# Patient Record
Sex: MALE | Race: BLACK OR AFRICAN AMERICAN | NOT HISPANIC OR LATINO | Employment: OTHER | URBAN - METROPOLITAN AREA
[De-identification: names, ages, dates, MRNs, and addresses within clinical notes are randomized per-mention and may not be internally consistent; named-entity substitution may affect disease eponyms.]

---

## 2017-01-13 ENCOUNTER — TELEPHONE (OUTPATIENT)
Dept: INTERNAL MEDICINE | Facility: CLINIC | Age: 57
End: 2017-01-13

## 2017-01-13 DIAGNOSIS — M25.512 CHRONIC LEFT SHOULDER PAIN: Primary | ICD-10-CM

## 2017-01-13 DIAGNOSIS — G89.29 CHRONIC LEFT SHOULDER PAIN: Primary | ICD-10-CM

## 2017-01-17 ENCOUNTER — OFFICE VISIT (OUTPATIENT)
Dept: SPORTS MEDICINE | Facility: CLINIC | Age: 57
End: 2017-01-17
Payer: COMMERCIAL

## 2017-01-17 ENCOUNTER — HOSPITAL ENCOUNTER (OUTPATIENT)
Dept: RADIOLOGY | Facility: HOSPITAL | Age: 57
Discharge: HOME | End: 2017-01-17
Attending: ORTHOPAEDIC SURGERY
Payer: COMMERCIAL

## 2017-01-17 VITALS
SYSTOLIC BLOOD PRESSURE: 135 MMHG | DIASTOLIC BLOOD PRESSURE: 94 MMHG | BODY MASS INDEX: 33.04 KG/M2 | WEIGHT: 218 LBS | HEART RATE: 75 BPM | HEIGHT: 68 IN

## 2017-01-17 DIAGNOSIS — M25.512 LEFT SHOULDER PAIN, UNSPECIFIED CHRONICITY: ICD-10-CM

## 2017-01-17 DIAGNOSIS — G89.29 CHRONIC LEFT SHOULDER PAIN: ICD-10-CM

## 2017-01-17 DIAGNOSIS — M25.512 CHRONIC LEFT SHOULDER PAIN: ICD-10-CM

## 2017-01-17 DIAGNOSIS — M25.512 LEFT SHOULDER PAIN, UNSPECIFIED CHRONICITY: Primary | ICD-10-CM

## 2017-01-17 PROCEDURE — 3080F DIAST BP >= 90 MM HG: CPT | Mod: S$GLB,,, | Performed by: PHYSICIAN ASSISTANT

## 2017-01-17 PROCEDURE — 99999 PR PBB SHADOW E&M-EST. PATIENT-LVL IV: CPT | Mod: PBBFAC,,, | Performed by: PHYSICIAN ASSISTANT

## 2017-01-17 PROCEDURE — 99203 OFFICE O/P NEW LOW 30 MIN: CPT | Mod: S$GLB,,, | Performed by: PHYSICIAN ASSISTANT

## 2017-01-17 PROCEDURE — 3075F SYST BP GE 130 - 139MM HG: CPT | Mod: S$GLB,,, | Performed by: PHYSICIAN ASSISTANT

## 2017-01-17 PROCEDURE — 73030 X-RAY EXAM OF SHOULDER: CPT | Mod: TC,PO,LT

## 2017-01-17 PROCEDURE — 1159F MED LIST DOCD IN RCRD: CPT | Mod: S$GLB,,, | Performed by: PHYSICIAN ASSISTANT

## 2017-01-17 PROCEDURE — 73030 X-RAY EXAM OF SHOULDER: CPT | Mod: 26,LT,, | Performed by: RADIOLOGY

## 2017-01-17 PROCEDURE — 97110 THERAPEUTIC EXERCISES: CPT | Mod: S$GLB,,, | Performed by: PHYSICIAN ASSISTANT

## 2017-01-17 NOTE — LETTER
January 17, 2017      Damir Webber MD  [address]  [facility] [address]           [facility]  [address]  [facility] [address]  Phone: [phone]          Patient: Ihsan Mays Sr.   MR Number: [id number]   YOB: 1960   Date of Visit: 1/17/2017       Dear Dr. Damir Webber:    Thank you for referring Ihsan Mays to me for evaluation. Attached you will find relevant portions of my assessment and plan of care.    If you have questions, please do not hesitate to call me. I look forward to following Ihsan Mays along with you.    Sincerely,    Verna Duncan PA-C    Enclosure  CC:  No Recipients    If you would like to receive this communication electronically, please contact [email or web address] or [phone] to request more information on [employer] Link access.    For providers and/or their staff who would like to refer a patient to [facility], please contact us through our one-stop-shop provider referral line, [facility], at [phone].    If you feel you have received this communication in error or would no longer like to receive these types of communications, please e-mail [email or web address]

## 2017-01-17 NOTE — MR AVS SNAPSHOT
[facility]  [address]  [facility] [address]  Phone: [phone]                  Ihsan Mays .   2017 4:00 PM   Office Visit    Description:  Male : 1960   Provider:  Verna Duncan PA-C   Department:  [facility]           Reason for Visit     Left Shoulder - Pain           Diagnoses this Visit        Comments    Left shoulder pain, unspecified chronicity    -  Primary            To Do List           Future Appointments        Provider Department Dept Phone    2017 3:45 PM [facility] [place] [place] [phone]      Goals (5 Years of Data)     None      [facility]     [facility] Nurse Care Line -  Assistance  Registered nurses in the [facility] Center provide clinical advisement, health education, appointment booking, and other advisory services.  Call for this free service at [phone].             Medications           Message regarding Medications     Verify the changes and/or additions to your medication regime listed below are the same as discussed with your clinician today.  If any of these changes or additions are incorrect, please notify your healthcare provider.             Verify that the below list of medications is an accurate representation of the medications you are currently taking.  If none reported, the list may be blank. If incorrect, please contact your healthcare provider. Carry this list with you in case of emergency.           Current Medications     carvedilol (COREG) 6.25 MG tablet Take 1 tablet (6.25 mg total) by mouth 2 (two) times daily.    latanoprost 0.005 % ophthalmic solution 1 drop every evening.    methylPREDNISolone (MEDROL DOSEPACK) 4 mg tablet Take as directed    valsartan-hydrochlorothiazide (DIOVAN-HCT) 160-25 mg per tablet Take 1 tablet by mouth once daily.    esomeprazole (NEXIUM) 40 MG capsule Take 1 capsule (40 mg total) by mouth before breakfast.  "          Clinical Reference Information           Vital Signs - Last Recorded  Most recent update: 1/17/2017  4:00 PM by Rahda Da Silva MA    BP Pulse Ht Wt BMI    (!) 135/94 75 5' 8" (1.727 m) 98.9 kg (218 lb) 33.15 kg/m2      Blood Pressure          Most Recent Value    BP  (!)  135/94      Allergies as of 1/17/2017     No Known Allergies      Immunizations Administered on Date of Encounter - 1/17/2017     None      Orders Placed During Today's Visit     Future Labs/Procedures Expected by Expires    MRI Upper Extremity Joint WO Cont Left  1/17/2017 1/17/2018    X-Ray Shoulder 2 or More Views Left  1/17/2017 1/17/2018      [facility] Sign-Up     Activating your [facility] account is as easy as 1-2-3!     1) Visit [email or web address], select Sign Up Now, enter this activation code and your date of birth, then select Next.  [email or web address]  Expires: 3/3/2017  3:51 PM      2) Create a username and password to use when you visit [facility] in the future and select a security question in case you lose your password and select Next.    3) Enter your e-mail address and click Sign Up!    Additional Information  If you have questions, please e-mail [email or web address] or call [phone] to talk to our [facility] staff. Remember, [facility] is NOT to be used for urgent needs. For medical emergencies, dial 911.         Instructions                     "

## 2017-01-17 NOTE — PROGRESS NOTES
Subjective:          Chief Complaint: Ihsan Mays Sr. is a 56 y.o. male who had concerns including Pain of the Left Shoulder.    HPI   Patient presents to clinic with left shoulder pain x 6 months. Denies trauma or injury to shoulder.  Pain is worse with overhead motions. Pain today is 4-5/10. Pain at its worst is 7-8/10. He has been soaking in a hot bath. Pain is worse when lying on the left side.He has never had any pain in his shoulder before. He has noticed stiffness in his shoulder with over head motions. Pain is severe and not responding to conservative treatment.    No hx of surgery to shoulder.    Review of Systems   Constitution: Negative. Negative for chills, fever, weight gain and weight loss.   HENT: Negative for congestion, headaches and sore throat.    Eyes: Negative for blurred vision and double vision.   Cardiovascular: Negative for chest pain, leg swelling and palpitations.   Respiratory: Negative for cough and shortness of breath.    Hematologic/Lymphatic: Does not bruise/bleed easily.   Skin: Negative for itching, poor wound healing and rash.   Musculoskeletal: Positive for joint pain and stiffness. Negative for back pain, joint swelling, muscle weakness and myalgias.   Gastrointestinal: Negative for abdominal pain, constipation, diarrhea, nausea and vomiting.   Genitourinary: Negative.  Negative for frequency and hematuria.   Neurological: Negative for dizziness, numbness, paresthesias and sensory change.   Psychiatric/Behavioral: Negative for altered mental status and depression. The patient is not nervous/anxious.    Allergic/Immunologic: Negative for hives.       Pain Related Questions  Over the past 3 days, what was your average pain during activity? (I.e. running, jogging, walking, climbing stairs, getting dressed, ect.): 7  Over the past 3 days, what was your highest pain level?: 9  Over the past 3 days, what was your lowest pain level? : 6    Other  How many nights a week are you  awakened by your affected body part?: 7  Was the patient's HEIGHT measured or patient reported?: Patient Reported  Was the patient's WEIGHT measured or patient reported?: Measured      Objective:        General: Ihsan is well-developed, well-nourished, appears stated age, in no acute distress, alert and oriented to time, place and person.     General    Vitals reviewed.  Constitutional: He is oriented to person, place, and time. He appears well-developed and well-nourished. No distress.   HENT:   Head: Normocephalic.   Eyes: EOM are normal.   Neck: Normal range of motion.   Cardiovascular: Normal rate and regular rhythm.    Pulmonary/Chest: Effort normal. No respiratory distress.   Neurological: He is alert and oriented to person, place, and time. He has normal reflexes. No cranial nerve deficit. Coordination normal.   Psychiatric: He has a normal mood and affect. His behavior is normal. Judgment and thought content normal.         Right Shoulder Exam     Inspection/Observation   Swelling: absent  Bruising: absent  Scars: absent  Deformity: absent  Scapular Winging: absent  Scapular Dyskinesia: negative  Atrophy: absent    Tenderness   The patient is experiencing no tenderness.        Range of Motion   Active Abduction:  160 normal   Passive Abduction:  160 normal   Extension:  50 normal   Forward Flexion: normal Right shoulder forward flexion: 165.   Adduction: 80 normal  External Rotation 0 degrees: 50   Internal Rotation 0 degrees: T10     Muscle Strength   The patient has normal right shoulder strength.    Tests & Signs   Apprehension: negative  Cross Arm: negative  Drop Arm: negative  Hawkin's test: negative  Impingement: negative  Sulcus: absent  Lift Off Sign: negative  Active Compression Test (Mayes's Sign): negative  Yergason's Test: negative  Speed's Test: negative  Anterior Drawer Test: 1+   Posterior Drawer Test: 1+    Other   Sensation: normal    Left Shoulder Exam     Inspection/Observation    Swelling: absent  Bruising: absent  Scars: absent  Deformity: absent  Scapular Winging: absent  Scapular Dyskinesia: negative  Atrophy: absent    Tenderness   The patient is experiencing no tenderness.         Range of Motion   Active Abduction:  160 normal   Passive Abduction:  160 normal   Extension:  50 normal   Forward Flexion: normal Left shoulder active forward flexion: 165.   Adduction: 80 normal  External Rotation 0 degrees: 30   Internal Rotation 0 degrees: T10     Muscle Strength   The patient has normal left shoulder strength.    Tests & Signs   Apprehension: negative  Cross Arm: positive  Drop Arm: negative  Hawkin's test: negative  Impingement: negative  Sulcus: absent  Lift Off Sign: negative  Active Compression test ([place]'s Sign): negative  Yergasons's Test: negative  Speed's Test: negative  Anterior Drawer Test: 1+  Posterior Drawer Test: 1+    Other   Sensation: normal       Muscle Strength   Right Upper Extremity   Shoulder Abduction: 5/5   Shoulder Internal Rotation: 5/5   Shoulder External Rotation: 5/5   Supraspinatus: 5/5/5   Subscapularis: 5/5/5   Biceps: 5/5/5   Left Upper Extremity  Shoulder Abduction: 4/5   Shoulder Internal Rotation: 5/5   Shoulder External Rotation: 5/5   Supraspinatus: 4/5/5   Subscapularis: 5/5/5   Biceps: 5/5/5     Reflexes     Left Side  Biceps:  2+  Triceps:  2+  Brachioradialis:  2+    Right Side   Biceps:  2+  Triceps:  2+  Brachioradialis:  2+      RADIOGRAPHS:  AP, scapular Y, and axillary radiographs of the left shoulder were obtained. The bones appear intact. There is no evidence for acute fracture or bone destruction. There is no evidence for dislocation. Soft tissues are unremarkable.         Assessment:       Encounter Diagnosis   Name Primary?    Left shoulder pain, unspecified chronicity Yes          Plan:       1. MRI of left shoulder     2. NSAIDS prn pain    3. RTC in 1 week with Verna Duncan for MRI results review    4. HEP 77198 - I,  instructed and demonstrated a RTC and periscapular HEP. The patient then demonstrated understanding of exercises and proper technique. This program was performed for 12 minutes.                     Patient questionnaires may have been collected.

## 2017-03-21 DIAGNOSIS — I10 ESSENTIAL HYPERTENSION: ICD-10-CM

## 2017-03-21 RX ORDER — CARVEDILOL 6.25 MG/1
6.25 TABLET ORAL 2 TIMES DAILY
Qty: 180 TABLET | Refills: 3 | Status: SHIPPED | OUTPATIENT
Start: 2017-03-21 | End: 2018-02-04 | Stop reason: SDUPTHER

## 2017-04-07 DIAGNOSIS — I10 ESSENTIAL HYPERTENSION: ICD-10-CM

## 2017-04-07 RX ORDER — VALSARTAN AND HYDROCHLOROTHIAZIDE 160; 25 MG/1; MG/1
1 TABLET ORAL DAILY
Qty: 90 TABLET | Refills: 3 | Status: SHIPPED | OUTPATIENT
Start: 2017-04-07 | End: 2018-02-21 | Stop reason: SDUPTHER

## 2017-04-07 NOTE — TELEPHONE ENCOUNTER
----- Message from Milton Xiao MA sent at 4/7/2017 12:10 PM CDT -----  Contact: self - [phone]  Type: Rx    Name of medication(s):  valsartan-hydrochlorothiazide (DIOVAN-HCT) 160-25 mg per tablet    Is this a refill? New rx? Refill     Who prescribed medication?    Pharmacy Name, Phone, & Location: [facility] - [address]    Comments: Also, requesting a 2 week supply to be sent to [facility]/pharmacy #[id number] - [facility] [address] AT [facility] until his Rx comes in. Patient is out of this medicated. Please call. Thanks!

## 2017-04-20 ENCOUNTER — OFFICE VISIT (OUTPATIENT)
Dept: INTERNAL MEDICINE | Facility: CLINIC | Age: 57
End: 2017-04-20
Attending: FAMILY MEDICINE
Payer: COMMERCIAL

## 2017-04-20 VITALS
DIASTOLIC BLOOD PRESSURE: 85 MMHG | WEIGHT: 221.31 LBS | HEART RATE: 66 BPM | SYSTOLIC BLOOD PRESSURE: 132 MMHG | HEIGHT: 68 IN | BODY MASS INDEX: 33.54 KG/M2

## 2017-04-20 DIAGNOSIS — I10 ESSENTIAL HYPERTENSION: Primary | ICD-10-CM

## 2017-04-20 DIAGNOSIS — K21.9 GASTROESOPHAGEAL REFLUX DISEASE, ESOPHAGITIS PRESENCE NOT SPECIFIED: ICD-10-CM

## 2017-04-20 DIAGNOSIS — Z00.00 PREVENTATIVE HEALTH CARE: ICD-10-CM

## 2017-04-20 DIAGNOSIS — D64.9 ANEMIA, UNSPECIFIED TYPE: ICD-10-CM

## 2017-04-20 PROCEDURE — 99999 PR PBB SHADOW E&M-EST. PATIENT-LVL III: CPT | Mod: PBBFAC,,, | Performed by: FAMILY MEDICINE

## 2017-04-20 PROCEDURE — 99396 PREV VISIT EST AGE 40-64: CPT | Mod: S$GLB,,, | Performed by: FAMILY MEDICINE

## 2017-04-20 PROCEDURE — 3075F SYST BP GE 130 - 139MM HG: CPT | Mod: S$GLB,,, | Performed by: FAMILY MEDICINE

## 2017-04-20 PROCEDURE — 3079F DIAST BP 80-89 MM HG: CPT | Mod: S$GLB,,, | Performed by: FAMILY MEDICINE

## 2017-04-20 NOTE — MR AVS SNAPSHOT
[facility]  [address]  [facility] [address]  Phone: [phone]  Fax: [phone]                  Ihsan Mays    2017 3:00 PM   Office Visit    Description:  Male : 1960   Provider:  Damir Webber MD   Department:  [facility]           Reason for Visit     Follow-up           Diagnoses this Visit        Comments    Essential hypertension    -  Primary     Anemia, unspecified type         Gastroesophageal reflux disease, esophagitis presence not specified         Preventative health care                To Do List           Goals (5 Years of Data)     None      Follow-Up and Disposition     Return in about 6 months (around 10/20/2017), or if symptoms worsen or fail to improve.      [facility] On Call     [facility] On Call Nurse Care Line -  Assistance  Unless otherwise directed by your provider, please contact [facility] On-Call, our nurse care line that is available for  assistance.     Registered nurses in the [facility] On Call Center provide: appointment scheduling, clinical advisement, health education, and other advisory services.  Call: [phone] (toll free)               Medications           Message regarding Medications     Verify the changes and/or additions to your medication regime listed below are the same as discussed with your clinician today.  If any of these changes or additions are incorrect, please notify your healthcare provider.             Verify that the below list of medications is an accurate representation of the medications you are currently taking.  If none reported, the list may be blank. If incorrect, please contact your healthcare provider. Carry this list with you in case of emergency.           Current Medications     carvedilol (COREG) 6.25 MG tablet Take 1 tablet (6.25 mg total) by mouth 2 (two) times daily.    latanoprost 0.005 % ophthalmic solution 1 drop every evening.    methylPREDNISolone (MEDROL  "DOSEPACK) 4 mg tablet Take as directed    valsartan-hydrochlorothiazide (DIOVAN-HCT) 160-25 mg per tablet Take 1 tablet by mouth once daily.    esomeprazole (NEXIUM) 40 MG capsule Take 1 capsule (40 mg total) by mouth before breakfast.           Clinical Reference Information           Your Vitals Were     BP Pulse Height Weight BMI    132/85 66 5' 8" (1.727 m) 100.4 kg (221 lb 5.5 oz) 33.65 kg/m2      Blood Pressure          Most Recent Value    BP  132/85      Allergies as of 4/20/2017     No Known Allergies      Immunizations Administered on Date of Encounter - 4/20/2017     None      Orders Placed During Today's Visit      Normal Orders This Visit    Ambulatory Referral to Gastroenterology     Future Labs/Procedures Expected by Expires    CBC auto differential  4/20/2017 4/20/2018    Comprehensive metabolic panel  4/20/2017 4/20/2018    Hemoglobin A1c  4/20/2017 4/20/2018    Lipid panel  4/20/2017 4/20/2018      [facility] Sign-Up     Activating your [facility] account is as easy as 1-2-3!     1) Visit [email or web address], select Sign Up Now, enter this activation code and your date of birth, then select Next.  [email or web address]  Expires: 6/4/2017  3:11 PM      2) Create a username and password to use when you visit [facility] in the future and select a security question in case you lose your password and select Next.    3) Enter your e-mail address and click Sign Up!    Additional Information  If you have questions, please e-mail [email or web address] or call [phone] to talk to our [facility] staff. Remember, [facility] is NOT to be used for urgent needs. For medical emergencies, dial 911.         Language Assistance Services     ATTENTION: Language assistance services are available, free of charge. Please call [phone].      ATENCIÓN: Si habla español, tiene a callahan disposición servicios gratuitos de asistencia lingüística. Llame al [phone].     CHÚ Ý: N?u b?n nói Ti?ng Vi?t, có các d?ch v? h? tr? ginger " ng? mi?n phí dành cho b?n. G?i s? [phone].         [facility] complies with applicable Federal civil rights laws and does not discriminate on the basis of race, color, national origin, age, disability, or sex.

## 2017-04-20 NOTE — PROGRESS NOTES
Subjective:       Patient ID: Ihsan Mays Sr. is a 56 y.o. male.    Chief Complaint: No chief complaint on file.  Ihsan Mays Sr. 56 y.o. 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  Tries to watch diet.  No stomach concerns, no increased LUTs.  Took BP meds noon today.      HPI  Review of Systems   Constitutional: Negative for activity change, appetite change, fatigue, fever and unexpected weight change.   HENT: Negative for congestion, hearing loss, postnasal drip and rhinorrhea.    Eyes: Negative for pain, discharge and visual disturbance.   Respiratory: Negative for cough, choking and shortness of breath.    Cardiovascular: Negative for chest pain, palpitations and leg swelling.   Gastrointestinal: Negative for abdominal pain, diarrhea and vomiting.   Genitourinary: Negative for dysuria, flank pain, hematuria and urgency.   Musculoskeletal: Negative for arthralgias, back pain, joint swelling and neck pain.   Skin: Negative for color change and rash.   Neurological: Negative for dizziness, tremors, syncope, weakness, numbness and headaches.   Psychiatric/Behavioral: Negative for agitation and confusion. The patient is not hyperactive.        Objective:      Physical Exam   Constitutional: He is oriented to person, place, and time. He appears well-developed and well-nourished.   HENT:   Head: Normocephalic.   Eyes: EOM are normal. Pupils are equal, round, and reactive to light.   Neck: Normal range of motion. Neck supple. No thyromegaly present.   Cardiovascular: Normal rate.  Exam reveals no gallop and no friction rub.    No murmur heard.  Pulmonary/Chest: Effort normal. No respiratory distress. He has no wheezes.   Abdominal: Soft. Bowel sounds are normal. He exhibits no mass. There is no tenderness.   Musculoskeletal: He exhibits no edema or tenderness.   Lymphadenopathy:     He has no cervical adenopathy.   Neurological: He is alert and  oriented to person, place, and time. He has normal reflexes. No cranial nerve deficit.   Skin: Skin is warm. No rash noted.   Psychiatric: He has a normal mood and affect. His behavior is normal.       Assessment:       No diagnosis found.    Plan:       Ihsan was seen today for follow-up.    Diagnoses and all orders for this visit:    Essential hypertension  -     Comprehensive metabolic panel; Future    Anemia, unspecified type  -     CBC auto differential; Future  -     Ambulatory Referral to Gastroenterology   - Not using PPI  Gastroesophageal reflux disease, esophagitis presence not specified  -     Ambulatory Referral to Gastroenterology    [facility] care  -     Lipid panel; Future  -     CBC auto differential; Future  -     Comprehensive metabolic panel; Future  -     Hemoglobin A1c; Future  -     Ambulatory Referral to Gastroenterology

## 2017-06-08 ENCOUNTER — TELEPHONE (OUTPATIENT)
Dept: INTERNAL MEDICINE | Facility: CLINIC | Age: 57
End: 2017-06-08

## 2017-06-08 NOTE — TELEPHONE ENCOUNTER
----- Message from Ludmila Saini sent at 6/8/2017  2:59 PM CDT -----  Contact: self [phone]  Patient Would like a call back to discuss getting an order for colon . Please advise , Thanks !       Please advise , Thanks !

## 2017-06-09 DIAGNOSIS — Z13.9 ENCOUNTER FOR SCREENING: Primary | ICD-10-CM

## 2017-09-05 ENCOUNTER — PATIENT MESSAGE (OUTPATIENT)
Dept: ADMINISTRATIVE | Facility: OTHER | Age: 57
End: 2017-09-05

## 2017-09-05 ENCOUNTER — OFFICE VISIT (OUTPATIENT)
Dept: INTERNAL MEDICINE | Facility: CLINIC | Age: 57
End: 2017-09-05
Payer: COMMERCIAL

## 2017-09-05 ENCOUNTER — PATIENT MESSAGE (OUTPATIENT)
Dept: GASTROENTEROLOGY | Facility: CLINIC | Age: 57
End: 2017-09-05

## 2017-09-05 ENCOUNTER — OFFICE VISIT (OUTPATIENT)
Dept: GASTROENTEROLOGY | Facility: CLINIC | Age: 57
End: 2017-09-05
Payer: COMMERCIAL

## 2017-09-05 VITALS
WEIGHT: 202.81 LBS | SYSTOLIC BLOOD PRESSURE: 150 MMHG | BODY MASS INDEX: 30.84 KG/M2 | HEART RATE: 80 BPM | DIASTOLIC BLOOD PRESSURE: 103 MMHG

## 2017-09-05 VITALS
HEIGHT: 68 IN | WEIGHT: 220.44 LBS | BODY MASS INDEX: 33.41 KG/M2 | SYSTOLIC BLOOD PRESSURE: 139 MMHG | DIASTOLIC BLOOD PRESSURE: 93 MMHG | HEART RATE: 75 BPM

## 2017-09-05 DIAGNOSIS — D64.9 ANEMIA, UNSPECIFIED TYPE: ICD-10-CM

## 2017-09-05 DIAGNOSIS — K21.9 GASTROESOPHAGEAL REFLUX DISEASE WITHOUT ESOPHAGITIS: Primary | ICD-10-CM

## 2017-09-05 DIAGNOSIS — K21.9 GASTROESOPHAGEAL REFLUX DISEASE, ESOPHAGITIS PRESENCE NOT SPECIFIED: ICD-10-CM

## 2017-09-05 DIAGNOSIS — D64.9 ANEMIA, UNSPECIFIED: ICD-10-CM

## 2017-09-05 DIAGNOSIS — I10 ESSENTIAL HYPERTENSION: Primary | ICD-10-CM

## 2017-09-05 PROCEDURE — 3077F SYST BP >= 140 MM HG: CPT | Mod: S$GLB,,, | Performed by: FAMILY MEDICINE

## 2017-09-05 PROCEDURE — 3075F SYST BP GE 130 - 139MM HG: CPT | Mod: S$GLB,,, | Performed by: NURSE PRACTITIONER

## 2017-09-05 PROCEDURE — 99999 PR PBB SHADOW E&M-EST. PATIENT-LVL III: CPT | Mod: PBBFAC,,, | Performed by: NURSE PRACTITIONER

## 2017-09-05 PROCEDURE — 3008F BODY MASS INDEX DOCD: CPT | Mod: S$GLB,,, | Performed by: FAMILY MEDICINE

## 2017-09-05 PROCEDURE — 3008F BODY MASS INDEX DOCD: CPT | Mod: S$GLB,,, | Performed by: NURSE PRACTITIONER

## 2017-09-05 PROCEDURE — 99215 OFFICE O/P EST HI 40 MIN: CPT | Mod: S$GLB,,, | Performed by: FAMILY MEDICINE

## 2017-09-05 PROCEDURE — 99999 PR PBB SHADOW E&M-EST. PATIENT-LVL III: CPT | Mod: PBBFAC,,, | Performed by: FAMILY MEDICINE

## 2017-09-05 PROCEDURE — 3080F DIAST BP >= 90 MM HG: CPT | Mod: S$GLB,,, | Performed by: NURSE PRACTITIONER

## 2017-09-05 PROCEDURE — 3080F DIAST BP >= 90 MM HG: CPT | Mod: S$GLB,,, | Performed by: FAMILY MEDICINE

## 2017-09-05 PROCEDURE — 99214 OFFICE O/P EST MOD 30 MIN: CPT | Mod: S$GLB,,, | Performed by: NURSE PRACTITIONER

## 2017-09-05 RX ORDER — AMLODIPINE BESYLATE 5 MG/1
5 TABLET ORAL DAILY
Qty: 30 TABLET | Refills: 0 | Status: SHIPPED | OUTPATIENT
Start: 2017-09-05 | End: 2017-11-30

## 2017-09-05 NOTE — PROGRESS NOTES
[facility] Note    Reason for Visit:  Gastroesophageal reflux    PCP: Damir Webber     Referring MD: [address] / [place] [address]    HPI:  This is a 56 y.o. male here for evaluation of gastroesophageal reflux and anemia.    Anemia: Labs in 4/2017 with mild decrease in RBC. Normal H/H, slightly elevated MCH. H/o mild decrease in H/H and elevated MCH x 6 years. EGD/colon in 2015 w/ non bleeding erosive gastritis, no h. Pylori and external hemorrhoids, benign polyp x 1.   GI bleeding - denies hematochezia, hematemesis, melena, BRBPR, black/tarry stools, and coffee ground emesis    GERD  Onset-2 years ago. Not worsening. S/s occurring less than monthly.   Typical Symptoms:  Pyrosis - retrosternal pyrosis once every few months  Regurgitation/Water Brash -none. No acid taste  Upright/Nocturnal symptoms - upright s/s.  Dysphagia/Odynophagia - none    Atypical Symptoms:  Hoarseness/Cough - none  Throat clearing - none  Chest pain - none  Asthma - none    Late Meals - dinner @ 6-7 pm. Remains upright for 1 hour after meals.  Food Triggers -none noticed  Caffeine intake - coffee 3 days per week in the winter time-less than this in the summer. Tea once weekly. 1-2 sodas every 2-3 weeks.  PPI usage/history - h/x Nexium two years ago for about 3 months.   NSAID usage - goody once every 6 months or so.    Abdominal pain - no  Bowel habits - normal. 1 formed BM/day      ROS:  Constitutional: No fevers, no chills, No unintentional weight loss, no fatigue,   ENT: No allergies  CV: No chest pain, no palpitations, no perif. edema, no sob on exertion  Pulm: No cough, No shortness of breath, no wheezes, no sputum  Ophtho: No vision changes  GI: see HPI; also no nausea, no vomiting, no change in appetite  Derm: No rash  Heme: No lymphadenopathy, No bruising  MSK: + arthritis, no muscle pain, no muscle weakness  : No dysuria, No hematuria  Endo: No hot or cold intolerance  Neuro: No syncope, No  "seizure,     Medical History:  has a past medical history of Glaucoma and Hypertension.    Surgical History:  has a past surgical history that includes Stomach surgery; Glaucoma surgery; and Finger surgery.    Family History: family history is not on file..     Social History:  reports that he has never smoked. He has never used smokeless tobacco. He reports that he drinks alcohol. He reports that he does not use drugs.    Review of patient's allergies indicates:  No Known Allergies    Current Outpatient Prescriptions   Medication Sig    amlodipine (NORVASC) 5 MG tablet Take 1 tablet (5 mg total) by mouth once daily.    carvedilol (COREG) 6.25 MG tablet Take 1 tablet (6.25 mg total) by mouth 2 (two) times daily.    latanoprost 0.005 % ophthalmic solution 1 drop every evening.    valsartan-hydrochlorothiazide (DIOVAN-HCT) 160-25 mg per tablet Take 1 tablet by mouth once daily.     No current facility-administered medications for this visit.        Objective Findings:    Vital Signs:  BP (!) 139/93   Pulse 75   Ht 5' 8" (1.727 m)   Wt 100 kg (220 lb 7.4 oz)   BMI 33.52 kg/m²   Body mass index is 33.52 kg/m².    Physical Exam:  General Appearance: Well appearing in no acute distress  Head:   Normocephalic, without obvious abnormality  Eyes:    No scleral icterus, EOMI  ENT: Neck supple, Lips, mucosa, and tongue normal; teeth and gums normal  Lungs: CTA bilaterally in anterior and posterior fields, no wheezes, no crackles.  Heart:  Regular rate and rhythm, S1, S2 normal, no murmurs heard  Abdomen: Soft, non tender, non distended with positive bowel sounds in all four quadrants. No hepatosplenomegaly, ascites, or mass  Extremities: 2+ radial pulses, no clubbing, cyanosis or edema  Skin: No rash to exposed areas  Neurologic: A&Ox4      Labs:  Lab Results   Component Value Date    WBC 6.48 04/20/2017    HGB 14.1 04/20/2017    HCT 40.8 04/20/2017     04/20/2017    CHOL 183 04/20/2017    TRIG 149 04/20/2017    " HDL 50 04/20/2017    ALT 26 04/20/2017    AST 30 04/20/2017     04/20/2017    K 3.8 04/20/2017     04/20/2017    CREATININE 0.9 04/20/2017    BUN 15 04/20/2017    CO2 24 04/20/2017    PSA 2.6 07/14/2016    INR 1.0 02/22/2012    HGBA1C 5.9 04/20/2017     No results found for: IRON, TIBC, FERRITIN, SATURATEDIRO      Endoscopy:    6/22/2015 EGD Dr Pérez-[facility]. Non bleeding erosive gastropathy. Path-chemical/reactive gastropathy. No h.pylori  6/22/2015 colonoscopy Dr. Pérez- non thrombosed external hemorrhoids. 2 mm rectal polyp. Path- hyperplastic. repeat in 5 years (2020).   Assessment:  1. Gastroesophageal reflux disease without esophagitis    2. Anemia, unspecified           Recommendations:  1. GERD- s/s less than monthly. No intervention needed.   2. Anemia- repeat labs w/ iron studies. Stools r/o occult blood.     F/u pending results.       Order summary:  Orders Placed This Encounter    CBC auto differential    Iron and TIBC    Occult blood x 1, stool    Occult blood x 1, stool    Occult blood x 1, stool         Thank you so much for allowing me to participate in the care of Ihsan Mays Sr.    Yuli Shell, APRN,FNP-C

## 2017-09-05 NOTE — PROGRESS NOTES
Subjective:       Patient ID: Ihsan Mays Sr. is a 56 y.o. male.    Chief Complaint: No chief complaint on file.  Ihsan Mays Sr. 56 y.o. male is here for office visit to review care and physical exam, reports needs pre op clearance for cataract surgery.  Uses HTN meds regulary, ezxcept this AM.  Didn't see GI, reports no one would answwer when called-bck #?  HPI  Review of Systems   Constitutional: Negative for activity change, appetite change, fatigue, fever and unexpected weight change.   HENT: Negative for congestion, hearing loss, postnasal drip and rhinorrhea.    Eyes: Negative for pain, discharge and visual disturbance.   Respiratory: Negative for cough, choking and shortness of breath.    Cardiovascular: Negative for chest pain, palpitations and leg swelling.   Gastrointestinal: Negative for abdominal pain, diarrhea and vomiting.   Genitourinary: Negative for dysuria, flank pain, hematuria and urgency.   Musculoskeletal: Negative for arthralgias, back pain, joint swelling and neck pain.   Skin: Negative for color change and rash.   Neurological: Negative for dizziness, tremors, syncope, weakness, numbness and headaches.   Psychiatric/Behavioral: Negative for agitation and confusion. The patient is not hyperactive.        Objective:      Physical Exam   Constitutional: He is oriented to person, place, and time. He appears well-developed and well-nourished.   HENT:   Head: Normocephalic.   Eyes: EOM are normal. Pupils are equal, round, and reactive to light.   Neck: Normal range of motion. Neck supple. No thyromegaly present.   Cardiovascular: Normal rate.  Exam reveals no gallop and no friction rub.    No murmur heard.  Pulmonary/Chest: Effort normal. No respiratory distress. He has no wheezes.   Abdominal: Soft. Bowel sounds are normal. He exhibits no mass. There is no tenderness.   Musculoskeletal: He exhibits no edema or tenderness.   Lymphadenopathy:     He has no cervical adenopathy.    Neurological: He is alert and oriented to person, place, and time. He has normal reflexes. No cranial nerve deficit.   Skin: Skin is warm. No rash noted.   Psychiatric: He has a normal mood and affect. His behavior is normal.       Assessment:       No diagnosis found.    Plan:       Diagnoses and all orders for this visit:    Essential hypertension  -     NURSING COMMUNICATION: Create [facility]  -     Hypertension Digital Medicine (HDMP) Enrollment Order  -     Hypertension Digital Medicine ([facility]): Assign Onboarding Questionnaires    Gastroesophageal reflux disease, esophagitis presence not specified  -     Ambulatory Referral to Gastroenterology    Anemia, unspecified type  -     Ambulatory Referral to Gastroenterology    Other orders  -     amlodipine (NORVASC) 5 MG tablet; Take 1 tablet (5 mg total) by mouth once daily.

## 2017-09-05 NOTE — LETTER
September 5, 2017      Damir Webber MD  [address]  [place] [address]           [facility] - Gastroenterology  [address]  [place] [address]  Phone: [phone]  Fax: [phone]          Patient: Ihsan Mays Sr.   MR Number: [id number]   YOB: 1960   Date of Visit: 9/5/2017       Dear Dr. Damir Webber:    Thank you for referring Ihsan Mays to me for evaluation. Attached you will find relevant portions of my assessment and plan of care.    If you have questions, please do not hesitate to call me. I look forward to following Ihsan Mays along with you.    Sincerely,    Yuli Shell NP    Enclosure  CC:  No Recipients    If you would like to receive this communication electronically, please contact [email or web address] or [phone] to request more information on [employer] Link access.    For providers and/or their staff who would like to refer a patient to [facility], please contact us through our one-stop-shop provider referral line, [facility], at [phone].    If you feel you have received this communication in error or would no longer like to receive these types of communications, please e-mail [email or web address]

## 2017-09-09 ENCOUNTER — LAB VISIT (OUTPATIENT)
Dept: LAB | Facility: HOSPITAL | Age: 57
End: 2017-09-09
Attending: NURSE PRACTITIONER
Payer: COMMERCIAL

## 2017-09-09 DIAGNOSIS — D64.9 ANEMIA, UNSPECIFIED: ICD-10-CM

## 2017-09-09 PROCEDURE — 82272 OCCULT BLD FECES 1-3 TESTS: CPT

## 2017-09-10 LAB — OB PNL STL: NEGATIVE

## 2017-09-11 ENCOUNTER — LAB VISIT (OUTPATIENT)
Dept: LAB | Facility: HOSPITAL | Age: 57
End: 2017-09-11
Attending: NURSE PRACTITIONER
Payer: COMMERCIAL

## 2017-09-11 DIAGNOSIS — D64.9 ANEMIA, UNSPECIFIED: ICD-10-CM

## 2017-09-11 LAB
OB PNL STL: NEGATIVE
OB PNL STL: NEGATIVE

## 2017-09-11 PROCEDURE — 82272 OCCULT BLD FECES 1-3 TESTS: CPT

## 2017-09-12 ENCOUNTER — CLINICAL SUPPORT (OUTPATIENT)
Dept: INTERNAL MEDICINE | Facility: CLINIC | Age: 57
End: 2017-09-12
Payer: COMMERCIAL

## 2017-09-12 VITALS — DIASTOLIC BLOOD PRESSURE: 85 MMHG | SYSTOLIC BLOOD PRESSURE: 115 MMHG

## 2017-09-12 DIAGNOSIS — Z01.30 BLOOD PRESSURE CHECK: Primary | ICD-10-CM

## 2017-09-12 PROCEDURE — 99999 PR PBB SHADOW E&M-EST. PATIENT-LVL III: CPT | Mod: PBBFAC,,,

## 2017-09-12 NOTE — PROGRESS NOTES
Subjective:       Patient ID: Ihsan Mays Sr. is a 58 y.o. male.    Chief Complaint: No chief complaint on file.  Ihsan Mays Sr. 58 y.o. male is here for office visit to review care and physical exam,  HPI  Review of Systems   Constitutional: Negative for activity change, appetite change, fatigue, fever and unexpected weight change.   HENT: Negative for congestion, hearing loss, postnasal drip and rhinorrhea.    Eyes: Negative for pain, discharge and visual disturbance.   Respiratory: Negative for cough, choking and shortness of breath.    Cardiovascular: Negative for chest pain, palpitations and leg swelling.   Gastrointestinal: Negative for abdominal pain, diarrhea and vomiting.   Genitourinary: Negative for dysuria, flank pain, hematuria and urgency.   Musculoskeletal: Positive for arthralgias, joint swelling and myalgias. Negative for back pain and neck pain.   Skin: Negative for color change and rash.   Neurological: Negative for dizziness, tremors, syncope, weakness, numbness and headaches.   Psychiatric/Behavioral: Negative for agitation and confusion. The patient is not hyperactive.        Objective:      Physical Exam   Constitutional: He is oriented to person, place, and time. He appears well-developed and well-nourished.   HENT:   Head: Normocephalic.   Eyes: EOM are normal. Pupils are equal, round, and reactive to light.   Neck: Normal range of motion. Neck supple. No thyromegaly present.   Cardiovascular: Normal rate.  Exam reveals no gallop and no friction rub.    No murmur heard.  Pulmonary/Chest: Effort normal. No respiratory distress. He has no wheezes.   Abdominal: Soft. Bowel sounds are normal. He exhibits no mass. There is no tenderness.   Musculoskeletal: He exhibits no edema or tenderness.   Lymphadenopathy:     He has no cervical adenopathy.   Neurological: He is alert and oriented to person, place, and time. He has normal reflexes. No cranial nerve deficit.   Skin: Skin is warm. No  rash noted.   Psychiatric: He has a normal mood and affect. His behavior is normal.       Assessment:       1. Blood pressure check        Plan:       Diagnoses and all orders for this visit:    Blood pressure check        Addendum: Closed by Dr Glez for Dr Webber but the content of the note was not altered or added to in any way.

## 2017-09-14 ENCOUNTER — PATIENT OUTREACH (OUTPATIENT)
Dept: OTHER | Facility: OTHER | Age: 57
End: 2017-09-14

## 2017-09-14 NOTE — LETTER
Fide Shen, Kira  [address] [place] [address]     Dear Ihsan Mays Sr.,    Welcome to the [facility]!         My name is Fide Shen PharmD and I will be your dedicated Digital Medicine clinician. As an expert in medication management, I will help manage your medications and identify lifestyle changes to improve blood pressure control. Together, we will work to improve your overall health.     What we expect from YOU:    You will need to take blood pressure readings multiple times a week and no less than one reading per week.   It is important that you take your measurements at different times during the day, when possible.     What you should expect from your Digital Medicine Care Team:   I will provide you with education about high blood pressure, including lifestyle changes that could help you to control your blood pressure.   I will review your weekly readings and provide you with monthly blood pressure progress reports after you have been in the program for more than 30 days.   I will send monthly progress reports on your blood pressure control to your physician so they can follow along with your progress as well.    You will be able to reach me by phone at  or through your [facility] account by clicking my name under Care Team on the right side of the home screen.    I look forward to working with you to achieve your blood pressure goals!    Sincerely,    Fide Shen PharmD  Your Personal Clinical Pharmacist    Please visit [email or web address] to learn more about high blood pressure and what you can do lower your blood pressure.                                                                                                    Ihsan Mays Sr.  [address]  [place] [address]

## 2017-09-15 ENCOUNTER — PATIENT MESSAGE (OUTPATIENT)
Dept: INTERNAL MEDICINE | Facility: CLINIC | Age: 57
End: 2017-09-15

## 2017-09-18 ENCOUNTER — TELEPHONE (OUTPATIENT)
Dept: INTERNAL MEDICINE | Facility: CLINIC | Age: 57
End: 2017-09-18

## 2017-09-18 NOTE — TELEPHONE ENCOUNTER
----- Message from Nilsa Live sent at 9/18/2017 12:23 PM CDT -----  Contact: self [phone]  Patient has left several messages regarding a form that needs to be sent to his eye doctor, please call patient back to give him an update.    Thank you

## 2017-09-20 ENCOUNTER — OFFICE VISIT (OUTPATIENT)
Dept: INTERNAL MEDICINE | Facility: CLINIC | Age: 57
End: 2017-09-20
Payer: COMMERCIAL

## 2017-09-20 VITALS
HEART RATE: 68 BPM | DIASTOLIC BLOOD PRESSURE: 79 MMHG | SYSTOLIC BLOOD PRESSURE: 123 MMHG | HEIGHT: 68 IN | BODY MASS INDEX: 33.78 KG/M2 | WEIGHT: 222.88 LBS

## 2017-09-20 DIAGNOSIS — I10 ESSENTIAL HYPERTENSION: ICD-10-CM

## 2017-09-20 DIAGNOSIS — D64.9 ANEMIA, UNSPECIFIED TYPE: Primary | ICD-10-CM

## 2017-09-20 DIAGNOSIS — Z00.00 PREVENTATIVE HEALTH CARE: Primary | ICD-10-CM

## 2017-09-20 DIAGNOSIS — Z01.818 PRE-OPERATIVE EXAMINATION FOR INTERNAL MEDICINE: ICD-10-CM

## 2017-09-20 PROCEDURE — 3078F DIAST BP <80 MM HG: CPT | Mod: S$GLB,,, | Performed by: FAMILY MEDICINE

## 2017-09-20 PROCEDURE — 99215 OFFICE O/P EST HI 40 MIN: CPT | Mod: S$GLB,,, | Performed by: FAMILY MEDICINE

## 2017-09-20 PROCEDURE — 99999 PR PBB SHADOW E&M-EST. PATIENT-LVL III: CPT | Mod: PBBFAC,,, | Performed by: FAMILY MEDICINE

## 2017-09-20 PROCEDURE — 3074F SYST BP LT 130 MM HG: CPT | Mod: S$GLB,,, | Performed by: FAMILY MEDICINE

## 2017-09-20 PROCEDURE — 3008F BODY MASS INDEX DOCD: CPT | Mod: S$GLB,,, | Performed by: FAMILY MEDICINE

## 2017-09-26 NOTE — TELEPHONE ENCOUNTER
HTN Digital Medicine Program Medication Reconciliation Outreach    Called patient to introduce him into the HDMP. Reviewed program details including blood pressure goals, technique for taking readings (timing, cuff placement, body positioning, i[employer] kasandra), and what to do in case of emergency. Introduced patient to members of care team (health , clinician, and responsible provider). Verified patient's understanding of [facility] My[facility] kasandra use for contacting clinical team and to ensure that iHealth cuff readings continue to transmit by logging in once every 2 weeks. Confirmation text sent and patient received.  Reviewed logging into [email or web address] kasandra as we have not received readings in 6 days. Pt reports he usually takes readings in evening once he is home from work and settled down. Has been taking Carvedilol BID + Diovan in AM. Has not started Norvasc as he did not feel he needed it. When he saw Dr. Akbar alcaraz, he was having a lot of personal stress and had also drank a large cup of coffee prior to office visit.    Screening Questionnaire Review:  1. Depression - not indicated  2. Sleep apnea - no       Verified the following information with the patient:  1. Medication list  Current Outpatient Prescriptions on File Prior to Visit   Medication Sig Dispense Refill    carvedilol (COREG) 6.25 MG tablet Take 1 tablet (6.25 mg total) by mouth 2 (two) times daily. 180 tablet 3    latanoprost 0.005 % ophthalmic solution 1 drop every evening.      valsartan-hydrochlorothiazide (DIOVAN-HCT) 160-25 mg per tablet Take 1 tablet by mouth once daily. 90 tablet 3    amlodipine (NORVASC) 5 MG tablet Take 1 tablet (5 mg total) by mouth once daily. 30 tablet 0     No current facility-administered medications on file prior to visit.        Previous ADRs  NKDA    2. Medication compliance: has not been compliant with the medication regiment due to the following:  no reason given - did not feel that he needed to start Norvasc    3.  Medication Allergies: Review of patient's allergies indicates:  No Known Allergies    Explained that our goal is to get his BP consistently below 140/90mmHg.  Patient denies having further questions, concerns. BP is at goal.       Last 5 Patient Entered Redings Current 30 Day Average: 120/84     Recent Readings 9/19/2017 9/19/2017 9/16/2017 9/16/2017 9/16/2017    Systolic BP (mmHg) 123 137 120 119 130    Diastolic BP (mmHg) 83 86 88 85 93    Pulse 81 81 83 83 83

## 2017-09-28 ENCOUNTER — PATIENT OUTREACH (OUTPATIENT)
Dept: OTHER | Facility: OTHER | Age: 57
End: 2017-09-28

## 2017-09-28 NOTE — PROGRESS NOTES
Last 5 Patient Entered Readings Current 30 Day Average: 129/90     Recent Readings 11/12/2017 11/12/2017 11/12/2017 11/4/2017 11/4/2017    Systolic BP (mmHg) 136 136 139 133 140    Diastolic BP (mmHg) 96 98 96 95 96    Pulse 78 83 83 79 81        Hypertension Digital Medicine Program (HDMP): Health  Introduction    Introduced Mr. Ihsan Myas . to HDM. Discussed health  role and recommended lifestyle modifications.    Diet (i.e. Low sodium, food labels): Pt reports eating breakfast but not lunch. Reports his wife cooks his dinner and she doesn't cook with salt or high sodium seasoning. Discussed low sodium seasonings such as Mrs. Navarro with pt.   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 Reports in the past he walked and ran in his neighborhood during the summers. Discussed different ways to get exercise throughout the day and the recommended amount of 150 minutes of exercise per week.  Alcohol/Tobacco: Will discuss on later call.  Medication Adherence: has been compliant with the medicaiton regimen.   Other goals: Pt reports wanting to lose 20 pounds to lower his blood pressure.     Reviewed AHA recommendations:  Limit sodium intake to <2000mg/day  Perform 150 minutes of physical activity per week    Patient is aware of the importance of taking daily BP readings at various times of the day  Patient aware that the health  can be used as a resource for lifestyle modifications to help reduce or maintain a healthy BP  Patient is aware of the importance of medication adherence.  Patient is aware that HDMP team is not available for emergencies. Patient should call 911 or [facility] on [facility] if one arises.

## 2017-10-25 ENCOUNTER — PATIENT OUTREACH (OUTPATIENT)
Dept: OTHER | Facility: OTHER | Age: 57
End: 2017-10-25

## 2017-10-25 NOTE — PROGRESS NOTES
Last 5 Patient Entered Readings Current 30 Day Average: 129/90     Recent Readings 11/19/2017 11/19/2017 11/14/2017 11/12/2017 11/12/2017    Systolic BP (mmHg) 124 119 121 136 136    Diastolic BP (mmHg) 90 94 81 96 98    Pulse 91 90 96 78 83        Called patient to introduce him to the Hypertension Digital Medicine Program.     Mr. Esqueda BP is approaching goal. He informed me that he is not taking amlodipine because he felt he didn't need it. Dr. Webber prescribed amlodipine at a clinic visit when Mr. Esqueda BP was 150/103. Mr. Mays says this was after having coffee and not resting before his BP was taken. His BP readings at home are much lower. Discussed changing valsartan/HCTZ to valsartan and chlorthalidone. He is open to the change but would like to work on reducing salt intake first, as he just received a refill for valsartan/HCTZ. His wife does most of the cooking and uses [employer]. Advised he have her switch to the no salt version. He also says he wife likes to eat out and  fast food often, which he knows is high in sodium. Will send a task to his health  to review low sodium diet with Mr. Mays and his wife, as she does the cooking. Will follow up with Mr. Mays in 3-4 weeks.     Reviewed patient's medications and verified allergies on file.   Hypertension Medications             carvedilol (COREG) 6.25 MG tablet Take 1 tablet (6.25 mg total) by mouth 2 (two) times daily.    valsartan-hydrochlorothiazide (DIOVAN-HCT) 160-25 mg per tablet Take 1 tablet by mouth once daily.        Explained that we expect him to obtain several blood pressures/week at random times of day. Also asked that the BP be taken at least 1 hour after taking BP medications.     Explained that our goal is to get his BP to consistently below 130/80mmHg.     Patient and I agreed that the patient will take his BP daily to every other day at varying times of the day.     Emailed patient link to [facility] HTN webpage  as well as my direct phone number in case he has any questions.

## 2017-12-12 ENCOUNTER — PATIENT OUTREACH (OUTPATIENT)
Dept: OTHER | Facility: OTHER | Age: 57
End: 2017-12-12

## 2017-12-12 NOTE — PROGRESS NOTES
Last 5 Patient Entered Readings Current 30 Day Average: 126/87     Recent Readings 12/5/2017 12/5/2017 11/27/2017 11/27/2017 11/19/2017    Systolic BP (mmHg) 117 128 130 133 124    Diastolic BP (mmHg) 84 93 86 92 90    Pulse 83 87 79 82 91          Hypertension Digital Medicine (HDMP) Health  Follow Up      Called pt to follow up about blood pressure readings and he said he would call me back another day. Per 30 day average, blood pressure is well controlled 126/87 mmHg. Encouraged adherence to low sodium diet and physical activity guidelines.

## 2017-12-28 ENCOUNTER — PATIENT OUTREACH (OUTPATIENT)
Dept: OTHER | Facility: OTHER | Age: 57
End: 2017-12-28

## 2017-12-28 NOTE — PROGRESS NOTES
Last 5 Patient Entered Readings                                      Current 30 Day Average: 128/87     Recent Readings 5/7/2018 5/7/2018 5/6/2018 5/6/2018 5/5/2018    SBP (mmHg) 101 115 121 129 149    DBP (mmHg) 69 81 89 85 96    Pulse 101 98 100 102 69        Mr. Mays was seen in the ED for chest pain and elevated BP. He reports feeling better now. He confirms carvedilol dose was increased. His HR is elevated, but he has no complaints of feeling his heart race. He denies any LH/dizziness with low BP readings. He is out of town for work and was brief on the phone.     Patient denies s/s of hypotension (lightheadedness, dizziness, nausea, fatigue) associated with low readings. Instructed patient to inform me if this occurs, patient confirms understanding.      Will continue to monitor regularly. Will follow up in 6-8 weeks, sooner if BP begins to trend upward or downward.    Patient has my contact information and knows to call with any concerns or clinical changes.     Current HTN regimen:  Hypertension Medications             carvedilol (COREG) 12.5 MG tablet Take 1 tablet (12.5 mg total) by mouth 2 (two) times daily with meals.         valsartan-hydrochlorothiazide (DIOVAN-HCT) 160-25 mg per tablet TAKE 1 TABLET BY MOUTH ONCE DAILY

## 2018-01-01 ENCOUNTER — HOSPITAL ENCOUNTER (EMERGENCY)
Facility: HOSPITAL | Age: 58
Discharge: HOME | End: 2018-01-01
Attending: EMERGENCY MEDICINE
Payer: COMMERCIAL

## 2018-01-01 VITALS
RESPIRATION RATE: 18 BRPM | OXYGEN SATURATION: 98 % | TEMPERATURE: 98 F | SYSTOLIC BLOOD PRESSURE: 153 MMHG | BODY MASS INDEX: 33.8 KG/M2 | HEART RATE: 71 BPM | DIASTOLIC BLOOD PRESSURE: 99 MMHG | WEIGHT: 223 LBS | HEIGHT: 68 IN

## 2018-01-01 DIAGNOSIS — I10 HYPERTENSION, UNSPECIFIED TYPE: ICD-10-CM

## 2018-01-01 DIAGNOSIS — Z91.89 AT RISK FOR MEDICATION NONCOMPLIANCE: Primary | ICD-10-CM

## 2018-01-01 PROCEDURE — 25000003 PHARM REV CODE 250: Performed by: EMERGENCY MEDICINE

## 2018-01-01 PROCEDURE — 99283 EMERGENCY DEPT VISIT LOW MDM: CPT

## 2018-01-01 RX ORDER — CLONIDINE HYDROCHLORIDE 0.1 MG/1
0.1 TABLET ORAL
Status: COMPLETED | OUTPATIENT
Start: 2018-01-01 | End: 2018-01-01

## 2018-01-01 RX ORDER — FUROSEMIDE 20 MG/1
40 TABLET ORAL
Status: COMPLETED | OUTPATIENT
Start: 2018-01-01 | End: 2018-01-01

## 2018-01-01 RX ORDER — FUROSEMIDE 20 MG/1
20 TABLET ORAL DAILY
Qty: 6 TABLET | Refills: 0 | Status: SHIPPED | OUTPATIENT
Start: 2018-01-01 | End: 2018-01-05

## 2018-01-01 RX ADMIN — CLONIDINE HYDROCHLORIDE 0.1 MG: 0.1 TABLET ORAL at 04:01

## 2018-01-01 RX ADMIN — FUROSEMIDE 40 MG: 20 TABLET ORAL at 04:01

## 2018-01-01 NOTE — DISCHARGE INSTRUCTIONS
Follow-up with her primary care physician as needed.  Return to this emergency room with any concerns.  Make a log of your blood pressures once in the morning and once in the evening.  Present this to Dr. Webber at next visit.

## 2018-01-01 NOTE — ED PROVIDER NOTES
Encounter Date: 1/1/2018       History     Chief Complaint   Patient presents with    Hypertension     BP has been high since Saturday, denies symptoms      Well-developed 57-year-old male comes emergent complaints of hypertension.  Mild low-grade headache.  No other neurological deficits.  Patient has 3 blood pressure medicines prescribed to him however only takes 2 of them.  Had ham and turkey over the last several days.          Review of patient's allergies indicates:  No Known Allergies  Past Medical History:   Diagnosis Date    Glaucoma     Hypertension      Past Surgical History:   Procedure Laterality Date    FINGER SURGERY      right hand pinkie finger     GLAUCOMA SURGERY      STOMACH SURGERY      ulcers      Family History   Problem Relation Age of Onset    Colon cancer Neg Hx     Esophageal cancer Neg Hx     Rectal cancer Neg Hx     Stomach cancer Neg Hx     Ulcerative colitis Neg Hx     Irritable bowel syndrome Neg Hx     Crohn's disease Neg Hx     Celiac disease Neg Hx      Social History   Substance Use Topics    Smoking status: Never Smoker    Smokeless tobacco: Never Used    Alcohol use Yes      Comment: occasionally     Review of Systems   Constitutional: Negative.    HENT: Negative.    Respiratory: Negative.    Cardiovascular: Negative.    Gastrointestinal: Negative.    Genitourinary: Negative.    Musculoskeletal: Negative.    Neurological: Positive for headaches ( Mild occasional headache).   All other systems reviewed and are negative.      Physical Exam     Initial Vitals [01/01/18 1311]   BP Pulse Resp Temp SpO2   (!) 185/103 78 20 98 °F (36.7 °C) 97 %      MAP       130.33         Physical Exam    Nursing note and vitals reviewed.  Constitutional: He appears well-developed and well-nourished.   HENT:   Head: Normocephalic.   Cardiovascular: Normal rate, regular rhythm and normal heart sounds.   Pulmonary/Chest: Breath sounds normal.   Abdominal: Soft.   Musculoskeletal:  Normal range of motion.   Neurological: He is alert and oriented to person, place, and time. He has normal strength and normal reflexes.   Skin: Skin is warm and dry.         ED Course   Procedures  Labs Reviewed - No data to display          Medical Decision Making:   Differential Diagnosis:   Hypertensive crisis, CVA, medication noncompliance                   ED Course      Clinical Impression:   The primary encounter diagnosis was At risk for medication noncompliance. A diagnosis of Hypertension, unspecified type was also pertinent to this visit.    Disposition:   Disposition: Discharged  Condition: Stable                        Bradley Wolf MD  01/01/18 7666

## 2018-01-05 ENCOUNTER — OFFICE VISIT (OUTPATIENT)
Dept: INTERNAL MEDICINE | Facility: CLINIC | Age: 58
End: 2018-01-05
Payer: COMMERCIAL

## 2018-01-05 VITALS
HEIGHT: 68 IN | OXYGEN SATURATION: 96 % | WEIGHT: 227.06 LBS | SYSTOLIC BLOOD PRESSURE: 101 MMHG | HEART RATE: 85 BPM | DIASTOLIC BLOOD PRESSURE: 70 MMHG | BODY MASS INDEX: 34.41 KG/M2

## 2018-01-05 DIAGNOSIS — I10 ESSENTIAL HYPERTENSION: Primary | ICD-10-CM

## 2018-01-05 PROCEDURE — 99214 OFFICE O/P EST MOD 30 MIN: CPT | Mod: S$GLB,,, | Performed by: FAMILY MEDICINE

## 2018-01-05 PROCEDURE — 99999 PR PBB SHADOW E&M-EST. PATIENT-LVL III: CPT | Mod: PBBFAC,,, | Performed by: FAMILY MEDICINE

## 2018-01-05 NOTE — PROGRESS NOTES
Subjective:       Patient ID: Ihsan Mays Sr. is a 57 y.o. male.    Chief Complaint: No chief complaint on file.  Ihsan Mays Sr. 57 y.o. male is here for office visit to review care and physical exam, was in ED with high BP, had diet indiscretion.  Is very busy with work, does digital program.      HPI  Review of Systems   Constitutional: Negative for activity change, appetite change, fatigue, fever and unexpected weight change.   HENT: Negative for congestion, hearing loss, postnasal drip and rhinorrhea.    Eyes: Negative for pain, discharge and visual disturbance.   Respiratory: Negative for cough, choking and shortness of breath.    Cardiovascular: Negative for chest pain, palpitations and leg swelling.   Gastrointestinal: Negative for abdominal pain, diarrhea and vomiting.   Genitourinary: Negative for dysuria, flank pain, hematuria and urgency.   Musculoskeletal: Negative for arthralgias, back pain, joint swelling and neck pain.   Skin: Negative for color change and rash.   Neurological: Negative for dizziness, tremors, syncope, weakness, numbness and headaches.   Psychiatric/Behavioral: Negative for agitation and confusion. The patient is not hyperactive.        Objective:      Physical Exam   Constitutional: He is oriented to person, place, and time. He appears well-developed and well-nourished.   HENT:   Head: Normocephalic.   Eyes: EOM are normal. Pupils are equal, round, and reactive to light.   Neck: Normal range of motion. Neck supple. No thyromegaly present.   Cardiovascular: Normal rate.  Exam reveals no gallop and no friction rub.    No murmur heard.  Pulmonary/Chest: Effort normal. No respiratory distress. He has no wheezes.   Abdominal: Soft. Bowel sounds are normal. He exhibits no mass. There is no tenderness.   Musculoskeletal: He exhibits no edema or tenderness.   Lymphadenopathy:     He has no cervical adenopathy.   Neurological: He is alert and oriented to person, place, and time. He  has normal reflexes. No cranial nerve deficit.   Skin: Skin is warm. No rash noted.   Psychiatric: He has a normal mood and affect. His behavior is normal.       Assessment:       No diagnosis found.    Plan:       Ihsan was seen today for hospital follow up.    Diagnoses and all orders for this visit:    Essential hypertension  -     Comprehensive metabolic panel; Future  -     Lipid panel; Future     - diet and exercise discussed, rtc three mo

## 2018-01-08 ENCOUNTER — PATIENT OUTREACH (OUTPATIENT)
Dept: OTHER | Facility: OTHER | Age: 58
End: 2018-01-08

## 2018-01-08 NOTE — PROGRESS NOTES
Last 5 Patient Entered Readings Current 30 Day Average: 130/90     Recent Readings 1/7/2018 1/4/2018 1/3/2018 1/2/2018 1/2/2018    SBP (mmHg) 124 123 116 127 142    DBP (mmHg) 91 86 83 91 97    Pulse 92 83 92 77 78        Hypertension Digital Medicine Program (HDMP): Health  Follow Up    Lifestyle Modifications:    1.Low sodium diet: yes, Pt reports eating out less and drinking more water. Pt reports he ate boiled shrimp and Ramen noodle the day he went to the ER and he feels like that is the reason his blood pressure was elevated. Discussed with pt that Ramen Noodle have 1150 mg of sodium in one container and seafood is usually boiled in crab boil and seasonings with tons of sodium in them. Discussed aiming for 2000 mgs of sodium per day to help with hypertension. Suggest using fooducate and MyfitnessPal to help with identifying how much sodium are in different food items. Educated pt on reading the menu before going out to eatReports he is now being mindful about his sodium and trying to read more food labels to ensure his BP doesn't elevate.Pt reports he is watching what his wife is cooking for him as well.    2.Physical activity: no , Pt reports he hasn't been active in the past few months. 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    3.Hypotension/Hypertension symptoms: no   Frequency/Alleviating factors/Precipitating factors, etc.     4.Patient has been compliant with the medication regimen.     Follow up with Mr. Ihsna Mays Sr. completed. No further questions or concerns. I will follow up in a few weeks to assess progress.

## 2018-01-09 ENCOUNTER — PATIENT MESSAGE (OUTPATIENT)
Dept: INTERNAL MEDICINE | Facility: CLINIC | Age: 58
End: 2018-01-09

## 2018-01-10 ENCOUNTER — HOSPITAL ENCOUNTER (EMERGENCY)
Facility: HOSPITAL | Age: 58
Discharge: HOME | End: 2018-01-10
Payer: COMMERCIAL

## 2018-01-10 VITALS
TEMPERATURE: 99 F | HEART RATE: 80 BPM | WEIGHT: 217 LBS | BODY MASS INDEX: 32.89 KG/M2 | SYSTOLIC BLOOD PRESSURE: 140 MMHG | HEIGHT: 68 IN | OXYGEN SATURATION: 97 % | RESPIRATION RATE: 16 BRPM | DIASTOLIC BLOOD PRESSURE: 90 MMHG

## 2018-01-10 DIAGNOSIS — J06.9 UPPER RESPIRATORY TRACT INFECTION, UNSPECIFIED TYPE: Primary | ICD-10-CM

## 2018-01-10 LAB
FLUAV AG SPEC QL IA: NEGATIVE
FLUBV AG SPEC QL IA: NEGATIVE
SPECIMEN SOURCE: NORMAL

## 2018-01-10 PROCEDURE — 99283 EMERGENCY DEPT VISIT LOW MDM: CPT

## 2018-01-10 PROCEDURE — 87400 INFLUENZA A/B EACH AG IA: CPT | Mod: 59

## 2018-01-10 RX ORDER — GUAIFENESIN 100 MG/5ML
200 SOLUTION ORAL EVERY 4 HOURS PRN
Qty: 150 ML | Refills: 0 | Status: SHIPPED | OUTPATIENT
Start: 2018-01-10 | End: 2018-01-20

## 2018-01-10 NOTE — ED PROVIDER NOTES
Encounter Date: 1/10/2018       History     Chief Complaint   Patient presents with    Cough     cough, nasal congestion, body aches, back pain with cough since sunday. no fever. taking mucinex and thearflu with some help at night.      57-year-old male presents to emergency room complaining of cough, body aches, back pain, sore throat, nasal congestion for the past 2 days.  Patient denies any ill contacts.  Denies any fever.  Has been taking TheraFlu states he has not given for relief of symptoms.          Review of patient's allergies indicates:  No Known Allergies  Past Medical History:   Diagnosis Date    Glaucoma     Hypertension      Past Surgical History:   Procedure Laterality Date    FINGER SURGERY      right hand pinkie finger     GLAUCOMA SURGERY      STOMACH SURGERY      ulcers      Family History   Problem Relation Age of Onset    Colon cancer Neg Hx     Esophageal cancer Neg Hx     Rectal cancer Neg Hx     Stomach cancer Neg Hx     Ulcerative colitis Neg Hx     Irritable bowel syndrome Neg Hx     Crohn's disease Neg Hx     Celiac disease Neg Hx      Social History   Substance Use Topics    Smoking status: Never Smoker    Smokeless tobacco: Never Used    Alcohol use Yes      Comment: occasionally     Review of Systems   Constitutional: Positive for appetite change and fatigue. Negative for fever.   HENT: Positive for congestion and rhinorrhea. Negative for sore throat.    Respiratory: Positive for cough. Negative for shortness of breath.    Cardiovascular: Negative for chest pain.   Gastrointestinal: Negative for nausea.   Genitourinary: Negative for dysuria.   Musculoskeletal: Positive for arthralgias. Negative for back pain.   Skin: Negative for rash.   Neurological: Negative for weakness.   Hematological: Does not bruise/bleed easily.   All other systems reviewed and are negative.      Physical Exam     Initial Vitals [01/10/18 1333]   BP Pulse Resp Temp SpO2   (!) 141/95 83 18 98.5  °F (36.9 °C) 95 %      MAP       110.33         Physical Exam    Nursing note and vitals reviewed.  Constitutional: He appears well-developed and well-nourished. He is not diaphoretic. No distress.   HENT:   Head: Normocephalic and atraumatic.   Right Ear: Tympanic membrane normal.   Left Ear: Tympanic membrane normal.   Mouth/Throat: Oropharynx is clear and moist and mucous membranes are normal.   Eyes: Conjunctivae are normal.   Neck: Normal range of motion. Neck supple.   Cardiovascular: Normal rate and regular rhythm.   Pulmonary/Chest: Breath sounds normal. No respiratory distress.   Abdominal: Soft. There is no tenderness.   Musculoskeletal: Normal range of motion. He exhibits no tenderness.   Neurological: He is alert and oriented to person, place, and time. He has normal strength.   Skin: Skin is warm and dry. Capillary refill takes less than 2 seconds.   Psychiatric: He has a normal mood and affect. His behavior is normal. Judgment and thought content normal.         ED Course   Procedures  Labs Reviewed   INFLUENZA A AND B ANTIGEN             Medical Decision Making:   Initial Assessment:   57-year-old male presents to emergency room complaining of cough, body aches, back pain, sore throat, nasal congestion for the past 2 days.  Patient denies any ill contacts.  Denies any fever.  Has been taking TheraFlu states he has not given for relief of symptoms.  Patient is afebrile. Exam is unremarkable.  Posterior pharynx is unremarkable.  TMs are normal.  Lungs clear bilaterally.  Differential Diagnosis:   URI, viral syndrome, RSV, pneumonia, bronchitis, croup, GERD, influenza, strep throat  ED Management:  Patient given medications for symptom relief.  Instructed to hydrate well.  Take Tylenol and Motrin as needed for fever.  Follow-up primary care doctor in 3-5 days.  If any symptoms worsen return to emergency room.  Patient verbalized understanding.                     ED Course      Clinical Impression:   The  encounter diagnosis was Upper respiratory tract infection, unspecified type.                           Edel Del Valle NP  01/13/18 5646

## 2018-02-04 DIAGNOSIS — I10 ESSENTIAL HYPERTENSION: ICD-10-CM

## 2018-02-05 RX ORDER — CARVEDILOL 6.25 MG/1
TABLET ORAL
Qty: 180 TABLET | Refills: 0 | Status: SHIPPED | OUTPATIENT
Start: 2018-02-05 | End: 2018-04-05

## 2018-02-21 DIAGNOSIS — I10 ESSENTIAL HYPERTENSION: ICD-10-CM

## 2018-02-22 RX ORDER — VALSARTAN AND HYDROCHLOROTHIAZIDE 160; 25 MG/1; MG/1
1 TABLET ORAL DAILY
Qty: 90 TABLET | Refills: 6 | Status: SHIPPED | OUTPATIENT
Start: 2018-02-22 | End: 2018-11-02 | Stop reason: SDUPTHER

## 2018-03-05 ENCOUNTER — PATIENT MESSAGE (OUTPATIENT)
Dept: ADMINISTRATIVE | Facility: OTHER | Age: 58
End: 2018-03-05

## 2018-03-13 ENCOUNTER — PATIENT OUTREACH (OUTPATIENT)
Dept: OTHER | Facility: OTHER | Age: 58
End: 2018-03-13

## 2018-03-13 NOTE — PROGRESS NOTES
Last 5 Patient Entered Readings                                      Current 30 Day Average: 126/87     Recent Readings 3/4/2018 2/22/2018 2/10/2018 1/27/2018 1/27/2018    SBP (mmHg) 126 126 126 121 127    DBP (mmHg) 87 87 87 88 90    Pulse 92 92 92 90 95          Hypertension Digital Medicine Program (HDMP): Health  Follow Up    Lifestyle Modifications:  Request pt to give us at least one reading per week to stay active with the program. Reports his cuff isn't working properly and he has been out of town for work.       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     3.Hypotension/Hypertension symptoms: no Pt reports he has been feeling great and avoiding foods with a lot of sodium in them.  Frequency/Alleviating factors/Precipitating factors, etc.     4.Patient has been compliant with the medication regimen.     Follow up with Mr. Ihsan Mays Sr. completed. No further questions or concerns. I will follow up in a few weeks to assess progress.

## 2018-04-02 ENCOUNTER — LAB VISIT (OUTPATIENT)
Dept: LAB | Facility: HOSPITAL | Age: 58
End: 2018-04-02
Attending: FAMILY MEDICINE
Payer: COMMERCIAL

## 2018-04-02 DIAGNOSIS — I10 ESSENTIAL HYPERTENSION: ICD-10-CM

## 2018-04-02 LAB
ALBUMIN SERPL BCP-MCNC: 4.1 G/DL
ALP SERPL-CCNC: 77 U/L
ALT SERPL W/O P-5'-P-CCNC: 38 U/L
ANION GAP SERPL CALC-SCNC: 13 MMOL/L
AST SERPL-CCNC: 27 U/L
BILIRUB SERPL-MCNC: 0.8 MG/DL
BUN SERPL-MCNC: 15 MG/DL
CALCIUM SERPL-MCNC: 9.4 MG/DL
CHLORIDE SERPL-SCNC: 105 MMOL/L
CHOLEST SERPL-MCNC: 170 MG/DL
CHOLEST/HDLC SERPL: 3.5 {RATIO}
CO2 SERPL-SCNC: 27 MMOL/L
CREAT SERPL-MCNC: 0.84 MG/DL
EST. GFR  (AFRICAN AMERICAN): >60 ML/MIN/1.73 M^2
EST. GFR  (NON AFRICAN AMERICAN): >60 ML/MIN/1.73 M^2
GLUCOSE SERPL-MCNC: 85 MG/DL
HDLC SERPL-MCNC: 48 MG/DL
HDLC SERPL: 28.2 %
LDLC SERPL CALC-MCNC: 105.8 MG/DL
NONHDLC SERPL-MCNC: 122 MG/DL
POTASSIUM SERPL-SCNC: 3.5 MMOL/L
PROT SERPL-MCNC: 7.1 G/DL
SODIUM SERPL-SCNC: 145 MMOL/L
TRIGL SERPL-MCNC: 81 MG/DL

## 2018-04-02 PROCEDURE — 80053 COMPREHEN METABOLIC PANEL: CPT | Mod: PO

## 2018-04-02 PROCEDURE — 36415 COLL VENOUS BLD VENIPUNCTURE: CPT | Mod: PO

## 2018-04-02 PROCEDURE — 80061 LIPID PANEL: CPT

## 2018-04-05 ENCOUNTER — OFFICE VISIT (OUTPATIENT)
Dept: INTERNAL MEDICINE | Facility: CLINIC | Age: 58
End: 2018-04-05
Payer: COMMERCIAL

## 2018-04-05 VITALS — DIASTOLIC BLOOD PRESSURE: 88 MMHG | HEART RATE: 96 BPM | SYSTOLIC BLOOD PRESSURE: 135 MMHG

## 2018-04-05 DIAGNOSIS — D50.0 IRON DEFICIENCY ANEMIA DUE TO CHRONIC BLOOD LOSS: ICD-10-CM

## 2018-04-05 DIAGNOSIS — K21.00 GASTROESOPHAGEAL REFLUX DISEASE WITH ESOPHAGITIS: ICD-10-CM

## 2018-04-05 DIAGNOSIS — I10 ESSENTIAL HYPERTENSION: Primary | ICD-10-CM

## 2018-04-05 PROCEDURE — 3079F DIAST BP 80-89 MM HG: CPT | Mod: CPTII,S$GLB,, | Performed by: FAMILY MEDICINE

## 2018-04-05 PROCEDURE — 99999 PR PBB SHADOW E&M-EST. PATIENT-LVL II: CPT | Mod: PBBFAC,,, | Performed by: FAMILY MEDICINE

## 2018-04-05 PROCEDURE — 3075F SYST BP GE 130 - 139MM HG: CPT | Mod: CPTII,S$GLB,, | Performed by: FAMILY MEDICINE

## 2018-04-05 PROCEDURE — 99214 OFFICE O/P EST MOD 30 MIN: CPT | Mod: S$GLB,,, | Performed by: FAMILY MEDICINE

## 2018-04-05 RX ORDER — CARVEDILOL 12.5 MG/1
12.5 TABLET ORAL 2 TIMES DAILY WITH MEALS
Qty: 180 TABLET | Refills: 3 | Status: SHIPPED | OUTPATIENT
Start: 2018-04-05 | End: 2018-10-08 | Stop reason: SDUPTHER

## 2018-04-05 NOTE — PROGRESS NOTES
Subjective:       Patient ID: Ihsan Mays Sr. is a 57 y.o. male.    Chief Complaint: No chief complaint on file.  Ihsan Mays Sr. 57 y.o. male is here for office visit to review care and physical exam, reports feeling well, walking for exercise.  Does a lot of travel for work.  Tries to eat healthy.  Has digital med to follow BP, note HR up usually with good levels noted.  ROS unremarkable.      HPI  Review of Systems   Constitutional: Negative for activity change, appetite change, fatigue, fever and unexpected weight change.   HENT: Negative for congestion, hearing loss, postnasal drip and rhinorrhea.    Eyes: Negative for pain, discharge and visual disturbance.   Respiratory: Negative for cough, choking and shortness of breath.    Cardiovascular: Negative for chest pain, palpitations and leg swelling.   Gastrointestinal: Negative for abdominal pain, diarrhea and vomiting.   Genitourinary: Negative for dysuria, flank pain, hematuria and urgency.   Musculoskeletal: Negative for arthralgias, back pain, joint swelling and neck pain.   Skin: Negative for color change and rash.   Neurological: Negative for dizziness, tremors, syncope, weakness, numbness and headaches.   Psychiatric/Behavioral: Negative for agitation and confusion. The patient is not hyperactive.        Objective:      Physical Exam   Constitutional: He is oriented to person, place, and time. He appears well-developed and well-nourished.   HENT:   Head: Normocephalic.   Eyes: EOM are normal. Pupils are equal, round, and reactive to light.   Neck: Normal range of motion. Neck supple. No thyromegaly present.   Cardiovascular: Normal rate.  Exam reveals no gallop and no friction rub.    No murmur heard.  Pulmonary/Chest: Effort normal. No respiratory distress. He has no wheezes.   Abdominal: Soft. Bowel sounds are normal. He exhibits no mass. There is no tenderness.   Musculoskeletal: He exhibits no edema or tenderness.   Lymphadenopathy:     He  has no cervical adenopathy.   Neurological: He is alert and oriented to person, place, and time. He has normal reflexes. No cranial nerve deficit.   Skin: Skin is warm. No rash noted.   Psychiatric: He has a normal mood and affect. His behavior is normal.       Assessment:       1. Essential hypertension    2. Iron deficiency anemia due to chronic blood loss    3. Gastroesophageal reflux disease with esophagitis        Plan:       Diagnoses and all orders for this visit:    Essential hypertension  - will increase Coreg HR control  Iron deficiency anemia due to chronic blood loss  - No GERD symptoms, EGD reviewed  Gastroesophageal reflux disease with esophagitis  - No GERD symptoms, EGD reviewed, diet and weight discussed

## 2018-04-12 ENCOUNTER — PATIENT OUTREACH (OUTPATIENT)
Dept: OTHER | Facility: OTHER | Age: 58
End: 2018-04-12

## 2018-04-12 NOTE — PROGRESS NOTES
Last 5 Patient Entered Readings                                      Current 30 Day Average: 138/95     Recent Readings 4/9/2018 4/9/2018 4/4/2018 4/4/2018 3/4/2018    SBP (mmHg) 146 153 129 138 126    DBP (mmHg) 107 108 82 95 87    Pulse 78 80 84 86 92        4/12-LVM. Follow up attempt. Will call again on 4/19.  4/19-LVM. Follow up attempt #2. Will call again on 5/4.

## 2018-04-27 RX ORDER — CARVEDILOL 6.25 MG/1
TABLET ORAL
Qty: 180 TABLET | Refills: 3 | Status: SHIPPED | OUTPATIENT
Start: 2018-04-27 | End: 2018-06-21 | Stop reason: DRUGHIGH

## 2018-05-02 ENCOUNTER — TELEPHONE (OUTPATIENT)
Dept: INTERNAL MEDICINE | Facility: CLINIC | Age: 58
End: 2018-05-02

## 2018-05-02 ENCOUNTER — PATIENT MESSAGE (OUTPATIENT)
Dept: INTERNAL MEDICINE | Facility: CLINIC | Age: 58
End: 2018-05-02

## 2018-05-02 NOTE — TELEPHONE ENCOUNTER
CAlled patient to schedule an appointment no answer left message to call back and schedule an appointment.

## 2018-05-02 NOTE — TELEPHONE ENCOUNTER
----- Message from Bhavani Banks sent at 5/2/2018  2:59 PM CDT -----  Contact: Pt [phone]  Pt would like a call back from the nurse. He is currently at the urgent care for chest pains and leg swelling and they are wanting to run some cardiac lab test on his but he would like to speak with Dr Webber

## 2018-05-03 ENCOUNTER — OFFICE VISIT (OUTPATIENT)
Dept: INTERNAL MEDICINE | Facility: CLINIC | Age: 58
End: 2018-05-03
Payer: COMMERCIAL

## 2018-05-03 ENCOUNTER — HOSPITAL ENCOUNTER (EMERGENCY)
Facility: HOSPITAL | Age: 58
Discharge: HOME | End: 2018-05-03
Attending: EMERGENCY MEDICINE
Payer: COMMERCIAL

## 2018-05-03 VITALS
HEIGHT: 68 IN | TEMPERATURE: 98 F | RESPIRATION RATE: 18 BRPM | BODY MASS INDEX: 33.8 KG/M2 | OXYGEN SATURATION: 97 % | SYSTOLIC BLOOD PRESSURE: 170 MMHG | DIASTOLIC BLOOD PRESSURE: 103 MMHG | HEART RATE: 63 BPM | WEIGHT: 223 LBS

## 2018-05-03 VITALS — WEIGHT: 225.06 LBS | BODY MASS INDEX: 34.11 KG/M2 | HEIGHT: 68 IN

## 2018-05-03 DIAGNOSIS — D50.0 IRON DEFICIENCY ANEMIA DUE TO CHRONIC BLOOD LOSS: ICD-10-CM

## 2018-05-03 DIAGNOSIS — R07.9 CHEST PAIN: ICD-10-CM

## 2018-05-03 DIAGNOSIS — R07.89 OTHER CHEST PAIN: ICD-10-CM

## 2018-05-03 DIAGNOSIS — I10 ESSENTIAL HYPERTENSION: Primary | ICD-10-CM

## 2018-05-03 LAB
ALBUMIN SERPL BCP-MCNC: 3.8 G/DL
ALP SERPL-CCNC: 74 U/L
ALT SERPL W/O P-5'-P-CCNC: 33 U/L
ANION GAP SERPL CALC-SCNC: 9 MMOL/L
AST SERPL-CCNC: 34 U/L
BASOPHILS # BLD AUTO: 0.04 K/UL
BASOPHILS NFR BLD: 0.7 %
BILIRUB SERPL-MCNC: 0.5 MG/DL
BNP SERPL-MCNC: 48 PG/ML
BUN SERPL-MCNC: 13 MG/DL
CALCIUM SERPL-MCNC: 9.4 MG/DL
CHLORIDE SERPL-SCNC: 108 MMOL/L
CO2 SERPL-SCNC: 23 MMOL/L
CREAT SERPL-MCNC: 0.8 MG/DL
D DIMER PPP IA.FEU-MCNC: 0.47 MG/L FEU
DIFFERENTIAL METHOD: ABNORMAL
EOSINOPHIL # BLD AUTO: 0.1 K/UL
EOSINOPHIL NFR BLD: 2.5 %
ERYTHROCYTE [DISTWIDTH] IN BLOOD BY AUTOMATED COUNT: 13.1 %
EST. GFR  (AFRICAN AMERICAN): >60 ML/MIN/1.73 M^2
EST. GFR  (NON AFRICAN AMERICAN): >60 ML/MIN/1.73 M^2
GLUCOSE SERPL-MCNC: 104 MG/DL
HCT VFR BLD AUTO: 43.9 %
HGB BLD-MCNC: 13.9 G/DL
IMM GRANULOCYTES # BLD AUTO: 0.02 K/UL
IMM GRANULOCYTES NFR BLD AUTO: 0.4 %
LYMPHOCYTES # BLD AUTO: 1.5 K/UL
LYMPHOCYTES NFR BLD: 26.4 %
MCH RBC QN AUTO: 31.4 PG
MCHC RBC AUTO-ENTMCNC: 31.7 G/DL
MCV RBC AUTO: 99 FL
MONOCYTES # BLD AUTO: 0.7 K/UL
MONOCYTES NFR BLD: 12.1 %
NEUTROPHILS # BLD AUTO: 3.2 K/UL
NEUTROPHILS NFR BLD: 57.9 %
NRBC BLD-RTO: 0 /100 WBC
PLATELET # BLD AUTO: 184 K/UL
PMV BLD AUTO: 10.2 FL
POTASSIUM SERPL-SCNC: 4.5 MMOL/L
PROT SERPL-MCNC: 7.6 G/DL
RBC # BLD AUTO: 4.43 M/UL
SODIUM SERPL-SCNC: 140 MMOL/L
TROPONIN I SERPL DL<=0.01 NG/ML-MCNC: <0.006 NG/ML
WBC # BLD AUTO: 5.6 K/UL

## 2018-05-03 PROCEDURE — 3080F DIAST BP >= 90 MM HG: CPT | Mod: CPTII,S$GLB,, | Performed by: FAMILY MEDICINE

## 2018-05-03 PROCEDURE — 93010 ELECTROCARDIOGRAM REPORT: CPT | Mod: ,,, | Performed by: INTERNAL MEDICINE

## 2018-05-03 PROCEDURE — 80053 COMPREHEN METABOLIC PANEL: CPT

## 2018-05-03 PROCEDURE — 84484 ASSAY OF TROPONIN QUANT: CPT

## 2018-05-03 PROCEDURE — 93005 ELECTROCARDIOGRAM TRACING: CPT

## 2018-05-03 PROCEDURE — 99284 EMERGENCY DEPT VISIT MOD MDM: CPT | Mod: ,,, | Performed by: EMERGENCY MEDICINE

## 2018-05-03 PROCEDURE — 3077F SYST BP >= 140 MM HG: CPT | Mod: CPTII,S$GLB,, | Performed by: FAMILY MEDICINE

## 2018-05-03 PROCEDURE — 3008F BODY MASS INDEX DOCD: CPT | Mod: CPTII,S$GLB,, | Performed by: FAMILY MEDICINE

## 2018-05-03 PROCEDURE — 83880 ASSAY OF NATRIURETIC PEPTIDE: CPT

## 2018-05-03 PROCEDURE — 99284 EMERGENCY DEPT VISIT MOD MDM: CPT | Mod: 25

## 2018-05-03 PROCEDURE — 85025 COMPLETE CBC W/AUTO DIFF WBC: CPT

## 2018-05-03 PROCEDURE — 99999 PR PBB SHADOW E&M-EST. PATIENT-LVL III: CPT | Mod: PBBFAC,,, | Performed by: FAMILY MEDICINE

## 2018-05-03 PROCEDURE — 99214 OFFICE O/P EST MOD 30 MIN: CPT | Mod: S$GLB,,, | Performed by: FAMILY MEDICINE

## 2018-05-03 PROCEDURE — 85379 FIBRIN DEGRADATION QUANT: CPT

## 2018-05-03 NOTE — ED TRIAGE NOTES
Pt presents with bilateral chest pain that began 3 days ago. Pt describes pain as similar to a muscle strain. Pain worsens with movement. Pain has improved this morning. Pt denies SOB, weakness, dizziness or nausea.

## 2018-05-03 NOTE — ED PROVIDER NOTES
Encounter Date: 5/3/2018    SCRIBE #1 NOTE: I, Syeda Tineo, am scribing for, and in the presence of,  Dr. Wolf. I have scribed the entire note.       History     Chief Complaint   Patient presents with    Chest Pain     Pt c/o intermittent chest pain since Monday.  Pt went to urgent care yesterday and PCP today.  Sent here for labs and elevated BP.      Time seen by provider: 9:05 AM    This is a 57 y.o. male with comorbidity of HTN who presents with complaint of constant chest pain x 3 days. Pt states the pain felt like a pulled muscle that was worse when turning to the sides. Pain was 7-9/10 but currently 2-3/10. No associated SOB, numbness, or weakness. He denies any recent heavy lifting. He had bilateral ankle and feet swelling that has now improved. He notes he flew back from [place] 5 days ago. No recent exercise. He denies nausea, vomiting, fever, chills, and cough.       The history is provided by the patient and medical records.     Review of patient's allergies indicates:  No Known Allergies  Past Medical History:   Diagnosis Date    Glaucoma     Hypertension      Past Surgical History:   Procedure Laterality Date    FINGER SURGERY      right hand pinkie finger     GLAUCOMA SURGERY      STOMACH SURGERY      ulcers      Family History   Problem Relation Age of Onset    Colon cancer Neg Hx     Esophageal cancer Neg Hx     Rectal cancer Neg Hx     Stomach cancer Neg Hx     Ulcerative colitis Neg Hx     Irritable bowel syndrome Neg Hx     Crohn's disease Neg Hx     Celiac disease Neg Hx      Social History   Substance Use Topics    Smoking status: Never Smoker    Smokeless tobacco: Never Used    Alcohol use Yes      Comment: occasionally     Review of Systems   Constitutional: Negative for chills and fever.   HENT: Negative for nosebleeds.    Eyes: Negative for visual disturbance.   Respiratory: Negative for cough and shortness of breath.    Cardiovascular: Positive for chest pain.         Positive for bilateral ankle and feet swelling.    Gastrointestinal: Negative for nausea and vomiting.   Genitourinary: Negative for dysuria.   Musculoskeletal: Negative for back pain.   Skin: Negative for rash.   Neurological: Negative for weakness and numbness.       Physical Exam     Initial Vitals [05/03/18 0839]   BP Pulse Resp Temp SpO2   (!) 150/100 61 18 98 °F (36.7 °C) 98 %      MAP       116.67         Physical Exam    Nursing note and vitals reviewed.  Constitutional: He appears well-developed and well-nourished. He is not diaphoretic. No distress.   HENT:   Head: Normocephalic and atraumatic.   Mouth/Throat: Oropharynx is clear and moist.   Neck: Normal range of motion. Neck supple. No JVD present.   Cardiovascular: Normal rate, regular rhythm, normal heart sounds and intact distal pulses.   Pulmonary/Chest: Breath sounds normal. No respiratory distress. He has no wheezes. He has no rhonchi. He has no rales.   Abdominal: Soft. He exhibits no distension. There is no tenderness.   Musculoskeletal: Normal range of motion. He exhibits no edema.   Lymphadenopathy:     He has no cervical adenopathy.   Neurological: He is alert and oriented to person, place, and time. He has normal strength. No cranial nerve deficit or sensory deficit.   Skin: Skin is warm and dry.         ED Course   Procedures  Labs Reviewed   CBC W/ AUTO DIFFERENTIAL - Abnormal; Notable for the following:        Result Value    RBC 4.43 (*)     Hemoglobin 13.9 (*)     MCV 99 (*)     MCH 31.4 (*)     MCHC 31.7 (*)     All other components within normal limits   COMPREHENSIVE METABOLIC PANEL   TROPONIN I   B-TYPE NATRIURETIC PEPTIDE   D DIMER, QUANTITATIVE     EKG Readings: (Independently Interpreted)   Sinus bradycardia at 59. No ischemic changes.        X-Rays:   Independently Interpreted Readings:   Chest X-Ray: No acute process      Medical Decision Making:   History:   Old Medical Records: I decided to obtain old medical  records.  Initial Assessment:   Emergent evaluation of 57 y.o. male with chest pain. Differential includes musculoskeletal pain, ACS, MI, CHF, and PE. Will do labs, x-ray, EKG, and continue to monitor.     10:17 AM  Labs reviewed. D-dimer and troponin negative. Pending CMP.     12:03 PM   BNP is 48. Repeat blood pressure is 155/96. He continues to deny chest pain at this time. Pt stable for discharge.   Independently Interpreted Test(s):   I have ordered and independently interpreted X-rays - see prior notes.  I have ordered and independently interpreted EKG Reading(s) - see prior notes  Clinical Tests:   Lab Tests: Ordered and Reviewed  Radiological Study: Ordered and Reviewed  Medical Tests: Ordered and Reviewed            Scribe Attestation:   Scribe #1: I performed the above scribed service and the documentation accurately describes the services I performed. I attest to the accuracy of the note.    Attending Attestation:           Physician Attestation for Scribe:      Comments: I, Dr. Estela Wolf, personally performed the services described in this documentation. All medical record entries made by the scribe were at my direction and in my presence.  I have reviewed the chart and agree that the record reflects my personal performance and is accurate and complete. Estela Wolf MD.                 Clinical Impression:   The primary encounter diagnosis was Essential hypertension. A diagnosis of Chest pain was also pertinent to this visit.    Disposition:   Disposition: Discharged  Condition: Stable                        Estela Wolf MD  05/03/18 0625

## 2018-05-03 NOTE — ED NOTES
LOC: The patient is awake, alert and aware of environment with an appropriate affect, the patient is oriented x 3 and speaking appropriately.  APPEARANCE: Patient resting comfortably and in no acute distress, patient is clean and well groomed.  SKIN: The skin is warm and dry, patient has normal skin turgor and moist mucus membranes, skin intact, no breakdown or brusing noted.  MUSKULOSKELETAL: Patient moving all extremities well, no obvious swelling or deformities noted.  RESPIRATORY: Airway is open and patent, respirations are spontaneous, patient has a normal effort and rate. Breath sounds are clear and equal bilaterally.  CARDIAC: Normal heart sounds. No peripheral edema. Pt reports that his lower legs were swollen yesterday. Chest pain is not reproducible.   ABDOMEN: Soft and non tender to palpation, no distention noted. Bowel sounds present.  NEURO: No neuro deficits, hand grasp equal, no drift noted, no facial droop noted. Speech is clear.

## 2018-05-03 NOTE — PROVIDER PROGRESS NOTES - EMERGENCY DEPT.
Encounter Date: 5/3/2018    ED Physician Progress Notes       SCRIBE NOTE: I, Mirna Carbone, am scribing for, and in the presence of,  Dr. Pierre.  Physician Statement: I, Dr. Pierre, personally performed the services described in this documentation as scribed by Mirna Carbone in my presence, and it is both accurate and complete.      EKG - STEMI Decision  Initial Reading: No STEMI present.

## 2018-05-04 ENCOUNTER — PATIENT OUTREACH (OUTPATIENT)
Dept: OTHER | Facility: OTHER | Age: 58
End: 2018-05-04

## 2018-05-04 NOTE — PROGRESS NOTES
Last 5 Patient Entered Readings                                      Current 30 Day Average: 140/96     Recent Readings 5/4/2018 5/4/2018 5/4/2018 4/30/2018 4/30/2018    SBP (mmHg) 159 164 158 133 143    DBP (mmHg) 108 111 108 95 97    Pulse 69 68 73 66 70        Patient states he will be out of town next week-5/7 for 2 weeks.    Encounter consisted of patient discussing his ER visit due to chest angina. He states feeling fine and is more concerned about his health.  Informed patient the importance of maintaining a low-sodium diet and daily exercise regimen to prevent future cardiovascular episodes.    Assessed previous elevated readings. Patient states consuming fried foods and prepared meals from restaurants.     Patient states travelling a lot therefore making food to take to work is a bit of a challenge. He states working out of town for 2 weeks, then home for two weeks. Encouraged patient to take fresh fruits and vegetables in tupperware container. He states he will try this.     Digital Medicine: Health  Follow Up    Lifestyle Modifications:    1.Dietary Modifications (Sodium intake <2,000mg/day, food labels, dining out): Patient reports eating the same. Patient states not consuming fried foods. Patient reports eating out a lot recently. He states consuming spaghetti and meatballs and salads. Patient reports not knowing how his wife prepares the meals. Patient states consuming a shrimp poboy. Patient states consuming [employer] for lunch. Patient states his wife makes most meals when he is at home. Encouraged patient to decrease eating out as its difficult to gauge the amount of sodium added to foods. Encouraged patient to communicate the importance of reading food labels and purchasing items 140 mg of sodium or less. Patient states he will inform his wife and will try to decrease eating out.     2.Physical Activity: Patient reports not having a regimented exercise routine. He states he had previous success  with walking at least 10-15 minutes everyday. Patient states he will try to start walking again very soon.     3.Medication Therapy: Patient has been compliant with the medication regimen.    4.Patient has the following medication side effects/concerns:   (Frequency/Alleviating factors/Precipitating factors, etc.)     Follow up with Mr. Ihsan Mays Sr. completed. No further questions or concerns. Will continue follow up to achieve health goals.

## 2018-05-31 ENCOUNTER — PATIENT OUTREACH (OUTPATIENT)
Dept: OTHER | Facility: OTHER | Age: 58
End: 2018-05-31

## 2018-05-31 NOTE — PROGRESS NOTES
Last 5 Patient Entered Readings                                      Current 30 Day Average: 124/85     Recent Readings 5/30/2018 5/30/2018 5/23/2018 5/23/2018 5/21/2018    SBP (mmHg) 127 126 124 137 118    DBP (mmHg) 91 90 88 96 79    Pulse 66 67 76 79 88          Digital Medicine: Health  Follow Up    Left voicemail to follow up with Mr. Ihsan Mays Sr..  Current BP average 124/85 mmHg is not at goal, [130/80].

## 2018-06-21 ENCOUNTER — PATIENT OUTREACH (OUTPATIENT)
Dept: OTHER | Facility: OTHER | Age: 58
End: 2018-06-21

## 2018-06-21 NOTE — PROGRESS NOTES
Last 5 Patient Entered Readings                                      Current 30 Day Average: 120/82     Recent Readings 6/18/2018 6/18/2018 6/14/2018 6/14/2018 6/14/2018    SBP (mmHg) 130 124 123 123 135    DBP (mmHg) 86 84 85 85 87    Pulse 71 69 69 69 73        Mr. Mays is doing well. He is leaving out of town for work again next week. He is pleased with BP readings. Average is approaching goal, with DBP slightly above goal of <80 mmHg. Encouraged him to discuss tips for maintaining low salt diet with his health , Keshawn. Also encouraged him to continue to check BP regularly and take medications as prescribed.     Patient's BP average is controlled based on 2017 ACC/AHA HTN guidelines of goal BP <130/80.      Patient's health , Keshawn Torres, will be following up every 3-4 weeks. I will continue to monitor regularly and will follow up in 3-4 months, sooner if BP begins to trend upward or downward.    Patient has my contact information and knows to call with any concerns or clinical changes.     Current HTN regimen:  Hypertension Medications             carvedilol (COREG) 12.5 MG tablet Take 1 tablet (12.5 mg total) by mouth 2 (two) times daily with meals.    valsartan-hydrochlorothiazide (DIOVAN-HCT) 160-25 mg per tablet TAKE 1 TABLET BY MOUTH ONCE DAILY

## 2018-06-26 NOTE — PROGRESS NOTES
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      Digital Medicine: Health  Follow Up    Lifestyle Modifications:    1.Dietary Modifications (Sodium intake <2,000mg/day, food labels, dining out): He states maintaining same diet since previous encounter, however, the biggest difference is patient has drastically decreased his fast food consumption. He states thinking decreasing his fast food has brought down his BP. Reminded patient of elevated amounts of sodium in prepared foods-mainly fast food. Encouraged patient to continue limiting eating out.     2.Physical Activity: Patient reports maintaining the same level of activity. Will further discuss activity changes upon next encounter.     3.Medication Therapy: Patient has been compliant with the medication regimen.    4.Patient has the following medication side effects/concerns:   (Frequency/Alleviating factors/Precipitating factors, etc.)     Follow up with Mr. Ihsan Mays Sr. completed. No further questions or concerns. Will continue follow up to achieve health goals.

## 2018-07-17 ENCOUNTER — PATIENT OUTREACH (OUTPATIENT)
Dept: OTHER | Facility: OTHER | Age: 58
End: 2018-07-17

## 2018-07-17 NOTE — PROGRESS NOTES
Last 5 Patient Entered Readings                                      Current 30 Day Average: 127/87     Recent Readings 7/14/2018 7/13/2018 7/9/2018 7/4/2018 7/4/2018    SBP (mmHg) 109 145 128 140 140    DBP (mmHg) 76 98 89 92 92    Pulse 78 81 62 70 76        Assessed elevated diastolic number. He states eating out often. Encouraged patient to decrease of X eating out.       Digital Medicine: Health  Follow Up    Lifestyle Modifications:    1.Dietary Modifications (Sodium intake <2,000mg/day, food labels, dining out): Patient reports eating out often. Patient reports cutting back on portion size. He states eating Chinese food and hamburgers often. He expresses he and wife need to decrease the amount of times eating out. He states they ate out every day of the week. Encouraged patient to communicate with wife the importance of decreasing prepared meals and maintaining low-sodium diet.     2.Physical Activity: Deferred.    3.Medication Therapy: Patient has been compliant with the medication regimen.    4.Patient has the following medication side effects/concerns:   (Frequency/Alleviating factors/Precipitating factors, etc.)     Follow up with Mr. Ihsan Mays Sr. completed. No further questions or concerns. Will continue follow up to achieve health goals.

## 2018-08-07 ENCOUNTER — PATIENT OUTREACH (OUTPATIENT)
Dept: OTHER | Facility: OTHER | Age: 58
End: 2018-08-07

## 2018-08-07 NOTE — PROGRESS NOTES
Last 5 Patient Entered Readings                                      Current 30 Day Average: 127/86     Recent Readings 8/6/2018 8/4/2018 8/4/2018 8/3/2018 7/29/2018    SBP (mmHg) 113 145 148 146 113    DBP (mmHg) 80 87 97 97 77    Pulse 90 70 72 63 80           Digital Medicine: Health  Follow Up    Lifestyle Modifications:    1.Dietary Modifications (Sodium intake <2,000mg/day, food labels, dining out): Patient reports the high reading was due to eating seafood at a restaurant to celebrate an occasion with family. Patient reports eating out maybe 1 or 2x/week when at home. Patient reports trying to stay with salads with travelling. He states he is travelling more often than not. Encouraged patient to request a fridge to keep fresh fruit and salads to try and maintain a low-sodium diet. He states he will try.     2.Physical Activity: Patient reports walking often. He states averaging around 4-5k steps/day. Patient reports he will try to walk around 15-20 minutes at the gym. Encouraged patient to increase walking.     3.Medication Therapy: Patient has been compliant with the medication regimen.    4.Patient has the following medication side effects/concerns:   (Frequency/Alleviating factors/Precipitating factors, etc.)     Follow up with Mr. Ihsan Mays Sr. completed. No further questions or concerns. Will continue follow up to achieve health goals.

## 2018-08-13 ENCOUNTER — NURSE TRIAGE (OUTPATIENT)
Dept: ADMINISTRATIVE | Facility: CLINIC | Age: 58
End: 2018-08-13

## 2018-08-13 NOTE — TELEPHONE ENCOUNTER
Reason for Disposition   Patient wants to be seen    Protocols used: ST HIGH BLOOD PRESSURE-A-[facility]    Ihsan is calling to report his blood pressure is high this AM and he would like an appointment.  Blood pressure is 167/111.  No symptoms.  Is taking medication.  Has not missed a dose.  Appointment scheduled.

## 2018-08-14 ENCOUNTER — NURSE TRIAGE (OUTPATIENT)
Dept: ADMINISTRATIVE | Facility: CLINIC | Age: 58
End: 2018-08-14

## 2018-08-14 ENCOUNTER — TELEPHONE (OUTPATIENT)
Dept: INTERNAL MEDICINE | Facility: CLINIC | Age: 58
End: 2018-08-14

## 2018-08-14 ENCOUNTER — PATIENT MESSAGE (OUTPATIENT)
Dept: INTERNAL MEDICINE | Facility: CLINIC | Age: 58
End: 2018-08-14

## 2018-08-14 NOTE — TELEPHONE ENCOUNTER
Reason for Disposition   BP > 140/90 and is taking BP medications    Protocols used: ST HIGH BLOOD PRESSURE-A-[place]    Pt is out of town. Calling to report systolic BP in the 140s. Pt is wanting to speak with PCP. Please contact patient

## 2018-08-14 NOTE — TELEPHONE ENCOUNTER
----- Message from Rocky Slater sent at 8/14/2018  1:20 PM CDT -----  Contact: Pt  Pt is requesting an earlier appt due to having high blood pressure for a 5 days.  Attempt to reschedule appt pt needed to be seen asap.  Pt can be reached at [phone].    Thank You.

## 2018-08-15 ENCOUNTER — PATIENT OUTREACH (OUTPATIENT)
Dept: OTHER | Facility: OTHER | Age: 58
End: 2018-08-15

## 2018-08-15 NOTE — PROGRESS NOTES
"Last 5 Patient Entered Readings                                      Current 30 Day Average: 134/91     Recent Readings 8/15/2018 8/15/2018 8/15/2018 8/14/2018 8/14/2018    SBP (mmHg) 146 157 135 143 156    DBP (mmHg) 100 111 88 104 107    Pulse 64 72 79 78 77        Assessed elevated readings. He states eating take out Chinese food and field peas with tinoco over the weekend. He states not knowing what seasonings wife uses when making meals at home. He states "not feeling normal" with previous elevated readings. He states not having chest pains, headaches, and lite headed ness. He states eating late dinners also. He states he may eat 30 minutes and then go to sleep. Encouraged patient to limit amount of prepared meals, especially Chinese foods. Encouraged patient to allow at least 1 1/2 hrs prior to sleeping to allow food to digest.   He states eating a salad with 1 package of caesar salad dressing last night. He states consuming fruit in small fruit cups but no food labels. Encouraged patient to read food labels and search for items less than 140 mg/sodium. He states he will try. Will call in 2 weeks to further assess lifestyle factor changes.   Patient requested [facility] phone number. Provided number.   "

## 2018-08-17 ENCOUNTER — OFFICE VISIT (OUTPATIENT)
Dept: INTERNAL MEDICINE | Facility: CLINIC | Age: 58
End: 2018-08-17
Payer: COMMERCIAL

## 2018-08-17 VITALS
BODY MASS INDEX: 34.45 KG/M2 | WEIGHT: 227.31 LBS | HEIGHT: 68 IN | DIASTOLIC BLOOD PRESSURE: 80 MMHG | SYSTOLIC BLOOD PRESSURE: 118 MMHG | HEART RATE: 73 BPM | OXYGEN SATURATION: 96 %

## 2018-08-17 DIAGNOSIS — I10 ESSENTIAL HYPERTENSION: Primary | ICD-10-CM

## 2018-08-17 PROCEDURE — 3074F SYST BP LT 130 MM HG: CPT | Mod: CPTII,S$GLB,, | Performed by: NURSE PRACTITIONER

## 2018-08-17 PROCEDURE — 99214 OFFICE O/P EST MOD 30 MIN: CPT | Mod: S$GLB,,, | Performed by: NURSE PRACTITIONER

## 2018-08-17 PROCEDURE — 99999 PR PBB SHADOW E&M-EST. PATIENT-LVL III: CPT | Mod: PBBFAC,,, | Performed by: NURSE PRACTITIONER

## 2018-08-17 PROCEDURE — 3079F DIAST BP 80-89 MM HG: CPT | Mod: CPTII,S$GLB,, | Performed by: NURSE PRACTITIONER

## 2018-08-17 PROCEDURE — 3008F BODY MASS INDEX DOCD: CPT | Mod: CPTII,S$GLB,, | Performed by: NURSE PRACTITIONER

## 2018-08-17 NOTE — PATIENT INSTRUCTIONS
Low-Salt Choices  Eating salt (sodium) can make your body retain too much water. Excess water makes your heart work harder. Canned, packaged, and frozen foods are easy to prepare, but they are often high in sodium. Here are some ideas for low-salt foods you can easily prepare yourself.    For breakfast  · Fruit or 100% fruit juice  · Whole-wheat bread or an English muffin. Compare sodium content on labels.  · Low-fat milk or yogurt  · Unsalted eggs  · Shredded wheat  · Corn tortillas  · Unsalted steamed rice  · Regular (not instant) hot cereal, made without salt  Stay away from:  · Sausage, tinoco, and ham  · Flour tortillas  · Packaged muffins, pancakes, and biscuits  · Instant hot cereals  · Cottage cheese  For lunch and dinner  · Fresh fish, chicken, turkey, or meat--baked, broiled, or roasted without salt  · Dry beans, cooked without salt  · Tofu, stir-fried without salt  · Unsalted fresh fruit and vegetables, or frozen or canned fruit and vegetables with no added salt  Stay away from:  · Lunch or deli meat that is cured or smoked  · Cheese  · Tomato juice and catsup  · Canned vegetables, soups, and fish not labeled as no-salt-added or reduced sodium  · Packaged gravies and sauces  · Olives, pickles, and relish  · Bottled salad dressings  For snacks and desserts  · Yogurt  · Unsalted, air popped popcorn  · Unsalted nuts or seeds  Stay away from:  · Pies and cakes  · Packaged dessert mixes  · Pizza  · Canned and packaged puddings  · Pretzels, chips, crackers, and nuts--unless the label says unsalted  Date Last Reviewed: 6/17/2015  © 0343-9599 [employer]. [address] [place] [address], [place] [address]. All rights reserved. This information is not intended as a substitute for professional medical care. Always follow your healthcare professional's instructions.      Follow up Monday and bring in your digital cuff and we will compare

## 2018-08-17 NOTE — PROGRESS NOTES
Subjective:       Patient ID: Ihsan Mays Sr. is a 57 y.o. male.    Chief Complaint: Blood Pressure Check    Pt here for BP check.  Home BP readings elevated.  Pt enrolled in digital HTN program    Last 5 Blood Pressure Readings   View Complete Flowsheet   Recent Readings 8/16/2018 8/16/2018 8/15/2018 8/15/2018 8/15/2018  SBP (mmHg) 148 144 146 157 135  DBP (mmHg) 95 100 100 111 88  Pulse 63 66 64 72 79          Review of Systems   Constitutional: Negative for activity change and appetite change.   Respiratory: Negative for chest tightness.    Musculoskeletal: Negative for arthralgias and myalgias.   Skin: Negative for rash.   Neurological: Negative for dizziness, facial asymmetry and headaches.   Psychiatric/Behavioral: Negative for sleep disturbance.         Past Medical History:   Diagnosis Date    Glaucoma     Hypertension      Past Surgical History:   Procedure Laterality Date    FINGER SURGERY      right hand pinkie finger     GLAUCOMA SURGERY      STOMACH SURGERY      ulcers      Social History     Social History Narrative    Not on file     Family History   Problem Relation Age of Onset    Colon cancer Neg Hx     Esophageal cancer Neg Hx     Rectal cancer Neg Hx     Stomach cancer Neg Hx     Ulcerative colitis Neg Hx     Irritable bowel syndrome Neg Hx     Crohn's disease Neg Hx     Celiac disease Neg Hx      Outpatient Encounter Medications as of 8/17/2018   Medication Sig Dispense Refill    carvedilol (COREG) 12.5 MG tablet Take 1 tablet (12.5 mg total) by mouth 2 (two) times daily with meals. 180 tablet 3    latanoprost 0.005 % ophthalmic solution 1 drop every evening.      valsartan-hydrochlorothiazide (DIOVAN-HCT) 160-25 mg per tablet TAKE 1 TABLET BY MOUTH ONCE DAILY 90 tablet 6     No facility-administered encounter medications on file as of 8/17/2018.      Last 3 sets of Vitals  Vitals - 1 value per visit 5/3/2018 5/3/2018 8/17/2018   SYSTOLIC - 170 118   DIASTOLIC - 103 80  "  PULSE - 63 73   TEMPERATURE - 98.2 -   RESPIRATIONS - 18 -   SPO2 - 97 96   Weight (lb) 225.09 223 227.29   Weight (kg) 102.1 101.152 103.1   HEIGHT 5' 8" 5' 8" 5' 8"   BODY MASS INDEX 34.22 33.91 34.56   VISIT REPORT - - -   Pain Score  0 - 5         Objective:      Physical Exam   Constitutional: He is oriented to person, place, and time. He appears well-developed and well-nourished. No distress.   HENT:   Head: Normocephalic and atraumatic.   Eyes: EOM are normal. Pupils are equal, round, and reactive to light.   Neck: Normal range of motion. Neck supple.   Cardiovascular: Normal rate, regular rhythm and normal heart sounds.   Pulmonary/Chest: Effort normal and breath sounds normal. No stridor. No respiratory distress. He has no wheezes. He has no rales.   Neurological: He is alert and oriented to person, place, and time.   Skin: Skin is warm and dry. Capillary refill takes less than 2 seconds. No rash noted. He is not diaphoretic. No erythema. No pallor.   Psychiatric: He has a normal mood and affect. His behavior is normal. Judgment and thought content normal.   Nursing note and vitals reviewed.          Lab Results   Component Value Date    WBC 5.60 05/03/2018    RBC 4.43 (L) 05/03/2018    HGB 13.9 (L) 05/03/2018    HCT 43.9 05/03/2018    MCV 99 (H) 05/03/2018    MCH 31.4 (H) 05/03/2018    MCHC 31.7 (L) 05/03/2018    RDW 13.1 05/03/2018     05/03/2018    MPV 10.2 05/03/2018    GRAN 3.2 05/03/2018    GRAN 57.9 05/03/2018    LYMPH 1.5 05/03/2018    LYMPH 26.4 05/03/2018    MONO 0.7 05/03/2018    MONO 12.1 05/03/2018    EOS 0.1 05/03/2018    BASO 0.04 05/03/2018    EOSINOPHIL 2.5 05/03/2018    BASOPHIL 0.7 05/03/2018     Lab Results   Component Value Date    WBC 5.60 05/03/2018    HGB 13.9 (L) 05/03/2018    HCT 43.9 05/03/2018     05/03/2018    CHOL 170 04/02/2018    TRIG 81 04/02/2018    HDL 48 04/02/2018    ALT 33 05/03/2018    AST 34 05/03/2018     05/03/2018    K 4.5 05/03/2018     " 05/03/2018    CREATININE 0.8 05/03/2018    BUN 13 05/03/2018    CO2 23 05/03/2018    PSA 3.3 09/20/2017    INR 1.0 02/22/2012    HGBA1C 5.9 04/20/2017       Assessment:       1. Essential hypertension        Plan:       Pt inst to walk in Monday and bring his home BP cuff and I will check with our manual cuff and check pt's technique and I sent a message to one of the digital HTN coordinators to ask about cuff calibration      Ihsan was seen today for blood pressure check.    Diagnoses and all orders for this visit:    Essential hypertension      Patient Instructions     Low-Salt Choices  Eating salt (sodium) can make your body retain too much water. Excess water makes your heart work harder. Canned, packaged, and frozen foods are easy to prepare, but they are often high in sodium. Here are some ideas for low-salt foods you can easily prepare yourself.    For breakfast  · Fruit or 100% fruit juice  · Whole-wheat bread or an English muffin. Compare sodium content on labels.  · Low-fat milk or yogurt  · Unsalted eggs  · Shredded wheat  · Corn tortillas  · Unsalted steamed rice  · Regular (not instant) hot cereal, made without salt  Stay away from:  · Sausage, tinoco, and ham  · Flour tortillas  · Packaged muffins, pancakes, and biscuits  · Instant hot cereals  · Cottage cheese  For lunch and dinner  · Fresh fish, chicken, turkey, or meat--baked, broiled, or roasted without salt  · Dry beans, cooked without salt  · Tofu, stir-fried without salt  · Unsalted fresh fruit and vegetables, or frozen or canned fruit and vegetables with no added salt  Stay away from:  · Lunch or deli meat that is cured or smoked  · Cheese  · Tomato juice and catsup  · Canned vegetables, soups, and fish not labeled as no-salt-added or reduced sodium  · Packaged gravies and sauces  · Olives, pickles, and relish  · Bottled salad dressings  For snacks and desserts  · Yogurt  · Unsalted, air popped popcorn  · Unsalted nuts or seeds  Stay away  from:  · Pies and cakes  · Packaged dessert mixes  · Pizza  · Canned and packaged puddings  · Pretzels, chips, crackers, and nuts--unless the label says unsalted  Date Last Reviewed: 6/17/2015  © 2630-9573 [employer]. [address] [place] [address], [place] [address]. All rights reserved. This information is not intended as a substitute for professional medical care. Always follow your healthcare professional's instructions.      Follow up Monday and bring in your digital cuff and we will compare

## 2018-08-20 ENCOUNTER — OFFICE VISIT (OUTPATIENT)
Dept: INTERNAL MEDICINE | Facility: CLINIC | Age: 58
End: 2018-08-20
Payer: COMMERCIAL

## 2018-08-20 VITALS — WEIGHT: 227.31 LBS | HEART RATE: 62 BPM | OXYGEN SATURATION: 97 % | BODY MASS INDEX: 34.45 KG/M2 | HEIGHT: 68 IN

## 2018-08-20 DIAGNOSIS — I10 ESSENTIAL HYPERTENSION: Primary | ICD-10-CM

## 2018-08-20 PROCEDURE — 99214 OFFICE O/P EST MOD 30 MIN: CPT | Mod: S$GLB,,, | Performed by: FAMILY MEDICINE

## 2018-08-20 PROCEDURE — 3008F BODY MASS INDEX DOCD: CPT | Mod: CPTII,S$GLB,, | Performed by: FAMILY MEDICINE

## 2018-08-20 PROCEDURE — 99999 PR PBB SHADOW E&M-EST. PATIENT-LVL III: CPT | Mod: PBBFAC,,, | Performed by: FAMILY MEDICINE

## 2018-08-20 NOTE — PROGRESS NOTES
Subjective:       Patient ID: Ihsan Mays Sr. is a 57 y.o. male.    Chief Complaint: Hypertension  Ihsan Mays Sr. 57 y.o. male is here for office visit to review care and physical exam,  HPI  Review of Systems   Constitutional: Negative for activity change, appetite change, fatigue, fever and unexpected weight change.   HENT: Negative for congestion, hearing loss, postnasal drip and rhinorrhea.    Eyes: Negative for pain, discharge and visual disturbance.   Respiratory: Negative for cough, choking and shortness of breath.    Cardiovascular: Negative for chest pain, palpitations and leg swelling.   Gastrointestinal: Negative for abdominal pain, diarrhea and vomiting.   Genitourinary: Negative for dysuria, flank pain, hematuria and urgency.   Musculoskeletal: Negative for arthralgias, back pain, joint swelling and neck pain.   Skin: Negative for color change and rash.   Neurological: Negative for dizziness, tremors, syncope, weakness, numbness and headaches.   Psychiatric/Behavioral: Negative for agitation and confusion. The patient is not hyperactive.        Objective:      Physical Exam   Constitutional: He is oriented to person, place, and time. He appears well-developed and well-nourished.   HENT:   Head: Normocephalic.   Eyes: EOM are normal. Pupils are equal, round, and reactive to light.   Neck: Normal range of motion. Neck supple. No thyromegaly present.   Cardiovascular: Normal rate. Exam reveals no gallop and no friction rub.   No murmur heard.  Pulmonary/Chest: Effort normal. No respiratory distress. He has no wheezes.   Abdominal: Soft. Bowel sounds are normal. He exhibits no mass. There is no tenderness.   Musculoskeletal: He exhibits no edema or tenderness.   Lymphadenopathy:     He has no cervical adenopathy.   Neurological: He is alert and oriented to person, place, and time. He has normal reflexes. No cranial nerve deficit.   Skin: Skin is warm. No rash noted.   Psychiatric: He has a normal  mood and affect. His behavior is normal.       Assessment:       1. Essential hypertension        Plan:       Ihsan was seen today for hypertension.    Diagnoses and all orders for this visit:    Essential hypertension    - asked to see if can ave a de=iferent BP cuff through digital med program seeing how his machine reading diferent than ours done manualy in clinic.  Will rtc with new machine,

## 2018-08-29 ENCOUNTER — PATIENT OUTREACH (OUTPATIENT)
Dept: OTHER | Facility: OTHER | Age: 58
End: 2018-08-29

## 2018-08-29 NOTE — PROGRESS NOTES
"Last 5 Patient Entered Readings                                      Current 30 Day Average: 136/91     Recent Readings 8/28/2018 8/28/2018 8/25/2018 8/22/2018 8/20/2018    SBP (mmHg) 121 117 135 119 122    DBP (mmHg) 87 82 91 79 84    Pulse 85 82 78 72 73        Patient reports having a new BP cuff as he had his old one compaired to BP reading at DRS visit.     Digital Medicine: Health  Follow Up    Lifestyle Modifications:    1.Dietary Modifications (Sodium intake <2,000mg/day, food labels, dining out): Patient states he will try to "get used" to adhering to a low-sodium diet. Patient states he plans on increasing his fruit and salads intake. Patient states he will try to make the changes soon. Patient expresses the importance of changing his diet. Will focus on dietary changes as diastolic number is slightly elevated. Encouraged patient to start making changes and will further assess patient progress in 2 weeks.     2.Physical Activity: Patient reports he is not exercising as much as he would like. He states working towards increasing his exercise regimen. He reports walking is his favorite form of exercise. Will further assess possible changes in exercise upon next encounter.      3.Medication Therapy: Patient has been compliant with the medication regimen.    4.Patient has the following medication side effects/concerns:   (Frequency/Alleviating factors/Precipitating factors, etc.)     Follow up with Mr. Ihsan Mays Sr. completed. No further questions or concerns. Will continue follow up to achieve health goals.  "

## 2018-09-12 ENCOUNTER — PATIENT OUTREACH (OUTPATIENT)
Dept: OTHER | Facility: OTHER | Age: 58
End: 2018-09-12

## 2018-09-12 NOTE — PROGRESS NOTES
"Last 5 Patient Entered Readings                                      Current 30 Day Average: 132/90     Recent Readings 9/7/2018 9/7/2018 9/4/2018 9/4/2018 9/3/2018    SBP (mmHg) 118 134 131 136 120    DBP (mmHg) 83 93 94 92 83    Pulse 85 83 72 74 74        Patient reports attributing the elevated readings to stress. Diastolic is elevated. Will work on decreasing items elevated in sodium (i.e. Sausage, tinoco, bread, and cheese).     Digital Medicine: Health  Follow Up    Lifestyle Modifications:    1.Dietary Modifications (Sodium intake <2,000mg/day, food labels, dining out): Patient reports limiting eating after 6 pm. Patient reports consuming salads and "whatever his wife cooks." Patient reports consuming fruit, grits, and eggs for breakfast. He states consuming cheese toast when at home on the weekends. He states limiting the fast foods. Patient reports consuming tinoco or sausage sometimes. He states consuming roasted chicken and turnip greens. Patient states wife does not add salt. Patient reports wife does not buy anything in a can. Informed patient about potential elevated amounts of sodium in tinoco, sausage, cheese, and bread. Will further assess patient's progress limiting said items upon next encounter in 2 weeks.      2.Physical Activity: Did not discuss exercise as he wants/needs to work on decreasing hidden sodium consumption. May discuss exercise upon next encounter.     3.Medication Therapy: Patient has been compliant with the medication regimen.    4.Patient has the following medication side effects/concerns:   (Frequency/Alleviating factors/Precipitating factors, etc.)     Follow up with Mr. Ihsan Mays Sr. completed. No further questions or concerns. Will continue follow up to achieve health goals.  "

## 2018-09-19 ENCOUNTER — PATIENT MESSAGE (OUTPATIENT)
Dept: ADMINISTRATIVE | Facility: OTHER | Age: 58
End: 2018-09-19

## 2018-09-26 ENCOUNTER — PATIENT OUTREACH (OUTPATIENT)
Dept: OTHER | Facility: OTHER | Age: 58
End: 2018-09-26

## 2018-09-26 NOTE — PROGRESS NOTES
Last 5 Patient Entered Readings                                      Current 30 Day Average: 124/88     Recent Readings 9/16/2018 9/16/2018 9/12/2018 9/12/2018 9/7/2018    SBP (mmHg) 118 123 133 138 118    DBP (mmHg) 85 88 96 95 83    Pulse 94 92 72 73 85          Digital Medicine: Health  Follow Up    Left voicemail to follow up with Mr. Ihsan Mays Sr..  Current BP average 124/88 mmHg is not at goal, [130/80].

## 2018-10-03 NOTE — PROGRESS NOTES
Last 5 Patient Entered Readings                                      Current 30 Day Average: 129/90     Recent Readings 10/2/2018 10/2/2018 9/26/2018 9/26/2018 9/16/2018    SBP (mmHg) 143 146 138 138 118    DBP (mmHg) 97 100 93 95 85    Pulse 69 72 81 86 94        Patient attributes elevated readings to feeling over-worked due to job demand and searching for a new PCP. He states he will search for a new PCP when he is able.    Informed patient of stress reduction techniques like taking warm bath with lavender or meditation for 10-15 minutes. He states he will try it.     Will call in 2 weeks to assess stress levels.       Digital Medicine: Health  Follow Up    Lifestyle Modifications:    1.Dietary Modifications (Sodium intake <2,000mg/day, food labels, dining out): Patient states he has not changed his eating habits.     2.Physical Activity: Deferred    3.Medication Therapy: Patient has been compliant with the medication regimen.    4.Patient has the following medication side effects/concerns:   (Frequency/Alleviating factors/Precipitating factors, etc.)     Follow up with Mr. Ihsan Mays Sr. completed. No further questions or concerns. Will continue to follow up to achieve health goals.

## 2018-10-08 ENCOUNTER — OFFICE VISIT (OUTPATIENT)
Dept: INTERNAL MEDICINE | Facility: CLINIC | Age: 58
End: 2018-10-08
Payer: COMMERCIAL

## 2018-10-08 VITALS
DIASTOLIC BLOOD PRESSURE: 88 MMHG | BODY MASS INDEX: 34.32 KG/M2 | HEART RATE: 68 BPM | SYSTOLIC BLOOD PRESSURE: 112 MMHG | HEIGHT: 68 IN | WEIGHT: 226.44 LBS | RESPIRATION RATE: 18 BRPM | TEMPERATURE: 98 F

## 2018-10-08 DIAGNOSIS — Z00.00 ANNUAL PHYSICAL EXAM: Primary | ICD-10-CM

## 2018-10-08 DIAGNOSIS — I10 ESSENTIAL HYPERTENSION: ICD-10-CM

## 2018-10-08 PROBLEM — Z01.818 PRE-OPERATIVE EXAMINATION FOR INTERNAL MEDICINE: Status: RESOLVED | Noted: 2017-09-20 | Resolved: 2018-10-08

## 2018-10-08 PROCEDURE — 99396 PREV VISIT EST AGE 40-64: CPT | Mod: 25,S$GLB,, | Performed by: INTERNAL MEDICINE

## 2018-10-08 PROCEDURE — 90471 IMMUNIZATION ADMIN: CPT | Mod: S$GLB,,, | Performed by: INTERNAL MEDICINE

## 2018-10-08 PROCEDURE — 90714 TD VACC NO PRESV 7 YRS+ IM: CPT | Mod: S$GLB,,, | Performed by: INTERNAL MEDICINE

## 2018-10-08 PROCEDURE — 99999 PR PBB SHADOW E&M-EST. PATIENT-LVL III: CPT | Mod: PBBFAC,,, | Performed by: INTERNAL MEDICINE

## 2018-10-08 PROCEDURE — 3074F SYST BP LT 130 MM HG: CPT | Mod: CPTII,S$GLB,, | Performed by: INTERNAL MEDICINE

## 2018-10-08 PROCEDURE — 3079F DIAST BP 80-89 MM HG: CPT | Mod: CPTII,S$GLB,, | Performed by: INTERNAL MEDICINE

## 2018-10-08 RX ORDER — CARVEDILOL 12.5 MG/1
12.5 TABLET ORAL 2 TIMES DAILY WITH MEALS
COMMUNITY
End: 2019-01-02 | Stop reason: SDUPTHER

## 2018-10-08 RX ORDER — CARVEDILOL 3.12 MG/1
TABLET ORAL
COMMUNITY
End: 2018-10-08

## 2018-10-08 RX ORDER — VALSARTAN 160 MG/1
TABLET ORAL
COMMUNITY
End: 2018-10-08

## 2018-10-08 NOTE — PROGRESS NOTES
Subjective:       Patient ID: Ihsan Mays Sr. is a 57 y.o. male.    Chief Complaint: Establish Care and Annual Exam    HPI   57 y.o. Male here for annual exam.     Cholesterol: (needs)  Vaccines: Influenza (2018); Tetanus (2018)  Eye exam: 2018  Colonoscopy: 2015- repeat in 5 yrs    Exercise: no  Diet: regular    Past Medical History:  No date: Glaucoma  No date: Hypertension  Past Surgical History:  6/22/2015: COLONOSCOPY; N/A      Comment:  Performed by Yosef Pérez MD at [facility] (4TH FLR)  6/22/2015: ESOPHAGOGASTRODUODENOSCOPY (EGD); N/A      Comment:  Performed by Yosef Pérez MD at [facility] (4TH FLR)  No date: FINGER SURGERY      Comment:  right hand pinkie finger   No date: GLAUCOMA SURGERY  No date: STOMACH SURGERY      Comment:  ulcers   Social History    Socioeconomic History      Marital status:       Spouse name: Not on file      Number of children: 3      Years of education: Not on file      Highest education level: Not on file    Social Needs      Financial resource strain: Not on file      Food insecurity - worry: Not on file      Food insecurity - inability: Not on file      Transportation needs - medical: Not on file      Transportation needs - non-medical: Not on file      Tobacco Use      Smoking status: Never Smoker      Smokeless tobacco: Never Used    Substance and Sexual Activity      Alcohol use: Yes        Comment: occasionally      Drug use: No      Sexual activity: Yes        Partners: Female      Social History Narrative       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 had no medications administered during this visit.    Review of Systems   Constitutional: Negative for activity change, appetite change, chills, diaphoresis, fatigue, fever and unexpected weight change.   HENT: Negative for congestion, mouth sores, postnasal drip, rhinorrhea, sinus pressure, sneezing, sore throat,  trouble swallowing and voice change.    Eyes: Negative for discharge, itching and visual disturbance.   Respiratory: Negative for cough, chest tightness, shortness of breath and wheezing.    Cardiovascular: Negative for chest pain, palpitations and leg swelling.   Gastrointestinal: Negative for abdominal pain, blood in stool, constipation, diarrhea, nausea and vomiting.   Endocrine: Negative for cold intolerance and heat intolerance.   Genitourinary: Negative for difficulty urinating, dysuria, flank pain, hematuria and urgency.   Musculoskeletal: Negative for arthralgias, back pain, myalgias and neck pain.   Skin: Negative for rash and wound.   Allergic/Immunologic: Negative for environmental allergies and food allergies.   Neurological: Negative for dizziness, tremors, seizures, syncope, weakness and headaches.   Hematological: Negative for adenopathy. Does not bruise/bleed easily.   Psychiatric/Behavioral: Negative for confusion, sleep disturbance and suicidal ideas. The patient is not nervous/anxious.        Objective:      Physical Exam   Constitutional: He is oriented to person, place, and time. He appears well-developed and well-nourished. No distress.   HENT:   Head: Normocephalic and atraumatic.   Right Ear: External ear normal.   Left Ear: External ear normal.   Nose: Nose normal.   Mouth/Throat: Oropharynx is clear and moist. No oropharyngeal exudate.   Eyes: Conjunctivae and EOM are normal. Pupils are equal, round, and reactive to light. Right eye exhibits no discharge. Left eye exhibits no discharge. No scleral icterus.   Neck: Normal range of motion. Neck supple. No JVD present. No thyromegaly present.   Cardiovascular: Normal rate, regular rhythm, normal heart sounds and intact distal pulses.   No murmur heard.  Pulmonary/Chest: Effort normal and breath sounds normal. No respiratory distress. He has no wheezes. He has no rales.   Abdominal: Soft. Bowel sounds are normal. He exhibits no distension.  There is no tenderness. There is no guarding.   Musculoskeletal: He exhibits no edema.   Lymphadenopathy:     He has no cervical adenopathy.   Neurological: He is alert and oriented to person, place, and time. No cranial nerve deficit. Coordination normal.   Skin: Skin is warm and dry. No rash noted. He is not diaphoretic. No pallor.   Psychiatric: He has a normal mood and affect. Judgment normal.       Assessment:       1. Annual physical exam    2. Essential hypertension        Plan:    1. Blood work ordered       Vaccines: Influenza (2018); Tetanus (2018)       Eye exam: 2018       Colonoscopy: 2015- repeat in 5 yrs   2. HTN- stable on Diovan 160-25 mg daily and Coreg 12.5 mg BID   3. F/u in 1 yr

## 2018-10-09 ENCOUNTER — LAB VISIT (OUTPATIENT)
Dept: LAB | Facility: HOSPITAL | Age: 58
End: 2018-10-09
Attending: INTERNAL MEDICINE
Payer: COMMERCIAL

## 2018-10-09 DIAGNOSIS — Z00.00 ANNUAL PHYSICAL EXAM: ICD-10-CM

## 2018-10-09 LAB
ALBUMIN SERPL BCP-MCNC: 3.8 G/DL
ALP SERPL-CCNC: 59 U/L
ALT SERPL W/O P-5'-P-CCNC: 22 U/L
ANION GAP SERPL CALC-SCNC: 8 MMOL/L
AST SERPL-CCNC: 20 U/L
BASOPHILS # BLD AUTO: 0.03 K/UL
BASOPHILS NFR BLD: 0.6 %
BILIRUB SERPL-MCNC: 0.7 MG/DL
BUN SERPL-MCNC: 15 MG/DL
CALCIUM SERPL-MCNC: 9.2 MG/DL
CHLORIDE SERPL-SCNC: 109 MMOL/L
CHOLEST SERPL-MCNC: 177 MG/DL
CHOLEST/HDLC SERPL: 3.8 {RATIO}
CO2 SERPL-SCNC: 23 MMOL/L
COMPLEXED PSA SERPL-MCNC: 2.8 NG/ML
CREAT SERPL-MCNC: 0.9 MG/DL
DIFFERENTIAL METHOD: ABNORMAL
EOSINOPHIL # BLD AUTO: 0.2 K/UL
EOSINOPHIL NFR BLD: 3 %
ERYTHROCYTE [DISTWIDTH] IN BLOOD BY AUTOMATED COUNT: 13.2 %
EST. GFR  (AFRICAN AMERICAN): >60 ML/MIN/1.73 M^2
EST. GFR  (NON AFRICAN AMERICAN): >60 ML/MIN/1.73 M^2
ESTIMATED AVG GLUCOSE: 111 MG/DL
GIANT PLATELETS BLD QL SMEAR: PRESENT
GLUCOSE SERPL-MCNC: 109 MG/DL
HBA1C MFR BLD HPLC: 5.5 %
HCT VFR BLD AUTO: 40.2 %
HDLC SERPL-MCNC: 47 MG/DL
HDLC SERPL: 26.6 %
HGB BLD-MCNC: 13.7 G/DL
IMM GRANULOCYTES # BLD AUTO: 0.01 K/UL
IMM GRANULOCYTES NFR BLD AUTO: 0.2 %
LDLC SERPL CALC-MCNC: 111.2 MG/DL
LYMPHOCYTES # BLD AUTO: 1.3 K/UL
LYMPHOCYTES NFR BLD: 24.9 %
MCH RBC QN AUTO: 32.3 PG
MCHC RBC AUTO-ENTMCNC: 34.1 G/DL
MCV RBC AUTO: 95 FL
MONOCYTES # BLD AUTO: 0.6 K/UL
MONOCYTES NFR BLD: 11.8 %
NEUTROPHILS # BLD AUTO: 3.1 K/UL
NEUTROPHILS NFR BLD: 59.5 %
NONHDLC SERPL-MCNC: 130 MG/DL
NRBC BLD-RTO: 0 /100 WBC
PLATELET # BLD AUTO: 145 K/UL
PLATELET BLD QL SMEAR: ABNORMAL
PMV BLD AUTO: 11.6 FL
POTASSIUM SERPL-SCNC: 3.6 MMOL/L
PROT SERPL-MCNC: 6.9 G/DL
RBC # BLD AUTO: 4.24 M/UL
SODIUM SERPL-SCNC: 140 MMOL/L
TRIGL SERPL-MCNC: 94 MG/DL
TSH SERPL DL<=0.005 MIU/L-ACNC: 1.64 UIU/ML
WBC # BLD AUTO: 5.26 K/UL

## 2018-10-09 PROCEDURE — 85025 COMPLETE CBC W/AUTO DIFF WBC: CPT

## 2018-10-09 PROCEDURE — 84443 ASSAY THYROID STIM HORMONE: CPT

## 2018-10-09 PROCEDURE — 36415 COLL VENOUS BLD VENIPUNCTURE: CPT | Mod: PO

## 2018-10-09 PROCEDURE — 84153 ASSAY OF PSA TOTAL: CPT

## 2018-10-09 PROCEDURE — 80053 COMPREHEN METABOLIC PANEL: CPT

## 2018-10-09 PROCEDURE — 83036 HEMOGLOBIN GLYCOSYLATED A1C: CPT

## 2018-10-09 PROCEDURE — 80061 LIPID PANEL: CPT

## 2018-10-17 ENCOUNTER — PATIENT OUTREACH (OUTPATIENT)
Dept: OTHER | Facility: OTHER | Age: 58
End: 2018-10-17

## 2018-10-17 NOTE — PROGRESS NOTES
Last 5 Patient Entered Readings                                      Current 30 Day Average: 134/93     Recent Readings 10/15/2018 10/15/2018 10/15/2018 10/13/2018 10/13/2018    SBP (mmHg) 117 117 127 144 141    DBP (mmHg) 82 82 92 95 91    Pulse - 89 86 74 81          Digital Medicine: Health  Follow Up    Lifestyle Modifications:    1.Dietary Modifications (Sodium intake <2,000mg/day, food labels, dining out): Patient states consuming a lot of grilled options. He states consuming more water prior to eating when eating at a restaurant. He states trying to make healthier options by switching out for grilled options. Encouraged patient to limit eating prepared meals and request no salt or sodium-filled seasonings added to meals. He states he will try.    2.Physical Activity: Patient states he has not been exercising as much. Will further assess exercise upon next encounter.      3.Medication Therapy: Patient has been compliant with the medication regimen.    4.Patient has the following medication side effects/concerns:   (Frequency/Alleviating factors/Precipitating factors, etc.)     Follow up with Mr. Ihsan Mays Sr. completed. No further questions or concerns. Will continue to follow up to achieve health goals.

## 2018-10-18 ENCOUNTER — PATIENT MESSAGE (OUTPATIENT)
Dept: OTHER | Facility: OTHER | Age: 58
End: 2018-10-18

## 2018-10-23 ENCOUNTER — PATIENT OUTREACH (OUTPATIENT)
Dept: OTHER | Facility: OTHER | Age: 58
End: 2018-10-23

## 2018-10-23 NOTE — PROGRESS NOTES
Last 5 Patient Entered Readings                                      Current 30 Day Average: 131/91     Recent Readings 10/18/2018 10/15/2018 10/15/2018 10/15/2018 10/13/2018    SBP (mmHg) 117 117 117 127 144    DBP (mmHg) 82 82 82 92 95    Pulse 89 - 89 86 74        Mr. Mays's BP is improving. He is in [place] and has not checked BP recently. He will be home Friday and will resume checking BP at this time. Discussed changing valsartan/HCTZ to valsartan and chlorthalidone, but he would like to hold off until he is home and checking BP regularly again. He also informed me that he has started walking for physical activity and is hopeful BP will improve as a result.     Patient's BP average is above goal of <130/80.     Will continue to monitor regularly. Will follow up in 3-4 weeks, sooner if BP begins to trend upward or downward.    Patient has my contact information and knows to call with any concerns or clinical changes.     Current HTN regimen:  Hypertension Medications             carvedilol (COREG) 12.5 MG tablet Take 12.5 mg by mouth 2 (two) times daily with meals.    valsartan-hydrochlorothiazide (DIOVAN-HCT) 160-25 mg per tablet TAKE 1 TABLET BY MOUTH ONCE DAILY

## 2018-10-31 ENCOUNTER — PATIENT OUTREACH (OUTPATIENT)
Dept: OTHER | Facility: OTHER | Age: 58
End: 2018-10-31

## 2018-10-31 NOTE — PROGRESS NOTES
Last 5 Patient Entered Readings                                      Current 30 Day Average: 131/92     Recent Readings 10/29/2018 10/29/2018 10/18/2018 10/18/2018 10/15/2018    SBP (mmHg) 134 131 117 117 117    DBP (mmHg) 90 92 82 82 82    Pulse 71 70 89 89 -        Patient states he will be out of town for 11/5-11/16. He states its okay for care team to call him whilst out of town.     Digital Medicine: Health  Follow Up    Lifestyle Modifications:    1.Dietary Modifications (Sodium intake <2,000mg/day, food labels, dining out): Patient reports his diet has not been the best. He states knowing eating out is not the best. He reports knowing to increase his water consumption. He reports eating salads when out of town. He states consuming rotisserie chicken and instant potatoes. Encouraged patient to purchase low-sodium items 140 mg/sodium or less. He states he will try.     2.Physical Activity: Patient reports I am starting to use the treadmill 3x/week. He states walking 1 hour and reports burning 300 calories every session.      3.Medication Therapy: Patient has been compliant with the medication regimen.    4.Patient has the following medication side effects/concerns:   (Frequency/Alleviating factors/Precipitating factors, etc.)     Follow up with Mr. Ihsan Mays Sr. completed. No further questions or concerns. Will continue to follow up to achieve health goals.

## 2018-10-31 NOTE — PROGRESS NOTES
Last 5 Patient Entered Readings                                      Current 30 Day Average: 131/92     Recent Readings 10/29/2018 10/29/2018 10/18/2018 10/18/2018 10/15/2018    SBP (mmHg) 134 131 117 117 117    DBP (mmHg) 90 92 82 82 82    Pulse 71 70 89 89 -            Digital Medicine: Health  Follow Up    Left voicemail to follow up with Mr. Ihsan Mays Sr..  Current BP average 131/92 mmHg is not at goal, [130/80].

## 2018-11-01 ENCOUNTER — PATIENT MESSAGE (OUTPATIENT)
Dept: INTERNAL MEDICINE | Facility: CLINIC | Age: 58
End: 2018-11-01

## 2018-11-01 NOTE — TELEPHONE ENCOUNTER
"----- Message from Gonzalez Bronson sent at 11/1/2018  2:08 PM CDT -----  Contact: Self /    Patient would like to get medical advice.    Symptoms (please be specific):  Lower Back Right Side     How long has patient had these symptoms:  Since this morning, but patient has had this issues before.    Pharmacy name and phone # (DON'T enter "on file" or "in chart"):  [id number] - [place], [address] AT [facility]  [phone] (Phone)  [phone] (Fax)    Any drug allergies:  No     Would [facility]?:      Comments:    "

## 2018-11-02 ENCOUNTER — OFFICE VISIT (OUTPATIENT)
Dept: INTERNAL MEDICINE | Facility: CLINIC | Age: 58
End: 2018-11-02
Payer: COMMERCIAL

## 2018-11-02 ENCOUNTER — HOSPITAL ENCOUNTER (OUTPATIENT)
Dept: RADIOLOGY | Facility: HOSPITAL | Age: 58
Discharge: HOME | End: 2018-11-02
Attending: INTERNAL MEDICINE
Payer: COMMERCIAL

## 2018-11-02 VITALS
TEMPERATURE: 99 F | HEIGHT: 68 IN | HEART RATE: 84 BPM | WEIGHT: 232.81 LBS | BODY MASS INDEX: 35.28 KG/M2 | SYSTOLIC BLOOD PRESSURE: 128 MMHG | DIASTOLIC BLOOD PRESSURE: 76 MMHG

## 2018-11-02 DIAGNOSIS — I10 ESSENTIAL HYPERTENSION: ICD-10-CM

## 2018-11-02 DIAGNOSIS — M62.830 MUSCLE SPASM OF BACK: ICD-10-CM

## 2018-11-02 DIAGNOSIS — S33.5XXA LUMBAR BACK SPRAIN, INITIAL ENCOUNTER: Primary | ICD-10-CM

## 2018-11-02 DIAGNOSIS — S33.5XXA LUMBAR BACK SPRAIN, INITIAL ENCOUNTER: ICD-10-CM

## 2018-11-02 PROCEDURE — 99999 PR PBB SHADOW E&M-EST. PATIENT-LVL III: CPT | Mod: PBBFAC,,, | Performed by: INTERNAL MEDICINE

## 2018-11-02 PROCEDURE — 3008F BODY MASS INDEX DOCD: CPT | Mod: CPTII,S$GLB,, | Performed by: INTERNAL MEDICINE

## 2018-11-02 PROCEDURE — 72110 X-RAY EXAM L-2 SPINE 4/>VWS: CPT | Mod: 26,,, | Performed by: RADIOLOGY

## 2018-11-02 PROCEDURE — 99214 OFFICE O/P EST MOD 30 MIN: CPT | Mod: 25,S$GLB,, | Performed by: INTERNAL MEDICINE

## 2018-11-02 PROCEDURE — 3078F DIAST BP <80 MM HG: CPT | Mod: CPTII,S$GLB,, | Performed by: INTERNAL MEDICINE

## 2018-11-02 PROCEDURE — 3074F SYST BP LT 130 MM HG: CPT | Mod: CPTII,S$GLB,, | Performed by: INTERNAL MEDICINE

## 2018-11-02 PROCEDURE — 72110 X-RAY EXAM L-2 SPINE 4/>VWS: CPT | Mod: TC,PO

## 2018-11-02 PROCEDURE — 96372 THER/PROPH/DIAG INJ SC/IM: CPT | Mod: S$GLB,,, | Performed by: INTERNAL MEDICINE

## 2018-11-02 RX ORDER — NABUMETONE 750 MG/1
750 TABLET, FILM COATED ORAL 2 TIMES DAILY
Qty: 60 TABLET | Refills: 1 | Status: SHIPPED | OUTPATIENT
Start: 2018-11-02 | End: 2018-11-27 | Stop reason: SDUPTHER

## 2018-11-02 RX ORDER — CYCLOBENZAPRINE HCL 10 MG
10 TABLET ORAL 3 TIMES DAILY PRN
Qty: 30 TABLET | Refills: 1 | Status: SHIPPED | OUTPATIENT
Start: 2018-11-02 | End: 2018-12-02

## 2018-11-02 RX ORDER — TRIAMCINOLONE ACETONIDE 40 MG/ML
40 INJECTION, SUSPENSION INTRA-ARTICULAR; INTRAMUSCULAR
Status: COMPLETED | OUTPATIENT
Start: 2018-11-02 | End: 2018-11-02

## 2018-11-02 RX ORDER — VALSARTAN AND HYDROCHLOROTHIAZIDE 160; 25 MG/1; MG/1
1 TABLET ORAL DAILY
Qty: 90 TABLET | Refills: 3 | Status: SHIPPED | OUTPATIENT
Start: 2018-11-02 | End: 2019-01-02

## 2018-11-02 RX ORDER — KETOROLAC TROMETHAMINE 30 MG/ML
60 INJECTION, SOLUTION INTRAMUSCULAR; INTRAVENOUS
Status: COMPLETED | OUTPATIENT
Start: 2018-11-02 | End: 2018-11-02

## 2018-11-02 RX ADMIN — KETOROLAC TROMETHAMINE 60 MG: 30 INJECTION, SOLUTION INTRAMUSCULAR; INTRAVENOUS at 08:11

## 2018-11-02 RX ADMIN — TRIAMCINOLONE ACETONIDE 40 MG: 40 INJECTION, SUSPENSION INTRA-ARTICULAR; INTRAMUSCULAR at 08:11

## 2018-11-02 NOTE — PROGRESS NOTES
Subjective:       Patient ID: Ihsan Mays Sr. is a 58 y.o. male.    Chief Complaint: Back Pain (lower; muscle spasms)    HPI   Pt c/o 24 hrs of persistent right sided LBP described as a dull ache at rest and sharp with movements. There is no radiation of the pain. Minimal relief with tylenol.    Review of Systems   Constitutional: Negative for activity change, appetite change, chills, diaphoresis, fatigue, fever and unexpected weight change.   HENT: Negative for postnasal drip, rhinorrhea, sinus pressure, sneezing, sore throat, trouble swallowing and voice change.    Respiratory: Negative for cough, shortness of breath and wheezing.    Cardiovascular: Negative for chest pain, palpitations and leg swelling.   Gastrointestinal: Negative for abdominal pain, blood in stool, constipation, diarrhea, nausea and vomiting.   Genitourinary: Negative for dysuria.   Musculoskeletal: Positive for back pain. Negative for arthralgias and myalgias.   Skin: Negative for rash and wound.   Allergic/Immunologic: Negative for environmental allergies and food allergies.   Hematological: Negative for adenopathy. Does not bruise/bleed easily.       Objective:      Physical Exam   Constitutional: He is oriented to person, place, and time. He appears well-developed and well-nourished. No distress.   HENT:   Head: Normocephalic and atraumatic.   Eyes: Conjunctivae and EOM are normal. Pupils are equal, round, and reactive to light. Right eye exhibits no discharge. Left eye exhibits no discharge. No scleral icterus.   Neck: Normal range of motion. Neck supple. No JVD present.   Cardiovascular: Normal rate, regular rhythm and normal heart sounds.   No murmur heard.  Pulmonary/Chest: Effort normal and breath sounds normal. No respiratory distress. He has no wheezes. He has no rales.   Musculoskeletal: He exhibits no edema.        Lumbar back: He exhibits tenderness and pain.   Lymphadenopathy:     He has no cervical adenopathy.    Neurological: He is alert and oriented to person, place, and time.   Skin: Skin is warm and dry. No rash noted. He is not diaphoretic. No pallor.       Assessment:       1. Lumbar back sprain, initial encounter    2. Muscle spasm of back    3. Essential hypertension        Plan:    1/2. X-ray lumbar spine          Rx Relafen 750 mg BID and Flexeril 10 mg TID PRN          Kenalog 40 mg/Toradol 60 mg IM   3. Stable, refill Diovan-HCT

## 2018-11-14 ENCOUNTER — PATIENT OUTREACH (OUTPATIENT)
Dept: OTHER | Facility: OTHER | Age: 58
End: 2018-11-14

## 2018-11-14 NOTE — PROGRESS NOTES
Last 5 Patient Entered Readings                                      Current 30 Day Average: 133/94     Recent Readings 11/11/2018 11/11/2018 11/11/2018 11/11/2018 11/4/2018    SBP (mmHg) 140 155 140 155 155    DBP (mmHg) 94 100 94 100 108    Pulse 85 83 85 83 -        Introduced as new HC. Sent [facility] message with new contact info

## 2018-11-19 ENCOUNTER — CLINICAL SUPPORT (OUTPATIENT)
Dept: REHABILITATION | Facility: HOSPITAL | Age: 58
End: 2018-11-19
Payer: COMMERCIAL

## 2018-11-19 DIAGNOSIS — M54.50 CHRONIC LOW BACK PAIN WITHOUT SCIATICA, UNSPECIFIED BACK PAIN LATERALITY: Primary | ICD-10-CM

## 2018-11-19 DIAGNOSIS — G89.29 CHRONIC LOW BACK PAIN WITHOUT SCIATICA, UNSPECIFIED BACK PAIN LATERALITY: Primary | ICD-10-CM

## 2018-11-19 PROCEDURE — 97140 MANUAL THERAPY 1/> REGIONS: CPT | Mod: PO

## 2018-11-19 PROCEDURE — 97110 THERAPEUTIC EXERCISES: CPT | Mod: PO

## 2018-11-19 PROCEDURE — 97161 PT EVAL LOW COMPLEX 20 MIN: CPT | Mod: PO

## 2018-11-19 NOTE — PATIENT INSTRUCTIONS
Access Code: [email or web address]   URL: [email or web address]   Date: 11/19/2018   Prepared by: Isaiah Ivory     Exercises  Supine March with Posterior Pelvic Tilt - 15 reps - 2 sets - 2x daily                            - 7x weekly  Standing Lumbar Extension - 10 reps - 2 sets - 2x daily - 7x weekly  Seated Child's Pose with Table - 2 reps - 1 sets - 30 sec hold - 2x daily - 7x weekly

## 2018-11-19 NOTE — PLAN OF CARE
[facility]  Physical Therapy Initial Evaluation    Name: Ihsan Mays Sr.  Clinic Number: [id number]    Therapy Diagnosis:   Encounter Diagnosis   Name Primary?    Chronic low back pain without sciatica, unspecified back pain laterality Yes     Physician: Elliot Ybarra, *    Physician Orders: PT Eval and Treat   Medical Diagnosis from Referral S33.5XXA (ICD-10-CM) - Lumbar back sprain, initial encounter    M62.830 (ICD-10-CM) - Muscle spasm of back  Evaluation Date: 11/19/2018  Authorization Period Expiration: 12/31/18  Plan of Care Expiration: 12/31/18  Visit # / Visits authorized: 1/ 20    Time In: 1100  Time Out: 1155  Total Billable Time: 55 minutes    Precautions: Standard    Subjective   Date of onset: Chronic 5-10 years off and on  History of current condition - Ihsan reports: comes and goes. Pt reports that he can be walking or standing, and a spasm can hit. No twisting or nothing, it just hits like a sharp pain like a muscle pull. Pt reports that he cannot move at all once that pain hits until it subsides. No AD needed for use at this time. No numbness, burning, or tingling-just the spasm when it hits.          Past Medical History:   Diagnosis Date    Glaucoma     Hypertension      Ihsan Mays Sr.  has a past surgical history that includes Stomach surgery; Glaucoma surgery; Finger surgery; COLONOSCOPY (N/A, 6/22/2015); and ESOPHAGOGASTRODUODENOSCOPY (EGD) (N/A, 6/22/2015).    Ihsan has a current medication list which includes the following prescription(s): carvedilol, cyclobenzaprine, fluzone quad [id number] (pf), latanoprost, nabumetone, and valsartan-hydrochlorothiazide.    Review of patient's allergies indicates:  No Known Allergies     Imaging, bone scan films:   EXAMINATION:  XR LUMBAR SPINE COMPLETE 5 VIEW    CLINICAL HISTORY:  LBP;Sprain of ligaments of lumbar spine, initial encounter    TECHNIQUE:  AP, lateral, spot and bilateral oblique views of the  lumbar spine were performed.    COMPARISON:  None    FINDINGS:  The lumbar vertebra are intact. The L5 vertebra is transitional in configuration.  Some degenerative changes evident at the pseudoarthrosis with the upper sacrum.  Small osteophytes are present at some positions. Disc spaces are not significantly narrowed. Lower facet joints are narrowed.  No obvious paraspinal lesion is detected.      Impression       No acute abnormality.  Mild degenerative spine changes.      Electronically signed by: Butch Amaro MD  Date: 11/02/2018  Time: 08:56         Prior Therapy:  [place]  Social History:  lives with their spouse  Occupation:   Prior Level of Function: indep  Current Level of Function: indep    Pain:  Current 0/10, worst 8/10, best 0/10   Location: right back   Description: Sharp  Aggravating Factors: Sitting, Standing and Walking  Easing Factors: rest    Pts goals: Try to strengthen the muscles to keep from the spasms.    Objective   4-5 10 hour shifts reported for work. Home about 2 weeks out of the month.    Observation: Slouched posture in sitting in the chair    Posture:      Lumbar Range of Motion:    Degrees Pain   Flexion 65    Extension 8    Left Side Bending 25    Right Side Bending 28    Left rotation   15    Right Rotation   18         Hip  Right   Left  Pain/Dysfunction with Movement    AROM PROM MMT AROM PROM MMT    Flexion 95   105      Extension          Abduction          Adduction          Internal rotation 35   32      External rotation 35   25         Knee  Right   Left  Pain/Dysfunction with Movement    AROM PROM MMT AROM PROM MMT    Flexion       FULL WNL   Extension                Lower Extremity Strength  Right LE  Left LE    Knee extension: 4 Knee extension: 4   Knee flexion: 4 Knee flexion: 4   Hip flexion: 4 Hip flexion: 4   Hip extension:  3+ Hip extension: 3+   Hip abduction: 3+ Hip abduction: 4-         Special Tests:  -Repeated Flexion:  Slight discomfort  -Repeated Ext: No pain  -Piriformis Test: tightness  -Bridge Test: weakness with proper form on 8 bridges              Neuro Dynamic Testing:    Sciatic nerve:      SLR: R = tightness     L = tightness, (reports cramps in leg a t night sometimes)       Femoral Nerve:    Femoral nerve test: neg      Joint Mobility: hypomobility at thoracic spine, some stiffness down into lumbar region    Palpation: tightness along R side quadratus lumborum    Sensation: intact    Flexibility: poor at the hamstrings      Beto test: R = 20 ; L = 25        CMS Impairment/Limitation/Restriction for FOTO Lumbar Survey    Therapist reviewed FOTO scores for Ihsan Mays Sr. on 11/19/2018.   FOTO documents entered into [employer] - see Media section.    Limitation Score: 6%  Category: Mobility    Current : CI = at least 1% but < 20% impaired, limited or restricted  Goal: CI = at least 1% but < 20% impaired, limited or restricted  Discharge: CI = at least 1% but < 20% impaired, limited or restricted         TREATMENT   Treatment Time In: 1130  Treatment Time Out: 1155  Total Treatment time separate from Evaluation: 25 minutes    Ihsan received therapeutic exercises to develop strength, flexibility and core stabilization for 15 minutes including:  Supine March with Posterior Pelvic Tilt - 15 reps - 2 sets                    Standing Lumbar Extension - 10 reps - 2 sets -   Seated Child's Pose with Table - 2 reps - 1 sets - 30 sec hold - 2x daily - 7x weekly  DKC  - 2 x 10  Squats with standing hip ext intermittent:  2 x10      Ihsan received the following manual therapy techniques: Myofacial release and Soft tissue Mobilization were applied to the: bilateral erector spinae group for 10 minutes, including:  AAROM LTR for mobility, and light bowstringing along the erector spinae.    Home Exercises and Patient Education Provided    Education provided:   - Home exercises provided for pt to begin at home along with  demonstration/practice    Written Home Exercises Provided: yes.  Exercises were reviewed and Ihsan was able to demonstrate them prior to the end of the session.  Ihsan demonstrated good  understanding of the education provided.     See EMR under Patient Instructions for exercises provided 11/19/2018.    Assessment   Ihsan is a 58 y.o. male referred to outpatient Physical Therapy with a medical diagnosis of Lumbar back sprain, initial encounter  Muscle spasm of back. Pt presents with limited dysfunction at this time. Pt presents with limited mobility due to sedentary nature. Pt has some hypomobility noted at this time which can also attribute to back spasms. Pt core is weak, noted with difficulty on the supine march exercises. Pt needed some cues for hip ext. Pt has some weakness with holding bridges and at the glut region, which along with core strengthening, can help with the spasm pain.    Pt prognosis is Good.   Pt will benefit from skilled outpatient Physical Therapy to address the deficits stated above and in the chart below, provide pt/family education, and to maximize pt's level of independence.     Plan of care discussed with patient: Yes  Pt's spiritual, cultural and educational needs considered and patient is agreeable to the plan of care and goals as stated below:     Anticipated Barriers for therapy: work schedule    Medical Necessity is demonstrated by the following  History  Co-morbidities and personal factors that may impact the plan of care Co-morbidities:   advanced age    Personal Factors:   age     low   Examination  Body Structures and Functions, activity limitations and participation restrictions that may impact the plan of care Body Regions:   back  lower extremities    Body Systems:    gross symmetry  ROM    Participation Restrictions:       Activity limitations:   Learning and applying knowledge  no deficits    General Tasks and Commands    Communication  no  deficits    Mobility  walking    Self care  no deficits    Domestic Life  no deficits    Interactions/Relationships  no deficits    Life Areas  no deficits    Community and Social Life  no deficits         moderate   Clinical Presentation stable and uncomplicated low   Decision Making/ Complexity Score: low     Goals:  Short Term Goals (4 Weeks):   1. This patient will be independent with a basic HEP.   2. This patient will increase B LE strength by 1 grade in order to perform all tasks with more emphasis at LE  4. This patient will have a pain rating of 6/10 at worst with ADLs or when pain hits again  5. Patient able to score greater than or equal to initial value on the FOTO Lumbar Spine Survey placing patient in 1-20% impaired, limited, or restricted category demonstrating overall decreased low back pain with functional activities.       Long Term Goals (8 Weeks):   1. This patient will be independent with an updated HEP.   3. This patient will have a pain rating of 2/10 at worst with ADLs.  4. Patient able to score 1-5% limitation on FOTO upon end of care  5. Pt will demonstrate proper posture with all sitting tasks.   -Pt to improve flexibility at hamstring group by 10 deg or more.  -Pt to report pain less than 4 with any episode of back pain relating to this spasm issue.      Plan   Plan of care Certification: 11/19/2018 to 1/19/19.    Pt will be treated by physical therapy 1-3 times a week for 8 weeks to include: Therapeutic exercises to increase ROM, strength and stabilization; joint and soft tissue mobilization with manual therapy techniques to decrease muscle tightness, pain and improve joint mobility; neuromuscular re-education to improve balance, coordination, kinesthetic sense and proprioception, therapeutic activities to improve coordination, strength and function, therapeutic taping to decrease pain, provide support and improve function of the LE(s); modalities such as moist heat, ice, ultrasound and  electrical stimulation to increase circulation, decrease pain and inflammation; dry needling with manual therapy techniques to decrease pain, inflammation and swelling, increase circulation and promote healing process will be considered and utilized as needed.  Pt may be seen by PTA to carry out plan of care as part of the Rehab team.    I certify the need for these services furnished under this plan of treatment and while under my care.    ____________________________________ Physician/Referring Practitioner                                  Date of Signature      Isaiah Ivory DPT

## 2018-11-27 RX ORDER — NABUMETONE 750 MG/1
750 TABLET, FILM COATED ORAL 2 TIMES DAILY
Qty: 180 TABLET | Refills: 1 | Status: SHIPPED | OUTPATIENT
Start: 2018-11-27 | End: 2020-06-04

## 2018-12-11 NOTE — PROGRESS NOTES
Last 5 Patient Entered Readings                                      Current 30 Day Average: 136/95     Recent Readings 12/5/2018 12/5/2018 11/25/2018 11/25/2018 11/25/2018    SBP (mmHg) 126 126 138 138 134    DBP (mmHg) 89 89 92 92 91    Pulse 87 87 - 87 87        Patient requests call back tomorrow

## 2018-12-12 NOTE — PROGRESS NOTES
"Last 5 Patient Entered Readings                                      Current 30 Day Average: 134/93     Recent Readings 12/5/2018 12/5/2018 11/25/2018 11/25/2018 11/25/2018    SBP (mmHg) 126 126 138 138 134    DBP (mmHg) 89 89 92 92 91    Pulse 87 87 - 87 87        Digital Medicine: Health  Follow Up    Lifestyle Modifications:    1.Dietary Modifications (Sodium intake <2,000mg/day, food labels, dining out):    Patient states that he has been trying to eat better. Salads and everything grilled. Does not track his sodium intake. Patient agrees to track his sodium (emailed AHA form), and I asked him to send back to me for review. We discussed that this will help to reveal areas for improvement to help work on DBP    2.Physical Activity:    "nothing really".     3.Medication Therapy: Patient has been compliant with the medication regimen.    4.Patient has the following medication side effects/concerns:   (Frequency/Alleviating factors/Precipitating factors, etc.)     Follow up with Mr. Ihsan Mays Sr. completed. No further questions or concerns. Will continue to follow up to achieve health goals.    "

## 2018-12-19 ENCOUNTER — PATIENT OUTREACH (OUTPATIENT)
Dept: OTHER | Facility: OTHER | Age: 58
End: 2018-12-19

## 2018-12-19 ENCOUNTER — OFFICE VISIT (OUTPATIENT)
Dept: INTERNAL MEDICINE | Facility: CLINIC | Age: 58
End: 2018-12-19
Payer: COMMERCIAL

## 2018-12-19 VITALS
WEIGHT: 227 LBS | HEIGHT: 68 IN | BODY MASS INDEX: 34.4 KG/M2 | RESPIRATION RATE: 16 BRPM | DIASTOLIC BLOOD PRESSURE: 108 MMHG | TEMPERATURE: 99 F | HEART RATE: 64 BPM | SYSTOLIC BLOOD PRESSURE: 158 MMHG

## 2018-12-19 DIAGNOSIS — I10 HTN, GOAL BELOW 130/80: ICD-10-CM

## 2018-12-19 DIAGNOSIS — J20.9 ACUTE BRONCHITIS, UNSPECIFIED ORGANISM: ICD-10-CM

## 2018-12-19 DIAGNOSIS — B97.89 VIRAL SINUSITIS: Primary | ICD-10-CM

## 2018-12-19 DIAGNOSIS — J32.9 VIRAL SINUSITIS: Primary | ICD-10-CM

## 2018-12-19 PROCEDURE — 3080F DIAST BP >= 90 MM HG: CPT | Mod: CPTII,S$GLB,, | Performed by: INTERNAL MEDICINE

## 2018-12-19 PROCEDURE — 96372 THER/PROPH/DIAG INJ SC/IM: CPT | Mod: S$GLB,,, | Performed by: INTERNAL MEDICINE

## 2018-12-19 PROCEDURE — 3077F SYST BP >= 140 MM HG: CPT | Mod: CPTII,S$GLB,, | Performed by: INTERNAL MEDICINE

## 2018-12-19 PROCEDURE — 99214 OFFICE O/P EST MOD 30 MIN: CPT | Mod: 25,S$GLB,, | Performed by: INTERNAL MEDICINE

## 2018-12-19 PROCEDURE — 99999 PR PBB SHADOW E&M-EST. PATIENT-LVL III: CPT | Mod: PBBFAC,,, | Performed by: INTERNAL MEDICINE

## 2018-12-19 PROCEDURE — 3008F BODY MASS INDEX DOCD: CPT | Mod: CPTII,S$GLB,, | Performed by: INTERNAL MEDICINE

## 2018-12-19 RX ORDER — AMLODIPINE BESYLATE 5 MG/1
5 TABLET ORAL DAILY PRN
COMMUNITY
End: 2018-12-19 | Stop reason: SDUPTHER

## 2018-12-19 RX ORDER — AMLODIPINE BESYLATE 5 MG/1
5 TABLET ORAL DAILY
Qty: 30 TABLET | Refills: 0 | Status: SHIPPED | OUTPATIENT
Start: 2018-12-19 | End: 2019-01-02 | Stop reason: SDUPTHER

## 2018-12-19 RX ORDER — CODEINE PHOSPHATE AND GUAIFENESIN 10; 100 MG/5ML; MG/5ML
5 SOLUTION ORAL 3 TIMES DAILY PRN
Qty: 236 ML | Refills: 0 | Status: SHIPPED | OUTPATIENT
Start: 2018-12-19 | End: 2018-12-29

## 2018-12-19 RX ORDER — FLUTICASONE PROPIONATE 50 MCG
2 SPRAY, SUSPENSION (ML) NASAL DAILY
Qty: 16 G | Refills: 12 | Status: SHIPPED | OUTPATIENT
Start: 2018-12-19 | End: 2020-09-16

## 2018-12-19 RX ORDER — TRIAMCINOLONE ACETONIDE 40 MG/ML
40 INJECTION, SUSPENSION INTRA-ARTICULAR; INTRAMUSCULAR
Status: COMPLETED | OUTPATIENT
Start: 2018-12-19 | End: 2018-12-19

## 2018-12-19 RX ORDER — LEVOCETIRIZINE DIHYDROCHLORIDE 5 MG/1
5 TABLET, FILM COATED ORAL NIGHTLY
Qty: 30 TABLET | Refills: 11 | Status: SHIPPED | OUTPATIENT
Start: 2018-12-19 | End: 2020-06-04

## 2018-12-19 RX ADMIN — TRIAMCINOLONE ACETONIDE 40 MG: 40 INJECTION, SUSPENSION INTRA-ARTICULAR; INTRAMUSCULAR at 11:12

## 2018-12-19 NOTE — PROGRESS NOTES
Last 5 Patient Entered Readings                                      Current 30 Day Average: 143/101     Recent Readings 12/19/2018 12/19/2018 12/18/2018 12/18/2018 12/18/2018    SBP (mmHg) 168 163 145 163 163    DBP (mmHg) 115 113 104 110 109    Pulse 71 71 70 77 69        Patient has a cold, along with a dry cough. Was taking coricidin early, but is not taking now. Patient reports that he charged his cuff yesterday. Is headed to the doctor today because he is still sick, and is concerned about elevated BP.

## 2018-12-19 NOTE — PROGRESS NOTES
Subjective:       Patient ID: Ihsan Mays Sr. is a 58 y.o. male.    Chief Complaint: Cough (started sick 3 weeks ago and  yesterday  headache  and pressure 160's/ 118  ); Hypertension; and Neck Pain (at 3 now.  headache all day  yesterday.)    HPI   Pt with HTN c/o 3 days of persistent sinus/chest congestion, dry cough, post nasal drip, sore throat, body aches and fatigue. Minimal relief with Coricidin HBP.   Review of Systems   Constitutional: Positive for fatigue. Negative for activity change, appetite change, chills, diaphoresis, fever and unexpected weight change.   HENT: Positive for congestion, postnasal drip, rhinorrhea, sinus pressure, sinus pain and sore throat. Negative for sneezing, trouble swallowing and voice change.    Respiratory: Positive for cough. Negative for shortness of breath and wheezing.    Cardiovascular: Negative for chest pain, palpitations and leg swelling.   Gastrointestinal: Negative for abdominal pain, blood in stool, constipation, diarrhea, nausea and vomiting.   Genitourinary: Negative for dysuria.   Musculoskeletal: Negative for arthralgias and myalgias.   Skin: Negative for rash and wound.   Allergic/Immunologic: Negative for environmental allergies and food allergies.   Hematological: Negative for adenopathy. Does not bruise/bleed easily.       Objective:      Physical Exam   Constitutional: He is oriented to person, place, and time. He appears well-developed and well-nourished. No distress.   HENT:   Head: Normocephalic and atraumatic.   Right Ear: External ear normal.   Left Ear: External ear normal.   Nose: Mucosal edema and rhinorrhea present.   Mouth/Throat: Oropharynx is clear and moist. No oropharyngeal exudate.   Eyes: Conjunctivae and EOM are normal. Pupils are equal, round, and reactive to light. Right eye exhibits no discharge. Left eye exhibits no discharge. No scleral icterus.   Neck: Normal range of motion. Neck supple. No JVD present.   Cardiovascular: Normal  rate, regular rhythm and normal heart sounds.   No murmur heard.  Pulmonary/Chest: Effort normal and breath sounds normal. No respiratory distress. He has no wheezes. He has no rales.   Abdominal: Soft. Bowel sounds are normal. There is no tenderness.   Musculoskeletal: He exhibits no edema.   Lymphadenopathy:     He has no cervical adenopathy.   Neurological: He is alert and oriented to person, place, and time.   Skin: Skin is warm and dry. No rash noted. He is not diaphoretic. No pallor.       Assessment:       1. Viral sinusitis    2. Acute bronchitis, unspecified organism    3. HTN, goal below 130/80        Plan:    1. Rx Xyzal/Flonase, Kenalog 40 mg IM   2. Rx Cheratussin AC TID PRN   3. Rx Norvasc and continue Diovan HCT/Coreg    4. F/u in 2 weeks for HTN

## 2018-12-26 ENCOUNTER — PATIENT OUTREACH (OUTPATIENT)
Dept: OTHER | Facility: OTHER | Age: 58
End: 2018-12-26

## 2018-12-26 NOTE — PROGRESS NOTES
Last 5 Patient Entered Readings                                      Current 30 Day Average: 132/99     Recent Readings 12/26/2018 12/26/2018 12/25/2018 12/25/2018 12/25/2018    SBP (mmHg) 144 142 127 141 145    DBP (mmHg) 102 98 87 97 101    Pulse 62 64 79 85 77        Mr. Mays' BP remains above goal. He saw Dr. Ybarra for cold symptoms last week. He started amlodipine 5 mg daily in addition to current regimen. He is tolerating amlodipine with no issues. Reviewed proper technique to check BP again. He has an appointment with Dr. Ybarra for a BP check next week. Asked that he bring his BP monitor in with him to ensure readings are accurate. Explained that valsartan or amlodipine dose may need to be increased. Will follow up after he sees PCP.     Patient's BP average is above goal of <130/80.     Will continue to monitor regularly. Will follow up in 2-3 weeks, sooner if BP begins to trend upward or downward.    Patient has my contact information and knows to call with any concerns or clinical changes.     Current HTN regimen:  Hypertension Medications             amLODIPine (NORVASC) 5 MG tablet Take 1 tablet (5 mg total) by mouth once daily.    carvedilol (COREG) 12.5 MG tablet Take 12.5 mg by mouth 2 (two) times daily with meals.    valsartan-hydrochlorothiazide (DIOVAN-HCT) 160-25 mg per tablet Take 1 tablet by mouth once daily.

## 2019-01-02 ENCOUNTER — OFFICE VISIT (OUTPATIENT)
Dept: INTERNAL MEDICINE | Facility: CLINIC | Age: 59
End: 2019-01-02
Payer: COMMERCIAL

## 2019-01-02 VITALS
DIASTOLIC BLOOD PRESSURE: 84 MMHG | TEMPERATURE: 99 F | WEIGHT: 226 LBS | HEIGHT: 68 IN | SYSTOLIC BLOOD PRESSURE: 130 MMHG | RESPIRATION RATE: 18 BRPM | HEART RATE: 76 BPM | BODY MASS INDEX: 34.25 KG/M2

## 2019-01-02 DIAGNOSIS — I10 HTN, GOAL BELOW 130/80: Primary | ICD-10-CM

## 2019-01-02 DIAGNOSIS — N52.9 ERECTILE DYSFUNCTION, UNSPECIFIED ERECTILE DYSFUNCTION TYPE: ICD-10-CM

## 2019-01-02 PROCEDURE — 99214 OFFICE O/P EST MOD 30 MIN: CPT | Mod: S$GLB,,, | Performed by: INTERNAL MEDICINE

## 2019-01-02 PROCEDURE — 99999 PR PBB SHADOW E&M-EST. PATIENT-LVL III: ICD-10-PCS | Mod: PBBFAC,,, | Performed by: INTERNAL MEDICINE

## 2019-01-02 PROCEDURE — 3075F PR MOST RECENT SYSTOLIC BLOOD PRESS GE 130-139MM HG: ICD-10-PCS | Mod: CPTII,S$GLB,, | Performed by: INTERNAL MEDICINE

## 2019-01-02 PROCEDURE — 99214 PR OFFICE/OUTPT VISIT, EST, LEVL IV, 30-39 MIN: ICD-10-PCS | Mod: S$GLB,,, | Performed by: INTERNAL MEDICINE

## 2019-01-02 PROCEDURE — 3079F DIAST BP 80-89 MM HG: CPT | Mod: CPTII,S$GLB,, | Performed by: INTERNAL MEDICINE

## 2019-01-02 PROCEDURE — 3008F BODY MASS INDEX DOCD: CPT | Mod: CPTII,S$GLB,, | Performed by: INTERNAL MEDICINE

## 2019-01-02 PROCEDURE — 3079F PR MOST RECENT DIASTOLIC BLOOD PRESSURE 80-89 MM HG: ICD-10-PCS | Mod: CPTII,S$GLB,, | Performed by: INTERNAL MEDICINE

## 2019-01-02 PROCEDURE — 3075F SYST BP GE 130 - 139MM HG: CPT | Mod: CPTII,S$GLB,, | Performed by: INTERNAL MEDICINE

## 2019-01-02 PROCEDURE — 99999 PR PBB SHADOW E&M-EST. PATIENT-LVL III: CPT | Mod: PBBFAC,,, | Performed by: INTERNAL MEDICINE

## 2019-01-02 PROCEDURE — 3008F PR BODY MASS INDEX (BMI) DOCUMENTED: ICD-10-PCS | Mod: CPTII,S$GLB,, | Performed by: INTERNAL MEDICINE

## 2019-01-02 RX ORDER — SILDENAFIL 100 MG/1
100 TABLET, FILM COATED ORAL DAILY PRN
Qty: 30 TABLET | Refills: 1 | Status: SHIPPED | OUTPATIENT
Start: 2019-01-02 | End: 2022-10-18

## 2019-01-02 RX ORDER — AMLODIPINE BESYLATE 5 MG/1
5 TABLET ORAL DAILY
Qty: 90 TABLET | Refills: 3 | Status: SHIPPED | OUTPATIENT
Start: 2019-01-02 | End: 2020-01-16

## 2019-01-02 RX ORDER — VALSARTAN AND HYDROCHLOROTHIAZIDE 320; 25 MG/1; MG/1
1 TABLET, FILM COATED ORAL DAILY
Qty: 90 TABLET | Refills: 3 | Status: SHIPPED | OUTPATIENT
Start: 2019-01-02 | End: 2019-11-26 | Stop reason: SDUPTHER

## 2019-01-02 RX ORDER — CARVEDILOL 12.5 MG/1
12.5 TABLET ORAL 2 TIMES DAILY WITH MEALS
Qty: 180 TABLET | Refills: 3 | Status: SHIPPED | OUTPATIENT
Start: 2019-01-02 | End: 2020-01-16

## 2019-01-02 NOTE — TELEPHONE ENCOUNTER
Faxed request for refills.  You saw him today and increased other bp medication.  This was last filled by prev pcp dr carmona 4/27/18 180 x 3.    Ok to fill  Another year?  Edit if need.    Thanks eric

## 2019-01-02 NOTE — PROGRESS NOTES
Subjective:       Patient ID: Ihsan Mays Sr. is a 58 y.o. male.    Chief Complaint: Follow-up (2 week HTN) and Medication Refill    HPI   Pt here for 2 wk f/u regarding HTN. BP readings at home are still running slightly high. No SE's from the medications. He is also interested in medication for ED.   Review of Systems   Constitutional: Negative for activity change, appetite change, chills, diaphoresis, fatigue, fever and unexpected weight change.   HENT: Negative for postnasal drip, rhinorrhea, sinus pressure, sneezing, sore throat, trouble swallowing and voice change.    Respiratory: Negative for cough, shortness of breath and wheezing.    Cardiovascular: Negative for chest pain, palpitations and leg swelling.   Gastrointestinal: Negative for abdominal pain, blood in stool, constipation, diarrhea, nausea and vomiting.   Genitourinary: Negative for dysuria.   Musculoskeletal: Negative for arthralgias and myalgias.   Skin: Negative for rash and wound.   Allergic/Immunologic: Negative for environmental allergies and food allergies.   Hematological: Negative for adenopathy. Does not bruise/bleed easily.       Objective:      Physical Exam   Constitutional: He is oriented to person, place, and time. He appears well-developed and well-nourished. No distress.   HENT:   Head: Normocephalic and atraumatic.   Eyes: Conjunctivae and EOM are normal. Pupils are equal, round, and reactive to light. Right eye exhibits no discharge. Left eye exhibits no discharge. No scleral icterus.   Neck: Normal range of motion. Neck supple. No JVD present.   Cardiovascular: Normal rate, regular rhythm and normal heart sounds.   No murmur heard.  Pulmonary/Chest: Effort normal and breath sounds normal. No respiratory distress. He has no wheezes. He has no rales.   Musculoskeletal: He exhibits no edema.   Lymphadenopathy:     He has no cervical adenopathy.   Neurological: He is alert and oriented to person, place, and time.   Skin: Skin  is warm and dry. No rash noted. He is not diaphoretic. No pallor.       Assessment:       1. HTN, goal below 130/80    2. Erectile dysfunction, unspecified erectile dysfunction type        Plan:    1. Increase Diovan HCT to 320-25 mg daily and continue Norvasc/Coreg   2. Rx Viagra 100 mg daily PRN

## 2019-01-04 ENCOUNTER — TELEPHONE (OUTPATIENT)
Dept: INTERNAL MEDICINE | Facility: CLINIC | Age: 59
End: 2019-01-04

## 2019-01-04 ENCOUNTER — PATIENT MESSAGE (OUTPATIENT)
Dept: INTERNAL MEDICINE | Facility: CLINIC | Age: 59
End: 2019-01-04

## 2019-01-04 NOTE — TELEPHONE ENCOUNTER
rx was sent electroinically on 1/2. I told patient by email.  He left another msg so I called rx to recorder at [facility] as dr lan approved.

## 2019-01-04 NOTE — TELEPHONE ENCOUNTER
----- Message from Argenis Meek sent at 1/4/2019  3:08 PM CST -----  Contact: Pt Mobile [phone]  Patient would like a call back in regards to letting you know that his medication for valsartan-hydrochlorothiazide (DIOVAN-HCT) 320-25 mg per tablet has a recall on it.

## 2019-01-04 NOTE — TELEPHONE ENCOUNTER
----- Message from Yolanda Alonzo sent at 1/4/2019  4:36 PM CST -----  Contact: Ihsan Mays [phone]  The patient is requesting a refill     amLODIPine (NORVASC) 5 MG tablet    Please call in to : [facility]/pharmacy #[id number] - [place] [facility], [address] AT [facility] [phone] (Phone)  [phone] (Fax)

## 2019-01-09 NOTE — PROGRESS NOTES
Last 5 Patient Entered Readings                                      Current 30 Day Average: 126/95     Recent Readings 1/6/2019 1/6/2019 1/5/2019 1/3/2019 1/3/2019    SBP (mmHg) 120 131 119 125 127    DBP (mmHg) 83 86 83 92 94    Pulse 70 75 80 74 76        Patient's sinus infection has cleared up, and has increased medication.     Digital Medicine: Health  Follow Up    Lifestyle Modifications:    1.Dietary Modifications (Sodium intake <2,000mg/day, food labels, dining out):    Patient continues to watch his sodium but is not tracking. Patient travels a lot for work, but is careful to still cook low sodium meals. Agrees to try again with tracking sodium (did not have time previously). Congratulated patient on his progress    2.Physical Activity:     3.Medication Therapy: Patient has been compliant with the medication regimen.    4.Patient has the following medication side effects/concerns:   (Frequency/Alleviating factors/Precipitating factors, etc.)     Follow up with Mr. Ihsan Mays Sr. completed. No further questions or concerns. Will continue to follow up to achieve health goals.

## 2019-01-11 ENCOUNTER — PATIENT OUTREACH (OUTPATIENT)
Dept: OTHER | Facility: OTHER | Age: 59
End: 2019-01-11

## 2019-01-11 NOTE — PROGRESS NOTES
Last 5 Patient Entered Readings                                      Current 30 Day Average: 126/95     Recent Readings 1/6/2019 1/6/2019 1/5/2019 1/3/2019 1/3/2019    SBP (mmHg) 120 131 119 125 127    DBP (mmHg) 83 86 83 92 94    Pulse 70 75 80 74 76        Mr. Esqueda BP average is improving. Dr. Ybarra increased valsartan/HCTZ dose from 160-25 to 320-25 daily. He is tolerating the dose increase and pleased to see BP improve. He also states that he is monitoring his salt intake.     Will continue to monitor regularly. Will follow up in 1-2 months, sooner if BP begins to trend upward or downward.    Patient has my contact information and knows to call with any concerns or clinical changes.     Current HTN regimen:  Hypertension Medications             amLODIPine (NORVASC) 5 MG tablet Take 1 tablet (5 mg total) by mouth once daily.    carvedilol (COREG) 12.5 MG tablet Take 1 tablet (12.5 mg total) by mouth 2 (two) times daily with meals.    valsartan-hydrochlorothiazide (DIOVAN-HCT) 320-25 mg per tablet Take 1 tablet by mouth once daily.

## 2019-02-06 ENCOUNTER — PATIENT OUTREACH (OUTPATIENT)
Dept: OTHER | Facility: OTHER | Age: 59
End: 2019-02-06

## 2019-02-06 NOTE — PROGRESS NOTES
"Last 5 Patient Entered Readings                                      Current 30 Day Average: 135/97     Recent Readings 2/6/2019 2/6/2019 2/5/2019 2/5/2019 2/4/2019    SBP (mmHg) 145 160 130 130 154    DBP (mmHg) 102 105 94 92 111    Pulse 79 70 78 81 87        Patient is unsure why his BP has been higher the last couple of days. Reports that he has been eating well and drinking plenty of water. Patient out on Saturday night at [employer] and had fried catfish. Drank on Sunday while watching the [employer], admits that he may of had "1 too many". Also ate turkey necks and boiled seafood. Has been eating salads since then.     Digital Medicine: Health  Follow Up    Lifestyle Modifications:    1.Dietary Modifications (Sodium intake <2,000mg/day, food labels, dining out):     2.Physical Activity:    This morning patient walked 3 miles. Patient would like to walk 3 days per week    3.Medication Therapy: Patient has been compliant with the medication regimen.    4.Patient has the following medication side effects/concerns:   (Frequency/Alleviating factors/Precipitating factors, etc.)     Follow up with Mr. Ihsan Mays Sr. completed. No further questions or concerns. Will continue to follow up to achieve health goals.      "

## 2019-02-13 ENCOUNTER — TELEPHONE (OUTPATIENT)
Dept: INTERNAL MEDICINE | Facility: CLINIC | Age: 59
End: 2019-02-13

## 2019-02-13 NOTE — TELEPHONE ENCOUNTER
----- Message from Bee Aamir sent at 2/13/2019 11:43 AM CST -----  Contact: pt [phone]  Pt would like a call back regarding getting a 3 month supply of all of his medications called in to his pharmacy. He lost his job on Monday and will only have insurance coverage until the end of the month and would like to make sure he has enough medication until he can get new insurance. Please advise.       [facility]/pharmacy #[id number] - [place], [address] AT [facility] [phone] (Phone)  [phone] (Fax)

## 2019-02-13 NOTE — TELEPHONE ENCOUNTER
Patient told he has refills on file, we cannot control how quickly his insurance will allow a refill.  He said pharmacy told him to call us to get filled early.    I spoke to pharmacist they can fill the amlodopine now because close to 30 days.    Coreg and valsartan can't be refilled till 23rd and they will process then.    I told patient the above and said in future to give drug names to get accurate info from pharmacy.

## 2019-02-26 ENCOUNTER — PATIENT OUTREACH (OUTPATIENT)
Dept: OTHER | Facility: OTHER | Age: 59
End: 2019-02-26

## 2019-02-26 NOTE — PROGRESS NOTES
Last 5 Patient Entered Readings                                      Current 30 Day Average: 131/97     Recent Readings 2/19/2019 2/8/2019 2/7/2019 2/7/2019 2/6/2019    SBP (mmHg) 115 116 127 131 145    DBP (mmHg) 77 85 89 99 102    Pulse 75 86 92 95 79        Digital Medicine: Health  Follow Up    Lifestyle Modifications:    1.Dietary Modifications (Sodium intake <2,000mg/day, food labels, dining out):    Patient has been eating salads and drinking water. Staying away from boiled seafood.     2.Physical Activity:    Patient has been walking 3 days per week for 3-5 miles per walk.     3.Medication Therapy: Patient has been compliant with the medication regimen.    4.Patient has the following medication side effects/concerns:   (Frequency/Alleviating factors/Precipitating factors, etc.)     Follow up with Mr. Ihsan Mays Sr. completed. No further questions or concerns. Will continue to follow up to achieve health goals.

## 2019-04-03 ENCOUNTER — PATIENT OUTREACH (OUTPATIENT)
Dept: OTHER | Facility: OTHER | Age: 59
End: 2019-04-03

## 2019-04-03 NOTE — PROGRESS NOTES
"Last 5 Patient Entered Readings                                      Current 30 Day Average: 134/94     Recent Readings 4/2/2019 4/2/2019 3/20/2019 3/20/2019 3/11/2019    SBP (mmHg) 143 156 121 116 138    DBP (mmHg) 98 106 86 90 90    Pulse 70 70 86 87 73        Digital Medicine: Health  Follow Up    Lifestyle Modifications:    1.Dietary Modifications (Sodium intake <2,000mg/day, food labels, dining out):    Patient admits that he ate "too many potato chips" over the weekend while watching basketball. Believes this caused the increased readings on 4/2. Patient is now working on resuming with his low sodium diet. Knows what to watch out for, and states that his wife is careful about cooking without salt. Patient further notes he has been trying to watch his portion sizes. Emailed patient list of low sodium potato chip options    2.Physical Activity:    Patient has been walking 3 miles around 3 days per week. Encouraged patient to continue with this. Denies any new health and wellness goals at this time, but thanks me for following up.     3.Medication Therapy: Patient has been compliant with the medication regimen.    4.Patient has the following medication side effects/concerns:   (Frequency/Alleviating factors/Precipitating factors, etc.)     Follow up with Mr. Ihsan Mays Sr. completed. No further questions or concerns. Will continue to follow up to achieve health goals.      "

## 2019-04-29 ENCOUNTER — PATIENT OUTREACH (OUTPATIENT)
Dept: OTHER | Facility: OTHER | Age: 59
End: 2019-04-29

## 2019-04-29 NOTE — PROGRESS NOTES
Last 5 Patient Entered Readings                                      Current 30 Day Average: 125/93     Recent Readings 4/23/2019 4/23/2019 4/11/2019 4/2/2019 4/2/2019    SBP (mmHg) 119 136 113 143 156    DBP (mmHg) 84 96 79 98 106    Pulse 74 75 81 70 70        Per 30 day average, 125/93 mmHg, patient's BP is at goal.     Mr. Mays' BP spiked a few weeks ago, after indulging in salty snacks. He has a list of low sodium snacks provided by his health . DBP is above goal, but overall average is improving. No medication changes necessary at this time.     Patient's health , Daisy Mixon, will be following up. I will continue to monitor regularly and will follow up in 4-6 months, sooner if BP begins to trend upward or downward.    Asked patient to call or message with questions or concerns.     Current HTN regimen:  Hypertension Medications             amLODIPine (NORVASC) 5 MG tablet Take 1 tablet (5 mg total) by mouth once daily.    carvedilol (COREG) 12.5 MG tablet Take 1 tablet (12.5 mg total) by mouth 2 (two) times daily with meals.    valsartan-hydrochlorothiazide (DIOVAN-HCT) 320-25 mg per tablet Take 1 tablet by mouth once daily.

## 2019-05-08 ENCOUNTER — PATIENT OUTREACH (OUTPATIENT)
Dept: OTHER | Facility: OTHER | Age: 59
End: 2019-05-08

## 2019-05-08 NOTE — PROGRESS NOTES
Last 5 Patient Entered Readings                                      Current 30 Day Average: 123/88     Recent Readings 5/5/2019 5/5/2019 4/23/2019 4/23/2019 4/11/2019    SBP (mmHg) 136 129 119 136 113    DBP (mmHg) 90 92 84 96 79    Pulse 68 74 74 75 81        Digital Medicine: Health  Follow Up    Lifestyle Modifications:    1.Dietary Modifications (Sodium intake <2,000mg/day, food labels, dining out):    Patient feels that he has been watching his salt intake and selecting low sodium products. Looks for options around 100 mgs, and tries to avoid fried food almost all the time. Due to elevated DBP, reviewed common sources of hidden sodium with patient including canned goods, packaged products, processed meats, sauces, and seasonings. Patient denies all of these foods, and further notes that he doesn't drink coffee, and very rarely drinks energy drinks. Mostly sticks to water. Encouraged patient to continue staying diligent with his low sodium diet, and that his BP avg has improved since last outreach.     2.Physical Activity:    Patient has been walking 3 days per week still for 3-5 miles.     3.Medication Therapy: Patient has been compliant with the medication regimen.    4.Patient has the following medication side effects/concerns:   (Frequency/Alleviating factors/Precipitating factors, etc.)     Follow up with Mr. Ihsan Mays Sr. completed. No further questions or concerns. Will continue to follow up to achieve health goals.

## 2019-06-13 ENCOUNTER — PATIENT OUTREACH (OUTPATIENT)
Dept: OTHER | Facility: OTHER | Age: 59
End: 2019-06-13

## 2019-07-31 ENCOUNTER — PATIENT OUTREACH (OUTPATIENT)
Dept: OTHER | Facility: OTHER | Age: 59
End: 2019-07-31

## 2019-07-31 NOTE — PROGRESS NOTES
Last 5 Patient Entered Readings                                      Current 30 Day Average: 108/73     Recent Readings 7/21/2019 7/21/2019 7/13/2019 7/13/2019 6/10/2019    SBP (mmHg) 104 101 111 111 116    DBP (mmHg) 68 67 78 78 79    Pulse 73 76 74 75 68        Left voicemail for follow up

## 2019-08-21 NOTE — PROGRESS NOTES
"Last 5 Patient Entered Readings                                      Current 30 Day Average: 117/83     Recent Readings 8/18/2019 8/8/2019 8/8/2019 7/21/2019 7/21/2019    SBP (mmHg) 118 116 122 104 101    DBP (mmHg) 83 80 85 68 67    Pulse 87 73 70 73 76        Digital Medicine: Health  Follow Up    Lifestyle Modifications:    1.Dietary Modifications (Sodium intake <2,000mg/day, food labels, dining out):    Patient feels that he has been "extra careful" to watch his diet recently. Patient states that he eats "everything legal" and avoids a lot of starches and fried food.     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     3.Medication Therapy: Patient has been compliant with the medication regimen.    4.Patient has the following medication side effects/concerns:   (Frequency/Alleviating factors/Precipitating factors, etc.)     Follow up with Mr. Ihsan Mays Sr. completed. No further questions or concerns. Will continue to follow up to achieve health goals.      "

## 2019-10-09 ENCOUNTER — PATIENT OUTREACH (OUTPATIENT)
Dept: OTHER | Facility: OTHER | Age: 59
End: 2019-10-09

## 2019-10-09 NOTE — PROGRESS NOTES
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         Diet:       Patient feels that he is doing well avoiding salty snacks and seasonings in general.     Physical Activity:   When asked if exercising, patient responded: yes    Patient participates in the following activities: walking    Patient admits that walking has been slow, but hopes to increase on weekends.     SDOH    INTERVENTION(S)  encouragement/support    PLAN  patient amenable to changes      There are no preventive care reminders to display for this patient.    Last 5 Patient Entered Readings                                      Current 30 Day Average: 115/81     Recent Readings 10/5/2019 10/5/2019 9/28/2019 9/28/2019 9/12/2019    SBP (mmHg) 128 125 117 119 111    DBP (mmHg) 90 89 81 80 79    Pulse 72 72 80 83 85

## 2019-10-15 ENCOUNTER — OFFICE VISIT (OUTPATIENT)
Dept: INTERNAL MEDICINE | Facility: CLINIC | Age: 59
End: 2019-10-15

## 2019-10-15 ENCOUNTER — LAB VISIT (OUTPATIENT)
Dept: LAB | Facility: HOSPITAL | Age: 59
End: 2019-10-15
Attending: INTERNAL MEDICINE

## 2019-10-15 VITALS
BODY MASS INDEX: 34.38 KG/M2 | HEART RATE: 67 BPM | DIASTOLIC BLOOD PRESSURE: 89 MMHG | HEIGHT: 68 IN | TEMPERATURE: 98 F | SYSTOLIC BLOOD PRESSURE: 132 MMHG | WEIGHT: 226.88 LBS | RESPIRATION RATE: 16 BRPM

## 2019-10-15 DIAGNOSIS — I10 ESSENTIAL HYPERTENSION: ICD-10-CM

## 2019-10-15 DIAGNOSIS — Z00.00 ANNUAL PHYSICAL EXAM: ICD-10-CM

## 2019-10-15 DIAGNOSIS — N52.9 ERECTILE DYSFUNCTION, UNSPECIFIED ERECTILE DYSFUNCTION TYPE: ICD-10-CM

## 2019-10-15 DIAGNOSIS — Z00.00 ANNUAL PHYSICAL EXAM: Primary | ICD-10-CM

## 2019-10-15 DIAGNOSIS — R60.0 EDEMA OF RIGHT LOWER EXTREMITY: ICD-10-CM

## 2019-10-15 LAB
ALBUMIN SERPL BCP-MCNC: 4 G/DL (ref 3.5–5.2)
ALP SERPL-CCNC: 66 U/L (ref 55–135)
ALT SERPL W/O P-5'-P-CCNC: 20 U/L (ref 10–44)
ANION GAP SERPL CALC-SCNC: 9 MMOL/L (ref 8–16)
AST SERPL-CCNC: 21 U/L (ref 10–40)
BASOPHILS # BLD AUTO: 0.03 K/UL (ref 0–0.2)
BASOPHILS NFR BLD: 0.5 % (ref 0–1.9)
BILIRUB SERPL-MCNC: 0.7 MG/DL (ref 0.1–1)
BUN SERPL-MCNC: 13 MG/DL (ref 8–23)
CALCIUM SERPL-MCNC: 9.3 MG/DL (ref 8.7–10.5)
CHLORIDE SERPL-SCNC: 104 MMOL/L (ref 95–110)
CHOLEST SERPL-MCNC: 207 MG/DL (ref 120–199)
CHOLEST/HDLC SERPL: 3.8 {RATIO} (ref 2–5)
CO2 SERPL-SCNC: 26 MMOL/L (ref 23–29)
CREAT SERPL-MCNC: 0.9 MG/DL (ref 0.5–1.4)
DIFFERENTIAL METHOD: ABNORMAL
EOSINOPHIL # BLD AUTO: 0.2 K/UL (ref 0–0.5)
EOSINOPHIL NFR BLD: 3.6 % (ref 0–8)
ERYTHROCYTE [DISTWIDTH] IN BLOOD BY AUTOMATED COUNT: 13.1 % (ref 11.5–14.5)
EST. GFR  (AFRICAN AMERICAN): >60 ML/MIN/1.73 M^2
EST. GFR  (NON AFRICAN AMERICAN): >60 ML/MIN/1.73 M^2
GLUCOSE SERPL-MCNC: 100 MG/DL (ref 70–110)
HCT VFR BLD AUTO: 41.4 % (ref 40–54)
HDLC SERPL-MCNC: 55 MG/DL (ref 40–75)
HDLC SERPL: 26.6 % (ref 20–50)
HGB BLD-MCNC: 13.8 G/DL (ref 14–18)
IMM GRANULOCYTES # BLD AUTO: 0.02 K/UL (ref 0–0.04)
IMM GRANULOCYTES NFR BLD AUTO: 0.3 % (ref 0–0.5)
LDLC SERPL CALC-MCNC: 136.4 MG/DL (ref 63–159)
LYMPHOCYTES # BLD AUTO: 1.6 K/UL (ref 1–4.8)
LYMPHOCYTES NFR BLD: 27.6 % (ref 18–48)
MCH RBC QN AUTO: 31.9 PG (ref 27–31)
MCHC RBC AUTO-ENTMCNC: 33.3 G/DL (ref 32–36)
MCV RBC AUTO: 96 FL (ref 82–98)
MONOCYTES # BLD AUTO: 0.5 K/UL (ref 0.3–1)
MONOCYTES NFR BLD: 9.1 % (ref 4–15)
NEUTROPHILS # BLD AUTO: 3.5 K/UL (ref 1.8–7.7)
NEUTROPHILS NFR BLD: 58.9 % (ref 38–73)
NONHDLC SERPL-MCNC: 152 MG/DL
NRBC BLD-RTO: 0 /100 WBC
PLATELET # BLD AUTO: 150 K/UL (ref 150–350)
PMV BLD AUTO: 11.5 FL (ref 9.2–12.9)
POTASSIUM SERPL-SCNC: 3.6 MMOL/L (ref 3.5–5.1)
PROT SERPL-MCNC: 7.3 G/DL (ref 6–8.4)
RBC # BLD AUTO: 4.32 M/UL (ref 4.6–6.2)
SODIUM SERPL-SCNC: 139 MMOL/L (ref 136–145)
TRIGL SERPL-MCNC: 78 MG/DL (ref 30–150)
TSH SERPL DL<=0.005 MIU/L-ACNC: 1.12 UIU/ML (ref 0.4–4)
WBC # BLD AUTO: 5.91 K/UL (ref 3.9–12.7)

## 2019-10-15 PROCEDURE — 99999 PR PBB SHADOW E&M-EST. PATIENT-LVL III: CPT | Mod: PBBFAC,,, | Performed by: INTERNAL MEDICINE

## 2019-10-15 PROCEDURE — 99999 PR PBB SHADOW E&M-EST. PATIENT-LVL III: ICD-10-PCS | Mod: PBBFAC,,, | Performed by: INTERNAL MEDICINE

## 2019-10-15 PROCEDURE — 85025 COMPLETE CBC W/AUTO DIFF WBC: CPT

## 2019-10-15 PROCEDURE — 84443 ASSAY THYROID STIM HORMONE: CPT

## 2019-10-15 PROCEDURE — 99396 PR PREVENTIVE VISIT,EST,40-64: ICD-10-PCS | Mod: S$PBB,,, | Performed by: INTERNAL MEDICINE

## 2019-10-15 PROCEDURE — 36415 COLL VENOUS BLD VENIPUNCTURE: CPT | Mod: PO

## 2019-10-15 PROCEDURE — 80053 COMPREHEN METABOLIC PANEL: CPT

## 2019-10-15 PROCEDURE — 99396 PREV VISIT EST AGE 40-64: CPT | Mod: S$PBB,,, | Performed by: INTERNAL MEDICINE

## 2019-10-15 PROCEDURE — 80061 LIPID PANEL: CPT

## 2019-10-15 NOTE — PROGRESS NOTES
Subjective:       Patient ID: Ihsan Mays Sr. is a 59 y.o. male.    Chief Complaint: Annual Exam    HPI   59 y.o. Male here for annual exam.      Vaccines: Influenza (2019); Tetanus (2018); Shingrix (will get)  Eye exam: 2018  Colonoscopy: 2015- repeat in 5 yrs     Exercise: no  Diet: regular     Past Medical History:  No date: Glaucoma  No date: Hypertension  Past Surgical History:  6/22/2015: COLONOSCOPY; N/A      Comment:  Performed by Yosef Pérez MD at [facility] ENDO (4TH FLR)  6/22/2015: ESOPHAGOGASTRODUODENOSCOPY (EGD); N/A      Comment:  Performed by Yosef Pérez MD at [facility] ENDO (4TH FLR)  No date: FINGER SURGERY      Comment:  right hand pinkie finger   No date: GLAUCOMA SURGERY  No date: STOMACH SURGERY      Comment:  ulcers   Social History    Socioeconomic History      Marital status:       Spouse name: Not on file      Number of children: 3      Years of education: Not on file      Highest education level: Not on file    Social Needs      Financial resource strain: Not on file      Food insecurity - worry: Not on file      Food insecurity - inability: Not on file      Transportation needs - medical: Not on file      Transportation needs - non-medical: Not on file       Tobacco Use      Smoking status: Never Smoker      Smokeless tobacco: Never Used    Substance and Sexual Activity      Alcohol use: Yes        Comment: occasionally      Drug use: No      Sexual activity: Yes        Partners: Female       Social History Narrative       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   HENT: Negative for congestion, mouth sores, postnasal drip, rhinorrhea, sinus pressure, sneezing, sore throat, trouble swallowing and voice change.    Eyes: Negative for discharge, itching and visual  disturbance.   Respiratory: Negative for cough, chest tightness, shortness of breath and wheezing.    Cardiovascular: Positive for leg swelling. Negative for chest pain and palpitations.   Gastrointestinal: Negative for abdominal pain, blood in stool, constipation, diarrhea, nausea and vomiting.   Endocrine: Negative for cold intolerance and heat intolerance.   Genitourinary: Negative for difficulty urinating, dysuria, flank pain, hematuria and urgency.   Musculoskeletal: Negative for arthralgias, back pain, myalgias and neck pain.   Skin: Negative for rash and wound.   Allergic/Immunologic: Negative for environmental allergies and food allergies.   Neurological: Negative for dizziness, tremors, seizures, syncope, weakness and headaches.   Hematological: Negative for adenopathy. Does not bruise/bleed easily.   Psychiatric/Behavioral: Negative for confusion, sleep disturbance and suicidal ideas. The patient is not nervous/anxious.        Objective:      Physical Exam   Constitutional: He is oriented to person, place, and time. He appears well-developed and well-nourished. No distress.   HENT:   Head: Normocephalic and atraumatic.   Right Ear: External ear normal.   Left Ear: External ear normal.   Nose: Nose normal.   Mouth/Throat: Oropharynx is clear and moist. No oropharyngeal exudate.   Eyes: Pupils are equal, round, and reactive to light. Conjunctivae and EOM are normal. Right eye exhibits no discharge. Left eye exhibits no discharge. No scleral icterus.   Neck: Normal range of motion. Neck supple. No JVD present. No thyromegaly present.   Cardiovascular: Normal rate, regular rhythm, normal heart sounds and intact distal pulses.   No murmur heard.  Pulmonary/Chest: Effort normal and breath sounds normal. No respiratory distress. He has no wheezes. He has no rales.   Abdominal: Soft. Bowel sounds are normal. He exhibits no distension. There is no tenderness. There is no guarding.   Musculoskeletal: He exhibits  edema (trace in RLE).   Lymphadenopathy:     He has no cervical adenopathy.   Neurological: He is alert and oriented to person, place, and time. No cranial nerve deficit. Coordination normal.   Skin: Skin is warm and dry. No rash noted. He is not diaphoretic. No pallor.   Psychiatric: He has a normal mood and affect. Judgment normal.       Assessment:       1. Annual physical exam    2. Essential hypertension    3. Erectile dysfunction, unspecified erectile dysfunction type    4. Edema of right lower extremity        Plan:    1. Blood work ordered        Vaccines: Influenza (2019); Tetanus (2018); Shingrix (will get)       Eye exam: 2018       Colonoscopy: 2015- repeat in 5 yrs   2. HTN- stable on Diovan/Coreg/Norvasc   3. ED- controlled on Viagra   4. Check US to r/o DVT   5. F/u in 6 months

## 2019-10-16 ENCOUNTER — HOSPITAL ENCOUNTER (OUTPATIENT)
Dept: CARDIOLOGY | Facility: HOSPITAL | Age: 59
Discharge: HOME | End: 2019-10-16
Attending: INTERNAL MEDICINE

## 2019-10-16 ENCOUNTER — HOSPITAL ENCOUNTER (OUTPATIENT)
Dept: RADIOLOGY | Facility: HOSPITAL | Age: 59
Discharge: HOME | End: 2019-10-16
Attending: INTERNAL MEDICINE

## 2019-10-16 DIAGNOSIS — R60.0 EDEMA OF RIGHT LOWER EXTREMITY: ICD-10-CM

## 2019-10-16 PROCEDURE — 93971 EXTREMITY STUDY: CPT | Mod: TC,PO,RT

## 2019-11-26 RX ORDER — VALSARTAN AND HYDROCHLOROTHIAZIDE 320; 25 MG/1; MG/1
TABLET, FILM COATED ORAL
Qty: 90 TABLET | Refills: 3 | Status: SHIPPED | OUTPATIENT
Start: 2019-11-26 | End: 2020-01-07 | Stop reason: RX

## 2019-12-05 ENCOUNTER — PATIENT OUTREACH (OUTPATIENT)
Dept: OTHER | Facility: OTHER | Age: 59
End: 2019-12-05

## 2019-12-05 NOTE — PROGRESS NOTES
Digital Medicine: Health  Follow-Up        Follow Up  Follow-up reason(s): reading review and goal follow-up    Patient currently has a cold. He has had sore throat and is coughing as of yesterday. Wished him a quick recovery.       INTERVENTION(S)  encouragement/support    PLAN  continue monitoring      There are no preventive care reminders to display for this patient.    Last 5 Patient Entered Readings                                      Current 30 Day Average: 119/83     Recent Readings 11/26/2019 11/26/2019 11/16/2019 11/16/2019 10/26/2019    SBP (mmHg) 122 121 115 115 122    DBP (mmHg) 84 82 83 83 83    Pulse 77 74 71 72 70                      Diet Screening       Patient has been avoiding eating out, and is continuing to monitor his sodium intake. He feels that this helps to avoid fluctuations in his BP. Congratulated patient on his continued efforts, and improvement in BP over the past year.     Intervention(s): reducing sodium intake    Physical Activity Screening       Patient admits that his activity level has still been less than he would like it to be. No goals at this time      SDOH

## 2020-01-07 NOTE — TELEPHONE ENCOUNTER
Last sent refilled 11/26/19 90 x 3 for valsartan hctz 320/25.  Physical was done 10/15/19.    [facility] says rx not available.  I called and spoke to pharmacist and they can fill separately. I gave verbal rx'.    Please sign to chart as phoned in.    Thanks eric

## 2020-01-08 ENCOUNTER — HOSPITAL ENCOUNTER (OUTPATIENT)
Dept: RADIOLOGY | Facility: HOSPITAL | Age: 60
Discharge: HOME | End: 2020-01-08
Attending: HOSPITALIST
Payer: COMMERCIAL

## 2020-01-08 ENCOUNTER — OFFICE VISIT (OUTPATIENT)
Dept: INTERNAL MEDICINE | Facility: CLINIC | Age: 60
End: 2020-01-08
Payer: COMMERCIAL

## 2020-01-08 VITALS
BODY MASS INDEX: 34.59 KG/M2 | SYSTOLIC BLOOD PRESSURE: 98 MMHG | TEMPERATURE: 99 F | WEIGHT: 228.19 LBS | RESPIRATION RATE: 16 BRPM | HEIGHT: 68 IN | HEART RATE: 84 BPM | DIASTOLIC BLOOD PRESSURE: 72 MMHG

## 2020-01-08 DIAGNOSIS — M79.671 RIGHT FOOT PAIN: ICD-10-CM

## 2020-01-08 DIAGNOSIS — I87.2 VENOUS INSUFFICIENCY OF BOTH LOWER EXTREMITIES: ICD-10-CM

## 2020-01-08 DIAGNOSIS — M79.671 RIGHT FOOT PAIN: Primary | ICD-10-CM

## 2020-01-08 PROCEDURE — 99999 PR PBB SHADOW E&M-EST. PATIENT-LVL III: CPT | Mod: PBBFAC,,, | Performed by: HOSPITALIST

## 2020-01-08 PROCEDURE — 99999 PR PBB SHADOW E&M-EST. PATIENT-LVL III: ICD-10-PCS | Mod: PBBFAC,,, | Performed by: HOSPITALIST

## 2020-01-08 PROCEDURE — 73630 XR FOOT COMPLETE 3 VIEW RIGHT: ICD-10-PCS | Mod: 26,RT,, | Performed by: RADIOLOGY

## 2020-01-08 PROCEDURE — 99213 PR OFFICE/OUTPT VISIT, EST, LEVL III, 20-29 MIN: ICD-10-PCS | Mod: S$GLB,,, | Performed by: HOSPITALIST

## 2020-01-08 PROCEDURE — 99213 OFFICE O/P EST LOW 20 MIN: CPT | Mod: S$GLB,,, | Performed by: HOSPITALIST

## 2020-01-08 PROCEDURE — 73630 X-RAY EXAM OF FOOT: CPT | Mod: 26,RT,, | Performed by: RADIOLOGY

## 2020-01-08 PROCEDURE — 73630 X-RAY EXAM OF FOOT: CPT | Mod: TC,PO,RT

## 2020-01-08 RX ORDER — HYDROCHLOROTHIAZIDE 25 MG/1
25 TABLET ORAL DAILY
Qty: 90 TABLET | Refills: 3 | Status: SHIPPED | OUTPATIENT
Start: 2020-01-08 | End: 2020-10-16

## 2020-01-08 RX ORDER — VALSARTAN 320 MG/1
320 TABLET ORAL DAILY
Qty: 90 TABLET | Refills: 3 | Status: SHIPPED | OUTPATIENT
Start: 2020-01-08 | End: 2020-10-16

## 2020-01-08 NOTE — PROGRESS NOTES
"Subjective:     @Patient ID: Ihsan Mays Sr. is a 59 y.o. male.    Chief Complaint: Foot Pain    HPI  58 yo male presents for urgent visit with pain of R foot pain/swelling x 1 week. Worse in the AM, or when sitting. Has had u/s for this in 10/2019 that was negative for DVT  No fall/injury. No hx of surgery  Had this before. Has seen podiatry many years ago.   Also notes having b/l leg swelling. Improves with leg elevation. He works as a      Review of Systems   Constitutional: Negative for chills and fever.   HENT: Negative for congestion and rhinorrhea.    Respiratory: Negative for cough and shortness of breath.    Cardiovascular: Positive for leg swelling. Negative for chest pain.   Neurological: Negative for dizziness and headaches.   Psychiatric/Behavioral: Negative for agitation and confusion.     Past medical history, surgical history, and family medical history reviewed and updated as appropriate.    Medications and allergies reviewed.     Objective:     Vitals:    01/08/20 1538   BP: 98/72   Pulse: 84   Resp: 16   Temp: 98.8 °F (37.1 °C)   TempSrc: Oral   Weight: 103.5 kg (228 lb 2.8 oz)   Height: 5' 8" (1.727 m)     Body mass index is 34.69 kg/m².  Physical Exam   Constitutional: He is oriented to person, place, and time. He appears well-developed and well-nourished. No distress.   HENT:   Head: Normocephalic and atraumatic.   Eyes: Conjunctivae are normal. Right eye exhibits no discharge. Left eye exhibits no discharge.   Neck: Normal range of motion. Neck supple.   Cardiovascular: Normal rate, regular rhythm and normal heart sounds. Exam reveals no friction rub.   No murmur heard.  Pulmonary/Chest: Effort normal and breath sounds normal.   Musculoskeletal: Normal range of motion. He exhibits edema.   Trace edema b/l lower ext   Neurological: He is alert and oriented to person, place, and time.   Skin: Skin is warm and dry.   Psychiatric: He has a normal mood and affect. His behavior is normal. "   Vitals reviewed.      Lab Results   Component Value Date    WBC 5.91 10/15/2019    HGB 13.8 (L) 10/15/2019    HCT 41.4 10/15/2019     10/15/2019    CHOL 207 (H) 10/15/2019    TRIG 78 10/15/2019    HDL 55 10/15/2019    ALT 20 10/15/2019    AST 21 10/15/2019     10/15/2019    K 3.6 10/15/2019     10/15/2019    CREATININE 0.9 10/15/2019    BUN 13 10/15/2019    CO2 26 10/15/2019    TSH 1.117 10/15/2019    PSA 2.8 10/09/2018    INR 1.0 02/22/2012    HGBA1C 5.5 10/09/2018       Assessment:     1. Right foot pain    2. Venous insufficiency of both lower extremities      Plan:   Ihsan was seen today for foot pain.    Diagnoses and all orders for this visit:    Right foot pain  - Suspect arthritis. Ok for tylenol or nsaids prn  -     X-Ray Foot Complete 3 view Right; Future    Venous insufficiency of both lower extremities        - Recommend compression stockings, leg elevation, and low salt diet        No follow-ups on file.    Francoise Dixon MD  Internal Medicine    1/8/2020

## 2020-01-16 RX ORDER — AMLODIPINE BESYLATE 5 MG/1
TABLET ORAL
Qty: 90 TABLET | Refills: 3 | Status: SHIPPED | OUTPATIENT
Start: 2020-01-16 | End: 2020-07-23 | Stop reason: SDUPTHER

## 2020-01-16 RX ORDER — CARVEDILOL 12.5 MG/1
TABLET ORAL
Qty: 180 TABLET | Refills: 3 | Status: SHIPPED | OUTPATIENT
Start: 2020-01-16 | End: 2020-12-22

## 2020-02-18 ENCOUNTER — PATIENT OUTREACH (OUTPATIENT)
Dept: OTHER | Facility: OTHER | Age: 60
End: 2020-02-18

## 2020-02-18 NOTE — PROGRESS NOTES
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       INTERVENTION(S)  encouragement/support    PLAN  continue monitoring      There are no preventive care reminders to display for this patient.    Last 5 Patient Entered Readings                                      Current 30 Day Average: 117/86     Recent Readings 2/15/2020 2/15/2020 2/9/2020 1/26/2020 1/18/2020    SBP (mmHg) 123 128 114 115 114    DBP (mmHg) 86 92 82 85 79    Pulse 70 70 78 74 68                      Diet Screening       Patient states that he continues to work on moderating his diet. He typically doesn't partake in [employer], and plans to relax at home this weekend.     Physical Activity Screening       He identified the following barriers to physical activity: lack of time    Patient struggles to find time to exercise the way he'd like, but he just joined [employer] and he hopes to start going in the late evenings when he has time. Congratulated patient on taking this step.       SDOH

## 2020-04-14 ENCOUNTER — OFFICE VISIT (OUTPATIENT)
Dept: INTERNAL MEDICINE | Facility: CLINIC | Age: 60
End: 2020-04-14
Payer: COMMERCIAL

## 2020-04-14 ENCOUNTER — PATIENT MESSAGE (OUTPATIENT)
Dept: ADMINISTRATIVE | Facility: OTHER | Age: 60
End: 2020-04-14

## 2020-04-14 ENCOUNTER — TELEPHONE (OUTPATIENT)
Dept: INTERNAL MEDICINE | Facility: CLINIC | Age: 60
End: 2020-04-14

## 2020-04-14 DIAGNOSIS — I10 ESSENTIAL HYPERTENSION: Primary | ICD-10-CM

## 2020-04-14 DIAGNOSIS — N52.9 ERECTILE DYSFUNCTION, UNSPECIFIED ERECTILE DYSFUNCTION TYPE: ICD-10-CM

## 2020-04-14 DIAGNOSIS — K59.00 CONSTIPATION, UNSPECIFIED CONSTIPATION TYPE: ICD-10-CM

## 2020-04-14 DIAGNOSIS — Z12.5 PROSTATE CANCER SCREENING: Primary | ICD-10-CM

## 2020-04-14 PROCEDURE — 99214 PR OFFICE/OUTPT VISIT, EST, LEVL IV, 30-39 MIN: ICD-10-PCS | Mod: 95,,, | Performed by: INTERNAL MEDICINE

## 2020-04-14 PROCEDURE — 99214 OFFICE O/P EST MOD 30 MIN: CPT | Mod: 95,,, | Performed by: INTERNAL MEDICINE

## 2020-04-14 NOTE — PROGRESS NOTES
Subjective:       Patient ID: Ihsan Mays Sr. is a 59 y.o. male.    Chief Complaint: No chief complaint on file.    HPI   The patient location is: [place], [place]  The chief complaint leading to consultation is: 6 month f/u  Visit type: audiovisual  Total time spent with patient: 7 minutes  Each patient to whom he or she provides medical services by telemedicine is:  (1) informed of the relationship between the physician and patient and the respective role of any other health care provider with respect to management of the patient; and (2) notified that he or she may decline to receive medical services by telemedicine and may withdraw from such care at any time.    Pt with HTN, ED is here for 6 month f/u. He is c/o a few weeks of intermittent constipation with hard stools and abdominal bloating. He just starting taking Fiber supplements today. Last colonocopy in 2015.     Review of Systems   Constitutional: Negative for activity change, appetite change, chills, diaphoresis, fatigue, fever and unexpected weight change.   HENT: Negative for hearing loss, postnasal drip, rhinorrhea, sinus pressure, sneezing, sore throat, trouble swallowing and voice change.    Eyes: Negative for discharge and visual disturbance.   Respiratory: Negative for cough, chest tightness, shortness of breath and wheezing.    Cardiovascular: Negative for chest pain, palpitations and leg swelling.   Gastrointestinal: Positive for constipation. Negative for abdominal pain, blood in stool, diarrhea, nausea and vomiting.   Endocrine: Negative for polydipsia and polyuria.   Genitourinary: Negative for difficulty urinating, dysuria, hematuria and urgency.   Musculoskeletal: Negative for arthralgias, joint swelling, myalgias and neck pain.   Skin: Negative for rash and wound.   Allergic/Immunologic: Negative for environmental allergies and food allergies.   Neurological: Negative for weakness and headaches.   Hematological: Negative for adenopathy.  Does not bruise/bleed easily.   Psychiatric/Behavioral: Negative for confusion and dysphoric mood.       Objective:      Physical Exam   Constitutional: He is oriented to person, place, and time. He appears well-developed and well-nourished. No distress.   Pulmonary/Chest: No respiratory distress.   Neurological: He is alert and oriented to person, place, and time.   Skin: He is not diaphoretic.       Assessment:       1. Essential hypertension    2. Erectile dysfunction, unspecified erectile dysfunction type    3. Constipation, unspecified constipation type        Plan:    1. HTN- stable on Diovan/Coreg/Norvasc   2. ED- controlled on Viagra   3. Constipation- pt will try Fiber supplements and increase intake of fluids

## 2020-04-15 ENCOUNTER — LAB VISIT (OUTPATIENT)
Dept: LAB | Facility: HOSPITAL | Age: 60
End: 2020-04-15
Attending: INTERNAL MEDICINE
Payer: COMMERCIAL

## 2020-04-15 DIAGNOSIS — Z12.5 PROSTATE CANCER SCREENING: ICD-10-CM

## 2020-04-15 DIAGNOSIS — I10 ESSENTIAL HYPERTENSION: ICD-10-CM

## 2020-04-15 LAB
ANION GAP SERPL CALC-SCNC: 6 MMOL/L (ref 8–16)
BASOPHILS # BLD AUTO: 0.04 K/UL (ref 0–0.2)
BASOPHILS NFR BLD: 0.5 % (ref 0–1.9)
BUN SERPL-MCNC: 16 MG/DL (ref 2–20)
CALCIUM SERPL-MCNC: 9.3 MG/DL (ref 8.7–10.5)
CHLORIDE SERPL-SCNC: 104 MMOL/L (ref 95–110)
CO2 SERPL-SCNC: 30 MMOL/L (ref 23–29)
COMPLEXED PSA SERPL-MCNC: 4.5 NG/ML (ref 0–4)
CREAT SERPL-MCNC: 0.9 MG/DL (ref 0.5–1.4)
DIFFERENTIAL METHOD: ABNORMAL
EOSINOPHIL # BLD AUTO: 0.3 K/UL (ref 0–0.5)
EOSINOPHIL NFR BLD: 3.3 % (ref 0–8)
ERYTHROCYTE [DISTWIDTH] IN BLOOD BY AUTOMATED COUNT: 12.9 % (ref 11.5–14.5)
EST. GFR  (AFRICAN AMERICAN): >60 ML/MIN/1.73 M^2
EST. GFR  (NON AFRICAN AMERICAN): >60 ML/MIN/1.73 M^2
GLUCOSE SERPL-MCNC: 123 MG/DL (ref 70–110)
HCT VFR BLD AUTO: 42.2 % (ref 40–54)
HGB BLD-MCNC: 13.8 G/DL (ref 14–18)
IMM GRANULOCYTES # BLD AUTO: 0.02 K/UL (ref 0–0.04)
IMM GRANULOCYTES NFR BLD AUTO: 0.3 % (ref 0–0.5)
LYMPHOCYTES # BLD AUTO: 1.8 K/UL (ref 1–4.8)
LYMPHOCYTES NFR BLD: 23.6 % (ref 18–48)
MCH RBC QN AUTO: 31.2 PG (ref 27–31)
MCHC RBC AUTO-ENTMCNC: 32.7 G/DL (ref 32–36)
MCV RBC AUTO: 96 FL (ref 82–98)
MONOCYTES # BLD AUTO: 0.7 K/UL (ref 0.3–1)
MONOCYTES NFR BLD: 9 % (ref 4–15)
NEUTROPHILS # BLD AUTO: 4.8 K/UL (ref 1.8–7.7)
NEUTROPHILS NFR BLD: 63.3 % (ref 38–73)
NRBC BLD-RTO: 0 /100 WBC
PLATELET # BLD AUTO: 192 K/UL (ref 150–350)
PMV BLD AUTO: 10.5 FL (ref 9.2–12.9)
POTASSIUM SERPL-SCNC: 3.7 MMOL/L (ref 3.5–5.1)
RBC # BLD AUTO: 4.42 M/UL (ref 4.6–6.2)
SODIUM SERPL-SCNC: 140 MMOL/L (ref 136–145)
WBC # BLD AUTO: 7.53 K/UL (ref 3.9–12.7)

## 2020-04-15 PROCEDURE — 84153 ASSAY OF PSA TOTAL: CPT

## 2020-04-15 PROCEDURE — 80048 BASIC METABOLIC PNL TOTAL CA: CPT | Mod: PO

## 2020-04-15 PROCEDURE — 85025 COMPLETE CBC W/AUTO DIFF WBC: CPT | Mod: PO

## 2020-04-15 PROCEDURE — 36415 COLL VENOUS BLD VENIPUNCTURE: CPT | Mod: PO

## 2020-04-16 ENCOUNTER — PATIENT MESSAGE (OUTPATIENT)
Dept: INTERNAL MEDICINE | Facility: CLINIC | Age: 60
End: 2020-04-16

## 2020-04-20 ENCOUNTER — TELEPHONE (OUTPATIENT)
Dept: INTERNAL MEDICINE | Facility: CLINIC | Age: 60
End: 2020-04-20

## 2020-04-20 ENCOUNTER — PATIENT MESSAGE (OUTPATIENT)
Dept: INTERNAL MEDICINE | Facility: CLINIC | Age: 60
End: 2020-04-20

## 2020-04-20 DIAGNOSIS — R97.20 ELEVATED PSA: Primary | ICD-10-CM

## 2020-04-20 DIAGNOSIS — K59.00 CONSTIPATION, UNSPECIFIED CONSTIPATION TYPE: Primary | ICD-10-CM

## 2020-04-20 RX ORDER — POLYETHYLENE GLYCOL 3350 17 G/17G
17 POWDER, FOR SOLUTION ORAL DAILY
Qty: 850 G | Refills: 3 | Status: SHIPPED | OUTPATIENT
Start: 2020-04-20 | End: 2020-06-04

## 2020-04-20 NOTE — TELEPHONE ENCOUNTER
----- Message from Elliot Ybarra DO sent at 4/20/2020 12:22 PM CDT -----  Prostate screen(PSA) is slightly above normal range- I will refer you to see a Urologist for evaluation   Trace anemia which is stable  Normal kidney function

## 2020-04-20 NOTE — TELEPHONE ENCOUNTER
Rx Miralax daily for constipation sent to [facility]. Keep me posted   Labs resulted

## 2020-04-21 ENCOUNTER — OFFICE VISIT (OUTPATIENT)
Dept: UROLOGY | Facility: CLINIC | Age: 60
End: 2020-04-21
Attending: UROLOGY
Payer: COMMERCIAL

## 2020-04-21 DIAGNOSIS — R97.20 ELEVATED PSA: ICD-10-CM

## 2020-04-21 PROCEDURE — 99243 OFF/OP CNSLTJ NEW/EST LOW 30: CPT | Mod: 95,,, | Performed by: UROLOGY

## 2020-04-21 PROCEDURE — 99243 PR OFFICE CONSULTATION,LEVEL III: ICD-10-PCS | Mod: 95,,, | Performed by: UROLOGY

## 2020-04-21 NOTE — PROGRESS NOTES
Subjective:      Ihsan Mays Sr. is a 59 y.o. male who was referred by Elliot Ybarra, Zo  for evaluation of elevated PSA.      The patient location is: Home  The chief complaint leading to consultation is: elevated PSA  Visit type: audiovisual  Total time spent with patient: 12 minutes  Each patient to whom he or she provides medical services by telemedicine is:  (1) informed of the relationship between the physician and patient and the respective role of any other health care provider with respect to management of the patient; and (2) notified that he or she may decline to receive medical services by telemedicine and may withdraw from such care at any time.    Notes:      Elevated PSA  Patient is here with an elevated PSA. He has no personal history of prostate cancer. He reports very mild LUTS with only nocturia x 1. He has no prior genitourinary history of hematuria, prostatitis, UTI.    Previous PSA values are :  Lab Results   Component Value Date    PSA 4.5 (H) 04/15/2020    PSA 2.8 10/09/2018    PSA 3.3 09/20/2017       The following portions of the patient's history were reviewed and updated as appropriate: allergies, current medications, past family history, past medical history, past social history, past surgical history and problem list.    Review of Systems  Constitutional: no fever or chills  ENT: no nasal congestion or sore throat  Respiratory: no cough or shortness of breath  Cardiovascular: no chest pain or palpitations  Gastrointestinal: no nausea or vomiting, tolerating diet  Genitourinary: as per HPI  Hematologic/Lymphatic: no easy bruising or lymphadenopathy  Musculoskeletal: no arthralgias or myalgias  Neurological: no seizures or tremors  Behavioral/Psych: no auditory or visual hallucinations     Objective:   Vitals: There were no vitals taken for this visit.    Physical Exam   General: alert and oriented, no acute distress  Respiratory: Symmetric expansion, non-labored  breathing  Neuro: alert and oriented x3, no gross deficits  Psych: normal judgment and insight, normal mood/affect and non-anxious    Lab Review     Lab Results   Component Value Date    WBC 7.53 04/15/2020    HGB 13.8 (L) 04/15/2020    HCT 42.2 04/15/2020    MCV 96 04/15/2020     04/15/2020     Lab Results   Component Value Date    CREATININE 0.90 04/15/2020    BUN 16 04/15/2020     Lab Results   Component Value Date    PSA 4.5 (H) 04/15/2020     Assessment:     1. Elevated PSA        Plan:   1. Repeat PSA with MELISSA in 3 months

## 2020-04-21 NOTE — LETTER
April 21, 2020      Elliot Ybarra,   [address] [facility] [address]           [facility], [address] [address]  Phone: [phone]  Fax: [phone]          Patient: Ihsan Mays Sr.   MR Number: [id number]   YOB: 1960   Date of Visit: 4/21/2020       Dear Dr. Elliot Ybarra:    Thank you for referring Ihsan Mays to me for evaluation. Attached you will find relevant portions of my assessment and plan of care.    If you have questions, please do not hesitate to call me. I look forward to following Ihsan Mays along with you.    Sincerely,    Phillip Galeas MD    Enclosure  CC:  No Recipients    If you would like to receive this communication electronically, please contact [email or web address] or [phone] to request more information on [employer] Link access.    For providers and/or their staff who would like to refer a patient to [facility], please contact us through our one-stop-shop provider referral line, [facility], at [phone].    If you feel you have received this communication in error or would no longer like to receive these types of communications, please e-mail [email or web address]

## 2020-04-28 ENCOUNTER — PATIENT OUTREACH (OUTPATIENT)
Dept: OTHER | Facility: OTHER | Age: 60
End: 2020-04-28

## 2020-04-28 NOTE — PROGRESS NOTES
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       INTERVENTION(S)  encouragement/support    PLAN  continue monitoring      There are no preventive care reminders to display for this patient.    Last 5 Patient Entered Readings                                      Current 30 Day Average: 119/87     Recent Readings 4/22/2020 4/22/2020 4/13/2020 4/13/2020 4/12/2020    SBP (mmHg) 120 123 122 128 122    DBP (mmHg) 84 90 87 90 87    Pulse 67 69 80 77 73                      Physical Activity Screening   When asked if exercising, patient responded: yes    Patient is uncomfortable walking outside, so he has been doing exercises at home. He had just signed up for a [employer] membership, but it has been on hold since the virus outbreak. Encouragement provided that patient has been maintaining his BP.       SDOH

## 2020-05-13 ENCOUNTER — TELEPHONE (OUTPATIENT)
Dept: INTERNAL MEDICINE | Facility: CLINIC | Age: 60
End: 2020-05-13

## 2020-05-13 NOTE — TELEPHONE ENCOUNTER
----- Message from Macho Ortiz sent at 5/12/2020  3:15 PM CDT -----  Contact: Patient  Patient called in and wanted to speak with the office regarding an appointment. The patient would like a call back from the office and can be reached at    [phone]

## 2020-05-25 ENCOUNTER — PATIENT MESSAGE (OUTPATIENT)
Dept: INTERNAL MEDICINE | Facility: CLINIC | Age: 60
End: 2020-05-25

## 2020-05-25 DIAGNOSIS — R10.9 ABDOMINAL PAIN, UNSPECIFIED ABDOMINAL LOCATION: Primary | ICD-10-CM

## 2020-06-04 ENCOUNTER — PATIENT MESSAGE (OUTPATIENT)
Dept: GASTROENTEROLOGY | Facility: CLINIC | Age: 60
End: 2020-06-04

## 2020-06-04 ENCOUNTER — HOSPITAL ENCOUNTER (OUTPATIENT)
Dept: RADIOLOGY | Facility: HOSPITAL | Age: 60
Discharge: HOME | End: 2020-06-04
Attending: INTERNAL MEDICINE
Payer: COMMERCIAL

## 2020-06-04 ENCOUNTER — OFFICE VISIT (OUTPATIENT)
Dept: GASTROENTEROLOGY | Facility: CLINIC | Age: 60
End: 2020-06-04
Payer: COMMERCIAL

## 2020-06-04 VITALS — HEIGHT: 68 IN | BODY MASS INDEX: 35.18 KG/M2 | WEIGHT: 232.13 LBS

## 2020-06-04 DIAGNOSIS — R10.13 DYSPEPSIA: ICD-10-CM

## 2020-06-04 DIAGNOSIS — Z12.11 SCREEN FOR COLON CANCER: ICD-10-CM

## 2020-06-04 DIAGNOSIS — R14.0 BLOATING: ICD-10-CM

## 2020-06-04 DIAGNOSIS — K21.9 GASTROESOPHAGEAL REFLUX DISEASE, ESOPHAGITIS PRESENCE NOT SPECIFIED: ICD-10-CM

## 2020-06-04 DIAGNOSIS — R10.9 ABDOMINAL PAIN, UNSPECIFIED ABDOMINAL LOCATION: ICD-10-CM

## 2020-06-04 DIAGNOSIS — K59.04 CHRONIC IDIOPATHIC CONSTIPATION: Primary | ICD-10-CM

## 2020-06-04 DIAGNOSIS — K59.04 CHRONIC IDIOPATHIC CONSTIPATION: ICD-10-CM

## 2020-06-04 PROCEDURE — 99999 PR PBB SHADOW E&M-EST. PATIENT-LVL IV: CPT | Mod: PBBFAC,,, | Performed by: INTERNAL MEDICINE

## 2020-06-04 PROCEDURE — 99214 PR OFFICE/OUTPT VISIT, EST, LEVL IV, 30-39 MIN: ICD-10-PCS | Mod: S$GLB,,, | Performed by: INTERNAL MEDICINE

## 2020-06-04 PROCEDURE — 99214 OFFICE O/P EST MOD 30 MIN: CPT | Mod: S$GLB,,, | Performed by: INTERNAL MEDICINE

## 2020-06-04 PROCEDURE — 99999 PR PBB SHADOW E&M-EST. PATIENT-LVL IV: ICD-10-PCS | Mod: PBBFAC,,, | Performed by: INTERNAL MEDICINE

## 2020-06-04 PROCEDURE — 74018 RADEX ABDOMEN 1 VIEW: CPT | Mod: TC,FY,PO

## 2020-06-04 RX ORDER — POLYETHYLENE GLYCOL 3350, SODIUM SULFATE ANHYDROUS, SODIUM BICARBONATE, SODIUM CHLORIDE, POTASSIUM CHLORIDE 236; 22.74; 6.74; 5.86; 2.97 G/4L; G/4L; G/4L; G/4L; G/4L
4 POWDER, FOR SOLUTION ORAL ONCE
Qty: 1 BOTTLE | Refills: 0 | Status: SHIPPED | OUTPATIENT
Start: 2020-06-04 | End: 2020-06-04

## 2020-06-04 NOTE — LETTER
June 4, 2020      Tiffanie Smith MD  [address]  [place] [address]           [facility]  [address] [place] [address] [place] [address] [place] [address]  Phone: [phone]  Fax: [phone]          Patient: Ihsan Mays Sr.   MR Number: [id number]   YOB: 1960   Date of Visit: 6/4/2020       Dear Dr. Tiffanie Smith:    Thank you for referring Ihsan Mays to me for evaluation. Attached you will find relevant portions of my assessment and plan of care.    If you have questions, please do not hesitate to call me. I look forward to following Ihsan Mays along with you.    Sincerely,    Danie Conway MD    Enclosure  CC:  Elliot Ybarra, DO    If you would like to receive this communication electronically, please contact [email or web address] or [phone] to request more information on [employer] Link access.    For providers and/or their staff who would like to refer a patient to [facility], please contact us through our one-stop-shop provider referral line, [facility], at [phone].    If you feel you have received this communication in error or would no longer like to receive these types of communications, please e-mail [email or web address]

## 2020-06-04 NOTE — PROGRESS NOTES
GASTROENTEROLOGY CLINIC NOTE    Reason for visit: The primary encounter diagnosis was Chronic idiopathic constipation. Diagnoses of Abdominal pain, unspecified abdominal location, Screen for colon cancer, Gastroesophageal reflux disease, esophagitis presence not specified, Bloating, and Dyspepsia were also pertinent to this visit.  Referring provider/PCP: Elliot Ybarra DO    HPI:  Ihsan Mays Sr. is a 59 y.o. male here today for abdominal pain. New patient.  Hx of PUD with bleeding ulcer in distant past.    Almost 2 months of bloating and feeling full and central nonradiating abdominal pain. No new medicines as inciting events. Nothing he has noticed is causing this. Does note significant constipation . Belching also more recently. + straining, had small BM this morning. Can go over a week wihtout a BM. No blood in the stool. No vomiting.   Tried miralax, it worked to help have a BM. But pain didn't completely resolve.       Prior Endoscopy:  EGD:  2015 [facility]  [facility], single erosion  Biopsied stomach    Colon:  2015 [facility]  1 polyp, repeat 5 years.    (Portions of this note were dictated using voice recognition software and may contain dictation related errors in spelling/grammar/syntax not found on text review)    Review of Systems   Constitutional: Negative for fever and weight loss.   HENT: Negative for nosebleeds and sore throat.    Eyes: Negative for double vision and photophobia.   Respiratory: Negative for cough and shortness of breath.    Cardiovascular: Negative for chest pain and leg swelling.   Gastrointestinal: Positive for abdominal pain, constipation and nausea. Negative for blood in stool and vomiting.   Genitourinary: Negative for dysuria and hematuria.   Musculoskeletal: Negative for joint pain and neck pain.        + right leg swelling (had negative u/s 8 months ago)   Skin: Negative for itching and rash.   Neurological: Negative for dizziness and headaches.  "  Psychiatric/Behavioral: Negative for hallucinations. The patient does not have insomnia.        Past Medical History: has a past medical history of Glaucoma and Hypertension.    Past Surgical History: has a past surgical history that includes Stomach surgery; Glaucoma surgery; and Finger surgery.    Family History:family history includes Diabetes in his mother; Heart disease in his maternal grandfather and maternal grandmother; Hypertension in his father and mother; Stroke in his maternal aunt.    Allergies: Review of patient's allergies indicates:  No Known Allergies    Social History: reports that he has never smoked. He has never used smokeless tobacco. He reports that he drinks alcohol. He reports that he does not use drugs.    Home medications:   Current Outpatient Medications on File Prior to Visit   Medication Sig Dispense Refill    amLODIPine (NORVASC) 5 MG tablet TAKE 1 TABLET BY MOUTH EVERY DAY 90 tablet 3    carvedilol (COREG) 12.5 MG tablet TAKE 1 TABLET BY MOUTH TWICE A DAY WITH MEALS 180 tablet 3    hydroCHLOROthiazide (HYDRODIURIL) 25 MG tablet Take 1 tablet (25 mg total) by mouth once daily. 90 tablet 3    latanoprost 0.005 % ophthalmic solution 1 drop every evening.      valsartan (DIOVAN) 320 MG tablet Take 1 tablet (320 mg total) by mouth once daily. 90 tablet 3    sildenafil (VIAGRA) 100 MG tablet Take 1 tablet (100 mg total) by mouth daily as needed for Erectile Dysfunction. 30 tablet 1    [DISCONTINUED] levocetirizine (XYZAL) 5 MG tablet Take 1 tablet (5 mg total) by mouth every evening. 30 tablet 11    [DISCONTINUED] nabumetone (RELAFEN) 750 MG tablet Take 1 tablet (750 mg total) by mouth 2 (two) times daily. 180 tablet 1    [DISCONTINUED] polyethylene glycol (GLYCOLAX) 17 gram/dose powder Take 17 g by mouth once daily. 850 g 3     No current facility-administered medications on file prior to visit.        Vital signs:  Ht 5' 8" (1.727 m)   Wt 105.3 kg (232 lb 2.3 oz)   BMI 35.30 " kg/m²     Physical Exam   Constitutional: He is oriented to person, place, and time. He appears well-developed and well-nourished. No distress.   HENT:   Head: Normocephalic and atraumatic.   Eyes: Conjunctivae are normal. No scleral icterus.   Neck: Neck supple. No thyromegaly present.   Cardiovascular: Normal rate, regular rhythm and intact distal pulses.   Pulmonary/Chest: Effort normal and breath sounds normal. No respiratory distress.   Abdominal: Soft. He exhibits distension. He exhibits no mass. There is no tenderness. There is no guarding.   Musculoskeletal: Normal range of motion. He exhibits no tenderness.   Right leg > left swelling.   Neurological: He is alert and oriented to person, place, and time.   Skin: Skin is warm and dry. No rash noted.   Psychiatric: He has a normal mood and affect. His behavior is normal.   Vitals reviewed.      Routine labs:  Lab Results   Component Value Date    WBC 7.53 04/15/2020    HGB 13.8 (L) 04/15/2020    HCT 42.2 04/15/2020    MCV 96 04/15/2020     04/15/2020     Lab Results   Component Value Date    INR 1.0 02/22/2012     Lab Results   Component Value Date    IRON 94 09/05/2017    TIBC 340 09/05/2017    FESATURATED 28 09/05/2017     Lab Results   Component Value Date     04/15/2020    K 3.7 04/15/2020     04/15/2020    CO2 30 (H) 04/15/2020    BUN 16 04/15/2020    CREATININE 0.90 04/15/2020     Lab Results   Component Value Date    ALBUMIN 4.0 10/15/2019    ALT 20 10/15/2019    AST 21 10/15/2019    ALKPHOS 66 10/15/2019    BILITOT 0.7 10/15/2019     No results found for: GLUCOSE    I have reviewed prior labs, imaging, notes from last month      Assessment:  1. Chronic idiopathic constipation    2. Abdominal pain, unspecified abdominal location    3. Screen for colon cancer    4. Gastroesophageal reflux disease, esophagitis presence not specified    5. Bloating    6. Dyspepsia        Plan:  Orders Placed This Encounter    X-Ray Abdomen AP 1 View     US Abdomen Complete    COVID-19 Routine Screening    polyethylene glycol (GOLYTELY,NULYTELY) 236-22.74-6.74 -5.86 gram suspension    Case request GI: EGD (ESOPHAGOGASTRODUODENOSCOPY), COLONOSCOPY     Start with u/s and xray.  Need to improve bowel habits and constipation.  Use senna - s and miralax to achieve daily BM    egd for gerd / dyspepsia/ bloating / prior PUD  Colon for surveillance of prior polyps.    Advised follow up with PCP for R > L leg swelling with prior negative right leg u/s.      RTC after endoscopies complete.      Danie Conway MD  [facility] [place]

## 2020-06-04 NOTE — PATIENT INSTRUCTIONS
Take senna- S daily or as much to have a bowel movement.    Use miralax daily as needed, can use 1/2 capful daily or use 1/2 pack to 1 pack.                [facility] covid testing ([address] [address])  Between 8am-11am on 6/23/20        GOLYTELY/ COLYTE/ NULYTELY Instructions    You are scheduled for a colonoscopy with Dr. Conway on 6/25/20 at [facility] located at [address] [place] [address].  Check in at the admit desk, first floor of the hospital (which is the building on the left).     You will receive a call 2-3 days before your colonoscopy to tell you the time to arrive.  If you have not received a call by the day before your procedure, call the Endoscopy Lab at [phone].    To ensure that your test is accurate and complete, you MUST follow these instructions listed below.  If you have any questions, please call our office at [phone].  Plan on being at the hospital for your procedure for 3-4 hours.    1.  Follow a CLEAR LIQUID DIET for the entire day before your scheduled colonoscopy.  This means no solid food the entire day starting when you wake.  You may have as much of the clear liquids as you want throughout the day.   CLEAR LIQUID DIET:   - Avoid Red, Orange, Purple, and/or Blue food coloring   - NO DAIRY   - You can have:  Coffee with sugar (no creamer), tea, water, soda, apple or white grape juice, chicken or beef broth/bouillon (no meat, noodles, or veggies), green/yellow popsicles, green/yellow Jell-O, lemonade.    2.  MIX GOLYTELY/COLYTE/NULYTELY (all names for same product) WITH ONE (1) GALLON OF WATER.  YOU MAY ADD A FLAVOR PACKET OR YELLOW/GREEN POWDER DRINK MIX TO THIS.  PUT IN REFRIGERATOR.  This is easier to drink if this solution is cold, so you can mix the solution one day ahead of time and place in the refrigerator prior to drinking.  You have to drink the solution within 24-36 hours of mixing it.  Do NOT put this solution over ice.  It IS ok to  drink with a straw.    3. AT 5 PM THE DAY BEFORE YOUR COLONOSCOPY, DRINK ONE (1) 8 OUNCE GLASS OF MIXTURE EVERY 10 MINUTES UNTIL HALF OF THE GALLON IS CONSUMED.  Keep this mixture cold and in refrigerator as much as you can while drinking it.  Place the remaining half of mixture in the refrigerator when you finish the first half.    4.  The endoscopy department will call you 2 days before your colonoscopy to tell you the exact time to arrive, AND to tell you the exact time to drink the 2nd portion of your prep (which will be FIVE HOURS BEFORE YOUR ARRIVAL TIME).  At this time given to you, DRINK ONE (1) 8 OUNCE GLASS OF MIXTURE EVERY 10 MINUTES UNTIL THE OTHER HALF IS CONSUMED. Keep the mixture cold while you are drinking it. Once this is complete, you may not have ANYTHING else by mouth!      5.  You must have someone with you to DRIVE YOU HOME since you will be receiving IV sedation for the colonoscopy.    6.  It is ok to take your heart, blood pressure, and seizure medications in the morning of your test with a SIP of water.  Hold other medications until after your procedure.  Do NOT have anything else to eat or drink the morning of your colonoscopy.  It is ok to brush your teeth.    7.  If you are on blood thinners THAT YOU HAVE BEEN INSTRUCTED TO HOLD BY YOUR DOCTOR FOR THIS PROCEDURE, then do NOT take this the morning of your colonoscopy.  Do NOT stop these medications on your own, they must be approved to be held by your doctor.  Your colonoscopy can NOT be done if you are on these medications.  Examples of blood thinners include: Coumadin, Aggrenox, Plavix, Pradaxa, Reapro, Pletal, Xarelto, Ticagrelor, Brilinta, Eliquis, and high dose aspirin (325 mg).  You do not have to stop baby aspirin 81 mg.    8.  IF YOU ARE DIABETIC:  NO INSULIN OR ORAL MEDICATIONS THE MORNING OF THE COLONOSCOPY.  TAKE ONLY HALF THE DOSE OF YOUR INSULIN THE DAY BEFORE THE COLONOSCOPY.  DO NOT TAKE ANY ORAL DIABETIC MEDICATIONS THE DAY  BEFORE THE COLONOSCOPY.  IF YOU ARE AN INSULIN DEPENDENT DIABETIC WITH UNSTABLE BLOOD SUGARS, NOTIFY YOUR PRIMARY CARE PHYSICIAN FOR INSTRUCTIONS.

## 2020-06-05 ENCOUNTER — HOSPITAL ENCOUNTER (OUTPATIENT)
Dept: RADIOLOGY | Facility: HOSPITAL | Age: 60
Discharge: HOME | End: 2020-06-05
Attending: INTERNAL MEDICINE
Payer: COMMERCIAL

## 2020-06-05 DIAGNOSIS — K59.04 CHRONIC IDIOPATHIC CONSTIPATION: ICD-10-CM

## 2020-06-05 DIAGNOSIS — K21.9 GASTROESOPHAGEAL REFLUX DISEASE, ESOPHAGITIS PRESENCE NOT SPECIFIED: ICD-10-CM

## 2020-06-05 DIAGNOSIS — Z12.11 SCREEN FOR COLON CANCER: ICD-10-CM

## 2020-06-05 DIAGNOSIS — R10.13 DYSPEPSIA: ICD-10-CM

## 2020-06-05 DIAGNOSIS — R10.9 ABDOMINAL PAIN, UNSPECIFIED ABDOMINAL LOCATION: ICD-10-CM

## 2020-06-05 DIAGNOSIS — R14.0 BLOATING: ICD-10-CM

## 2020-06-05 PROCEDURE — 76700 US EXAM ABDOM COMPLETE: CPT | Mod: TC,PO

## 2020-06-12 ENCOUNTER — PATIENT MESSAGE (OUTPATIENT)
Dept: GASTROENTEROLOGY | Facility: CLINIC | Age: 60
End: 2020-06-12

## 2020-06-12 DIAGNOSIS — Z12.11 SCREEN FOR COLON CANCER: Primary | ICD-10-CM

## 2020-06-16 ENCOUNTER — PATIENT MESSAGE (OUTPATIENT)
Dept: GASTROENTEROLOGY | Facility: CLINIC | Age: 60
End: 2020-06-16

## 2020-06-18 ENCOUNTER — PATIENT MESSAGE (OUTPATIENT)
Dept: GASTROENTEROLOGY | Facility: CLINIC | Age: 60
End: 2020-06-18

## 2020-06-22 ENCOUNTER — TELEPHONE (OUTPATIENT)
Dept: GASTROENTEROLOGY | Facility: CLINIC | Age: 60
End: 2020-06-22

## 2020-06-22 NOTE — TELEPHONE ENCOUNTER
Patient tried calling to update his insurance because it is showing that he would have to pay $2500 out of pocket because his insurance was only for 6 months. [employer] number given to patient to update his insurance. Patient called his insurance company and they stated that his procedures would be covered 100%.

## 2020-06-23 ENCOUNTER — PATIENT MESSAGE (OUTPATIENT)
Dept: ENDOSCOPY | Facility: HOSPITAL | Age: 60
End: 2020-06-23

## 2020-06-23 NOTE — TELEPHONE ENCOUNTER
Spoke with pt and he would have to pay out of pocket $2500. Pt stated that his new insurance starts on 7/1/20. Would it be ok if he postpones  the EGD/Colonoscopy until then? He said that he is feeling fine and his symptoms are controlled.

## 2020-07-07 ENCOUNTER — PATIENT MESSAGE (OUTPATIENT)
Dept: ENDOSCOPY | Facility: HOSPITAL | Age: 60
End: 2020-07-07

## 2020-07-09 ENCOUNTER — PATIENT OUTREACH (OUTPATIENT)
Dept: OTHER | Facility: OTHER | Age: 60
End: 2020-07-09

## 2020-07-09 NOTE — PROGRESS NOTES
"Digital Medicine: Health  Follow-Up    The history is provided by the patient.   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       Intervention/Plan    There are no preventive care reminders to display for this patient.    Last 5 Patient Entered Readings                                      Current 30 Day Average: 116/79     Recent Readings 7/7/2020 6/26/2020 6/15/2020 6/15/2020 6/2/2020    SBP (mmHg) 120 109 118 116 124    DBP (mmHg) 82 74 82 79 89    Pulse 77 92 78 78 74                      Diet Screening       Patient feels that he has been "eating correctly". He is avoiding fried foods, high sodium options and choosing healthier options such as grilled foods. Feels that this has helped to manage his BP, and he has now adjusted to this eating style.     Assigning the following patient goals: maintain low sodium diet      SDOH  "

## 2020-07-11 ENCOUNTER — PATIENT MESSAGE (OUTPATIENT)
Dept: ENDOSCOPY | Facility: HOSPITAL | Age: 60
End: 2020-07-11

## 2020-07-16 ENCOUNTER — PATIENT MESSAGE (OUTPATIENT)
Dept: ENDOSCOPY | Facility: HOSPITAL | Age: 60
End: 2020-07-16

## 2020-07-23 RX ORDER — AMLODIPINE BESYLATE 5 MG/1
5 TABLET ORAL DAILY
Qty: 90 TABLET | Refills: 3 | Status: SHIPPED | OUTPATIENT
Start: 2020-07-23 | End: 2021-04-21 | Stop reason: SDUPTHER

## 2020-07-23 RX ORDER — AMLODIPINE BESYLATE 5 MG/1
5 TABLET ORAL DAILY
Qty: 90 TABLET | Refills: 3 | Status: CANCELLED | OUTPATIENT
Start: 2020-07-23

## 2020-07-23 NOTE — TELEPHONE ENCOUNTER
----- Message from Judy Perdue sent at 7/23/2020  2:50 PM CDT -----  Contact: pt [phone]  Requesting an RX refill or new RX.  Is this a refill or new RX:  refill  RX name and strength: amLODIPine (NORVASC) 5 MG tablet  Directions (copy/paste from chart):    Is this a 30 day or 90 day RX:    Local pharmacy or mail order pharmacy:  local  Pharmacy name and phone # (copy/paste from chart):   [facility]/pharmacy #[id number] - [address] AT [facility] [phone] (Phone)  [phone] (Fax)    Comments:

## 2020-08-12 ENCOUNTER — PATIENT MESSAGE (OUTPATIENT)
Dept: ENDOSCOPY | Facility: HOSPITAL | Age: 60
End: 2020-08-12

## 2020-08-12 ENCOUNTER — TELEPHONE (OUTPATIENT)
Dept: INTERNAL MEDICINE | Facility: CLINIC | Age: 60
End: 2020-08-12

## 2020-08-12 DIAGNOSIS — Z00.00 ANNUAL PHYSICAL EXAM: ICD-10-CM

## 2020-08-12 DIAGNOSIS — Z12.5 PROSTATE CANCER SCREENING: Primary | ICD-10-CM

## 2020-08-12 NOTE — TELEPHONE ENCOUNTER
----- Message from Gunnar Cope sent at 8/12/2020  2:39 PM CDT -----  Contact: Patient  Doctor appointment and lab have been scheduled.  Please link lab orders to the lab appointment.  Date of doctor appointment:    Physical or EP:  10/16/20  Date of lab appointment:  10/05/20  Comments:     Thank you

## 2020-08-15 ENCOUNTER — PATIENT MESSAGE (OUTPATIENT)
Dept: INTERNAL MEDICINE | Facility: CLINIC | Age: 60
End: 2020-08-15

## 2020-08-17 ENCOUNTER — PATIENT MESSAGE (OUTPATIENT)
Dept: INTERNAL MEDICINE | Facility: CLINIC | Age: 60
End: 2020-08-17

## 2020-08-18 ENCOUNTER — CLINICAL SUPPORT (OUTPATIENT)
Dept: URGENT CARE | Facility: CLINIC | Age: 60
End: 2020-08-18
Payer: COMMERCIAL

## 2020-08-18 VITALS — TEMPERATURE: 99 F | OXYGEN SATURATION: 97 % | HEART RATE: 79 BPM

## 2020-08-18 DIAGNOSIS — R10.13 DYSPEPSIA: ICD-10-CM

## 2020-08-18 DIAGNOSIS — R14.0 BLOATING: ICD-10-CM

## 2020-08-18 DIAGNOSIS — Z12.11 SCREEN FOR COLON CANCER: ICD-10-CM

## 2020-08-18 DIAGNOSIS — K21.9 GASTROESOPHAGEAL REFLUX DISEASE, ESOPHAGITIS PRESENCE NOT SPECIFIED: ICD-10-CM

## 2020-08-18 DIAGNOSIS — R10.9 ABDOMINAL PAIN, UNSPECIFIED ABDOMINAL LOCATION: ICD-10-CM

## 2020-08-18 DIAGNOSIS — K59.04 CHRONIC IDIOPATHIC CONSTIPATION: ICD-10-CM

## 2020-08-18 PROCEDURE — U0003 INFECTIOUS AGENT DETECTION BY NUCLEIC ACID (DNA OR RNA); SEVERE ACUTE RESPIRATORY SYNDROME CORONAVIRUS 2 (SARS-COV-2) (CORONAVIRUS DISEASE [COVID-19]), AMPLIFIED PROBE TECHNIQUE, MAKING USE OF HIGH THROUGHPUT TECHNOLOGIES AS DESCRIBED BY CMS-2020-01-R: HCPCS

## 2020-08-19 ENCOUNTER — PATIENT MESSAGE (OUTPATIENT)
Dept: ENDOSCOPY | Facility: HOSPITAL | Age: 60
End: 2020-08-19

## 2020-08-19 LAB — SARS-COV-2 RNA RESP QL NAA+PROBE: NOT DETECTED

## 2020-08-21 ENCOUNTER — HOSPITAL ENCOUNTER (OUTPATIENT)
Facility: HOSPITAL | Age: 60
Discharge: HOME | End: 2020-08-21
Attending: INTERNAL MEDICINE | Admitting: INTERNAL MEDICINE
Payer: COMMERCIAL

## 2020-08-21 ENCOUNTER — ANESTHESIA EVENT (OUTPATIENT)
Dept: ENDOSCOPY | Facility: HOSPITAL | Age: 60
End: 2020-08-21
Payer: COMMERCIAL

## 2020-08-21 ENCOUNTER — ANESTHESIA (OUTPATIENT)
Dept: ENDOSCOPY | Facility: HOSPITAL | Age: 60
End: 2020-08-21
Payer: COMMERCIAL

## 2020-08-21 VITALS
SYSTOLIC BLOOD PRESSURE: 122 MMHG | HEIGHT: 68 IN | BODY MASS INDEX: 34.56 KG/M2 | DIASTOLIC BLOOD PRESSURE: 71 MMHG | TEMPERATURE: 98 F | WEIGHT: 228 LBS | OXYGEN SATURATION: 100 % | RESPIRATION RATE: 20 BRPM | HEART RATE: 76 BPM

## 2020-08-21 DIAGNOSIS — Z12.11 COLON CANCER SCREENING: ICD-10-CM

## 2020-08-21 DIAGNOSIS — K21.9 GASTROESOPHAGEAL REFLUX DISEASE, ESOPHAGITIS PRESENCE NOT SPECIFIED: Primary | ICD-10-CM

## 2020-08-21 PROCEDURE — 43239 PR EGD, FLEX, W/BIOPSY, SGL/MULTI: ICD-10-PCS | Mod: 51,,, | Performed by: INTERNAL MEDICINE

## 2020-08-21 PROCEDURE — 43239 EGD BIOPSY SINGLE/MULTIPLE: CPT | Mod: 51,,, | Performed by: INTERNAL MEDICINE

## 2020-08-21 PROCEDURE — 88305 TISSUE EXAM BY PATHOLOGIST: CPT | Mod: 26,,, | Performed by: PATHOLOGY

## 2020-08-21 PROCEDURE — 88305 TISSUE EXAM BY PATHOLOGIST: ICD-10-PCS | Mod: 26,,, | Performed by: PATHOLOGY

## 2020-08-21 PROCEDURE — G0105 COLORECTAL SCRN; HI RISK IND: HCPCS | Performed by: INTERNAL MEDICINE

## 2020-08-21 PROCEDURE — 43239 EGD BIOPSY SINGLE/MULTIPLE: CPT | Performed by: INTERNAL MEDICINE

## 2020-08-21 PROCEDURE — 37000008 HC ANESTHESIA 1ST 15 MINUTES: Performed by: INTERNAL MEDICINE

## 2020-08-21 PROCEDURE — 88342 IMHCHEM/IMCYTCHM 1ST ANTB: CPT | Performed by: PATHOLOGY

## 2020-08-21 PROCEDURE — 25000003 PHARM REV CODE 250: Performed by: INTERNAL MEDICINE

## 2020-08-21 PROCEDURE — 88305 TISSUE EXAM BY PATHOLOGIST: CPT | Performed by: PATHOLOGY

## 2020-08-21 PROCEDURE — G0105 COLORECTAL SCRN; HI RISK IND: HCPCS | Mod: ,,, | Performed by: INTERNAL MEDICINE

## 2020-08-21 PROCEDURE — G0105 COLORECTAL SCRN; HI RISK IND: ICD-10-PCS | Mod: ,,, | Performed by: INTERNAL MEDICINE

## 2020-08-21 PROCEDURE — 27201012 HC FORCEPS, HOT/COLD, DISP: Performed by: INTERNAL MEDICINE

## 2020-08-21 PROCEDURE — 25000003 PHARM REV CODE 250: Performed by: NURSE ANESTHETIST, CERTIFIED REGISTERED

## 2020-08-21 PROCEDURE — 88342 IMHCHEM/IMCYTCHM 1ST ANTB: CPT | Mod: 26,,, | Performed by: PATHOLOGY

## 2020-08-21 PROCEDURE — 37000009 HC ANESTHESIA EA ADD 15 MINS: Performed by: INTERNAL MEDICINE

## 2020-08-21 PROCEDURE — 88342 CHG IMMUNOCYTOCHEMISTRY: ICD-10-PCS | Mod: 26,,, | Performed by: PATHOLOGY

## 2020-08-21 PROCEDURE — 63600175 PHARM REV CODE 636 W HCPCS: Performed by: NURSE ANESTHETIST, CERTIFIED REGISTERED

## 2020-08-21 RX ORDER — SODIUM CHLORIDE 9 MG/ML
INJECTION, SOLUTION INTRAVENOUS CONTINUOUS
Status: DISCONTINUED | OUTPATIENT
Start: 2020-08-21 | End: 2020-08-21 | Stop reason: HOSPADM

## 2020-08-21 RX ORDER — PANTOPRAZOLE SODIUM 40 MG/1
40 TABLET, DELAYED RELEASE ORAL DAILY
Qty: 30 TABLET | Refills: 11 | Status: SHIPPED | OUTPATIENT
Start: 2020-08-21 | End: 2022-04-11

## 2020-08-21 RX ORDER — PROPOFOL 10 MG/ML
VIAL (ML) INTRAVENOUS
Status: DISCONTINUED | OUTPATIENT
Start: 2020-08-21 | End: 2020-08-21

## 2020-08-21 RX ORDER — PROPOFOL 10 MG/ML
VIAL (ML) INTRAVENOUS CONTINUOUS PRN
Status: DISCONTINUED | OUTPATIENT
Start: 2020-08-21 | End: 2020-08-21

## 2020-08-21 RX ORDER — LIDOCAINE HCL/PF 100 MG/5ML
SYRINGE (ML) INTRAVENOUS
Status: DISCONTINUED | OUTPATIENT
Start: 2020-08-21 | End: 2020-08-21

## 2020-08-21 RX ORDER — SODIUM CHLORIDE 0.9 % (FLUSH) 0.9 %
10 SYRINGE (ML) INJECTION
Status: DISCONTINUED | OUTPATIENT
Start: 2020-08-21 | End: 2020-08-21 | Stop reason: HOSPADM

## 2020-08-21 RX ADMIN — LIDOCAINE HYDROCHLORIDE 100 MG: 20 INJECTION, SOLUTION INTRAVENOUS at 01:08

## 2020-08-21 RX ADMIN — PROPOFOL 50 MG: 10 INJECTION, EMULSION INTRAVENOUS at 01:08

## 2020-08-21 RX ADMIN — SODIUM CHLORIDE: 0.9 INJECTION, SOLUTION INTRAVENOUS at 11:08

## 2020-08-21 RX ADMIN — PROPOFOL 150 MCG/KG/MIN: 10 INJECTION, EMULSION INTRAVENOUS at 01:08

## 2020-08-21 NOTE — ANESTHESIA POSTPROCEDURE EVALUATION
Anesthesia Post Evaluation    Patient: Ihsan Mays     Procedure(s) Performed: Procedure(s) (LRB):  EGD (ESOPHAGOGASTRODUODENOSCOPY) (N/A)  COLONOSCOPYPrepopik (N/A)    Final Anesthesia Type: MAC    Patient location during evaluation: GI PACU  Patient participation: Yes- Able to Participate  Level of consciousness: awake and alert and oriented  Post-procedure vital signs: reviewed and stable  Pain management: adequate  Airway patency: patent    PONV status at discharge: No PONV  Anesthetic complications: no      Cardiovascular status: blood pressure returned to baseline, hemodynamically stable and stable  Respiratory status: unassisted, spontaneous ventilation and room air  Hydration status: euvolemic  Follow-up not needed.          Vitals Value Taken Time   /80 08/21/20 1333   Temp 36.6 08/21/20 1333   Pulse 80 08/21/20 1333   Resp 20 08/21/20 1333   SpO2 95 08/21/20 1333         No case tracking events are documented in the log.      Pain/Daniel Score: No data recorded

## 2020-08-21 NOTE — [employer] PATIENT INSTRUCTIONS
Discharge Summary/Instructions after an Endoscopic Procedure  Patient Name: Ihsan Mays  Patient MRN: [id number]  Patient YOB: 1960  Friday, August 21, 2020  Danie Conway MD  Your health is very important to us during the Covid Crisis. Following your   procedure today, you will receive a daily text for 2 weeks asking about   signs or symptoms of Covid 19.  Please respond to this text when you   receive it so we can follow up and keep you as safe as possible.   RESTRICTIONS:  During your procedure today, you received medications for sedation.  These   medications may affect your judgment, balance and coordination.  Therefore,   for 24 hours, you have the following restrictions:   - DO NOT drive a car, operate machinery, make legal/financial decisions,   sign important papers or drink alcohol.    ACTIVITY:  Today: no heavy lifting, straining or running due to procedural   sedation/anesthesia.  The following day: return to full activity including work.  DIET:  Eat and drink normally unless instructed otherwise.     TREATMENT FOR COMMON SIDE EFFECTS:  - Mild abdominal pain, nausea, belching, bloating or excessive gas:  rest,   eat lightly and use a heating pad.  - Sore Throat: treat with throat lozenges and/or gargle with warm salt   water.  - Because air was used during the procedure, expelling large amounts of air   from your rectum or belching is normal.  - If a bowel prep was taken, you may not have a bowel movement for 1-3 days.    This is normal.  SYMPTOMS TO WATCH FOR AND REPORT TO YOUR PHYSICIAN:  1. Abdominal pain or bloating, other than gas cramps.  2. Chest pain.  3. Back pain.  4. Signs of infection such as: chills or fever occurring within 24 hours   after the procedure.  5. Rectal bleeding, which would show as bright red, maroon, or black stools.   (A tablespoon of blood from the rectum is not serious, especially if   hemorrhoids are present.)  6. Vomiting.  7. Weakness or dizziness.  GO  DIRECTLY TO THE NEAREST EMERGENCY ROOM IF YOU HAVE ANY OF THE FOLLOWING:      Difficulty breathing              Chills and/or fever over 101 F   Persistent vomiting and/or vomiting blood   Severe abdominal pain   Severe chest pain   Black, tarry stools   Bleeding- more than one tablespoon   Any other symptom or condition that you feel may need urgent attention  Your doctor recommends these additional instructions:  If any biopsies were taken, your doctors clinic will contact you in 1 to 2   weeks with any results.  - Discharge patient to home.   - Patient has a contact number available for emergencies.  The signs and   symptoms of potential delayed complications were discussed with the   patient.  Return to normal activities tomorrow.  Written discharge   instructions were provided to the patient.   - Resume previous diet.   - Continue present medications.   - Await pathology results.   - Need full dose PPI daily.  - Avoid NSAIDs unless needed for cardiovascular protection.  - See the other procedure note for documentation of additional   recommendations.  For questions, problems or results please call your physician - Danie Conway MD.  EMERGENCY PHONE NUMBER: [phone],  LAB RESULTS: [phone]  IF A COMPLICATION OR EMERGENCY SITUATION ARISES AND YOU ARE UNABLE TO REACH   YOUR PHYSICIAN - GO DIRECTLY TO THE EMERGENCY ROOM.  Danie Conway MD  8/21/2020 1:11:20 PM  This report has been verified and signed electronically.  [employer]

## 2020-08-21 NOTE — PROVATION PATIENT INSTRUCTIONS
Discharge Summary/Instructions after an Endoscopic Procedure  Patient Name: Ihsan Mays  Patient MRN: [id number]  Patient YOB: 1960  Friday, August 21, 2020  Danie Conway MD  Your health is very important to us during the Covid Crisis. Following your   procedure today, you will receive a daily text for 2 weeks asking about   signs or symptoms of Covid 19.  Please respond to this text when you   receive it so we can follow up and keep you as safe as possible.   RESTRICTIONS:  During your procedure today, you received medications for sedation.  These   medications may affect your judgment, balance and coordination.  Therefore,   for 24 hours, you have the following restrictions:   - DO NOT drive a car, operate machinery, make legal/financial decisions,   sign important papers or drink alcohol.    ACTIVITY:  Today: no heavy lifting, straining or running due to procedural   sedation/anesthesia.  The following day: return to full activity including work.  DIET:  Eat and drink normally unless instructed otherwise.     TREATMENT FOR COMMON SIDE EFFECTS:  - Mild abdominal pain, nausea, belching, bloating or excessive gas:  rest,   eat lightly and use a heating pad.  - Sore Throat: treat with throat lozenges and/or gargle with warm salt   water.  - Because air was used during the procedure, expelling large amounts of air   from your rectum or belching is normal.  - If a bowel prep was taken, you may not have a bowel movement for 1-3 days.    This is normal.  SYMPTOMS TO WATCH FOR AND REPORT TO YOUR PHYSICIAN:  1. Abdominal pain or bloating, other than gas cramps.  2. Chest pain.  3. Back pain.  4. Signs of infection such as: chills or fever occurring within 24 hours   after the procedure.  5. Rectal bleeding, which would show as bright red, maroon, or black stools.   (A tablespoon of blood from the rectum is not serious, especially if   hemorrhoids are present.)  6. Vomiting.  7. Weakness or dizziness.  GO  DIRECTLY TO THE NEAREST EMERGENCY ROOM IF YOU HAVE ANY OF THE FOLLOWING:      Difficulty breathing              Chills and/or fever over 101 F   Persistent vomiting and/or vomiting blood   Severe abdominal pain   Severe chest pain   Black, tarry stools   Bleeding- more than one tablespoon   Any other symptom or condition that you feel may need urgent attention  Your doctor recommends these additional instructions:  If any biopsies were taken, your doctors clinic will contact you in 1 to 2   weeks with any results.  - Discharge patient to home.   - Patient has a contact number available for emergencies.  The signs and   symptoms of potential delayed complications were discussed with the   patient.  Return to normal activities tomorrow.  Written discharge   instructions were provided to the patient.   - Resume previous diet.   - Continue present medications.   - Repeat colonoscopy in 10 years for surveillance.   - Refer to a colo-rectal surgeon at appointment to be scheduled.  For questions, problems or results please call your physician - Danie Conway MD.  EMERGENCY PHONE NUMBER: [phone],  LAB RESULTS: [phone]  IF A COMPLICATION OR EMERGENCY SITUATION ARISES AND YOU ARE UNABLE TO REACH   YOUR PHYSICIAN - GO DIRECTLY TO THE EMERGENCY ROOM.  Danie Conway MD  8/21/2020 1:27:42 PM  This report has been verified and signed electronically.  [employer]

## 2020-08-21 NOTE — TRANSFER OF CARE
"Anesthesia Transfer of Care Note    Patient: Ihsan Mays SrLuis Enrique    Procedure(s) Performed: Procedure(s) (LRB):  EGD (ESOPHAGOGASTRODUODENOSCOPY) (N/A)  COLONOSCOPYPrepopik (N/A)    Patient location: GI    Anesthesia Type: MAC    Transport from OR: Transported from OR on room air with adequate spontaneous ventilation    Post pain: adequate analgesia    Post assessment: no apparent anesthetic complications and tolerated procedure well    Post vital signs: stable    Level of consciousness: awake, alert and oriented    Nausea/Vomiting: no nausea/vomiting    Complications: none    Transfer of care protocol was followed      Last vitals:   Visit Vitals  /77 (BP Location: Left arm, Patient Position: Sitting)   Pulse 74   Temp 36.8 °C (98.2 °F) (Skin)   Resp 18   Ht 5' 8" (1.727 m)   Wt 103.4 kg (228 lb)   SpO2 98%   BMI 34.67 kg/m²     "

## 2020-08-21 NOTE — H&P
Short Stay Endoscopy History and Physical    PCP - Elliot Ybarra,     Procedure - Colonoscopy  + EGD  ASA - per anesthesia  Mallampati - per anesthesia  History of Anesthesia problems - no  Family history Anesthesia problems - no   Plan of anesthesia - General    HPI:  This is a 59 y.o. male here for evaluation of :  Colon for  personal history of colon polyps  EGD for gerd, reflux, hiatal hernia. dyspepsia    ROS:  Constitutional: No fevers, chills, No weight loss  CV: No chest pain  Pulm: No cough, No shortness of breath  GI: see HPI  Derm: No rash    Medical History:  has a past medical history of Glaucoma and Hypertension.    Surgical History:  has a past surgical history that includes Stomach surgery; Glaucoma surgery; and Finger surgery.    Family History: family history includes Diabetes in his mother; Heart disease in his maternal grandfather and maternal grandmother; Hypertension in his father and mother; Stroke in his maternal aunt.. Otherwise no colon cancer, inflammatory bowel disease, or GI malignancies.    Social History:  reports that he has never smoked. He has never used smokeless tobacco. He reports current alcohol use. He reports that he does not use drugs.    Review of patient's allergies indicates:  No Known Allergies    Medications:   Medications Prior to Admission   Medication Sig Dispense Refill Last Dose    amLODIPine (NORVASC) 5 MG tablet Take 1 tablet (5 mg total) by mouth once daily. 90 tablet 3 8/21/2020 at Unknown time    carvedilol (COREG) 12.5 MG tablet TAKE 1 TABLET BY MOUTH TWICE A DAY WITH MEALS 180 tablet 3 8/21/2020 at Unknown time    hydroCHLOROthiazide (HYDRODIURIL) 25 MG tablet Take 1 tablet (25 mg total) by mouth once daily. 90 tablet 3 8/20/2020 at Unknown time    latanoprost 0.005 % ophthalmic solution 1 drop every evening.   8/20/2020 at Unknown time    sod picosulf-mag ox-citric ac (PREPOPIK) 10 mg-3.5 gram-12 gram PwPk Take as instructed 2 each 0  8/20/2020 at Unknown time    valsartan (DIOVAN) 320 MG tablet Take 1 tablet (320 mg total) by mouth once daily. 90 tablet 3 8/20/2020 at Unknown time    sildenafil (VIAGRA) 100 MG tablet Take 1 tablet (100 mg total) by mouth daily as needed for Erectile Dysfunction. 30 tablet 1          Physical Exam:    Vital Signs:   Vitals:    08/21/20 1146   BP: 116/77   Pulse: 74   Resp: 18   Temp: 98.2 °F (36.8 °C)       General Appearance: Well appearing in no acute distress  Eyes:    No scleral icterus  ENT: Neck supple, Lips, mucosa, and tongue normal; teeth and gums normal  Lungs: CTA bilaterally  Heart:  S1, S2 normal, no murmurs heard  Abdomen: Soft, non tender, non distended with positive bowel sounds. No hepatosplenomegaly, ascites, or mass.  Extremities: 2+ pulses, no clubbing, cyanosis or edema  Skin: No rash      Labs:  Lab Results   Component Value Date    WBC 7.53 04/15/2020    HGB 13.8 (L) 04/15/2020    HCT 42.2 04/15/2020     04/15/2020    CHOL 207 (H) 10/15/2019    TRIG 78 10/15/2019    HDL 55 10/15/2019    ALT 20 10/15/2019    AST 21 10/15/2019     04/15/2020    K 3.7 04/15/2020     04/15/2020    CREATININE 0.90 04/15/2020    BUN 16 04/15/2020    CO2 30 (H) 04/15/2020    TSH 1.117 10/15/2019    PSA 4.5 (H) 04/15/2020    INR 1.0 02/22/2012    HGBA1C 5.5 10/09/2018       I have explained the risks and benefits of endoscopy procedures to the patient including but not limited to bleeding, perforation, infection, and death.  The patient was asked if they understand and allowed to ask any further questions to their satisfaction.    Danie Conway MD

## 2020-08-21 NOTE — ANESTHESIA PREPROCEDURE EVALUATION
08/21/2020  Ihsan Mays Sr. is a 59 y.o., male. Here for EGD    Past Medical History:   Diagnosis Date    Glaucoma     Hypertension      Past Surgical History:   Procedure Laterality Date    FINGER SURGERY      right hand pinkie finger     GLAUCOMA SURGERY      STOMACH SURGERY      ulcers      Lab Results   Component Value Date    WBC 7.53 04/15/2020    HGB 13.8 (L) 04/15/2020    HCT 42.2 04/15/2020     04/15/2020    CHOL 207 (H) 10/15/2019    TRIG 78 10/15/2019    HDL 55 10/15/2019    ALT 20 10/15/2019    AST 21 10/15/2019     04/15/2020    K 3.7 04/15/2020     04/15/2020    CREATININE 0.90 04/15/2020    BUN 16 04/15/2020    CO2 30 (H) 04/15/2020    TSH 1.117 10/15/2019    PSA 4.5 (H) 04/15/2020    INR 1.0 02/22/2012    HGBA1C 5.5 10/09/2018             Anesthesia Evaluation    I have reviewed the Patient Summary Reports.    I have reviewed the Nursing Notes.    I have reviewed the Medications.     Review of Systems  Anesthesia Hx:  No problems with previous Anesthesia    Social:  Non-Smoker    Cardiovascular:  Cardiovascular Normal     Pulmonary:  Pulmonary Normal    Neurological:  Neurology Normal        Physical Exam  General:  Obesity    Airway/Jaw/Neck:  Airway Findings: Mouth Opening: Normal Tongue: Normal  General Airway Assessment: Good  Mallampati: I  TM Distance: Normal, at least 6 cm  Jaw/Neck Findings:  Neck ROM: Normal ROM      Dental:  Dental Findings: In tact   Chest/Lungs:  Chest/Lungs Findings:    Heart/Vascular:  Heart Findings:       Mental Status:  Mental Status Findings:  Cooperative, Alert and Oriented         Anesthesia Plan  Type of Anesthesia, risks & benefits discussed:  Anesthesia Type:  MAC  Patient's Preference:   Intra-op Monitoring Plan: standard ASA monitors  Intra-op Monitoring Plan Comments:   Post Op Pain Control Plan: per primary service  following discharge from PACU  Post Op Pain Control Plan Comments:   Induction:   IV  Beta Blocker:  Patient is on a Beta-Blocker and has received one dose within the past 24 hours (No further documentation required).       Informed Consent: Patient understands risks and agrees with Anesthesia plan.  Questions answered. Anesthesia consent signed with patient.  ASA Score: 2     Day of Surgery Review of History & Physical:            Ready For Surgery From Anesthesia Perspective.

## 2020-08-27 ENCOUNTER — PATIENT MESSAGE (OUTPATIENT)
Dept: GASTROENTEROLOGY | Facility: CLINIC | Age: 60
End: 2020-08-27

## 2020-08-27 ENCOUNTER — OFFICE VISIT (OUTPATIENT)
Dept: SURGERY | Facility: CLINIC | Age: 60
End: 2020-08-27
Payer: COMMERCIAL

## 2020-08-27 VITALS
SYSTOLIC BLOOD PRESSURE: 144 MMHG | DIASTOLIC BLOOD PRESSURE: 90 MMHG | WEIGHT: 235.69 LBS | BODY MASS INDEX: 35.72 KG/M2 | HEART RATE: 74 BPM | HEIGHT: 68 IN

## 2020-08-27 DIAGNOSIS — K64.4 EXTERNAL HEMORRHOID: Primary | ICD-10-CM

## 2020-08-27 DIAGNOSIS — Z12.11 COLON CANCER SCREENING: ICD-10-CM

## 2020-08-27 PROCEDURE — 99203 OFFICE O/P NEW LOW 30 MIN: CPT | Mod: S$GLB,,, | Performed by: NURSE PRACTITIONER

## 2020-08-27 PROCEDURE — 99999 PR PBB SHADOW E&M-EST. PATIENT-LVL IV: ICD-10-PCS | Mod: PBBFAC,,, | Performed by: NURSE PRACTITIONER

## 2020-08-27 PROCEDURE — 99203 PR OFFICE/OUTPT VISIT, NEW, LEVL III, 30-44 MIN: ICD-10-PCS | Mod: S$GLB,,, | Performed by: NURSE PRACTITIONER

## 2020-08-27 PROCEDURE — 99999 PR PBB SHADOW E&M-EST. PATIENT-LVL IV: CPT | Mod: PBBFAC,,, | Performed by: NURSE PRACTITIONER

## 2020-08-27 RX ORDER — HYDROCORTISONE 25 MG/G
CREAM TOPICAL 2 TIMES DAILY
Qty: 28 G | Refills: 1 | Status: SHIPPED | OUTPATIENT
Start: 2020-08-27 | End: 2022-04-11

## 2020-08-27 NOTE — PROGRESS NOTES
CRS Office Visit History and Physical    Referring Md:   Danie Conway Md  [address]  [address]  [place] [address]    SUBJECTIVE:     Chief Complaint: hemorrhoids    History of Present Illness:  The patient is new patient to this practice.   Course is as follows:  Patient is a 59 y.o. male presents with external hemorrhoid noted on colonoscopy.  This does not irritate him at all. He currently has no complaints and does not even notice it is there  Symptoms have been present for many years.  Has tried nothing.  Associated bleeding: yes, though rare  Previous anorectal procedures: no  denies straining/prolonged time on toilet with bowel movements.  is not currently taking fiber supplement or stool softener  Blood thinners: No    Last Colonoscopy completed on 8/21/2020  - Hemorrhoids were found on perianal exam.   - The entire examined colon appeared normal on direct and retroflexion views.   - The terminal ileum appeared normal.   - repeat in 10 years  Family history of colorectal cancer or IBD: none.    Review of patient's allergies indicates:  No Known Allergies    Past Medical History:   Diagnosis Date    Glaucoma     Hypertension      Past Surgical History:   Procedure Laterality Date    COLONOSCOPY N/A 8/21/2020    Procedure: COLONOSCOPYPrepopik;  Surgeon: Danie Conway MD;  Location: [facility];  Service: Endoscopy;  Laterality: N/A;    ESOPHAGOGASTRODUODENOSCOPY N/A 8/21/2020    Procedure: EGD (ESOPHAGOGASTRODUODENOSCOPY);  Surgeon: Danie Conway MD;  Location: [facility];  Service: Endoscopy;  Laterality: N/A;    FINGER SURGERY      right hand pinkie finger     GLAUCOMA SURGERY      STOMACH SURGERY      ulcers      Family History   Problem Relation Age of Onset    Diabetes Mother     Hypertension Mother     Hypertension Father     Stroke Maternal Aunt     Heart disease Maternal Grandmother     Heart disease Maternal Grandfather     Colon cancer Neg Hx     Esophageal cancer Neg Hx   "   Rectal cancer Neg Hx     Stomach cancer Neg Hx     Ulcerative colitis Neg Hx     Irritable bowel syndrome Neg Hx     Crohn's disease Neg Hx     Celiac disease Neg Hx     Cancer Neg Hx      Social History     Tobacco Use    Smoking status: Never Smoker    Smokeless tobacco: Never Used   Substance Use Topics    Alcohol use: Yes     Frequency: Monthly or less     Drinks per session: 1 or 2     Binge frequency: Less than monthly     Comment: occasionally    Drug use: No        Review of Systems:  Review of Systems   Constitutional: Negative for chills, fever and weight loss.   Respiratory: Negative for cough and shortness of breath.    Cardiovascular: Negative for chest pain and palpitations.   Gastrointestinal: Negative for abdominal pain, blood in stool, constipation, diarrhea and melena.   Genitourinary: Negative for dysuria and hematuria.   Musculoskeletal: Negative for joint pain and myalgias.   Skin: Negative for itching and rash.   Psychiatric/Behavioral: Negative for substance abuse.   All other systems reviewed and are negative.      OBJECTIVE:     Vital Signs (Most Recent)  Blood Pressure (Abnormal) 144/90 (BP Location: Left arm, Patient Position: Sitting, BP Method: Large (Automatic))   Pulse 74   Height 5' 8" (1.727 m)   Weight 106.9 kg (235 lb 10.8 oz)   Body Mass Index 35.83 kg/m²     Physical Exam:  General: Black or  male in no distress   Neuro: Alert and oriented to person, place, and time.  Moves all extremities.     HEENT: No icterus.  Trachea midline  Respiratory: Respirations are even and unlabored, no cough or audible wheezing  Abdomen: soft, round  Skin: Warm dry and intact, No visible rashes, no jaundice    Labs reviewed today:  Lab Results   Component Value Date    WBC 7.53 04/15/2020    HGB 13.8 (L) 04/15/2020    HCT 42.2 04/15/2020     04/15/2020    CHOL 207 (H) 10/15/2019    TRIG 78 10/15/2019    HDL 55 10/15/2019    ALT 20 10/15/2019    AST 21 " 10/15/2019     04/15/2020    K 3.7 04/15/2020     04/15/2020    CREATININE 0.90 04/15/2020    BUN 16 04/15/2020    CO2 30 (H) 04/15/2020    TSH 1.117 10/15/2019    PSA 4.5 (H) 04/15/2020    INR 1.0 02/22/2012    HGBA1C 5.5 10/09/2018       Imaging reviewed today:  6/5/20 US abdomen  - No significant abnormalities    6/4/20 abdominal xray  - No acute findings.    Endoscopy reviewed today:  Last EGD completed on 8/21/2020  - LA Grade A reflux esophagitis.   - 3 cm hiatal hernia.   - Erythematous mucosa in the antrum. Biopsied.   - Normal examined duodenum.     Last Colonoscopy completed on 8/21/2020  - Hemorrhoids were found on perianal exam.   - The entire examined colon appeared normal on direct and retroflexion views.   - The terminal ileum appeared normal.  - Repeat in 10 years     Anorectal Exam:    Anal Skin: External hemorrhoids    Digital Rectal Exam:  deferred    ASSESSMENT/PLAN:     Ihsan was seen today for hemorrhoids.    Diagnoses and all orders for this visit:    External hemorrhoid  -     hydrocortisone 2.5 % cream; Apply topically 2 (two) times daily. for 10 days    Colon cancer screening  -     Ambulatory referral/consult to Colorectal Surgery        The patient was instructed to:  Use cream 2x/day for 10 days  Increase water intake to at least 8-10 glasses of water per day.  Take a daily fiber supplement (Konsyl, Benefiber, Metamucil) and increase dietary intake to 20-30 grams/day.  Avoid straining or spending >5min/event with bowel movements.  Follow-up in clinic in 4 weeks with MD to discuss sx.      SUJATHA Atwood  Colon and Rectal Surgery

## 2020-08-27 NOTE — LETTER
August 27, 2020      Danie Conway MD  [address]  [address]  [facility] [address]           [facility] [address] [facility]  [address]  [facility] [address]  Phone: [phone]          Patient: Ihsan Mays Sr.   MR Number: [id number]   YOB: 1960   Date of Visit: 8/27/2020       Dear Dr. Danie Conway:    Thank you for referring Ihsan Mays to me for evaluation. Attached you will find relevant portions of my assessment and plan of care.    If you have questions, please do not hesitate to call me. I look forward to following Ihsan Mays along with you.    Sincerely,    Tiana Hyde, NP    Enclosure  CC:  No Recipients    If you would like to receive this communication electronically, please contact [email or web address] or [phone] to request more information on [employer] Link access.    For providers and/or their staff who would like to refer a patient to [facility], please contact us through our one-stop-shop provider referral line, [facility], at [phone].    If you feel you have received this communication in error or would no longer like to receive these types of communications, please e-mail [email or web address]

## 2020-08-31 LAB
FINAL PATHOLOGIC DIAGNOSIS: NORMAL
GROSS: NORMAL

## 2020-09-23 NOTE — PROGRESS NOTES
CRS Office Visit History and Physical      SUBJECTIVE:     Chief Complaint: Hemorrhoid    History of Present Illness:  The patient is known patient to this practice.   Course is as follows:  Patient is a 59 y.o. male presents for hemorrhoid follow-up.  He was seen by Taina last month for hemorrhoid seen on colonoscopy, and was asymptomatic at that time.  Has been taking daily Metamucil and using topical Anusol.  He remains asymptomatic.  Denies any anal or rectal pain.  No bleeding with bowel movements or when wiping.  Does not notice any swelling or itching.  Saw Tiana last month.  Stated that hemorrhoids were asymptomatic at that time.    Last Colonoscopy: 8/21/20    Review of patient's allergies indicates:  No Known Allergies    Past Medical History:   Diagnosis Date    Glaucoma     Hypertension      Past Surgical History:   Procedure Laterality Date    COLONOSCOPY N/A 8/21/2020    Procedure: COLONOSCOPYPrepopik;  Surgeon: Danie Conway MD;  Location: [facility];  Service: Endoscopy;  Laterality: N/A;    ESOPHAGOGASTRODUODENOSCOPY N/A 8/21/2020    Procedure: EGD (ESOPHAGOGASTRODUODENOSCOPY);  Surgeon: Danie Conway MD;  Location: [facility];  Service: Endoscopy;  Laterality: N/A;    FINGER SURGERY      right hand pinkie finger     GLAUCOMA SURGERY      STOMACH SURGERY      ulcers      Family History   Problem Relation Age of Onset    Diabetes Mother     Hypertension Mother     Hypertension Father     Stroke Maternal Aunt     Heart disease Maternal Grandmother     Heart disease Maternal Grandfather     Colon cancer Neg Hx     Esophageal cancer Neg Hx     Rectal cancer Neg Hx     Stomach cancer Neg Hx     Ulcerative colitis Neg Hx     Irritable bowel syndrome Neg Hx     Crohn's disease Neg Hx     Celiac disease Neg Hx     Cancer Neg Hx      Social History     Tobacco Use    Smoking status: Never Smoker    Smokeless tobacco: Never Used   Substance Use Topics    Alcohol use: Yes      "Frequency: Monthly or less     Drinks per session: 1 or 2     Binge frequency: Less than monthly     Comment: occasionally    Drug use: No        Review of Systems:  Review of Systems   All other systems reviewed and are negative.      OBJECTIVE:     Vital Signs (Most Recent)  /84 (BP Location: Left arm, Patient Position: Sitting, BP Method: Large (Manual))   Pulse 88   Ht 5' 10" (1.778 m)   Wt 107 kg (236 lb)   BMI 33.86 kg/m²     Physical Exam:  General: Black or  male in no distress   Neuro: Alert and oriented x 4.  Moves all extremities.     HEENT: No icterus.  Trachea midline  Respiratory: Respirations are even and unlabored  Cardiac: Regular rate  Extremities: Warm dry and intact  Skin: No rashes    Anorectal:   External exam:  External exam reveals chronically prolapsed right posterior hemorrhoid no swelling, no thrombosis, no infection      ASSESSMENT/PLAN:     58yo M w/ a symptomatic, chronically prolapsed hemorrhoid.  We discussed indication for excisional hemorrhoidectomy would include pain, irritation, bleeding, difficulty cleaning, or enlarging hemorrhoid.  He is not interested in pursuing excision at this time as he is asymptomatic.  I think that this is reasonable.  He can follow up with me in the future as needed.  Recommended continuing daily fiber supplementation.    Denisha Arnett MD  Staff Surgeon, Colon and Rectal Surgery  [facility]    "

## 2020-09-24 ENCOUNTER — PATIENT OUTREACH (OUTPATIENT)
Dept: ADMINISTRATIVE | Facility: OTHER | Age: 60
End: 2020-09-24

## 2020-09-24 ENCOUNTER — PATIENT OUTREACH (OUTPATIENT)
Dept: OTHER | Facility: OTHER | Age: 60
End: 2020-09-24

## 2020-09-24 NOTE — PROGRESS NOTES
LINKS immunization registry updated  Care Everywhere updated  Health Maintenance updated  Chart reviewed for overdue Proactive [facility] Encounters ([facility]AB[employer]) health maintenance testing (CRS, Breast Ca, Diabetic Eye Exam)   Orders entered:N/A

## 2020-09-24 NOTE — PROGRESS NOTES
Digital Medicine: Health  Follow-Up    The history is provided by the patient.             Reason for review: Blood pressure not at goal      Additional Follow-up details: Patient reports that he is staying busy with work. Denies any changes with his routine          Diet-no change to diet    No change to diet.  Patient reports eating or drinking the following: Continues to reduce fried foods and other     Intervention(s): reducing processed foods      Physical Activity-no change to routine  No change to exercise routine.       Additional physical activity details: Denies any activity goals right now. Patient struggles to find time to walk due to busy schedule at work      Medication Adherence-Medication Adherence not addressed.      Substance, Sleep, Stress-Not assessed      Continue current diet/physical activity routine.       Addressed patient questions and patient has my contact information if needed prior to next outreach.   Explained the importance of self-monitoring and medication adherence. Encouraged the patient to communicate with their health  for lifestyle modifications to help improve or maintain a healthy lifestyle.            There are no preventive care reminders to display for this patient.    Last 5 Patient Entered Readings                                      Current 30 Day Average: 125/85     Recent Readings 9/22/2020 8/29/2020 8/11/2020 8/3/2020 8/3/2020    SBP (mmHg) 129 120 115 122 125    DBP (mmHg) 86 83 89 90 90    Pulse 73 85 78 77 77

## 2020-09-25 ENCOUNTER — OFFICE VISIT (OUTPATIENT)
Dept: SURGERY | Facility: CLINIC | Age: 60
End: 2020-09-25
Attending: COLON & RECTAL SURGERY
Payer: COMMERCIAL

## 2020-09-25 VITALS
WEIGHT: 236 LBS | SYSTOLIC BLOOD PRESSURE: 132 MMHG | HEART RATE: 88 BPM | DIASTOLIC BLOOD PRESSURE: 84 MMHG | BODY MASS INDEX: 33.79 KG/M2 | HEIGHT: 70 IN

## 2020-09-25 DIAGNOSIS — K64.4 EXTERNAL HEMORRHOID: Primary | ICD-10-CM

## 2020-09-25 PROCEDURE — 99999 PR PBB SHADOW E&M-EST. PATIENT-LVL III: CPT | Mod: PBBFAC,,, | Performed by: COLON & RECTAL SURGERY

## 2020-09-25 PROCEDURE — 99999 PR PBB SHADOW E&M-EST. PATIENT-LVL III: ICD-10-PCS | Mod: PBBFAC,,, | Performed by: COLON & RECTAL SURGERY

## 2020-09-25 PROCEDURE — 99213 PR OFFICE/OUTPT VISIT, EST, LEVL III, 20-29 MIN: ICD-10-PCS | Mod: S$GLB,,, | Performed by: COLON & RECTAL SURGERY

## 2020-09-25 PROCEDURE — 99213 OFFICE O/P EST LOW 20 MIN: CPT | Mod: S$GLB,,, | Performed by: COLON & RECTAL SURGERY

## 2020-10-05 ENCOUNTER — LAB VISIT (OUTPATIENT)
Dept: LAB | Facility: HOSPITAL | Age: 60
End: 2020-10-05
Attending: INTERNAL MEDICINE
Payer: COMMERCIAL

## 2020-10-05 DIAGNOSIS — Z12.5 PROSTATE CANCER SCREENING: ICD-10-CM

## 2020-10-05 DIAGNOSIS — Z00.00 ANNUAL PHYSICAL EXAM: ICD-10-CM

## 2020-10-05 LAB
ALBUMIN SERPL BCP-MCNC: 4.3 G/DL (ref 3.5–5.2)
ALP SERPL-CCNC: 74 U/L (ref 55–135)
ALT SERPL W/O P-5'-P-CCNC: 36 U/L (ref 10–44)
ANION GAP SERPL CALC-SCNC: 9 MMOL/L (ref 8–16)
AST SERPL-CCNC: 25 U/L (ref 10–40)
BASOPHILS # BLD AUTO: 0.05 K/UL (ref 0–0.2)
BASOPHILS NFR BLD: 0.7 % (ref 0–1.9)
BILIRUB SERPL-MCNC: 0.7 MG/DL (ref 0.1–1)
BUN SERPL-MCNC: 16 MG/DL (ref 8–23)
CALCIUM SERPL-MCNC: 9.9 MG/DL (ref 8.7–10.5)
CHLORIDE SERPL-SCNC: 105 MMOL/L (ref 95–110)
CHOLEST SERPL-MCNC: 180 MG/DL (ref 120–199)
CHOLEST/HDLC SERPL: 3.8 {RATIO} (ref 2–5)
CO2 SERPL-SCNC: 28 MMOL/L (ref 23–29)
COMPLEXED PSA SERPL-MCNC: 4.3 NG/ML (ref 0–4)
CREAT SERPL-MCNC: 1 MG/DL (ref 0.5–1.4)
DIFFERENTIAL METHOD: ABNORMAL
EOSINOPHIL # BLD AUTO: 0.2 K/UL (ref 0–0.5)
EOSINOPHIL NFR BLD: 2.5 % (ref 0–8)
ERYTHROCYTE [DISTWIDTH] IN BLOOD BY AUTOMATED COUNT: 13 % (ref 11.5–14.5)
EST. GFR  (AFRICAN AMERICAN): >60 ML/MIN/1.73 M^2
EST. GFR  (NON AFRICAN AMERICAN): >60 ML/MIN/1.73 M^2
GLUCOSE SERPL-MCNC: 120 MG/DL (ref 70–110)
HCT VFR BLD AUTO: 42.8 % (ref 40–54)
HDLC SERPL-MCNC: 47 MG/DL (ref 40–75)
HDLC SERPL: 26.1 % (ref 20–50)
HGB BLD-MCNC: 13.6 G/DL (ref 14–18)
IMM GRANULOCYTES # BLD AUTO: 0.01 K/UL (ref 0–0.04)
IMM GRANULOCYTES NFR BLD AUTO: 0.1 % (ref 0–0.5)
LDLC SERPL CALC-MCNC: 116.4 MG/DL (ref 63–159)
LYMPHOCYTES # BLD AUTO: 1.7 K/UL (ref 1–4.8)
LYMPHOCYTES NFR BLD: 23.9 % (ref 18–48)
MCH RBC QN AUTO: 31.2 PG (ref 27–31)
MCHC RBC AUTO-ENTMCNC: 31.8 G/DL (ref 32–36)
MCV RBC AUTO: 98 FL (ref 82–98)
MONOCYTES # BLD AUTO: 0.6 K/UL (ref 0.3–1)
MONOCYTES NFR BLD: 8.7 % (ref 4–15)
NEUTROPHILS # BLD AUTO: 4.6 K/UL (ref 1.8–7.7)
NEUTROPHILS NFR BLD: 64.1 % (ref 38–73)
NONHDLC SERPL-MCNC: 133 MG/DL
NRBC BLD-RTO: 0 /100 WBC
PLATELET # BLD AUTO: 189 K/UL (ref 150–350)
PMV BLD AUTO: 11.6 FL (ref 9.2–12.9)
POTASSIUM SERPL-SCNC: 3.6 MMOL/L (ref 3.5–5.1)
PROT SERPL-MCNC: 7.8 G/DL (ref 6–8.4)
RBC # BLD AUTO: 4.36 M/UL (ref 4.6–6.2)
SODIUM SERPL-SCNC: 142 MMOL/L (ref 136–145)
TRIGL SERPL-MCNC: 83 MG/DL (ref 30–150)
TSH SERPL DL<=0.005 MIU/L-ACNC: 1.63 UIU/ML (ref 0.4–4)
WBC # BLD AUTO: 7.16 K/UL (ref 3.9–12.7)

## 2020-10-05 PROCEDURE — 84153 ASSAY OF PSA TOTAL: CPT

## 2020-10-05 PROCEDURE — 36415 COLL VENOUS BLD VENIPUNCTURE: CPT | Mod: PO

## 2020-10-05 PROCEDURE — 80061 LIPID PANEL: CPT

## 2020-10-05 PROCEDURE — 84443 ASSAY THYROID STIM HORMONE: CPT

## 2020-10-05 PROCEDURE — 80053 COMPREHEN METABOLIC PANEL: CPT

## 2020-10-05 PROCEDURE — 85025 COMPLETE CBC W/AUTO DIFF WBC: CPT

## 2020-10-10 ENCOUNTER — PATIENT MESSAGE (OUTPATIENT)
Dept: INTERNAL MEDICINE | Facility: CLINIC | Age: 60
End: 2020-10-10

## 2020-10-12 ENCOUNTER — OFFICE VISIT (OUTPATIENT)
Dept: INTERNAL MEDICINE | Facility: CLINIC | Age: 60
End: 2020-10-12
Payer: COMMERCIAL

## 2020-10-12 VITALS
HEART RATE: 70 BPM | DIASTOLIC BLOOD PRESSURE: 78 MMHG | TEMPERATURE: 97 F | BODY MASS INDEX: 35.55 KG/M2 | OXYGEN SATURATION: 98 % | SYSTOLIC BLOOD PRESSURE: 112 MMHG | RESPIRATION RATE: 16 BRPM | HEIGHT: 68 IN | WEIGHT: 234.56 LBS

## 2020-10-12 DIAGNOSIS — S33.5XXA LUMBAR SPRAIN, INITIAL ENCOUNTER: Primary | ICD-10-CM

## 2020-10-12 PROCEDURE — 96372 PR INJECTION,THERAP/PROPH/DIAG2ST, IM OR SUBCUT: ICD-10-PCS | Mod: S$GLB,,, | Performed by: INTERNAL MEDICINE

## 2020-10-12 PROCEDURE — 96372 THER/PROPH/DIAG INJ SC/IM: CPT | Mod: S$GLB,,, | Performed by: INTERNAL MEDICINE

## 2020-10-12 PROCEDURE — 99999 PR PBB SHADOW E&M-EST. PATIENT-LVL IV: CPT | Mod: PBBFAC,,, | Performed by: INTERNAL MEDICINE

## 2020-10-12 PROCEDURE — 99214 PR OFFICE/OUTPT VISIT, EST, LEVL IV, 30-39 MIN: ICD-10-PCS | Mod: 25,S$GLB,, | Performed by: INTERNAL MEDICINE

## 2020-10-12 PROCEDURE — 99999 PR PBB SHADOW E&M-EST. PATIENT-LVL IV: ICD-10-PCS | Mod: PBBFAC,,, | Performed by: INTERNAL MEDICINE

## 2020-10-12 PROCEDURE — 99214 OFFICE O/P EST MOD 30 MIN: CPT | Mod: 25,S$GLB,, | Performed by: INTERNAL MEDICINE

## 2020-10-12 RX ORDER — NABUMETONE 750 MG/1
750 TABLET, FILM COATED ORAL 2 TIMES DAILY
Qty: 60 TABLET | Refills: 1 | Status: SHIPPED | OUTPATIENT
Start: 2020-10-12 | End: 2021-04-21

## 2020-10-12 RX ORDER — CYCLOBENZAPRINE HCL 10 MG
10 TABLET ORAL 3 TIMES DAILY PRN
Qty: 60 TABLET | Refills: 1 | Status: SHIPPED | OUTPATIENT
Start: 2020-10-12 | End: 2020-11-11

## 2020-10-12 RX ORDER — VALSARTAN AND HYDROCHLOROTHIAZIDE 320; 25 MG/1; MG/1
1 TABLET, FILM COATED ORAL DAILY
COMMUNITY
Start: 2020-10-04 | End: 2020-12-03 | Stop reason: SDUPTHER

## 2020-10-12 RX ORDER — TRIAMCINOLONE ACETONIDE 40 MG/ML
40 INJECTION, SUSPENSION INTRA-ARTICULAR; INTRAMUSCULAR
Status: COMPLETED | OUTPATIENT
Start: 2020-10-12 | End: 2020-10-12

## 2020-10-12 RX ORDER — KETOROLAC TROMETHAMINE 30 MG/ML
60 INJECTION, SOLUTION INTRAMUSCULAR; INTRAVENOUS
Status: COMPLETED | OUTPATIENT
Start: 2020-10-12 | End: 2020-10-12

## 2020-10-12 RX ADMIN — KETOROLAC TROMETHAMINE 60 MG: 30 INJECTION, SOLUTION INTRAMUSCULAR; INTRAVENOUS at 02:10

## 2020-10-12 RX ADMIN — TRIAMCINOLONE ACETONIDE 40 MG: 40 INJECTION, SUSPENSION INTRA-ARTICULAR; INTRAMUSCULAR at 02:10

## 2020-10-12 NOTE — PROGRESS NOTES
Subjective:       Patient ID: Ihsan Mays Sr. is a 60 y.o. male.    Chief Complaint: Back Pain and Leg Pain (right)    HPI   Pt here for evaluation of 2 months of waxing/waning B/L LBP described as a dull ache in nature. A/s of numbness/tingling radiating down the back of his right leg. Bending/twisting can bring on the pain. No obvious injury to the area. Minimal relief with Tylenol.   Review of Systems   Constitutional: Negative for activity change, appetite change, chills, diaphoresis, fatigue, fever and unexpected weight change.   HENT: Negative for postnasal drip, rhinorrhea, sinus pressure/congestion, sneezing, sore throat, trouble swallowing and voice change.    Respiratory: Negative for cough, shortness of breath and wheezing.    Cardiovascular: Negative for chest pain, palpitations and leg swelling.   Gastrointestinal: Negative for abdominal pain, blood in stool, constipation, diarrhea, nausea and vomiting.   Genitourinary: Negative for dysuria.   Musculoskeletal: Positive for back pain and myalgias. Negative for arthralgias.   Integumentary:  Negative for rash and wound.   Allergic/Immunologic: Negative for environmental allergies and food allergies.   Hematological: Negative for adenopathy. Does not bruise/bleed easily.         Objective:      Physical Exam  Constitutional:       General: He is not in acute distress.     Appearance: He is well-developed. He is not diaphoretic.   HENT:      Head: Normocephalic and atraumatic.      Right Ear: External ear normal.      Left Ear: External ear normal.      Nose: Nose normal.      Mouth/Throat:      Pharynx: No oropharyngeal exudate.   Eyes:      General: No scleral icterus.        Right eye: No discharge.         Left eye: No discharge.      Conjunctiva/sclera: Conjunctivae normal.      Pupils: Pupils are equal, round, and reactive to light.   Neck:      Musculoskeletal: Normal range of motion and neck supple.      Vascular: No JVD.   Cardiovascular:       Rate and Rhythm: Normal rate and regular rhythm.      Heart sounds: Normal heart sounds. No murmur.   Pulmonary:      Effort: Pulmonary effort is normal. No respiratory distress.      Breath sounds: Normal breath sounds. No wheezing or rales.   Lymphadenopathy:      Cervical: No cervical adenopathy.   Skin:     General: Skin is warm and dry.      Coloration: Skin is not pale.      Findings: No rash.   Neurological:      Mental Status: He is alert and oriented to person, place, and time.         Assessment:       1. Lumbar sprain, initial encounter        Plan:    1. Rx Relafen 750 mg BID and Flexeril 10 mg TID PRN       Kenalog 40 mg/Toradol 60 mg IM       Referral to PT

## 2020-10-16 ENCOUNTER — OFFICE VISIT (OUTPATIENT)
Dept: INTERNAL MEDICINE | Facility: CLINIC | Age: 60
End: 2020-10-16
Payer: COMMERCIAL

## 2020-10-16 ENCOUNTER — LAB VISIT (OUTPATIENT)
Dept: LAB | Facility: HOSPITAL | Age: 60
End: 2020-10-16
Attending: INTERNAL MEDICINE
Payer: COMMERCIAL

## 2020-10-16 VITALS
TEMPERATURE: 98 F | HEART RATE: 72 BPM | WEIGHT: 230.81 LBS | DIASTOLIC BLOOD PRESSURE: 78 MMHG | BODY MASS INDEX: 34.98 KG/M2 | SYSTOLIC BLOOD PRESSURE: 128 MMHG | HEIGHT: 68 IN

## 2020-10-16 DIAGNOSIS — Z00.00 ANNUAL PHYSICAL EXAM: Primary | ICD-10-CM

## 2020-10-16 DIAGNOSIS — R73.9 ELEVATED BLOOD SUGAR: ICD-10-CM

## 2020-10-16 DIAGNOSIS — N52.9 ERECTILE DYSFUNCTION, UNSPECIFIED ERECTILE DYSFUNCTION TYPE: ICD-10-CM

## 2020-10-16 DIAGNOSIS — R97.20 ELEVATED PSA: ICD-10-CM

## 2020-10-16 DIAGNOSIS — I10 ESSENTIAL HYPERTENSION: ICD-10-CM

## 2020-10-16 LAB
ESTIMATED AVG GLUCOSE: 123 MG/DL (ref 68–131)
HBA1C MFR BLD HPLC: 5.9 % (ref 4–5.6)

## 2020-10-16 PROCEDURE — 99999 PR PBB SHADOW E&M-EST. PATIENT-LVL IV: CPT | Mod: PBBFAC,,, | Performed by: INTERNAL MEDICINE

## 2020-10-16 PROCEDURE — 83036 HEMOGLOBIN GLYCOSYLATED A1C: CPT

## 2020-10-16 PROCEDURE — 36415 COLL VENOUS BLD VENIPUNCTURE: CPT | Mod: PO

## 2020-10-16 PROCEDURE — 99396 PREV VISIT EST AGE 40-64: CPT | Mod: S$GLB,,, | Performed by: INTERNAL MEDICINE

## 2020-10-16 PROCEDURE — 99999 PR PBB SHADOW E&M-EST. PATIENT-LVL IV: ICD-10-PCS | Mod: PBBFAC,,, | Performed by: INTERNAL MEDICINE

## 2020-10-16 PROCEDURE — 99396 PR PREVENTIVE VISIT,EST,40-64: ICD-10-PCS | Mod: S$GLB,,, | Performed by: INTERNAL MEDICINE

## 2020-10-16 NOTE — PROGRESS NOTES
Subjective:       Patient ID: Ihsan Mays Sr. is a 60 y.o. male.    Chief Complaint: Annual Exam (rev labs)    HPI   60 y.o. Male here for annual exam.      Vaccines: Influenza (2020); Tetanus (2018); Shingrix (will get)  Eye exam: 2018  Colonoscopy: 8/20     Exercise: no  Diet: regular     Past Medical History:  No date: Glaucoma  No date: Hypertension  Past Surgical History:  6/22/2015: COLONOSCOPY; N/A      Comment:  Performed by Yosef P[facility]rez MD at [facility] ENDO (4TH FLR)  6/22/2015: ESOPHAGOGASTRODUODENOSCOPY (EGD); N/A      Comment:  Performed by Yosef Pérez MD at [facility] ENDO (4TH FLR)  No date: FINGER SURGERY      Comment:  right hand pinkie finger   No date: GLAUCOMA SURGERY  No date: STOMACH SURGERY      Comment:  ulcers   Social History    Socioeconomic History      Marital status:       Spouse name: Not on file      Number of children: 3      Years of education: Not on file      Highest education level: Not on file    Social Needs      Financial resource strain: Not on file      Food insecurity - worry: Not on file      Food insecurity - inability: Not on file      Transportation needs - medical: Not on file      Transportation needs - non-medical: Not on file       Tobacco Use      Smoking status: Never Smoker      Smokeless tobacco: Never Used    Substance and Sexual Activity      Alcohol use: Yes        Comment: occasionally      Drug use: No      Sexual activity: Yes        Partners: Female       Social History Narrative       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   HENT: Negative for nasal congestion, mouth sores, postnasal drip, rhinorrhea, sinus pressure/congestion, sneezing, sore throat, trouble swallowing and voice change.    Eyes: Negative for discharge, itching  and visual disturbance.   Respiratory: Negative for cough, chest tightness, shortness of breath and wheezing.    Cardiovascular: Negative for chest pain, palpitations and leg swelling.   Gastrointestinal: Negative for abdominal pain, blood in stool, constipation, diarrhea, nausea and vomiting.   Endocrine: Negative for cold intolerance and heat intolerance.   Genitourinary: Negative for difficulty urinating, dysuria, flank pain, hematuria and urgency.   Musculoskeletal: Negative for arthralgias, back pain, myalgias and neck pain.   Integumentary:  Negative for rash and wound.   Allergic/Immunologic: Negative for environmental allergies and food allergies.   Neurological: Negative for dizziness, tremors, seizures, syncope, weakness and headaches.   Hematological: Negative for adenopathy. Does not bruise/bleed easily.   Psychiatric/Behavioral: Negative for confusion, sleep disturbance and suicidal ideas. The patient is not nervous/anxious.          Objective:      Physical Exam  Constitutional:       General: He is not in acute distress.     Appearance: He is well-developed. He is not diaphoretic.   HENT:      Head: Normocephalic and atraumatic.      Right Ear: External ear normal.      Left Ear: External ear normal.      Nose: Nose normal.      Mouth/Throat:      Pharynx: No oropharyngeal exudate.   Eyes:      General: No scleral icterus.        Right eye: No discharge.         Left eye: No discharge.      Conjunctiva/sclera: Conjunctivae normal.      Pupils: Pupils are equal, round, and reactive to light.   Neck:      Musculoskeletal: Normal range of motion and neck supple.      Thyroid: No thyromegaly.      Vascular: No JVD.   Cardiovascular:      Rate and Rhythm: Normal rate and regular rhythm.      Heart sounds: Normal heart sounds. No murmur.   Pulmonary:      Effort: Pulmonary effort is normal. No respiratory distress.      Breath sounds: Normal breath sounds. No wheezing or rales.   Abdominal:      General:  Bowel sounds are normal. There is no distension.      Palpations: Abdomen is soft.      Tenderness: There is no abdominal tenderness. There is no guarding.   Lymphadenopathy:      Cervical: No cervical adenopathy.   Skin:     General: Skin is warm and dry.      Coloration: Skin is not pale.      Findings: No rash.   Neurological:      Mental Status: He is alert and oriented to person, place, and time.   Psychiatric:         Judgment: Judgment normal.         Assessment:       1. Annual physical exam    2. Essential hypertension    3. Erectile dysfunction, unspecified erectile dysfunction type    4. Elevated PSA        Plan:    1. Blood work reviewed with pt       Vaccines: Influenza (2020); Tetanus (2018); Shingrix (will get)       Eye exam: 2018       Colonoscopy: 8/20   2. HTN- stable on Diovan/Coreg/Norvasc   3. ED- controlled on Viagra   4. Elevated PSA- Referral back to Urology   5. F/u in 6 months

## 2020-11-02 ENCOUNTER — PATIENT MESSAGE (OUTPATIENT)
Dept: INTERNAL MEDICINE | Facility: CLINIC | Age: 60
End: 2020-11-02

## 2020-11-02 DIAGNOSIS — Z23 NEED FOR SHINGLES VACCINE: Primary | ICD-10-CM

## 2020-11-02 NOTE — TELEPHONE ENCOUNTER
Need internal shingles vaccine order and we need to schedule at infectious disease.    His insurance doesn't allow him to get at pharmacy.

## 2020-11-06 ENCOUNTER — OFFICE VISIT (OUTPATIENT)
Dept: UROLOGY | Facility: CLINIC | Age: 60
End: 2020-11-06
Payer: COMMERCIAL

## 2020-11-06 VITALS
BODY MASS INDEX: 34.86 KG/M2 | HEART RATE: 70 BPM | WEIGHT: 230 LBS | DIASTOLIC BLOOD PRESSURE: 86 MMHG | HEIGHT: 68 IN | SYSTOLIC BLOOD PRESSURE: 121 MMHG

## 2020-11-06 DIAGNOSIS — R97.20 ELEVATED PSA: ICD-10-CM

## 2020-11-06 LAB
BILIRUB SERPL-MCNC: NEGATIVE MG/DL
BLOOD URINE, POC: ABNORMAL
CLARITY, POC UA: CLEAR
COLOR, POC UA: YELLOW
GLUCOSE UR QL STRIP: NORMAL
KETONES UR QL STRIP: NEGATIVE
LEUKOCYTE ESTERASE URINE, POC: ABNORMAL
NITRITE, POC UA: NEGATIVE
PH, POC UA: 5
PROTEIN, POC: ABNORMAL
SPECIFIC GRAVITY, POC UA: 1.02
UROBILINOGEN, POC UA: NORMAL

## 2020-11-06 PROCEDURE — 99999 PR PBB SHADOW E&M-EST. PATIENT-LVL IV: CPT | Mod: PBBFAC,,, | Performed by: UROLOGY

## 2020-11-06 PROCEDURE — 99214 OFFICE O/P EST MOD 30 MIN: CPT | Mod: 25,S$GLB,, | Performed by: UROLOGY

## 2020-11-06 PROCEDURE — 99214 PR OFFICE/OUTPT VISIT, EST, LEVL IV, 30-39 MIN: ICD-10-PCS | Mod: 25,S$GLB,, | Performed by: UROLOGY

## 2020-11-06 PROCEDURE — 81002 URINALYSIS NONAUTO W/O SCOPE: CPT | Mod: S$GLB,,, | Performed by: UROLOGY

## 2020-11-06 PROCEDURE — 99999 PR PBB SHADOW E&M-EST. PATIENT-LVL IV: ICD-10-PCS | Mod: PBBFAC,,, | Performed by: UROLOGY

## 2020-11-06 PROCEDURE — 81002 POCT URINE DIPSTICK WITHOUT MICROSCOPE: ICD-10-PCS | Mod: S$GLB,,, | Performed by: UROLOGY

## 2020-11-06 PROCEDURE — 87086 URINE CULTURE/COLONY COUNT: CPT

## 2020-11-06 NOTE — PROGRESS NOTES
"Subjective:      Ihsan Mays Sr. is a 60 y.o. male who returns today regarding his elevated PSA.    Previously seen in April for virtual visit. PSA then was 4.5. He has no personal history of prostate cancer. He has on mild LUTS. No new c/o today.    The following portions of the patient's history were reviewed and updated as appropriate: allergies, current medications, past family history, past medical history, past social history, past surgical history and problem list.    Review of Systems  A comprehensive multipoint review of systems was negative except as otherwise stated in the HPI.     Objective:   Vitals: /86   Pulse 70   Ht 5' 8" (1.727 m)   Wt 104.3 kg (230 lb)   BMI 34.97 kg/m²     Physical Exam   General: alert and oriented, no acute distress  Respiratory: Symmetric expansion, non-labored breathing  Cardiovascular: regular rate and rhythm, no peripheral edema  Abdomen: soft, non distended  Genitourinary: no penile lesions or discharge, no testicular masses, normal scrotum  Rectal: the prostate is 40 gms and without nodules and normal rectal tone  Skin: normal coloration and turgor, no rashes, no suspicious skin lesions noted  Neuro: no gross deficits  Psych: normal judgment and insight, normal mood/affect and non-anxious    Lab Review   Urinalysis demonstrates positive for leukocytes, red blood cells  Lab Results   Component Value Date    WBC 7.16 10/05/2020    HGB 13.6 (L) 10/05/2020    HCT 42.8 10/05/2020    MCV 98 10/05/2020     10/05/2020     Lab Results   Component Value Date    CREATININE 1.0 10/05/2020    BUN 16 10/05/2020     Component PSA, Screen   Latest Ref Rng & Units 0.00 - 4.00 ng/mL   10/5/2020 4.3 (H)   4/15/2020 4.5 (H)   10/9/2018 2.8   9/20/2017 3.3   7/14/2016 2.6   5/26/2015 2.8       Assessment and Plan:   1. Elevated PSA  - Stable, even slightly improved, from April - will monitor with repeat in 6 mos  - Urine culture today  - FU 6 mos w/ PSA       "

## 2020-11-06 NOTE — LETTER
November 6, 2020      Elliot Ybarra,   [address]  [place] [address]           [facility] 7th Fl  [address]  [place] [address]  Phone: [phone]          Patient: Ihsan Mays Sr.   MR Number: [id number]   YOB: 1960   Date of Visit: 11/6/2020       Dear Dr. Elliot Ybarra:    Thank you for referring Ihsan Mays to me for evaluation. Attached you will find relevant portions of my assessment and plan of care.    If you have questions, please do not hesitate to call me. I look forward to following Ihsan Mays along with you.    Sincerely,    Phillip Galeas MD    Enclosure  CC:  No Recipients    If you would like to receive this communication electronically, please contact [email or web address] or [phone] to request more information on [employer] access.    For providers and/or their staff who would like to refer a patient to [facility], please contact us through our one-stop-shop provider referral line, [facility], at [phone].    If you feel you have received this communication in error or would no longer like to receive these types of communications, please e-mail [email or web address]

## 2020-11-08 LAB — BACTERIA UR CULT: NORMAL

## 2020-11-09 ENCOUNTER — CLINICAL SUPPORT (OUTPATIENT)
Dept: INFECTIOUS DISEASES | Facility: CLINIC | Age: 60
End: 2020-11-09
Payer: COMMERCIAL

## 2020-11-09 PROCEDURE — 99999 PR PBB SHADOW E&M-EST. PATIENT-LVL II: CPT | Mod: PBBFAC,,,

## 2020-11-09 PROCEDURE — 90750 HZV VACC RECOMBINANT IM: CPT | Mod: S$GLB,,, | Performed by: INTERNAL MEDICINE

## 2020-11-09 PROCEDURE — 90471 ZOSTER RECOMBINANT VACCINE: ICD-10-PCS | Mod: S$GLB,,, | Performed by: INTERNAL MEDICINE

## 2020-11-09 PROCEDURE — 90750 ZOSTER RECOMBINANT VACCINE: ICD-10-PCS | Mod: S$GLB,,, | Performed by: INTERNAL MEDICINE

## 2020-11-09 PROCEDURE — 90471 IMMUNIZATION ADMIN: CPT | Mod: S$GLB,,, | Performed by: INTERNAL MEDICINE

## 2020-11-09 PROCEDURE — 99999 PR PBB SHADOW E&M-EST. PATIENT-LVL II: ICD-10-PCS | Mod: PBBFAC,,,

## 2020-12-03 ENCOUNTER — PATIENT MESSAGE (OUTPATIENT)
Dept: INTERNAL MEDICINE | Facility: CLINIC | Age: 60
End: 2020-12-03

## 2020-12-03 DIAGNOSIS — I10 ESSENTIAL HYPERTENSION: Primary | ICD-10-CM

## 2020-12-03 RX ORDER — VALSARTAN AND HYDROCHLOROTHIAZIDE 320; 25 MG/1; MG/1
1 TABLET, FILM COATED ORAL DAILY
Qty: 90 TABLET | Refills: 3 | Status: SHIPPED | OUTPATIENT
Start: 2020-12-03 | End: 2021-04-21 | Stop reason: SDUPTHER

## 2020-12-03 NOTE — TELEPHONE ENCOUNTER
No new care gaps identified.  Powered by [employer]. Reference number: [id number]. 12/03/2020 3:53:43 PM   CST

## 2020-12-03 NOTE — TELEPHONE ENCOUNTER
Refill Authorization Note   Ihsan Mays  is requesting a refill authorization.  Brief Assessment and Rationale for Refill:  Approve     Medication Therapy Plan:  [facility]    Medication Reconciliation Completed: No   Comments:   Orders Placed This Encounter    valsartan-hydrochlorothiazide (DIOVAN-HCT) 320-25 mg per tablet      Requested Prescriptions   Signed Prescriptions Disp Refills    valsartan-hydrochlorothiazide (DIOVAN-HCT) 320-25 mg per tablet 90 tablet 3     Sig: Take 1 tablet by mouth once daily.       Cardiovascular: ARB + Diuretic Combos Passed - 12/3/2020  3:53 PM        Passed - Patient is at least 18 years old        Passed - Last BP in normal range within 360 days.     BP Readings from Last 3 Encounters:   11/06/20 121/86   10/16/20 128/78   10/12/20 112/78              Passed - Office visit in past 12 months or future 90 days     Recent Outpatient Visits            3 weeks ago Elevated PSA    [facility] 7th Fl Phillip Galeas MD    1 month ago Annual physical exam    [facility] Elliot Ybarra DO    1 month ago Lumbar sprain, initial encounter    [facility] Elliot Ybarra,     2 months ago External hemorrhoid    [facility] Denisha Arnett MD    3 months ago External hemorrhoid    [facility] Tiana Hyde NP          Future Appointments              In 1 month INJECTION, INFECTIOUS DISEASES [facility]st Fl, [facility]    In 5 months LAB, [place] [facility] 5th Fl, [facility]    In 5 months Phillip Galeas MD [facility] 7th Fl, [place]                Passed - K in normal range and within 360 days     Potassium   Date Value Ref Range Status   10/05/2020 3.6 3.5 - 5.1 mmol/L Final   04/15/2020 3.7 3.5 - 5.1 mmol/L Final   10/15/2019 3.6 3.5 - 5.1 mmol/L Final              Passed - Na is between 130 and 148 and within 360 days     Sodium    Date Value Ref Range Status   10/05/2020 142 136 - 145 mmol/L Final   04/15/2020 140 136 - 145 mmol/L Final   10/15/2019 139 136 - 145 mmol/L Final              Passed - Cr is 1.4 or below and within 360 days     Creatinine   Date Value Ref Range Status   10/05/2020 1.0 0.5 - 1.4 mg/dL Final   04/15/2020 0.90 0.50 - 1.40 mg/dL Final   10/15/2019 0.9 0.5 - 1.4 mg/dL Final              Passed - eGFR within 360 days     eGFR if non    Date Value Ref Range Status   10/05/2020 >60.0 >60 mL/min/1.73 m^2 Final     Comment:     Calculation used to obtain the estimated glomerular filtration  rate (eGFR) is the CKD-EPI equation.      04/15/2020 >60.0 >60 mL/min/1.73 m^2 Final     Comment:     Calculation used to obtain the estimated glomerular filtration  rate (eGFR) is the CKD-EPI equation.      10/15/2019 >60.0 >60 mL/min/1.73 m^2 Final     Comment:     Calculation used to obtain the estimated glomerular filtration  rate (eGFR) is the CKD-EPI equation.        eGFR if    Date Value Ref Range Status   10/05/2020 >60.0 >60 mL/min/1.73 m^2 Final   04/15/2020 >60.0 >60 mL/min/1.73 m^2 Final   10/15/2019 >60.0 >60 mL/min/1.73 m^2 Final                  Appointments  past 12m or future 3m with PCP    Date Provider   Last Visit   10/16/2020 Elliot Ybarra DO   Next Visit   Visit date not found Elliot Ybarra DO        Note composed:4:48 PM 12/03/2020

## 2020-12-05 DIAGNOSIS — I10 ESSENTIAL HYPERTENSION: ICD-10-CM

## 2020-12-05 NOTE — TELEPHONE ENCOUNTER
No new care gaps identified.  Powered by [employer]. Reference number: [id number]. 12/05/2020 12:52:03 PM   CST

## 2020-12-07 ENCOUNTER — PATIENT MESSAGE (OUTPATIENT)
Dept: INTERNAL MEDICINE | Facility: CLINIC | Age: 60
End: 2020-12-07

## 2020-12-07 ENCOUNTER — PATIENT OUTREACH (OUTPATIENT)
Dept: OTHER | Facility: OTHER | Age: 60
End: 2020-12-07

## 2020-12-07 RX ORDER — VALSARTAN AND HYDROCHLOROTHIAZIDE 320; 25 MG/1; MG/1
1 TABLET, FILM COATED ORAL DAILY
Qty: 30 TABLET | Refills: 0 | OUTPATIENT
Start: 2020-12-07

## 2020-12-07 NOTE — PROGRESS NOTES
"Digital Medicine: Health  Follow-Up    The history is provided by the patient.             Reason for review: Blood pressure not at goal      Additional Follow-up details: Patient states that he stays busy with work, and is "doing the best he can" with covid. He is trying to avoid crowds            Diet-no change to diet    No change to diet.  Patient reports eating or drinking the following: Denies any new goals related to eating habit, but hopes to drink more water in 2021      Physical Activity-no change to routine  No change to exercise routine.       Additional physical activity details: Patient has been walking, and is not using his gym membership. He walks at home inside his house while watching tv.      Medication Adherence-Medication Adherence not addressed.      Substance, Sleep, Stress-Not assessed      Continue current diet/physical activity routine.       Addressed patient questions and patient has my contact information if needed prior to next outreach.   Explained the importance of self-monitoring and medication adherence. Encouraged the patient to communicate with their health  for lifestyle modifications to help improve or maintain a healthy lifestyle.               There are no preventive care reminders to display for this patient.      Last 5 Patient Entered Readings                                      Current 30 Day Average: 119/85     Recent Readings 12/6/2020 12/6/2020 11/27/2020 11/27/2020 11/22/2020    SBP (mmHg) 114 120 118 125 125    DBP (mmHg) 79 84 80 89 92    Pulse 79 78 83 83 70               "

## 2020-12-07 NOTE — PROGRESS NOTES
Nithin HILLIARD. Duplicate Request    Refill Authorization Note   Ihsan Mays is requesting a refill authorization.    Brief assessment and rationale for refill: Quick Discontinue       Medication Therapy Plan: [facility]  Medication reconciliation completed: No    Comments:   Pended Medication(s)       Requested Prescriptions     Refused Prescriptions Disp Refills    valsartan-hydrochlorothiazide (DIOVAN-HCT) 320-25 mg per tablet [Pharmacy Med Name: Valsartan-hydroCHLOROthiazide 320-25 MG Oral Tablet] 30 tablet 0     Sig: Take 1 tablet by mouth once daily     Refused By: KIKE CUNNINGHAM     Reason for Refusal: Request already responded to by other means (e.g. phone or fax)        Duplicate Pended Encounter(s)/ Last Prescribed Details:    Authorizing Provider: Elliot Ybarra DO ELFEGO #:  [id number] NPI:  [id number]    Ordering User:  Solomon Hi      Cosigner:  Elliot Ybarra DO Signed:  12/4/2020 06:12           Diagnosis Association: Essential hypertension (I10)      Original Order:  valsartan-hydrochlorothiazide (DIOVAN-HCT) 320-25 mg per tablet [[id number]]      Pharmacy:  [facility]/pharmacy #[id number] - [place], [address] AT [facility]   ELFEGO #:  [id number]   Pharmacy Comments: --          Fill quantity remaining: -- Fill quantity used: -- Next fill due: --       Outpatient Medication Detail     Disp Refills Start End NICK   valsartan-hydrochlorothiazide (DIOVAN-HCT) 320-25 mg per tablet 90 tablet 3 12/3/2020  No   Sig - Route: Take 1 tablet by mouth once daily. - Oral   Sent to pharmacy as: valsartan-hydrochlorothiazide (DIOVAN-HCT) 320-25 mg per tablet   Class: Normal   Notes to Pharmacy: Please inactivate all prior scripts with same name and strength including any scripts on hold.   Order: [id number]   Cosign for Ordering: Accepted by Elliot Ybarra DO on 12/4/2020  6:12 AM   Date/Time Signed: 12/3/2020 16:48       E-Prescribing Status: Receipt confirmed by pharmacy (12/3/2020   4:48 PM CST)   Ordering Encounter Report    Associated Reports   View Encounter                Note composed:9:22 AM 12/07/2020

## 2020-12-08 ENCOUNTER — PATIENT MESSAGE (OUTPATIENT)
Dept: INTERNAL MEDICINE | Facility: CLINIC | Age: 60
End: 2020-12-08

## 2020-12-20 NOTE — TELEPHONE ENCOUNTER
No new care gaps identified.  Powered by [employer]. Reference number: [id number]. 12/20/2020 10:21:29 AM   CST

## 2020-12-22 RX ORDER — CARVEDILOL 12.5 MG/1
TABLET ORAL
Qty: 180 TABLET | Refills: 3 | Status: SHIPPED | OUTPATIENT
Start: 2020-12-22 | End: 2021-04-21 | Stop reason: SDUPTHER

## 2020-12-23 NOTE — PROGRESS NOTES
Refill Authorization Note   Ihsan Mays  is requesting a refill authorization.  Brief Assessment and Rationale for Refill:  Approve     Medication Therapy Plan:  CDMR. approve     Medication Reconciliation Completed: No   Comments:       Requested Prescriptions   Pending Prescriptions Disp Refills    carvediloL (COREG) 12.5 MG tablet [Pharmacy Med Name: CARVEDILOL 12.5 MG TABLET] 180 tablet 3     Sig: TAKE 1 TABLET BY MOUTH TWICE A DAY WITH MEALS       Cardiovascular:  Beta Blockers Passed - 12/22/2020  6:04 PM        Passed - Patient is at least 18 years old        Passed - Last BP in normal range within 360 days.     BP Readings from Last 3 Encounters:   11/06/20 121/86   10/16/20 128/78   10/12/20 112/78              Passed - Last Heart Rate in normal range within 360 days.     Pulse Readings from Last 3 Encounters:   11/06/20 70   10/16/20 72   10/12/20 70             Passed - Office visit in past 12 months or future 90 days     Recent Outpatient Visits            1 month ago Elevated PSA    [facility] 7th Fl Phillip Galeas MD    2 months ago Annual physical exam    [facility] Elliot Ybarra DO    2 months ago Lumbar sprain, initial encounter    [facility] Elliot Ybarra DO    2 months ago External hemorrhoid    [facility] Denisha Arnett MD    3 months ago External hemorrhoid    [facility] Tinaa Hyde, [place]          Future Appointments              In 2 weeks INJECTION, INFECTIOUS DISEASES [facility] 1st Fl, [facility]    In 4 months LAB, [place] [facility] 5th Fl, [place]    In 4 months Phillip Galeas MD [facility] 7th Fl, [place]                    Appointments  past 12m or future 3m with PCP    Date Provider   Last Visit   10/16/2020 Elliot Ybarra DO   Next Visit   Visit date not found Elliot Ybarra DO   ED visits in past  90 days: 0     Note composed:6:05 PM 12/22/2020

## 2021-01-11 ENCOUNTER — CLINICAL SUPPORT (OUTPATIENT)
Dept: INFECTIOUS DISEASES | Facility: CLINIC | Age: 61
End: 2021-01-11
Payer: COMMERCIAL

## 2021-01-11 PROCEDURE — 90471 ZOSTER RECOMBINANT VACCINE: ICD-10-PCS | Mod: S$GLB,,, | Performed by: INTERNAL MEDICINE

## 2021-01-11 PROCEDURE — 90471 IMMUNIZATION ADMIN: CPT | Mod: S$GLB,,, | Performed by: INTERNAL MEDICINE

## 2021-01-11 PROCEDURE — 90750 ZOSTER RECOMBINANT VACCINE: ICD-10-PCS | Mod: S$GLB,,, | Performed by: INTERNAL MEDICINE

## 2021-01-11 PROCEDURE — 90750 HZV VACC RECOMBINANT IM: CPT | Mod: S$GLB,,, | Performed by: INTERNAL MEDICINE

## 2021-04-05 ENCOUNTER — TELEPHONE (OUTPATIENT)
Dept: ORTHOPEDICS | Facility: CLINIC | Age: 61
End: 2021-04-05

## 2021-04-12 DIAGNOSIS — M62.838 MUSCLE SPASM: ICD-10-CM

## 2021-04-12 DIAGNOSIS — M54.16 LUMBAR RADICULOPATHY: ICD-10-CM

## 2021-04-12 DIAGNOSIS — M54.50 LUMBAGO: Primary | ICD-10-CM

## 2021-04-21 ENCOUNTER — OFFICE VISIT (OUTPATIENT)
Dept: INTERNAL MEDICINE | Facility: CLINIC | Age: 61
End: 2021-04-21
Payer: COMMERCIAL

## 2021-04-21 ENCOUNTER — TELEPHONE (OUTPATIENT)
Dept: INTERNAL MEDICINE | Facility: CLINIC | Age: 61
End: 2021-04-21

## 2021-04-21 VITALS
SYSTOLIC BLOOD PRESSURE: 118 MMHG | HEIGHT: 68 IN | TEMPERATURE: 97 F | OXYGEN SATURATION: 99 % | BODY MASS INDEX: 36.09 KG/M2 | RESPIRATION RATE: 18 BRPM | WEIGHT: 238.13 LBS | HEART RATE: 72 BPM | DIASTOLIC BLOOD PRESSURE: 82 MMHG

## 2021-04-21 DIAGNOSIS — I10 ESSENTIAL HYPERTENSION: Primary | ICD-10-CM

## 2021-04-21 DIAGNOSIS — R97.20 ELEVATED PSA: ICD-10-CM

## 2021-04-21 DIAGNOSIS — N52.9 ERECTILE DYSFUNCTION, UNSPECIFIED ERECTILE DYSFUNCTION TYPE: ICD-10-CM

## 2021-04-21 DIAGNOSIS — Z00.00 ANNUAL PHYSICAL EXAM: Primary | ICD-10-CM

## 2021-04-21 PROCEDURE — 99999 PR PBB SHADOW E&M-EST. PATIENT-LVL III: CPT | Mod: PBBFAC,,, | Performed by: INTERNAL MEDICINE

## 2021-04-21 PROCEDURE — 1126F AMNT PAIN NOTED NONE PRSNT: CPT | Mod: S$GLB,,, | Performed by: INTERNAL MEDICINE

## 2021-04-21 PROCEDURE — 3079F PR MOST RECENT DIASTOLIC BLOOD PRESSURE 80-89 MM HG: ICD-10-PCS | Mod: CPTII,S$GLB,, | Performed by: INTERNAL MEDICINE

## 2021-04-21 PROCEDURE — 3074F PR MOST RECENT SYSTOLIC BLOOD PRESSURE < 130 MM HG: ICD-10-PCS | Mod: CPTII,S$GLB,, | Performed by: INTERNAL MEDICINE

## 2021-04-21 PROCEDURE — 99999 PR PBB SHADOW E&M-EST. PATIENT-LVL III: ICD-10-PCS | Mod: PBBFAC,,, | Performed by: INTERNAL MEDICINE

## 2021-04-21 PROCEDURE — 3079F DIAST BP 80-89 MM HG: CPT | Mod: CPTII,S$GLB,, | Performed by: INTERNAL MEDICINE

## 2021-04-21 PROCEDURE — 3008F BODY MASS INDEX DOCD: CPT | Mod: CPTII,S$GLB,, | Performed by: INTERNAL MEDICINE

## 2021-04-21 PROCEDURE — 99214 PR OFFICE/OUTPT VISIT, EST, LEVL IV, 30-39 MIN: ICD-10-PCS | Mod: S$GLB,,, | Performed by: INTERNAL MEDICINE

## 2021-04-21 PROCEDURE — 3008F PR BODY MASS INDEX (BMI) DOCUMENTED: ICD-10-PCS | Mod: CPTII,S$GLB,, | Performed by: INTERNAL MEDICINE

## 2021-04-21 PROCEDURE — 1126F PR PAIN SEVERITY QUANTIFIED, NO PAIN PRESENT: ICD-10-PCS | Mod: S$GLB,,, | Performed by: INTERNAL MEDICINE

## 2021-04-21 PROCEDURE — 3074F SYST BP LT 130 MM HG: CPT | Mod: CPTII,S$GLB,, | Performed by: INTERNAL MEDICINE

## 2021-04-21 PROCEDURE — 99214 OFFICE O/P EST MOD 30 MIN: CPT | Mod: S$GLB,,, | Performed by: INTERNAL MEDICINE

## 2021-04-21 RX ORDER — NAPROXEN 500 MG/1
TABLET ORAL
COMMUNITY
Start: 2021-03-24 | End: 2022-10-18

## 2021-04-21 RX ORDER — VALSARTAN AND HYDROCHLOROTHIAZIDE 320; 25 MG/1; MG/1
1 TABLET, FILM COATED ORAL DAILY
Qty: 90 TABLET | Refills: 3 | Status: SHIPPED | OUTPATIENT
Start: 2021-04-21 | End: 2022-04-11

## 2021-04-21 RX ORDER — CYCLOBENZAPRINE HCL 10 MG
10 TABLET ORAL EVERY 8 HOURS PRN
COMMUNITY
Start: 2021-03-24

## 2021-04-21 RX ORDER — AMLODIPINE BESYLATE 5 MG/1
5 TABLET ORAL DAILY
Qty: 90 TABLET | Refills: 3 | Status: SHIPPED | OUTPATIENT
Start: 2021-04-21 | End: 2022-05-04

## 2021-04-21 RX ORDER — CARVEDILOL 12.5 MG/1
12.5 TABLET ORAL 2 TIMES DAILY WITH MEALS
Qty: 180 TABLET | Refills: 3 | Status: SHIPPED | OUTPATIENT
Start: 2021-04-21 | End: 2022-07-25 | Stop reason: SDUPTHER

## 2021-05-03 ENCOUNTER — CLINICAL SUPPORT (OUTPATIENT)
Dept: REHABILITATION | Facility: HOSPITAL | Age: 61
End: 2021-05-03
Payer: COMMERCIAL

## 2021-05-03 DIAGNOSIS — M53.86 DECREASED RANGE OF MOTION OF LUMBAR SPINE: ICD-10-CM

## 2021-05-03 DIAGNOSIS — R29.898 HIP TIGHTNESS: ICD-10-CM

## 2021-05-03 DIAGNOSIS — R29.898 WEAKNESS OF BOTH HIPS: ICD-10-CM

## 2021-05-03 DIAGNOSIS — M62.9 HAMSTRING TIGHTNESS OF BOTH LOWER EXTREMITIES: ICD-10-CM

## 2021-05-03 PROBLEM — M54.50 CHRONIC LOW BACK PAIN WITHOUT SCIATICA: Status: RESOLVED | Noted: 2018-11-19 | Resolved: 2021-05-03

## 2021-05-03 PROBLEM — G89.29 CHRONIC LOW BACK PAIN WITHOUT SCIATICA: Status: RESOLVED | Noted: 2018-11-19 | Resolved: 2021-05-03

## 2021-05-03 PROCEDURE — 97162 PT EVAL MOD COMPLEX 30 MIN: CPT | Mod: PO

## 2021-05-04 ENCOUNTER — LAB VISIT (OUTPATIENT)
Dept: LAB | Facility: HOSPITAL | Age: 61
End: 2021-05-04
Attending: INTERNAL MEDICINE
Payer: COMMERCIAL

## 2021-05-04 DIAGNOSIS — I10 ESSENTIAL HYPERTENSION: ICD-10-CM

## 2021-05-04 DIAGNOSIS — R97.20 ELEVATED PSA: ICD-10-CM

## 2021-05-04 LAB
BASOPHILS # BLD AUTO: 0.03 K/UL (ref 0–0.2)
BASOPHILS NFR BLD: 0.4 % (ref 0–1.9)
DIFFERENTIAL METHOD: ABNORMAL
EOSINOPHIL # BLD AUTO: 0.2 K/UL (ref 0–0.5)
EOSINOPHIL NFR BLD: 2.8 % (ref 0–8)
ERYTHROCYTE [DISTWIDTH] IN BLOOD BY AUTOMATED COUNT: 13.2 % (ref 11.5–14.5)
HCT VFR BLD AUTO: 40.7 % (ref 40–54)
HGB BLD-MCNC: 13.7 G/DL (ref 14–18)
IMM GRANULOCYTES # BLD AUTO: 0.03 K/UL (ref 0–0.04)
IMM GRANULOCYTES NFR BLD AUTO: 0.4 % (ref 0–0.5)
LYMPHOCYTES # BLD AUTO: 1.9 K/UL (ref 1–4.8)
LYMPHOCYTES NFR BLD: 23.3 % (ref 18–48)
MCH RBC QN AUTO: 30.9 PG (ref 27–31)
MCHC RBC AUTO-ENTMCNC: 33.7 G/DL (ref 32–36)
MCV RBC AUTO: 92 FL (ref 82–98)
MONOCYTES # BLD AUTO: 0.7 K/UL (ref 0.3–1)
MONOCYTES NFR BLD: 8.1 % (ref 4–15)
NEUTROPHILS # BLD AUTO: 5.3 K/UL (ref 1.8–7.7)
NEUTROPHILS NFR BLD: 65 % (ref 38–73)
NRBC BLD-RTO: 0 /100 WBC
PLATELET # BLD AUTO: 208 K/UL (ref 150–450)
PMV BLD AUTO: 11.1 FL (ref 9.2–12.9)
PROSTATE SPECIFIC ANTIGEN, TOTAL: 4.6 NG/ML (ref 0–4)
PSA FREE MFR SERPL: 16.09 %
PSA FREE SERPL-MCNC: 0.74 NG/ML (ref 0.01–1.5)
RBC # BLD AUTO: 4.44 M/UL (ref 4.6–6.2)
WBC # BLD AUTO: 8.12 K/UL (ref 3.9–12.7)

## 2021-05-04 PROCEDURE — 36415 COLL VENOUS BLD VENIPUNCTURE: CPT | Mod: PO | Performed by: UROLOGY

## 2021-05-04 PROCEDURE — 84153 ASSAY OF PSA TOTAL: CPT | Performed by: UROLOGY

## 2021-05-04 PROCEDURE — 85025 COMPLETE CBC W/AUTO DIFF WBC: CPT | Performed by: INTERNAL MEDICINE

## 2021-05-04 PROCEDURE — 84154 ASSAY OF PSA FREE: CPT | Performed by: UROLOGY

## 2021-05-04 PROCEDURE — 80048 BASIC METABOLIC PNL TOTAL CA: CPT | Performed by: INTERNAL MEDICINE

## 2021-05-05 ENCOUNTER — TELEPHONE (OUTPATIENT)
Dept: INTERNAL MEDICINE | Facility: CLINIC | Age: 61
End: 2021-05-05

## 2021-05-05 ENCOUNTER — PATIENT MESSAGE (OUTPATIENT)
Dept: UROLOGY | Facility: CLINIC | Age: 61
End: 2021-05-05

## 2021-05-05 LAB
ANION GAP SERPL CALC-SCNC: 10 MMOL/L (ref 8–16)
BUN SERPL-MCNC: 15 MG/DL (ref 8–23)
CALCIUM SERPL-MCNC: 9.9 MG/DL (ref 8.7–10.5)
CHLORIDE SERPL-SCNC: 104 MMOL/L (ref 95–110)
CO2 SERPL-SCNC: 26 MMOL/L (ref 23–29)
CREAT SERPL-MCNC: 0.9 MG/DL (ref 0.5–1.4)
EST. GFR  (AFRICAN AMERICAN): >60 ML/MIN/1.73 M^2
EST. GFR  (NON AFRICAN AMERICAN): >60 ML/MIN/1.73 M^2
GLUCOSE SERPL-MCNC: 86 MG/DL (ref 70–110)
POTASSIUM SERPL-SCNC: 3.7 MMOL/L (ref 3.5–5.1)
SODIUM SERPL-SCNC: 140 MMOL/L (ref 136–145)

## 2021-05-07 ENCOUNTER — OFFICE VISIT (OUTPATIENT)
Dept: UROLOGY | Facility: CLINIC | Age: 61
End: 2021-05-07
Payer: COMMERCIAL

## 2021-05-07 VITALS
HEART RATE: 70 BPM | WEIGHT: 238.13 LBS | SYSTOLIC BLOOD PRESSURE: 110 MMHG | BODY MASS INDEX: 36.09 KG/M2 | HEIGHT: 68 IN | DIASTOLIC BLOOD PRESSURE: 77 MMHG

## 2021-05-07 DIAGNOSIS — R97.20 ELEVATED PSA: Primary | ICD-10-CM

## 2021-05-07 PROCEDURE — 1126F AMNT PAIN NOTED NONE PRSNT: CPT | Mod: S$GLB,,, | Performed by: UROLOGY

## 2021-05-07 PROCEDURE — 3008F BODY MASS INDEX DOCD: CPT | Mod: CPTII,S$GLB,, | Performed by: UROLOGY

## 2021-05-07 PROCEDURE — 3008F PR BODY MASS INDEX (BMI) DOCUMENTED: ICD-10-PCS | Mod: CPTII,S$GLB,, | Performed by: UROLOGY

## 2021-05-07 PROCEDURE — 99999 PR PBB SHADOW E&M-EST. PATIENT-LVL III: CPT | Mod: PBBFAC,,, | Performed by: UROLOGY

## 2021-05-07 PROCEDURE — 99214 OFFICE O/P EST MOD 30 MIN: CPT | Mod: S$GLB,,, | Performed by: UROLOGY

## 2021-05-07 PROCEDURE — 1126F PR PAIN SEVERITY QUANTIFIED, NO PAIN PRESENT: ICD-10-PCS | Mod: S$GLB,,, | Performed by: UROLOGY

## 2021-05-07 PROCEDURE — 99999 PR PBB SHADOW E&M-EST. PATIENT-LVL III: ICD-10-PCS | Mod: PBBFAC,,, | Performed by: UROLOGY

## 2021-05-07 PROCEDURE — 99214 PR OFFICE/OUTPT VISIT, EST, LEVL IV, 30-39 MIN: ICD-10-PCS | Mod: S$GLB,,, | Performed by: UROLOGY

## 2021-05-07 RX ORDER — CIPROFLOXACIN 500 MG/1
500 TABLET ORAL 2 TIMES DAILY
Qty: 4 TABLET | Refills: 0 | Status: SHIPPED | OUTPATIENT
Start: 2021-05-07 | End: 2021-05-09

## 2021-05-09 PROBLEM — M62.9 HAMSTRING TIGHTNESS OF BOTH LOWER EXTREMITIES: Status: ACTIVE | Noted: 2021-05-09

## 2021-05-09 PROBLEM — R29.898 WEAKNESS OF BOTH HIPS: Status: ACTIVE | Noted: 2021-05-09

## 2021-05-09 PROBLEM — R29.898 HIP TIGHTNESS: Status: ACTIVE | Noted: 2021-05-09

## 2021-05-09 PROBLEM — M53.86 DECREASED RANGE OF MOTION OF LUMBAR SPINE: Status: ACTIVE | Noted: 2021-05-09

## 2021-06-02 ENCOUNTER — DOCUMENTATION ONLY (OUTPATIENT)
Dept: REHABILITATION | Facility: HOSPITAL | Age: 61
End: 2021-06-02

## 2021-06-02 ENCOUNTER — CLINICAL SUPPORT (OUTPATIENT)
Dept: REHABILITATION | Facility: HOSPITAL | Age: 61
End: 2021-06-02
Payer: COMMERCIAL

## 2021-06-02 DIAGNOSIS — M62.9 HAMSTRING TIGHTNESS OF BOTH LOWER EXTREMITIES: ICD-10-CM

## 2021-06-02 DIAGNOSIS — M53.86 DECREASED RANGE OF MOTION OF LUMBAR SPINE: Primary | ICD-10-CM

## 2021-06-02 DIAGNOSIS — R29.898 WEAKNESS OF BOTH HIPS: ICD-10-CM

## 2021-06-02 DIAGNOSIS — M53.86 DECREASED RANGE OF MOTION OF LUMBAR SPINE: ICD-10-CM

## 2021-06-02 DIAGNOSIS — R29.898 HIP TIGHTNESS: ICD-10-CM

## 2021-06-02 PROCEDURE — 97110 THERAPEUTIC EXERCISES: CPT | Mod: PO

## 2021-06-07 ENCOUNTER — PROCEDURE VISIT (OUTPATIENT)
Dept: UROLOGY | Facility: CLINIC | Age: 61
End: 2021-06-07
Attending: UROLOGY
Payer: COMMERCIAL

## 2021-06-07 VITALS
HEART RATE: 69 BPM | BODY MASS INDEX: 36.07 KG/M2 | WEIGHT: 238 LBS | SYSTOLIC BLOOD PRESSURE: 124 MMHG | HEIGHT: 68 IN | DIASTOLIC BLOOD PRESSURE: 87 MMHG

## 2021-06-07 DIAGNOSIS — R97.20 ELEVATED PSA: Primary | ICD-10-CM

## 2021-06-07 LAB
BILIRUB SERPL-MCNC: ABNORMAL MG/DL
BLOOD URINE, POC: ABNORMAL
CLARITY, POC UA: CLEAR
COLOR, POC UA: YELLOW
GLUCOSE UR QL STRIP: NORMAL
KETONES UR QL STRIP: ABNORMAL
LEUKOCYTE ESTERASE URINE, POC: ABNORMAL
NITRITE, POC UA: ABNORMAL
PH, POC UA: 5
PROTEIN, POC: ABNORMAL
SPECIFIC GRAVITY, POC UA: 1
UROBILINOGEN, POC UA: NORMAL

## 2021-06-07 PROCEDURE — 55700 TRANSRECTAL ULTRASOUND W/ BIOPSY: CPT | Mod: S$GLB,,, | Performed by: UROLOGY

## 2021-06-07 PROCEDURE — 96372 THER/PROPH/DIAG INJ SC/IM: CPT | Mod: 59,S$GLB,, | Performed by: UROLOGY

## 2021-06-07 PROCEDURE — 88305 TISSUE EXAM BY PATHOLOGIST: ICD-10-PCS | Mod: 26,,, | Performed by: STUDENT IN AN ORGANIZED HEALTH CARE EDUCATION/TRAINING PROGRAM

## 2021-06-07 PROCEDURE — 88344 IMHCHEM/IMCYTCHM EA MLT ANTB: CPT | Performed by: STUDENT IN AN ORGANIZED HEALTH CARE EDUCATION/TRAINING PROGRAM

## 2021-06-07 PROCEDURE — 76872 TRANSRECTAL ULTRASOUND W/ BIOPSY: ICD-10-PCS | Mod: 26,S$GLB,, | Performed by: UROLOGY

## 2021-06-07 PROCEDURE — 88344 PR IHC OR ICC EACH MULTIPLEX ANTIBODY STAIN PROCEDURE: ICD-10-PCS | Mod: 26,,, | Performed by: STUDENT IN AN ORGANIZED HEALTH CARE EDUCATION/TRAINING PROGRAM

## 2021-06-07 PROCEDURE — 96372 PR INJECTION,THERAP/PROPH/DIAG2ST, IM OR SUBCUT: ICD-10-PCS | Mod: 59,S$GLB,, | Performed by: UROLOGY

## 2021-06-07 PROCEDURE — 76872 US TRANSRECTAL: CPT | Mod: 26,S$GLB,, | Performed by: UROLOGY

## 2021-06-07 PROCEDURE — 88309 TISSUE EXAM BY PATHOLOGIST: CPT | Mod: 26,,, | Performed by: STUDENT IN AN ORGANIZED HEALTH CARE EDUCATION/TRAINING PROGRAM

## 2021-06-07 PROCEDURE — 55700 TRANSRECTAL ULTRASOUND W/ BIOPSY: ICD-10-PCS | Mod: S$GLB,,, | Performed by: UROLOGY

## 2021-06-07 PROCEDURE — 88309 PR  SURG PATH,LEVEL VI: ICD-10-PCS | Mod: 26,,, | Performed by: STUDENT IN AN ORGANIZED HEALTH CARE EDUCATION/TRAINING PROGRAM

## 2021-06-07 PROCEDURE — 88305 TISSUE EXAM BY PATHOLOGIST: CPT | Mod: 26,,, | Performed by: STUDENT IN AN ORGANIZED HEALTH CARE EDUCATION/TRAINING PROGRAM

## 2021-06-07 PROCEDURE — 88344 IMHCHEM/IMCYTCHM EA MLT ANTB: CPT | Mod: 26,,, | Performed by: STUDENT IN AN ORGANIZED HEALTH CARE EDUCATION/TRAINING PROGRAM

## 2021-06-07 PROCEDURE — 88305 TISSUE EXAM BY PATHOLOGIST: CPT | Mod: 59 | Performed by: STUDENT IN AN ORGANIZED HEALTH CARE EDUCATION/TRAINING PROGRAM

## 2021-06-07 PROCEDURE — 81002 URINALYSIS NONAUTO W/O SCOPE: CPT | Mod: S$GLB,,, | Performed by: UROLOGY

## 2021-06-07 PROCEDURE — 81002 POCT URINE DIPSTICK WITHOUT MICROSCOPE: ICD-10-PCS | Mod: S$GLB,,, | Performed by: UROLOGY

## 2021-06-07 RX ORDER — LIDOCAINE HYDROCHLORIDE 20 MG/ML
JELLY TOPICAL
Status: COMPLETED | OUTPATIENT
Start: 2021-06-07 | End: 2021-06-07

## 2021-06-07 RX ORDER — GENTAMICIN SULFATE 40 MG/ML
80 INJECTION, SOLUTION INTRAMUSCULAR; INTRAVENOUS
Status: COMPLETED | OUTPATIENT
Start: 2021-06-07 | End: 2021-06-07

## 2021-06-07 RX ORDER — LIDOCAINE HYDROCHLORIDE 10 MG/ML
1 INJECTION INFILTRATION; PERINEURAL
Status: COMPLETED | OUTPATIENT
Start: 2021-06-07 | End: 2021-06-07

## 2021-06-07 RX ADMIN — GENTAMICIN SULFATE 80 MG: 40 INJECTION, SOLUTION INTRAMUSCULAR; INTRAVENOUS at 03:06

## 2021-06-07 RX ADMIN — LIDOCAINE HYDROCHLORIDE 10 ML: 10 INJECTION INFILTRATION; PERINEURAL at 03:06

## 2021-06-07 RX ADMIN — LIDOCAINE HYDROCHLORIDE 5 ML: 20 JELLY TOPICAL at 03:06

## 2021-06-09 ENCOUNTER — DOCUMENTATION ONLY (OUTPATIENT)
Dept: UROLOGY | Facility: CLINIC | Age: 61
End: 2021-06-09

## 2021-06-14 ENCOUNTER — CLINICAL SUPPORT (OUTPATIENT)
Dept: REHABILITATION | Facility: HOSPITAL | Age: 61
End: 2021-06-14
Payer: COMMERCIAL

## 2021-06-14 DIAGNOSIS — M53.86 DECREASED RANGE OF MOTION OF LUMBAR SPINE: ICD-10-CM

## 2021-06-14 DIAGNOSIS — R29.898 HIP TIGHTNESS: ICD-10-CM

## 2021-06-14 DIAGNOSIS — R29.898 WEAKNESS OF BOTH HIPS: ICD-10-CM

## 2021-06-14 DIAGNOSIS — M62.9 HAMSTRING TIGHTNESS OF BOTH LOWER EXTREMITIES: ICD-10-CM

## 2021-06-14 PROCEDURE — 97110 THERAPEUTIC EXERCISES: CPT | Mod: PO

## 2021-06-16 ENCOUNTER — CLINICAL SUPPORT (OUTPATIENT)
Dept: REHABILITATION | Facility: HOSPITAL | Age: 61
End: 2021-06-16
Payer: COMMERCIAL

## 2021-06-16 DIAGNOSIS — R29.898 HIP TIGHTNESS: ICD-10-CM

## 2021-06-16 DIAGNOSIS — M62.9 HAMSTRING TIGHTNESS OF BOTH LOWER EXTREMITIES: ICD-10-CM

## 2021-06-16 DIAGNOSIS — R29.898 WEAKNESS OF BOTH HIPS: ICD-10-CM

## 2021-06-16 DIAGNOSIS — M53.86 DECREASED RANGE OF MOTION OF LUMBAR SPINE: ICD-10-CM

## 2021-06-16 PROCEDURE — 97110 THERAPEUTIC EXERCISES: CPT | Mod: PO

## 2021-06-17 ENCOUNTER — TELEPHONE (OUTPATIENT)
Dept: UROLOGY | Facility: CLINIC | Age: 61
End: 2021-06-17

## 2021-06-17 ENCOUNTER — PATIENT MESSAGE (OUTPATIENT)
Dept: UROLOGY | Facility: CLINIC | Age: 61
End: 2021-06-17

## 2021-06-17 LAB
FINAL PATHOLOGIC DIAGNOSIS: NORMAL
Lab: NORMAL

## 2021-06-21 ENCOUNTER — CLINICAL SUPPORT (OUTPATIENT)
Dept: REHABILITATION | Facility: HOSPITAL | Age: 61
End: 2021-06-21
Payer: COMMERCIAL

## 2021-06-21 DIAGNOSIS — M53.86 DECREASED RANGE OF MOTION OF LUMBAR SPINE: ICD-10-CM

## 2021-06-21 DIAGNOSIS — R29.898 HIP TIGHTNESS: ICD-10-CM

## 2021-06-21 DIAGNOSIS — R29.898 WEAKNESS OF BOTH HIPS: ICD-10-CM

## 2021-06-21 DIAGNOSIS — M62.9 HAMSTRING TIGHTNESS OF BOTH LOWER EXTREMITIES: ICD-10-CM

## 2021-06-21 PROCEDURE — 97164 PT RE-EVAL EST PLAN CARE: CPT | Mod: PO

## 2021-06-22 ENCOUNTER — OFFICE VISIT (OUTPATIENT)
Dept: UROLOGY | Facility: CLINIC | Age: 61
End: 2021-06-22
Payer: COMMERCIAL

## 2021-06-22 VITALS
WEIGHT: 238.13 LBS | HEIGHT: 68 IN | DIASTOLIC BLOOD PRESSURE: 77 MMHG | BODY MASS INDEX: 36.09 KG/M2 | HEART RATE: 72 BPM | SYSTOLIC BLOOD PRESSURE: 120 MMHG

## 2021-06-22 DIAGNOSIS — R97.20 ELEVATED PSA: Primary | ICD-10-CM

## 2021-06-22 DIAGNOSIS — C61 PROSTATE CANCER: ICD-10-CM

## 2021-06-22 PROCEDURE — 99214 PR OFFICE/OUTPT VISIT, EST, LEVL IV, 30-39 MIN: ICD-10-PCS | Mod: S$GLB,,, | Performed by: UROLOGY

## 2021-06-22 PROCEDURE — 1126F AMNT PAIN NOTED NONE PRSNT: CPT | Mod: S$GLB,,, | Performed by: UROLOGY

## 2021-06-22 PROCEDURE — 3008F BODY MASS INDEX DOCD: CPT | Mod: CPTII,S$GLB,, | Performed by: UROLOGY

## 2021-06-22 PROCEDURE — 3008F PR BODY MASS INDEX (BMI) DOCUMENTED: ICD-10-PCS | Mod: CPTII,S$GLB,, | Performed by: UROLOGY

## 2021-06-22 PROCEDURE — 99999 PR PBB SHADOW E&M-EST. PATIENT-LVL III: ICD-10-PCS | Mod: PBBFAC,,, | Performed by: UROLOGY

## 2021-06-22 PROCEDURE — 99999 PR PBB SHADOW E&M-EST. PATIENT-LVL III: CPT | Mod: PBBFAC,,, | Performed by: UROLOGY

## 2021-06-22 PROCEDURE — 99214 OFFICE O/P EST MOD 30 MIN: CPT | Mod: S$GLB,,, | Performed by: UROLOGY

## 2021-06-22 PROCEDURE — 1126F PR PAIN SEVERITY QUANTIFIED, NO PAIN PRESENT: ICD-10-PCS | Mod: S$GLB,,, | Performed by: UROLOGY

## 2021-08-17 ENCOUNTER — PATIENT MESSAGE (OUTPATIENT)
Dept: UROLOGY | Facility: CLINIC | Age: 61
End: 2021-08-17

## 2021-08-17 ENCOUNTER — TELEPHONE (OUTPATIENT)
Dept: UROLOGY | Facility: CLINIC | Age: 61
End: 2021-08-17

## 2021-08-17 ENCOUNTER — LAB VISIT (OUTPATIENT)
Dept: LAB | Facility: HOSPITAL | Age: 61
End: 2021-08-17
Attending: UROLOGY
Payer: COMMERCIAL

## 2021-08-17 DIAGNOSIS — C61 PROSTATE CANCER: ICD-10-CM

## 2021-08-17 DIAGNOSIS — R97.20 ELEVATED PSA: ICD-10-CM

## 2021-08-17 LAB
ANION GAP SERPL CALC-SCNC: 8 MMOL/L (ref 8–16)
CALCIUM SERPL-MCNC: 9.3 MG/DL (ref 8.7–10.5)
CHLORIDE SERPL-SCNC: 106 MMOL/L (ref 95–110)
CO2 SERPL-SCNC: 30 MMOL/L (ref 23–29)
CREAT SERPL-MCNC: 0.82 MG/DL (ref 0.5–1.4)
EST. GFR  (AFRICAN AMERICAN): >60 ML/MIN/1.73 M^2
EST. GFR  (NON AFRICAN AMERICAN): >60 ML/MIN/1.73 M^2
GLUCOSE SERPL-MCNC: 116 MG/DL (ref 70–110)
POTASSIUM SERPL-SCNC: 3.3 MMOL/L (ref 3.5–5.1)
SODIUM SERPL-SCNC: 144 MMOL/L (ref 136–145)
UUN UR-MCNC: 14 MG/DL (ref 2–20)

## 2021-08-17 PROCEDURE — 36415 COLL VENOUS BLD VENIPUNCTURE: CPT | Mod: PO | Performed by: UROLOGY

## 2021-08-17 PROCEDURE — 80048 BASIC METABOLIC PNL TOTAL CA: CPT | Mod: PO | Performed by: UROLOGY

## 2021-08-22 ENCOUNTER — HOSPITAL ENCOUNTER (OUTPATIENT)
Dept: RADIOLOGY | Facility: HOSPITAL | Age: 61
Discharge: HOME | End: 2021-08-22
Attending: UROLOGY
Payer: COMMERCIAL

## 2021-08-22 DIAGNOSIS — C61 PROSTATE CANCER: ICD-10-CM

## 2021-08-22 DIAGNOSIS — R97.20 ELEVATED PSA: ICD-10-CM

## 2021-08-22 PROCEDURE — 72197 MRI PELVIS W/O & W/DYE: CPT | Mod: TC

## 2021-08-22 PROCEDURE — 25500020 PHARM REV CODE 255: Performed by: UROLOGY

## 2021-08-22 PROCEDURE — A9585 GADOBUTROL INJECTION: HCPCS | Performed by: UROLOGY

## 2021-08-22 PROCEDURE — 72197 MRI PELVIS W/O & W/DYE: CPT | Mod: 26,,, | Performed by: RADIOLOGY

## 2021-08-22 PROCEDURE — 72197 MRI PROSTATE W W/O CONTRAST: ICD-10-PCS | Mod: 26,,, | Performed by: RADIOLOGY

## 2021-08-22 RX ORDER — GADOBUTROL 604.72 MG/ML
10 INJECTION INTRAVENOUS
Status: COMPLETED | OUTPATIENT
Start: 2021-08-22 | End: 2021-08-22

## 2021-08-22 RX ADMIN — GADOBUTROL 10 ML: 604.72 INJECTION INTRAVENOUS at 10:08

## 2021-08-23 ENCOUNTER — TELEPHONE (OUTPATIENT)
Dept: UROLOGY | Facility: CLINIC | Age: 61
End: 2021-08-23

## 2021-08-24 ENCOUNTER — OFFICE VISIT (OUTPATIENT)
Dept: UROLOGY | Facility: CLINIC | Age: 61
End: 2021-08-24
Payer: COMMERCIAL

## 2021-08-24 VITALS
HEART RATE: 70 BPM | SYSTOLIC BLOOD PRESSURE: 126 MMHG | DIASTOLIC BLOOD PRESSURE: 85 MMHG | WEIGHT: 238.13 LBS | HEIGHT: 68 IN | BODY MASS INDEX: 36.09 KG/M2

## 2021-08-24 DIAGNOSIS — C61 PROSTATE CANCER: Primary | ICD-10-CM

## 2021-08-24 DIAGNOSIS — R97.20 ELEVATED PSA: ICD-10-CM

## 2021-08-24 PROCEDURE — 3008F BODY MASS INDEX DOCD: CPT | Mod: CPTII,S$GLB,, | Performed by: UROLOGY

## 2021-08-24 PROCEDURE — 99999 PR PBB SHADOW E&M-EST. PATIENT-LVL III: CPT | Mod: PBBFAC,,, | Performed by: UROLOGY

## 2021-08-24 PROCEDURE — 3079F PR MOST RECENT DIASTOLIC BLOOD PRESSURE 80-89 MM HG: ICD-10-PCS | Mod: CPTII,S$GLB,, | Performed by: UROLOGY

## 2021-08-24 PROCEDURE — 1159F PR MEDICATION LIST DOCUMENTED IN MEDICAL RECORD: ICD-10-PCS | Mod: CPTII,S$GLB,, | Performed by: UROLOGY

## 2021-08-24 PROCEDURE — 3008F PR BODY MASS INDEX (BMI) DOCUMENTED: ICD-10-PCS | Mod: CPTII,S$GLB,, | Performed by: UROLOGY

## 2021-08-24 PROCEDURE — 99214 OFFICE O/P EST MOD 30 MIN: CPT | Mod: S$GLB,,, | Performed by: UROLOGY

## 2021-08-24 PROCEDURE — 3074F SYST BP LT 130 MM HG: CPT | Mod: CPTII,S$GLB,, | Performed by: UROLOGY

## 2021-08-24 PROCEDURE — 3074F PR MOST RECENT SYSTOLIC BLOOD PRESSURE < 130 MM HG: ICD-10-PCS | Mod: CPTII,S$GLB,, | Performed by: UROLOGY

## 2021-08-24 PROCEDURE — 3079F DIAST BP 80-89 MM HG: CPT | Mod: CPTII,S$GLB,, | Performed by: UROLOGY

## 2021-08-24 PROCEDURE — 1126F PR PAIN SEVERITY QUANTIFIED, NO PAIN PRESENT: ICD-10-PCS | Mod: CPTII,S$GLB,, | Performed by: UROLOGY

## 2021-08-24 PROCEDURE — 1126F AMNT PAIN NOTED NONE PRSNT: CPT | Mod: CPTII,S$GLB,, | Performed by: UROLOGY

## 2021-08-24 PROCEDURE — 99214 PR OFFICE/OUTPT VISIT, EST, LEVL IV, 30-39 MIN: ICD-10-PCS | Mod: S$GLB,,, | Performed by: UROLOGY

## 2021-08-24 PROCEDURE — 1159F MED LIST DOCD IN RCRD: CPT | Mod: CPTII,S$GLB,, | Performed by: UROLOGY

## 2021-08-24 PROCEDURE — 99999 PR PBB SHADOW E&M-EST. PATIENT-LVL III: ICD-10-PCS | Mod: PBBFAC,,, | Performed by: UROLOGY

## 2021-10-14 ENCOUNTER — LAB VISIT (OUTPATIENT)
Dept: LAB | Facility: HOSPITAL | Age: 61
End: 2021-10-14
Attending: INTERNAL MEDICINE
Payer: COMMERCIAL

## 2021-10-14 DIAGNOSIS — Z00.00 ANNUAL PHYSICAL EXAM: ICD-10-CM

## 2021-10-14 LAB
ALBUMIN SERPL BCP-MCNC: 3.8 G/DL (ref 3.5–5.2)
ALP SERPL-CCNC: 72 U/L (ref 55–135)
ALT SERPL W/O P-5'-P-CCNC: 30 U/L (ref 10–44)
ANION GAP SERPL CALC-SCNC: 16 MMOL/L (ref 8–16)
AST SERPL-CCNC: 24 U/L (ref 10–40)
BASOPHILS # BLD AUTO: 0.03 K/UL (ref 0–0.2)
BASOPHILS NFR BLD: 0.5 % (ref 0–1.9)
BILIRUB SERPL-MCNC: 0.7 MG/DL (ref 0.1–1)
BUN SERPL-MCNC: 14 MG/DL (ref 8–23)
CALCIUM SERPL-MCNC: 9.5 MG/DL (ref 8.7–10.5)
CHLORIDE SERPL-SCNC: 106 MMOL/L (ref 95–110)
CHOLEST SERPL-MCNC: 185 MG/DL (ref 120–199)
CHOLEST/HDLC SERPL: 3.8 {RATIO} (ref 2–5)
CO2 SERPL-SCNC: 20 MMOL/L (ref 23–29)
CREAT SERPL-MCNC: 0.9 MG/DL (ref 0.5–1.4)
DIFFERENTIAL METHOD: ABNORMAL
EOSINOPHIL # BLD AUTO: 0.2 K/UL (ref 0–0.5)
EOSINOPHIL NFR BLD: 3 % (ref 0–8)
ERYTHROCYTE [DISTWIDTH] IN BLOOD BY AUTOMATED COUNT: 13.4 % (ref 11.5–14.5)
EST. GFR  (AFRICAN AMERICAN): >60 ML/MIN/1.73 M^2
EST. GFR  (NON AFRICAN AMERICAN): >60 ML/MIN/1.73 M^2
GLUCOSE SERPL-MCNC: 108 MG/DL (ref 70–110)
HCT VFR BLD AUTO: 42.2 % (ref 40–54)
HDLC SERPL-MCNC: 49 MG/DL (ref 40–75)
HDLC SERPL: 26.5 % (ref 20–50)
HGB BLD-MCNC: 13.8 G/DL (ref 14–18)
IMM GRANULOCYTES # BLD AUTO: 0.01 K/UL (ref 0–0.04)
IMM GRANULOCYTES NFR BLD AUTO: 0.2 % (ref 0–0.5)
LDLC SERPL CALC-MCNC: 117 MG/DL (ref 63–159)
LYMPHOCYTES # BLD AUTO: 1.4 K/UL (ref 1–4.8)
LYMPHOCYTES NFR BLD: 22.6 % (ref 18–48)
MCH RBC QN AUTO: 31.7 PG (ref 27–31)
MCHC RBC AUTO-ENTMCNC: 32.7 G/DL (ref 32–36)
MCV RBC AUTO: 97 FL (ref 82–98)
MONOCYTES # BLD AUTO: 0.7 K/UL (ref 0.3–1)
MONOCYTES NFR BLD: 10.4 % (ref 4–15)
NEUTROPHILS # BLD AUTO: 4 K/UL (ref 1.8–7.7)
NEUTROPHILS NFR BLD: 63.3 % (ref 38–73)
NONHDLC SERPL-MCNC: 136 MG/DL
NRBC BLD-RTO: 0 /100 WBC
PLATELET # BLD AUTO: 220 K/UL (ref 150–450)
PMV BLD AUTO: 10.9 FL (ref 9.2–12.9)
POTASSIUM SERPL-SCNC: 3.4 MMOL/L (ref 3.5–5.1)
PROT SERPL-MCNC: 7 G/DL (ref 6–8.4)
RBC # BLD AUTO: 4.35 M/UL (ref 4.6–6.2)
SODIUM SERPL-SCNC: 142 MMOL/L (ref 136–145)
TRIGL SERPL-MCNC: 95 MG/DL (ref 30–150)
TSH SERPL DL<=0.005 MIU/L-ACNC: 1.46 UIU/ML (ref 0.4–4)
WBC # BLD AUTO: 6.32 K/UL (ref 3.9–12.7)

## 2021-10-14 PROCEDURE — 85025 COMPLETE CBC W/AUTO DIFF WBC: CPT | Performed by: INTERNAL MEDICINE

## 2021-10-14 PROCEDURE — 80061 LIPID PANEL: CPT | Performed by: INTERNAL MEDICINE

## 2021-10-14 PROCEDURE — 36415 COLL VENOUS BLD VENIPUNCTURE: CPT | Mod: PO | Performed by: INTERNAL MEDICINE

## 2021-10-14 PROCEDURE — 84443 ASSAY THYROID STIM HORMONE: CPT | Performed by: INTERNAL MEDICINE

## 2021-10-14 PROCEDURE — 80053 COMPREHEN METABOLIC PANEL: CPT | Performed by: INTERNAL MEDICINE

## 2021-10-21 ENCOUNTER — OFFICE VISIT (OUTPATIENT)
Dept: INTERNAL MEDICINE | Facility: CLINIC | Age: 61
End: 2021-10-21
Payer: COMMERCIAL

## 2021-10-21 VITALS
WEIGHT: 238.75 LBS | TEMPERATURE: 97 F | OXYGEN SATURATION: 97 % | RESPIRATION RATE: 18 BRPM | DIASTOLIC BLOOD PRESSURE: 86 MMHG | SYSTOLIC BLOOD PRESSURE: 124 MMHG | HEART RATE: 89 BPM | HEIGHT: 68 IN | BODY MASS INDEX: 36.19 KG/M2

## 2021-10-21 DIAGNOSIS — Z00.00 ANNUAL PHYSICAL EXAM: Primary | ICD-10-CM

## 2021-10-21 DIAGNOSIS — C61 CANCER OF PROSTATE: ICD-10-CM

## 2021-10-21 DIAGNOSIS — N52.9 ERECTILE DYSFUNCTION, UNSPECIFIED ERECTILE DYSFUNCTION TYPE: ICD-10-CM

## 2021-10-21 DIAGNOSIS — I10 ESSENTIAL HYPERTENSION: ICD-10-CM

## 2021-10-21 PROCEDURE — 3079F PR MOST RECENT DIASTOLIC BLOOD PRESSURE 80-89 MM HG: ICD-10-PCS | Mod: CPTII,S$GLB,, | Performed by: INTERNAL MEDICINE

## 2021-10-21 PROCEDURE — 99396 PR PREVENTIVE VISIT,EST,40-64: ICD-10-PCS | Mod: S$GLB,,, | Performed by: INTERNAL MEDICINE

## 2021-10-21 PROCEDURE — 1160F RVW MEDS BY RX/DR IN RCRD: CPT | Mod: CPTII,S$GLB,, | Performed by: INTERNAL MEDICINE

## 2021-10-21 PROCEDURE — 3074F PR MOST RECENT SYSTOLIC BLOOD PRESSURE < 130 MM HG: ICD-10-PCS | Mod: CPTII,S$GLB,, | Performed by: INTERNAL MEDICINE

## 2021-10-21 PROCEDURE — 99999 PR PBB SHADOW E&M-EST. PATIENT-LVL III: ICD-10-PCS | Mod: PBBFAC,,, | Performed by: INTERNAL MEDICINE

## 2021-10-21 PROCEDURE — 1159F PR MEDICATION LIST DOCUMENTED IN MEDICAL RECORD: ICD-10-PCS | Mod: CPTII,S$GLB,, | Performed by: INTERNAL MEDICINE

## 2021-10-21 PROCEDURE — 3008F BODY MASS INDEX DOCD: CPT | Mod: CPTII,S$GLB,, | Performed by: INTERNAL MEDICINE

## 2021-10-21 PROCEDURE — 1160F PR REVIEW ALL MEDS BY PRESCRIBER/CLIN PHARMACIST DOCUMENTED: ICD-10-PCS | Mod: CPTII,S$GLB,, | Performed by: INTERNAL MEDICINE

## 2021-10-21 PROCEDURE — 99396 PREV VISIT EST AGE 40-64: CPT | Mod: S$GLB,,, | Performed by: INTERNAL MEDICINE

## 2021-10-21 PROCEDURE — 99999 PR PBB SHADOW E&M-EST. PATIENT-LVL III: CPT | Mod: PBBFAC,,, | Performed by: INTERNAL MEDICINE

## 2021-10-21 PROCEDURE — 3008F PR BODY MASS INDEX (BMI) DOCUMENTED: ICD-10-PCS | Mod: CPTII,S$GLB,, | Performed by: INTERNAL MEDICINE

## 2021-10-21 PROCEDURE — 3079F DIAST BP 80-89 MM HG: CPT | Mod: CPTII,S$GLB,, | Performed by: INTERNAL MEDICINE

## 2021-10-21 PROCEDURE — 1159F MED LIST DOCD IN RCRD: CPT | Mod: CPTII,S$GLB,, | Performed by: INTERNAL MEDICINE

## 2021-10-21 PROCEDURE — 3074F SYST BP LT 130 MM HG: CPT | Mod: CPTII,S$GLB,, | Performed by: INTERNAL MEDICINE

## 2022-02-17 ENCOUNTER — LAB VISIT (OUTPATIENT)
Dept: LAB | Facility: HOSPITAL | Age: 62
End: 2022-02-17
Attending: UROLOGY
Payer: COMMERCIAL

## 2022-02-17 DIAGNOSIS — R97.20 ELEVATED PSA: ICD-10-CM

## 2022-02-17 DIAGNOSIS — C61 PROSTATE CANCER: ICD-10-CM

## 2022-02-17 LAB — COMPLEXED PSA SERPL-MCNC: 4.7 NG/ML (ref 0–4)

## 2022-02-17 PROCEDURE — 84153 ASSAY OF PSA TOTAL: CPT | Performed by: UROLOGY

## 2022-02-17 PROCEDURE — 36415 COLL VENOUS BLD VENIPUNCTURE: CPT | Mod: PO | Performed by: UROLOGY

## 2022-02-21 NOTE — PROGRESS NOTES
Subjective:      Ihsan Mays Sr. is a 61 y.o. male who returns today regarding his     pca active surveillance      ED.    The following portions of the patient's history were reviewed and updated as appropriate: allergies, current medications, past family history, past medical history, past social history, past surgical history and problem list.    Review of Systems  Pertinent items are noted in HPI.  A comprehensive multipoint review of systems was negative except as otherwise stated in the HPI.    Past Medical History:   Diagnosis Date    Glaucoma     Hypertension     Prostate cancer      Past Surgical History:   Procedure Laterality Date    COLONOSCOPY N/A 8/21/2020    Procedure: COLONOSCOPYPrepopik;  Surgeon: Danie Conway MD;  Location: [facility];  Service: Endoscopy;  Laterality: N/A;    ESOPHAGOGASTRODUODENOSCOPY N/A 8/21/2020    Procedure: EGD (ESOPHAGOGASTRODUODENOSCOPY);  Surgeon: Danie Conway MD;  Location: [facility];  Service: Endoscopy;  Laterality: N/A;    FINGER SURGERY      right hand pinkie finger     GLAUCOMA SURGERY      STOMACH SURGERY      ulcers        Review of patient's allergies indicates:  No Known Allergies       Objective:   Vitals: There were no vitals taken for this visit.    Physical Exam   General: alert and oriented, no acute distress  Respiratory: Symmetric expansion, non-labored breathing  Cardiovascular: no peripheral edema  Abdomen: soft, non distended  Skin: normal coloration and turgor, no rashes, no suspicious skin lesions noted  Neuro: no gross deficits  Psych: normal judgment and insight, normal mood/affect and non-anxious  MELISSA no nodules  Physical Exam    Lab Review   Urinalysis demonstrates no specimen    Lab Results   Component Value Date    WBC 6.32 10/14/2021    HGB 13.8 (L) 10/14/2021    HCT 42.2 10/14/2021    MCV 97 10/14/2021     10/14/2021     Lab Results   Component Value Date    CREATININE 0.9 10/14/2021    BUN 14 10/14/2021     Lab  Results   Component Value Date    PSA 4.3 (H) 10/05/2020    PSA 4.5 (H) 04/15/2020    PSA 2.8 10/09/2018    PSA 3.3 09/20/2017    PSA 2.6 07/14/2016    PSA 2.8 05/26/2015    PSADIAG 4.7 (H) 02/17/2022    PSATOTAL 4.6 (H) 05/04/2021    PSAFREE 0.74 05/04/2021    PSAFREEPCT 16.09 05/04/2021       Final Pathologic Diagnosis [facility] DIAGNOSIS:   A PROSTATE, RIGHT BASE, NEEDLE BIOPSY:   Small focus of atypical glands, worrisome for adenocarcinoma, see comment.   (R97.20)   B. PROSTATE, RIGHT MID, NEEDLE BIOPSY:   Benign prostatic tissue. (R97.20)   C. PROSTATE, RIGHT APEX, NEEDLE BIOPSY:   Small focus of atypical glands, worrisome for adenocarcinoma, see comment.   (R97.20)   D. PROSTATE, LEFT BASE, NEEDLE BIOPSY:   Small focus of atypical glands, worrisome for adenocarcinoma, see comment.   (R97.20)   E. PROSTATE, LEFT MID, NEEDLE BIOPSY:   Benign prostatic tissue, see comment (R97 .20)   F PROSTATE, LEFT APEX, NEEDLE BIOPSY:   PROSTATE GLAND: NEEDLE BIOPSY   HISTOLOGIC TYPE: Acinar adenocarcinoma, see comment.   HISTOLOGIC GRADE:   Grade Group 1 (Shwetha Score 3+3=6).   INTRADUCTAL CARCINOMA: Not identified.   TUMOR QUANTITATION:   Number cores positive: 1   Total number of cores: 2   CONTINUOUS MEASUREMENT:   Estimated percentage of prostatic tissue involved by continuous tumor: 8%   Total linear millimeters of carcinoma: 2 mm.   Total linear millimeters of needle core tissue: 24 mm.   LYMPHOVASCULAR INVASION: Not identified.   PERINEURAL INVASION: Not identified. (C61.)   Comment:   A, C, D, E, F). *lmmunohistochemical stains for prostate triple stain were   performed.   support the above diagnosis.   Paloma Roberts MD   Report attached.   Performing site:   [address] [address]    Comment: Interp By Carlos Montalvo MD, PhD, Signed on 06/17/2021 at 10:03            Imaging  MRI PROSTATE W W/O CONTRAST     CLINICAL HISTORY:  shwetha 6 cT1c; psa 4.6  considering active  surveillance; assess volume of disease;  Elevated prostate specific antigen (PSA)     TECHNIQUE:  TECHNIQUE: Multiparametric MRI of the prostate and pelvis performed on a 3T scanner utilizing phase pelvic coil.     Sequences obtained:Sagittal, axial and coronal high resolution T2-WI with small field-of-view; Axial diffusion weighted images with multiple B-values and creation of ADC-maps; Dynamic contrast enhanced T1-weighted images through the prostate were also obtained before, during and after the administration of intravenous gadolinium.     CONTRAST: 10 cc of Gadolinium-based contrast media (contrast:Gadavist).     COMPARISON:  None.     FINDINGS:  Substantial motion artifacts.     Previous biopsy: 6/7/2021 see pathology report in the patient's chart     PSA: 4.6 ng/mL 05/04/2021     Prior therapy: none     Prostate: The prostate measures 4.6 x 4.3 x 4.2 cm corresponding to a computed volume of 40.6cc.     Peripheral zone: Focal areas of high T1 and low T2 signal c/w post biopsy hemorrhage     Transitional zone: Benign prostatic hyperplasia without focal.  Several mostly encapsulated nodules.     Neurovascular bundle: Unremarkable.     Seminal vesicles:     SV invasion:Unremarkable     The bladder is nondistended there is mild bladder wall thickening.     There is no pelvic adenopathy.     Impression:     Patchy hemorrhage within the peripheral zone or following the prostate biopsy.     No suspicious nodule noted within the peripheral zone or transitional zone.     Overall Assessment:     PIRADS 2 (clinically significant cancer is unlikely to be present)     Number of targets created for potential MR/US fusion biopsy     Peripheral zone: 0     Transition zone: 0        Electronically signed by: Niharika Moore MD  Date:                                            08/22/2021  Time:                                           15:15        Imaging  -  Assessment and Plan:   Prostate cancer  Withams 6 cT1 cNc cMx  active surveillance    rtc 6 months with psa to see NP  Will plan on repeat MR and biopsy in 6-12 months    Elevated PSA

## 2022-02-22 ENCOUNTER — OFFICE VISIT (OUTPATIENT)
Dept: UROLOGY | Facility: CLINIC | Age: 62
End: 2022-02-22
Payer: COMMERCIAL

## 2022-02-22 VITALS
DIASTOLIC BLOOD PRESSURE: 80 MMHG | BODY MASS INDEX: 36.07 KG/M2 | SYSTOLIC BLOOD PRESSURE: 119 MMHG | WEIGHT: 238 LBS | HEIGHT: 68 IN | HEART RATE: 69 BPM

## 2022-02-22 DIAGNOSIS — C61 PROSTATE CANCER: Primary | ICD-10-CM

## 2022-02-22 DIAGNOSIS — R97.20 ELEVATED PSA: ICD-10-CM

## 2022-02-22 PROCEDURE — 1159F PR MEDICATION LIST DOCUMENTED IN MEDICAL RECORD: ICD-10-PCS | Mod: CPTII,S$GLB,, | Performed by: UROLOGY

## 2022-02-22 PROCEDURE — 3074F PR MOST RECENT SYSTOLIC BLOOD PRESSURE < 130 MM HG: ICD-10-PCS | Mod: CPTII,S$GLB,, | Performed by: UROLOGY

## 2022-02-22 PROCEDURE — 3079F DIAST BP 80-89 MM HG: CPT | Mod: CPTII,S$GLB,, | Performed by: UROLOGY

## 2022-02-22 PROCEDURE — 3074F SYST BP LT 130 MM HG: CPT | Mod: CPTII,S$GLB,, | Performed by: UROLOGY

## 2022-02-22 PROCEDURE — 3008F PR BODY MASS INDEX (BMI) DOCUMENTED: ICD-10-PCS | Mod: CPTII,S$GLB,, | Performed by: UROLOGY

## 2022-02-22 PROCEDURE — 3008F BODY MASS INDEX DOCD: CPT | Mod: CPTII,S$GLB,, | Performed by: UROLOGY

## 2022-02-22 PROCEDURE — 99214 PR OFFICE/OUTPT VISIT, EST, LEVL IV, 30-39 MIN: ICD-10-PCS | Mod: S$GLB,,, | Performed by: UROLOGY

## 2022-02-22 PROCEDURE — 1159F MED LIST DOCD IN RCRD: CPT | Mod: CPTII,S$GLB,, | Performed by: UROLOGY

## 2022-02-22 PROCEDURE — 99999 PR PBB SHADOW E&M-EST. PATIENT-LVL III: ICD-10-PCS | Mod: PBBFAC,,, | Performed by: UROLOGY

## 2022-02-22 PROCEDURE — 99999 PR PBB SHADOW E&M-EST. PATIENT-LVL III: CPT | Mod: PBBFAC,,, | Performed by: UROLOGY

## 2022-02-22 PROCEDURE — 99214 OFFICE O/P EST MOD 30 MIN: CPT | Mod: S$GLB,,, | Performed by: UROLOGY

## 2022-02-22 PROCEDURE — 3079F PR MOST RECENT DIASTOLIC BLOOD PRESSURE 80-89 MM HG: ICD-10-PCS | Mod: CPTII,S$GLB,, | Performed by: UROLOGY

## 2022-03-04 NOTE — PROGRESS NOTES
Subjective:       Patient ID: Ihsan Mays Sr. is a 56 y.o. male.    Chief Complaint: Pre-op Exam  Ihsan Mays Sr. 56 y.o. male is here for office visit to review care and physical exam, here for pre op Optho procedure, removing a cataract.  Feels well, ROS unremarkable.      HPI  Review of Systems   Constitutional: Negative for activity change, appetite change, fatigue, fever and unexpected weight change.   HENT: Negative for congestion, hearing loss, postnasal drip and rhinorrhea.    Eyes: Negative for pain, discharge and visual disturbance.   Respiratory: Negative for cough, choking and shortness of breath.    Cardiovascular: Negative for chest pain, palpitations and leg swelling.   Gastrointestinal: Negative for abdominal pain, diarrhea and vomiting.   Genitourinary: Negative for dysuria, flank pain, hematuria and urgency.   Musculoskeletal: Negative for arthralgias, back pain, joint swelling and neck pain.   Skin: Negative for color change and rash.   Neurological: Negative for dizziness, tremors, syncope, weakness, numbness and headaches.   Psychiatric/Behavioral: Negative for agitation and confusion. The patient is not hyperactive.        Objective:      Physical Exam   Constitutional: He is oriented to person, place, and time. He appears well-developed and well-nourished.   HENT:   Head: Normocephalic.   Eyes: EOM are normal. Pupils are equal, round, and reactive to light.   Neck: Normal range of motion. Neck supple. No thyromegaly present.   Cardiovascular: Normal rate.  Exam reveals no gallop and no friction rub.    No murmur heard.  Pulmonary/Chest: Effort normal. No respiratory distress. He has no wheezes.   Abdominal: Soft. Bowel sounds are normal. He exhibits no mass. There is no tenderness.   Musculoskeletal: He exhibits no edema or tenderness.   Lymphadenopathy:     He has no cervical adenopathy.   Neurological: He is alert and oriented to person, place, and time. He has normal reflexes. No  cranial nerve deficit.   Skin: Skin is warm. No rash noted.   Psychiatric: He has a normal mood and affect. His behavior is normal.       Assessment:       No diagnosis found.    Plan:       Ihsan was seen today for pre-op exam.    Diagnoses and all orders for this visit:    Anemia, unspecified type   - Chart reviewed, saw GI  Pre-operative examination for internal medicine  - Clear, form filled  Essential hypertension  - controled     done

## 2022-03-06 ENCOUNTER — PATIENT MESSAGE (OUTPATIENT)
Dept: INTERNAL MEDICINE | Facility: CLINIC | Age: 62
End: 2022-03-06
Payer: COMMERCIAL

## 2022-03-07 NOTE — TELEPHONE ENCOUNTER
Spoke with patient over phone -- stated chest pain feels like pulled muscle, pt denies any SOB or chest pain. Appt made 3/8 with recommendation for ER visit if symptoms worsen or SOB/chest pain develops before appt. PtVU

## 2022-03-08 ENCOUNTER — OFFICE VISIT (OUTPATIENT)
Dept: INTERNAL MEDICINE | Facility: CLINIC | Age: 62
End: 2022-03-08
Payer: COMMERCIAL

## 2022-03-08 ENCOUNTER — HOSPITAL ENCOUNTER (OUTPATIENT)
Dept: RADIOLOGY | Facility: HOSPITAL | Age: 62
Discharge: HOME | End: 2022-03-08
Attending: INTERNAL MEDICINE
Payer: COMMERCIAL

## 2022-03-08 VITALS
TEMPERATURE: 98 F | WEIGHT: 237.44 LBS | OXYGEN SATURATION: 96 % | BODY MASS INDEX: 35.99 KG/M2 | HEIGHT: 68 IN | DIASTOLIC BLOOD PRESSURE: 82 MMHG | SYSTOLIC BLOOD PRESSURE: 112 MMHG | HEART RATE: 75 BPM

## 2022-03-08 DIAGNOSIS — R06.09 DYSPNEA ON EXERTION: ICD-10-CM

## 2022-03-08 DIAGNOSIS — I82.811 SAPHENOUS VEIN CLOT, RIGHT: ICD-10-CM

## 2022-03-08 DIAGNOSIS — R60.0 BILATERAL LEG EDEMA: Primary | ICD-10-CM

## 2022-03-08 PROCEDURE — 93010 ELECTROCARDIOGRAM REPORT: CPT | Mod: S$GLB,,, | Performed by: INTERNAL MEDICINE

## 2022-03-08 PROCEDURE — 99999 PR PBB SHADOW E&M-EST. PATIENT-LVL IV: ICD-10-PCS | Mod: PBBFAC,,, | Performed by: INTERNAL MEDICINE

## 2022-03-08 PROCEDURE — 93010 EKG 12-LEAD: ICD-10-PCS | Mod: S$GLB,,, | Performed by: INTERNAL MEDICINE

## 2022-03-08 PROCEDURE — 71046 XR CHEST PA AND LATERAL: ICD-10-PCS | Mod: 26,,, | Performed by: RADIOLOGY

## 2022-03-08 PROCEDURE — 99999 PR PBB SHADOW E&M-EST. PATIENT-LVL IV: CPT | Mod: PBBFAC,,, | Performed by: INTERNAL MEDICINE

## 2022-03-08 PROCEDURE — 71046 X-RAY EXAM CHEST 2 VIEWS: CPT | Mod: 26,,, | Performed by: RADIOLOGY

## 2022-03-08 PROCEDURE — 93005 ELECTROCARDIOGRAM TRACING: CPT | Mod: S$GLB,,, | Performed by: INTERNAL MEDICINE

## 2022-03-08 PROCEDURE — 71046 X-RAY EXAM CHEST 2 VIEWS: CPT | Mod: TC,PO

## 2022-03-08 PROCEDURE — 99214 PR OFFICE/OUTPT VISIT, EST, LEVL IV, 30-39 MIN: ICD-10-PCS | Mod: S$GLB,,, | Performed by: INTERNAL MEDICINE

## 2022-03-08 PROCEDURE — 99214 OFFICE O/P EST MOD 30 MIN: CPT | Mod: S$GLB,,, | Performed by: INTERNAL MEDICINE

## 2022-03-08 PROCEDURE — 93005 EKG 12-LEAD: ICD-10-PCS | Mod: S$GLB,,, | Performed by: INTERNAL MEDICINE

## 2022-03-08 NOTE — PROGRESS NOTES
Subjective:       Patient ID: Ihsan Mays Sr. is a 61 y.o. male.    Chief Complaint: Leg Swelling (Rt leg & knee swelling. When pt stands on leg or driving more painful.) and chest Soreness (Lt side of chest pt says feels soreness.)    HPI   Pt with prostate cancer, HTN is here for evaluation of a few weeks of intermittent symptoms of B/L LE leg swelling with edema. It has been mild overall and get worse the longer he is standing and improves with elevation. No hx of CAD/CHF or any orthopnea. Also with mild SOB only with exertion for same duration.  Review of Systems   Constitutional: Negative for activity change, appetite change, chills, diaphoresis, fatigue, fever and unexpected weight change.   HENT: Negative for postnasal drip, rhinorrhea, sinus pressure/congestion, sneezing, sore throat, trouble swallowing and voice change.    Respiratory: Positive for shortness of breath. Negative for cough and wheezing.    Cardiovascular: Positive for leg swelling. Negative for chest pain and palpitations.   Gastrointestinal: Negative for abdominal pain, blood in stool, constipation, diarrhea, nausea and vomiting.   Genitourinary: Negative for dysuria.   Musculoskeletal: Negative for arthralgias and myalgias.   Integumentary:  Negative for rash and wound.   Allergic/Immunologic: Negative for environmental allergies and food allergies.   Hematological: Negative for adenopathy. Does not bruise/bleed easily.         Objective:      Physical Exam  Constitutional:       General: He is not in acute distress.     Appearance: He is well-developed. He is not diaphoretic.   HENT:      Head: Normocephalic and atraumatic.      Right Ear: External ear normal.      Left Ear: External ear normal.      Nose: Nose normal.      Mouth/Throat:      Pharynx: No oropharyngeal exudate.   Eyes:      General: No scleral icterus.        Right eye: No discharge.         Left eye: No discharge.      Conjunctiva/sclera: Conjunctivae normal.       Pupils: Pupils are equal, round, and reactive to light.   Neck:      Vascular: No JVD.   Cardiovascular:      Rate and Rhythm: Normal rate and regular rhythm.      Heart sounds: Normal heart sounds. No murmur heard.  Pulmonary:      Effort: Pulmonary effort is normal. No respiratory distress.      Breath sounds: Normal breath sounds. No wheezing or rales.   Abdominal:      General: Bowel sounds are normal.      Palpations: Abdomen is soft.      Tenderness: There is no abdominal tenderness.   Musculoskeletal:      Cervical back: Normal range of motion and neck supple.      Right lower leg: Edema (trace) present.      Left lower leg: Edema (trace) present.   Lymphadenopathy:      Cervical: No cervical adenopathy.   Skin:     General: Skin is warm and dry.      Coloration: Skin is not pale.      Findings: No rash.   Neurological:      Mental Status: He is alert and oriented to person, place, and time.         Assessment:       Problem List Items Addressed This Visit        Hematology    Saphenous vein clot, right      Other Visit Diagnoses     Bilateral leg edema    -  Primary    Relevant Orders    CV Ultrasound doppler venous legs bilat (Completed)    CBC Auto Differential (Completed)    Comprehensive Metabolic Panel (Completed)    BNP (Completed)    Dyspnea on exertion        Relevant Orders    EKG 12-lead (Completed)    X-Ray Chest PA And Lateral (Completed)          Plan:    Suspect dependent edema- Check labs and STAT LE US to r/o DVT      SOB with exertion- check EKG/CXR      Addendum- pt found to have right lesser saphenous vein SVT, will start anticoagulation and referral to cardio to determine duration of therapy

## 2022-03-09 ENCOUNTER — HOSPITAL ENCOUNTER (OUTPATIENT)
Dept: CARDIOLOGY | Facility: HOSPITAL | Age: 62
Discharge: HOME | End: 2022-03-09
Attending: INTERNAL MEDICINE
Payer: COMMERCIAL

## 2022-03-09 DIAGNOSIS — R60.0 BILATERAL LEG EDEMA: ICD-10-CM

## 2022-03-09 PROCEDURE — 93970 EXTREMITY STUDY: CPT | Mod: 50

## 2022-03-09 PROCEDURE — 93970 EXTREMITY STUDY: CPT | Mod: 26,,, | Performed by: INTERNAL MEDICINE

## 2022-03-09 PROCEDURE — 93970 CV US DOPPLER VENOUS LEGS BILATERAL (CUPID ONLY): ICD-10-PCS | Mod: 26,,, | Performed by: INTERNAL MEDICINE

## 2022-03-10 ENCOUNTER — TELEPHONE (OUTPATIENT)
Dept: INTERNAL MEDICINE | Facility: CLINIC | Age: 62
End: 2022-03-10
Payer: COMMERCIAL

## 2022-03-10 DIAGNOSIS — I82.811 THROMBOSIS OF RIGHT SAPHENOUS VEIN: Primary | ICD-10-CM

## 2022-03-10 DIAGNOSIS — I82.90 ACUTE VENOUS THROMBOSIS: Primary | ICD-10-CM

## 2022-03-10 DIAGNOSIS — I82.811 THROMBOSIS OF RIGHT SAPHENOUS VEIN: ICD-10-CM

## 2022-03-10 NOTE — TELEPHONE ENCOUNTER
----- Message from Argenis Meek sent at 3/10/2022 10:20 AM CST -----  Contact: Pt Mobile [phone]  Patient is calling in regards to him saying that you have prescribed a new blood thinner script for him on today, and you sent it to..      [id number] - [address] AT [address] OF [address] [address]  Phone: [phone] Fax: [phone]  Comment: Patient said that the blood thinner script is too expensive for him and he would like for you to prescribe a script for a cheaper price please.

## 2022-03-10 NOTE — TELEPHONE ENCOUNTER
Pt advised on superficial blood clot in the leg  Will start Xarelto(he was advised of the risk of bleeding and ED precautions) and will refer to vascular medicine

## 2022-03-15 ENCOUNTER — PATIENT MESSAGE (OUTPATIENT)
Dept: INTERNAL MEDICINE | Facility: CLINIC | Age: 62
End: 2022-03-15
Payer: COMMERCIAL

## 2022-03-15 PROBLEM — I82.811: Status: ACTIVE | Noted: 2022-03-15

## 2022-03-16 ENCOUNTER — PATIENT MESSAGE (OUTPATIENT)
Dept: INTERNAL MEDICINE | Facility: CLINIC | Age: 62
End: 2022-03-16
Payer: COMMERCIAL

## 2022-03-16 RX ORDER — METHYLPREDNISOLONE 4 MG/1
TABLET ORAL
Qty: 1 EACH | Refills: 0 | Status: SHIPPED | OUTPATIENT
Start: 2022-03-16 | End: 2022-04-11

## 2022-04-06 ENCOUNTER — PATIENT MESSAGE (OUTPATIENT)
Dept: INTERNAL MEDICINE | Facility: CLINIC | Age: 62
End: 2022-04-06
Payer: COMMERCIAL

## 2022-04-06 DIAGNOSIS — I82.811 THROMBOSIS OF RIGHT SAPHENOUS VEIN: Primary | ICD-10-CM

## 2022-04-10 ENCOUNTER — PATIENT OUTREACH (OUTPATIENT)
Dept: ADMINISTRATIVE | Facility: OTHER | Age: 62
End: 2022-04-10
Payer: COMMERCIAL

## 2022-04-11 ENCOUNTER — OFFICE VISIT (OUTPATIENT)
Dept: CARDIOLOGY | Facility: CLINIC | Age: 62
End: 2022-04-11
Payer: COMMERCIAL

## 2022-04-11 VITALS
HEART RATE: 73 BPM | WEIGHT: 238.75 LBS | HEIGHT: 68 IN | DIASTOLIC BLOOD PRESSURE: 80 MMHG | BODY MASS INDEX: 36.19 KG/M2 | SYSTOLIC BLOOD PRESSURE: 120 MMHG

## 2022-04-11 DIAGNOSIS — E78.2 MIXED HYPERLIPIDEMIA: ICD-10-CM

## 2022-04-11 DIAGNOSIS — E66.9 OBESITY (BMI 30-39.9): ICD-10-CM

## 2022-04-11 DIAGNOSIS — I82.811 SAPHENOUS VEIN CLOT, RIGHT: ICD-10-CM

## 2022-04-11 DIAGNOSIS — C61 CANCER OF PROSTATE: ICD-10-CM

## 2022-04-11 DIAGNOSIS — I10 ESSENTIAL HYPERTENSION: ICD-10-CM

## 2022-04-11 DIAGNOSIS — I89.0 LYMPHEDEMA OF BOTH LOWER EXTREMITIES: ICD-10-CM

## 2022-04-11 DIAGNOSIS — I82.811 THROMBOSIS OF RIGHT SAPHENOUS VEIN: Primary | ICD-10-CM

## 2022-04-11 DIAGNOSIS — R60.0 EDEMA OF BOTH LOWER EXTREMITIES: ICD-10-CM

## 2022-04-11 PROCEDURE — 99999 PR PBB SHADOW E&M-EST. PATIENT-LVL V: ICD-10-PCS | Mod: PBBFAC,,, | Performed by: INTERNAL MEDICINE

## 2022-04-11 PROCEDURE — 99205 OFFICE O/P NEW HI 60 MIN: CPT | Mod: S$GLB,,, | Performed by: INTERNAL MEDICINE

## 2022-04-11 PROCEDURE — 99999 PR PBB SHADOW E&M-EST. PATIENT-LVL V: CPT | Mod: PBBFAC,,, | Performed by: INTERNAL MEDICINE

## 2022-04-11 PROCEDURE — 99205 PR OFFICE/OUTPT VISIT, NEW, LEVL V, 60-74 MIN: ICD-10-PCS | Mod: S$GLB,,, | Performed by: INTERNAL MEDICINE

## 2022-04-11 RX ORDER — ATORVASTATIN CALCIUM 40 MG/1
40 TABLET, FILM COATED ORAL DAILY
Qty: 90 TABLET | Refills: 3 | Status: SHIPPED | OUTPATIENT
Start: 2022-04-11 | End: 2023-04-08

## 2022-04-11 NOTE — PATIENT INSTRUCTIONS
Assessment/Plan:  Ihsan Mays Sr. is a 61 y.o. male with RLE superficial vein thrombosis, HTN, prostate cancer, obesity, who presents for initial appointment.      1. Thrombosis of right lesser saphenous vein- Likely due to underlying prostate cancer.  Other risk factors include lymphedema and venous insufficiency.  Continue Xarelto 20 mg daily for an additional 2 months, then check BLE venous reflux study to evaluate for resolution of the SVT.  Given underlying prostate cancer, pt will require at least prophylactic dose anticoagulation going forward.       2. Leg Swelling- Due to lymphedema, possible venous insufficiency, and contribution from amlodipine.  Refer to lymphedema clinic.  Limit sodium intake to 2,000 mg daily.  Limit volume intake to 1.5 liters daily.  Elevate legs when resting.     2. Obesity- Encourage diet, exercise and weight loss.      3. HLD- Start atorvastatin 40 mg daily.      Follow up in 2 months with BLE venous reflux study prior

## 2022-04-11 NOTE — PROGRESS NOTES
[facility]      Chief Complaint   Patient presents with    Deep Vein Thrombosis       Patient ID: Ihsan Mays Sr. is a 61 y.o. male with RLE superficial vein thrombosis, HTN, prostate cancer, obesity, who presents for initial appointment.  Pertinent history/events are as follows:     -Pt kindly referred by Dr. Ybarra for evaluation of thrombosis of right saphenous vein.    HPI:  Mr. Mays reports leg swelling for several years.  On 3/8/2022 he reported right leg swelling and pain to his PCP (Dr. Ybarra), and was sent for a BLE venous ultrasound which revealed thrombosis of the right lesser saphenous vein and no evidence of lower extremity DVT.  He was started on Xarelto at that time.  Mr. Mays reports significant improvement in riight leg swelling and pain since starting Xarelto.  He has no claudication or tissue loss.  No smoking history.  He reports no bleeding issues since starting Xarelto.      Past Medical History:   Diagnosis Date    Glaucoma     Hypertension     Prostate cancer      Past Surgical History:   Procedure Laterality Date    COLONOSCOPY N/A 8/21/2020    Procedure: COLONOSCOPYPrepopik;  Surgeon: Danie Conway MD;  Location: [facility];  Service: Endoscopy;  Laterality: N/A;    ESOPHAGOGASTRODUODENOSCOPY N/A 8/21/2020    Procedure: EGD (ESOPHAGOGASTRODUODENOSCOPY);  Surgeon: Danie Conway MD;  Location: [facility];  Service: Endoscopy;  Laterality: N/A;    FINGER SURGERY      right hand pinkie finger     GLAUCOMA SURGERY      STOMACH SURGERY      ulcers      Social History     Socioeconomic History    Marital status:     Number of children: 3   Tobacco Use    Smoking status: Never Smoker    Smokeless tobacco: Never Used   Substance and Sexual Activity    Alcohol use: Yes     Comment: occasionally    Drug use: No    Sexual activity: Yes     Partners: Female     Birth control/protection: Post-menopausal   Social History Narrative    [employer]  manager for transportation company      Social Determinants of Health     Financial Resource Strain: Low Risk     Difficulty of Paying Living Expenses: Not hard at all   Food Insecurity: No Food Insecurity    Worried About Running Out of Food in the Last Year: Never true    Ran Out of Food in the Last Year: Never true   Transportation Needs: No Transportation Needs    Lack of Transportation (Medical): No    Lack of Transportation (Non-Medical): No   Physical Activity: Inactive    Days of Exercise per Week: 0 days    Minutes of Exercise per Session: 0 min   Stress: No Stress Concern Present    Feeling of Stress : Only a little   Social Connections: Unknown    Frequency of Communication with Friends and Family: More than three times a week    Frequency of Social Gatherings with Friends and Family: Once a week    Active Member of Clubs or Organizations: Yes    Attends Club or Organization Meetings: More than 4 times per year    Marital Status:    Housing Stability: Low Risk     Unable to Pay for Housing in the Last Year: No    Number of Places Lived in the Last Year: 1    Unstable Housing in the Last Year: No     Family History   Problem Relation Age of Onset    Diabetes Mother     Hypertension Mother     Hypertension Father     Stroke Maternal Aunt     Heart disease Maternal Grandmother     Heart disease Maternal Grandfather     Colon cancer Neg Hx     Esophageal cancer Neg Hx     Rectal cancer Neg Hx     Stomach cancer Neg Hx     Ulcerative colitis Neg Hx     Irritable bowel syndrome Neg Hx     Crohn's disease Neg Hx     Celiac disease Neg Hx     Cancer Neg Hx        Review of patient's allergies indicates:  No Known Allergies    Medication List with Changes/Refills   Current Medications    AMLODIPINE (NORVASC) 5 MG TABLET    Take 1 tablet (5 mg total) by mouth once daily.    CARVEDILOL (COREG) 12.5 MG TABLET    Take 1 tablet (12.5 mg total) by mouth 2 (two) times daily with  "meals.    CYCLOBENZAPRINE (FLEXERIL) 10 MG TABLET    Take 10 mg by mouth every 8 (eight) hours as needed.    LATANOPROST 0.005 % OPHTHALMIC SOLUTION    1 drop every evening.    NAPROXEN (NAPROSYN) 500 MG TABLET    SMARTSI Tablet(s) By Mouth Every 12 Hours    RIVAROXABAN (XARELTO) 20 MG TAB    Take 1 tablet (20 mg total) by mouth daily with dinner or evening meal.    SILDENAFIL (VIAGRA) 100 MG TABLET    Take 1 tablet (100 mg total) by mouth daily as needed for Erectile Dysfunction.    VALSARTAN-HYDROCHLOROTHIAZIDE (DIOVAN-HCT) 320-25 MG PER TABLET    TAKE 1 TABLET BY MOUTH EVERY DAY   Discontinued Medications    HYDROCORTISONE 2.5 % CREAM    Apply topically 2 (two) times daily. for 10 days    METHYLPREDNISOLONE (MEDROL, SOMMER,) 4 MG TABLET    use as directed    PANTOPRAZOLE (PROTONIX) 40 MG TABLET    Take 1 tablet (40 mg total) by mouth once daily.       Review of Systems  Constitution: Denies chills, fever, and sweats.  HENT: Denies headaches or blurry vision.  Cardiovascular: Denies chest pain or irregular heart beat.  Respiratory: Denies cough or shortness of breath.  Gastrointestinal: Denies abdominal pain, nausea, or vomiting.  Musculoskeletal: Positive for leg swelling.  Neurological: Denies dizziness or focal weakness.  Psychiatric/Behavioral: Normal mental status.  Hematologic/Lymphatic: Denies bleeding problem or easy bruising/bleeding.  Skin: Denies rash or suspicious lesions    Physical Examination  /80   Pulse 73   Ht 5' 8" (1.727 m)   Wt 108.3 kg (238 lb 12.1 oz)   BMI 36.30 kg/m²     Constitutional: No acute distress, conversant  HEENT: Sclera anicteric, Pupils equal, round and reactive to light, extraocular motions intact, Oropharynx clear  Neck: No JVD, no carotid bruits  Cardiovascular: regular rate and rhythm, no murmur, rubs or gallops, normal S1/S2  Pulmonary: Clear to auscultation bilaterally  Abdominal: Abdomen soft, nontender, nondistended, positive bowel sounds  Extremities: BLE's " with 1 pitting edema, venous stasis dermatitis, and changes consistent with lymphedema (R>L)   Pulses:  Carotid pulses are 2+ on the right side, and 2+ on the left side.  Radial pulses are 2+ on the right side, and 2+ on the left side.   Femoral pulses are 2+ on the right side, and 2+ on the left side.  Popliteal pulses are 2+ on the right side, and 2+ on the left side.   Dorsalis pedis pulses are 2+ on the right side, and 2+ on the left side.   Posterior tibial pulses are 2+ on the right side, and 2+ on the left side.    Skin: No ecchymosis, erythema, or ulcers  Psych: Alert and oriented x 3, appropriate affect  Neuro: CNII-XII intact, no focal deficits    Labs:  Most Recent Data  CBC:   Lab Results   Component Value Date    WBC 6.81 03/08/2022    HGB 13.9 (L) 03/08/2022    HCT 42.3 03/08/2022     03/08/2022    MCV 98 03/08/2022    RDW 13.3 03/08/2022     BMP:   Lab Results   Component Value Date     03/08/2022    K 3.7 03/08/2022     03/08/2022    CO2 23 03/08/2022    BUN 12 03/08/2022    CREATININE 0.8 03/08/2022    GLU 84 03/08/2022    CALCIUM 10.0 03/08/2022     LFTS;   Lab Results   Component Value Date    PROT 7.2 03/08/2022    ALBUMIN 3.9 03/08/2022    BILITOT 0.8 03/08/2022    AST 24 03/08/2022    ALKPHOS 67 03/08/2022    ALT 28 03/08/2022     COAGS:   Lab Results   Component Value Date    INR 1.0 02/22/2012     FLP:   Lab Results   Component Value Date    CHOL 185 10/14/2021    HDL 49 10/14/2021    LDLCALC 117.0 10/14/2021    TRIG 95 10/14/2021    CHOLHDL 26.5 10/14/2021     CARDIAC:   Lab Results   Component Value Date    TROPONINI <0.006 05/03/2018    BNP 17 03/08/2022       Imaging:    BLE Venous Ultrasound 3/9/2022:  · The right lesser saphenous vein SVT.  · There is no evidence of a left lower extremity DVT.      Assessment/Plan:  Ihsan Mays Sr. is a 61 y.o. male with RLE superficial vein thrombosis, HTN, prostate cancer, obesity, who presents for initial appointment.      1.  Thrombosis of right lesser saphenous vein- Likely due to underlying prostate cancer.  Other risk factors include lymphedema and venous insufficiency.  Continue Xarelto 20 mg daily for an additional 2 months, then check BLE venous reflux study to evaluate for resolution of the SVT.  Given underlying prostate cancer, pt will require at least prophylactic dose anticoagulation going forward.       2. Leg Swelling- Due to lymphedema, possible venous insufficiency, and contribution from amlodipine.  Refer to lymphedema clinic.  Limit sodium intake to 2,000 mg daily.  Limit volume intake to 1.5 liters daily.  Elevate legs when resting.     2. Obesity- Encourage diet, exercise and weight loss.      3. HLD- Start atorvastatin 40 mg daily.      Follow up in 2 months with BLE venous reflux study prior    Total duration of face to face visit time 45 minutes.  Total time spent counseling greater than fifty percent of total visit time.  Counseling included discussion regarding imaging findings, diagnosis, possibilities, treatment options, risks and benefits.  The patient had many questions regarding the options and long-term effects.    Isaiah Zambrano MD, PhD  Interventional Cardiology

## 2022-04-22 ENCOUNTER — PATIENT MESSAGE (OUTPATIENT)
Dept: INTERNAL MEDICINE | Facility: CLINIC | Age: 62
End: 2022-04-22
Payer: COMMERCIAL

## 2022-04-24 ENCOUNTER — PATIENT MESSAGE (OUTPATIENT)
Dept: INTERNAL MEDICINE | Facility: CLINIC | Age: 62
End: 2022-04-24
Payer: COMMERCIAL

## 2022-04-26 ENCOUNTER — PATIENT MESSAGE (OUTPATIENT)
Dept: INTERNAL MEDICINE | Facility: CLINIC | Age: 62
End: 2022-04-26
Payer: COMMERCIAL

## 2022-05-04 RX ORDER — AMLODIPINE BESYLATE 5 MG/1
TABLET ORAL
Qty: 90 TABLET | Refills: 3 | Status: SHIPPED | OUTPATIENT
Start: 2022-05-04 | End: 2023-04-08

## 2022-05-04 NOTE — TELEPHONE ENCOUNTER
Refill Authorization Note   Ihsan Mays  is requesting a refill authorization.  Brief Assessment and Rationale for Refill:  Approve     Medication Therapy Plan:       Medication Reconciliation Completed: No   Comments:     No Care Gaps recommended.     Note composed:12:12 PM 05/04/2022

## 2022-05-04 NOTE — TELEPHONE ENCOUNTER
No new care gaps identified.  [facility] Embedded Care Gaps. Reference number: [id number]. 5/04/2022   12:14:41 AM TONIT

## 2022-05-24 ENCOUNTER — PATIENT MESSAGE (OUTPATIENT)
Dept: INTERNAL MEDICINE | Facility: CLINIC | Age: 62
End: 2022-05-24
Payer: COMMERCIAL

## 2022-05-24 DIAGNOSIS — K62.5 BRBPR (BRIGHT RED BLOOD PER RECTUM): Primary | ICD-10-CM

## 2022-06-16 ENCOUNTER — HOSPITAL ENCOUNTER (OUTPATIENT)
Dept: CARDIOLOGY | Facility: HOSPITAL | Age: 62
Discharge: HOME | End: 2022-06-16
Attending: INTERNAL MEDICINE
Payer: COMMERCIAL

## 2022-06-16 DIAGNOSIS — I89.0 LYMPHEDEMA OF BOTH LOWER EXTREMITIES: ICD-10-CM

## 2022-06-16 PROCEDURE — 93970 CV US LOWER VENOUS INSUFFICIENCY BILATERAL (CUPID ONLY): ICD-10-PCS | Mod: 26,,, | Performed by: INTERNAL MEDICINE

## 2022-06-16 PROCEDURE — 93922 ANKLE BRACHIAL INDICES (ABI): ICD-10-PCS | Mod: 26,,, | Performed by: INTERNAL MEDICINE

## 2022-06-16 PROCEDURE — 93922 UPR/L XTREMITY ART 2 LEVELS: CPT | Mod: 26,,, | Performed by: INTERNAL MEDICINE

## 2022-06-16 PROCEDURE — 93970 EXTREMITY STUDY: CPT | Mod: 26,,, | Performed by: INTERNAL MEDICINE

## 2022-06-16 PROCEDURE — 93970 EXTREMITY STUDY: CPT | Mod: TC

## 2022-06-16 PROCEDURE — 93922 UPR/L XTREMITY ART 2 LEVELS: CPT

## 2022-06-17 LAB
LEFT ABI: 1.1
LEFT ARM BP: 136 MMHG
LEFT DORSALIS PEDIS: 145 MMHG
LEFT GREAT SAPHENOUS DISTAL THIGH DIA: 0.25 CM
LEFT GREAT SAPHENOUS JUNCTION DIA: 0.51 CM
LEFT GREAT SAPHENOUS JUNCTION REFLUX: 1866 MS
LEFT GREAT SAPHENOUS KNEE DIA: 0.19 CM
LEFT GREAT SAPHENOUS KNEE REFLUX: 1198 MS
LEFT GREAT SAPHENOUS MIDDLE THIGH DIA: 0.19 CM
LEFT GREAT SAPHENOUS MIDDLE THIGH REFLUX: 5253 MS
LEFT GREAT SAPHENOUS PROXIMAL CALF DIA: 0.33 CM
LEFT GREAT SAPHENOUS PROXIMAL CALF REFLUX: 4989 MS
LEFT POSTERIOR TIBIAL: 149 MMHG
LEFT SMALL SAPHENOUS KNEE DIA: 0.24 CM
LEFT SMALL SAPHENOUS SPJ DIA: 0.19 CM
RIGHT ABI: 1.12
RIGHT ARM BP: 136 MMHG
RIGHT DORSALIS PEDIS: 152 MMHG
RIGHT GREAT SAPHENOUS DISTAL THIGH DIA: 0.43 CM
RIGHT GREAT SAPHENOUS DISTAL THIGH REFLUX: 1952 MS
RIGHT GREAT SAPHENOUS JUNCTION DIA: 0.66 CM
RIGHT GREAT SAPHENOUS KNEE DIA: 0.23 CM
RIGHT GREAT SAPHENOUS KNEE REFLUX: 2300 MS
RIGHT GREAT SAPHENOUS MIDDLE THIGH DIA: 0.22 CM
RIGHT GREAT SAPHENOUS MIDDLE THIGH REFLUX: 3653 MS
RIGHT GREAT SAPHENOUS PROXIMAL CALF DIA: 0.19 CM
RIGHT GREAT SAPHENOUS PROXIMAL CALF REFLUX: 1627 MS
RIGHT POSTERIOR TIBIAL: 151 MMHG
RIGHT SMALL SAPHENOUS KNEE DIA: 0.24 CM
RIGHT SMALL SAPHENOUS KNEE REFLUX: 4932 MS
RIGHT SMALL SAPHENOUS SPJ DIA: 0.19 CM
RIGHT SMALL SAPHENOUS SPJ REFLUX: 2843 MS

## 2022-06-22 ENCOUNTER — OFFICE VISIT (OUTPATIENT)
Dept: CARDIOLOGY | Facility: CLINIC | Age: 62
End: 2022-06-22
Payer: COMMERCIAL

## 2022-06-22 VITALS
HEIGHT: 68 IN | DIASTOLIC BLOOD PRESSURE: 71 MMHG | BODY MASS INDEX: 35.95 KG/M2 | HEART RATE: 78 BPM | WEIGHT: 237.19 LBS | SYSTOLIC BLOOD PRESSURE: 114 MMHG

## 2022-06-22 DIAGNOSIS — I10 PRIMARY HYPERTENSION: ICD-10-CM

## 2022-06-22 DIAGNOSIS — C61 CANCER OF PROSTATE: ICD-10-CM

## 2022-06-22 DIAGNOSIS — E78.2 MIXED HYPERLIPIDEMIA: ICD-10-CM

## 2022-06-22 DIAGNOSIS — R60.0 EDEMA OF BOTH LOWER EXTREMITIES: ICD-10-CM

## 2022-06-22 DIAGNOSIS — I89.0 LYMPHEDEMA OF BOTH LOWER EXTREMITIES: Primary | ICD-10-CM

## 2022-06-22 DIAGNOSIS — I10 ESSENTIAL HYPERTENSION: ICD-10-CM

## 2022-06-22 PROCEDURE — 99999 PR PBB SHADOW E&M-EST. PATIENT-LVL III: CPT | Mod: PBBFAC,,, | Performed by: INTERNAL MEDICINE

## 2022-06-22 PROCEDURE — 99215 PR OFFICE/OUTPT VISIT, EST, LEVL V, 40-54 MIN: ICD-10-PCS | Mod: S$GLB,,, | Performed by: INTERNAL MEDICINE

## 2022-06-22 PROCEDURE — 99215 OFFICE O/P EST HI 40 MIN: CPT | Mod: S$GLB,,, | Performed by: INTERNAL MEDICINE

## 2022-06-22 PROCEDURE — 99999 PR PBB SHADOW E&M-EST. PATIENT-LVL III: ICD-10-PCS | Mod: PBBFAC,,, | Performed by: INTERNAL MEDICINE

## 2022-06-22 NOTE — PATIENT INSTRUCTIONS
Assessment/Plan:  Ihsan Mays Sr. is a 61 y.o. male with RLE superficial vein thrombosis, HTN, prostate cancer, obesity, who presents for a follow up appointment.      1. Thrombosis of right lesser saphenous vein- Likely due to underlying prostate cancer.  Other risk factors include lymphedema and venous insufficiency.  BLE Venous Ultrasound on 6/16/2022 revealed the right smaller saphenous vein demonstrates thickened walls with complete compressibility consistent with a history of superficial venous thrombosis.  Bilateral GSV and right lower extremity SSV reflux noted.  No evidence of deep venous thrombosis bilaterally.  Decrease Xarelto to 10 mg daily indefinitely.  Given underlying prostate cancer, pt will require at least prophylactic dose anticoagulation.       2. Leg Swelling- Due to lymphedema, possible venous insufficiency, and contribution from amlodipine.  Refer to lymphedema clinic.  Limit sodium intake to 2,000 mg daily.  Limit volume intake to 1.5 liters daily.  Elevate legs when resting.     2. Obesity- Encourage diet, exercise and weight loss.      3. HLD- Continue atorvastatin 40 mg daily.      Follow up in 3 months

## 2022-06-22 NOTE — PROGRESS NOTES
[facility]      Chief Complaint   Patient presents with    Thrombosis of right saphenous vein       Patient ID: Ihsan Mays Sr. is a 61 y.o. male with RLE superficial vein thrombosis, HTN, prostate cancer, obesity, who presents for initial appointment.  Pertinent history/events are as follows:     -Pt kindly referred by Dr. Ybarra for evaluation of thrombosis of right saphenous vein.    -At our initial clinic visit on 4/11/2022, Mr. Mays reports leg swelling for several years.  On 3/8/2022 he reported right leg swelling and pain to his PCP (Dr. Ybarra), and was sent for a BLE venous ultrasound which revealed thrombosis of the right lesser saphenous vein and no evidence of lower extremity DVT.  He was started on Xarelto at that time.  Mr. Mays reports significant improvement in riight leg swelling and pain since starting Xarelto.  He has no claudication or tissue loss.  No smoking history.  He reports no bleeding issues since starting Xarelto.   Plan:   Thrombosis of right lesser saphenous vein- Likely due to underlying prostate cancer.  Other risk factors include lymphedema and venous insufficiency.  Continue Xarelto 20 mg daily for an additional 2 months, then check BLE venous reflux study to evaluate for resolution of the SVT.  Given underlying prostate cancer, pt will require at least prophylactic dose anticoagulation going forward.     Leg Swelling- Due to lymphedema, possible venous insufficiency, and contribution from amlodipine.  Refer to lymphedema clinic.  Limit sodium intake to 2,000 mg daily.  Limit volume intake to 1.5 liters daily.  Elevate legs when resting.   Obesity- Encourage diet, exercise and weight loss.    HLD- Start atorvastatin 40 mg daily.    HPI:  Mr. Mays reports no new symptoms.  Scheduled to start lymphedema clinic therapy in 7/2022.  BLE Venous Ultrasound on 6/16/2022 revealed the right smaller saphenous vein demonstrates thickened walls with complete  compressibility consistent with a history of superficial venous thrombosis.  Bilateral GSV and right lower extremity SSV reflux noted.  No evidence of deep venous thrombosis bilaterally.    Past Medical History:   Diagnosis Date    Glaucoma     Hypertension     Prostate cancer      Past Surgical History:   Procedure Laterality Date    COLONOSCOPY N/A 8/21/2020    Procedure: COLONOSCOPYPrepopik;  Surgeon: Danie Conway MD;  Location: [facility] ENDO;  Service: Endoscopy;  Laterality: N/A;    ESOPHAGOGASTRODUODENOSCOPY N/A 8/21/2020    Procedure: EGD (ESOPHAGOGASTRODUODENOSCOPY);  Surgeon: Danie Conway MD;  Location: [facility] ENDO;  Service: Endoscopy;  Laterality: N/A;    FINGER SURGERY      right hand pinkie finger     GLAUCOMA SURGERY      STOMACH SURGERY      ulcers      Social History     Socioeconomic History    Marital status:     Number of children: 3   Tobacco Use    Smoking status: Never Smoker    Smokeless tobacco: Never Used   Substance and Sexual Activity    Alcohol use: Yes     Comment: occasionally    Drug use: No    Sexual activity: Yes     Partners: Female     Birth control/protection: Post-menopausal   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      Social Determinants of Health     Financial Resource Strain: Low Risk     Difficulty of Paying Living Expenses: Not hard at all   Food Insecurity: No Food Insecurity    Worried About Running Out of Food in the Last Year: Never true    Ran Out of Food in the Last Year: Never true   Transportation Needs: No Transportation Needs    Lack of Transportation (Medical): No    Lack of Transportation (Non-Medical): No   Physical Activity: Inactive    Days of Exercise per Week: 1 day    Minutes of Exercise per Session: 0 min   Stress: No Stress Concern Present    Feeling of Stress : Only a little   Social Connections: Unknown    Frequency of Communication with Friends and Family: Three times a week     Frequency of Social Gatherings with Friends and Family: Once a week    Active Member of Clubs or Organizations: Yes    Attends Club or Organization Meetings: More than 4 times per year    Marital Status:    Housing Stability: Low Risk     Unable to Pay for Housing in the Last Year: No    Number of Places Lived in the Last Year: 1    Unstable Housing in the Last Year: No     Family History   Problem Relation Age of Onset    Diabetes Mother     Hypertension Mother     Hypertension Father     Stroke Maternal Aunt     Heart disease Maternal Grandmother     Heart disease Maternal Grandfather     Colon cancer Neg Hx     Esophageal cancer Neg Hx     Rectal cancer Neg Hx     Stomach cancer Neg Hx     Ulcerative colitis Neg Hx     Irritable bowel syndrome Neg Hx     Crohn's disease Neg Hx     Celiac disease Neg Hx     Cancer Neg Hx        Review of patient's allergies indicates:  No Known Allergies    Medication List with Changes/Refills   Current Medications    AMLODIPINE (NORVASC) 5 MG TABLET    TAKE 1 TABLET BY MOUTH EVERY DAY    ATORVASTATIN (LIPITOR) 40 MG TABLET    Take 1 tablet (40 mg total) by mouth once daily.    CARVEDILOL (COREG) 12.5 MG TABLET    Take 1 tablet (12.5 mg total) by mouth 2 (two) times daily with meals.    CYCLOBENZAPRINE (FLEXERIL) 10 MG TABLET    Take 10 mg by mouth every 8 (eight) hours as needed.    LATANOPROST 0.005 % OPHTHALMIC SOLUTION    1 drop every evening.    NAPROXEN (NAPROSYN) 500 MG TABLET    SMARTSI Tablet(s) By Mouth Every 12 Hours    RIVAROXABAN (XARELTO) 20 MG TAB    Take 1 tablet (20 mg total) by mouth daily with dinner or evening meal.    RIVAROXABAN (XARELTO) 20 MG TAB    Take 1 tablet (20 mg total) by mouth daily with dinner or evening meal.    SILDENAFIL (VIAGRA) 100 MG TABLET    Take 1 tablet (100 mg total) by mouth daily as needed for Erectile Dysfunction.    VALSARTAN-HYDROCHLOROTHIAZIDE (DIOVAN-HCT) 320-25 MG PER TABLET    TAKE 1 TABLET BY  "MOUTH EVERY DAY       Review of Systems  Constitution: Denies chills, fever, and sweats.  HENT: Denies headaches or blurry vision.  Cardiovascular: Denies chest pain or irregular heart beat.  Respiratory: Denies cough or shortness of breath.  Gastrointestinal: Denies abdominal pain, nausea, or vomiting.  Musculoskeletal: Positive for leg swelling.  Neurological: Denies dizziness or focal weakness.  Psychiatric/Behavioral: Normal mental status.  Hematologic/Lymphatic: Denies bleeding problem or easy bruising/bleeding.  Skin: Denies rash or suspicious lesions    Physical Examination  /71   Pulse 78   Ht 5' 8" (1.727 m)   Wt 107.6 kg (237 lb 3.4 oz)   BMI 36.07 kg/m²     Constitutional: No acute distress, conversant  HEENT: Sclera anicteric, Pupils equal, round and reactive to light, extraocular motions intact, Oropharynx clear  Neck: No JVD, no carotid bruits  Cardiovascular: regular rate and rhythm, no murmur, rubs or gallops, normal S1/S2  Pulmonary: Clear to auscultation bilaterally  Abdominal: Abdomen soft, nontender, nondistended, positive bowel sounds  Extremities: BLE's with 1 pitting edema, venous stasis dermatitis, and changes consistent with lymphedema (R>L)   Pulses:  Carotid pulses are 2+ on the right side, and 2+ on the left side.  Radial pulses are 2+ on the right side, and 2+ on the left side.   Femoral pulses are 2+ on the right side, and 2+ on the left side.  Popliteal pulses are 2+ on the right side, and 2+ on the left side.   Dorsalis pedis pulses are 2+ on the right side, and 2+ on the left side.   Posterior tibial pulses are 2+ on the right side, and 2+ on the left side.    Skin: No ecchymosis, erythema, or ulcers  Psych: Alert and oriented x 3, appropriate affect  Neuro: CNII-XII intact, no focal deficits    Labs:  Most Recent Data  CBC:   Lab Results   Component Value Date    WBC 6.81 03/08/2022    HGB 13.9 (L) 03/08/2022    HCT 42.3 03/08/2022     03/08/2022    MCV 98 " 03/08/2022    RDW 13.3 03/08/2022     BMP:   Lab Results   Component Value Date     03/08/2022    K 3.7 03/08/2022     03/08/2022    CO2 23 03/08/2022    BUN 12 03/08/2022    CREATININE 0.8 03/08/2022    GLU 84 03/08/2022    CALCIUM 10.0 03/08/2022     LFTS;   Lab Results   Component Value Date    PROT 7.2 03/08/2022    ALBUMIN 3.9 03/08/2022    BILITOT 0.8 03/08/2022    AST 24 03/08/2022    ALKPHOS 67 03/08/2022    ALT 28 03/08/2022     COAGS:   Lab Results   Component Value Date    INR 1.0 02/22/2012     FLP:   Lab Results   Component Value Date    CHOL 185 10/14/2021    HDL 49 10/14/2021    LDLCALC 117.0 10/14/2021    TRIG 95 10/14/2021    CHOLHDL 26.5 10/14/2021     CARDIAC:   Lab Results   Component Value Date    TROPONINI <0.006 05/03/2018    BNP 17 03/08/2022       Imaging:    BLE Venous Ultrasound 6/16/2022:  No evidence of deep venous thrombosis bilaterally.  The right smaller saphenous vein demonstrates thickened walls with complete compressibility consistent with a history of superficial venous thrombosis.  Bilateral GSV and right lower extremity SSV reflux noted.    BLE Venous Ultrasound 3/9/2022:  · The right lesser saphenous vein SVT.  · There is no evidence of a left lower extremity DVT.    Assessment/Plan:  Ihsan Mays SrLuis Enrique is a 61 y.o. male with RLE superficial vein thrombosis, HTN, prostate cancer, obesity, who presents for a follow up appointment.      1. Thrombosis of right lesser saphenous vein- Likely due to underlying prostate cancer.  Other risk factors include lymphedema and venous insufficiency.  BLE Venous Ultrasound on 6/16/2022 revealed the right smaller saphenous vein demonstrates thickened walls with complete compressibility consistent with a history of superficial venous thrombosis.  Bilateral GSV and right lower extremity SSV reflux noted.  No evidence of deep venous thrombosis bilaterally.  Decrease Xarelto to 10 mg daily indefinitely.  Given underlying prostate  cancer, pt will require at least prophylactic dose anticoagulation.       2. Leg Swelling- Due to lymphedema, possible venous insufficiency, and contribution from amlodipine.  Refer to lymphedema clinic.  Limit sodium intake to 2,000 mg daily.  Limit volume intake to 1.5 liters daily.  Elevate legs when resting.     2. Obesity- Encourage diet, exercise and weight loss.      3. HLD- Continue atorvastatin 40 mg daily.      Follow up in 3 months    Total duration of face to face visit time 30 minutes.  Total time spent counseling greater than fifty percent of total visit time.  Counseling included discussion regarding imaging findings, diagnosis, possibilities, treatment options, risks and benefits.  The patient had many questions regarding the options and long-term effects.    Isaiah Zambrano MD, PhD  Interventional Cardiology

## 2022-06-28 ENCOUNTER — TELEPHONE (OUTPATIENT)
Dept: INTERNAL MEDICINE | Facility: CLINIC | Age: 62
End: 2022-06-28
Payer: COMMERCIAL

## 2022-06-28 DIAGNOSIS — E78.2 MIXED HYPERLIPIDEMIA: ICD-10-CM

## 2022-06-28 DIAGNOSIS — Z12.5 PROSTATE CANCER SCREENING: ICD-10-CM

## 2022-06-28 DIAGNOSIS — Z00.00 ANNUAL PHYSICAL EXAM: ICD-10-CM

## 2022-06-28 DIAGNOSIS — I10 ESSENTIAL HYPERTENSION: Primary | ICD-10-CM

## 2022-06-28 NOTE — TELEPHONE ENCOUNTER
----- Message from Mikci Silvestre sent at 6/28/2022  4:02 PM CDT -----  Contact: Pt [phone]  The patient sched the annual visit, please put in the lab orders.      Thank you

## 2022-07-15 ENCOUNTER — CLINICAL SUPPORT (OUTPATIENT)
Dept: REHABILITATION | Facility: HOSPITAL | Age: 62
End: 2022-07-15
Payer: COMMERCIAL

## 2022-07-15 DIAGNOSIS — I89.0 LYMPHEDEMA OF BOTH LOWER EXTREMITIES: ICD-10-CM

## 2022-07-15 PROCEDURE — 97535 SELF CARE MNGMENT TRAINING: CPT

## 2022-07-15 PROCEDURE — 97161 PT EVAL LOW COMPLEX 20 MIN: CPT

## 2022-07-15 NOTE — PROGRESS NOTES
See evaluation in POC for goals and assessment     Eval Date: 07/20/2022    Christal Ashraf, PT, DPT, CLT

## 2022-07-20 ENCOUNTER — CLINICAL SUPPORT (OUTPATIENT)
Dept: REHABILITATION | Facility: HOSPITAL | Age: 62
End: 2022-07-20
Payer: COMMERCIAL

## 2022-07-20 DIAGNOSIS — I89.0 LYMPHEDEMA OF BOTH LOWER EXTREMITIES: Primary | ICD-10-CM

## 2022-07-20 PROCEDURE — 97140 MANUAL THERAPY 1/> REGIONS: CPT | Mod: CQ

## 2022-07-20 NOTE — PROGRESS NOTES
Physical Therapy Daily Treatment Note     Name: Ihsan Mays .  Clinic Number: [id number]    Therapy Diagnosis:   Encounter Diagnosis   Name Primary?    Lymphedema of both lower extremities Yes     Physician: Isaiah Zambrano MD*    Visit Date: 7/20/2022    Physician Orders: PT Eval and Treat - lymphedema  Medical Diagnosis from Referral: I89.0 (ICD-10-CM) - Lymphedema, not elsewhere classified      Evaluation Date: 7/15/2022  Authorization Period Expiration: 12/31/2022  Plan of Care Expiration: 8/31/2022  Visit # / Visits authorized: 1/ 20     Time In: 1:00pm   Time Out: 2:00pm   Total Billable Time: 60 minutes     Precautions: Standard    Subjective     Pt reports: doing well with no pain currently .   He was compliant with home compression/exercise program.  Response to previous treatment: eval   Functional change: none    Pain: 0/10  Location: BLES , R>L    Objective     Pt presents c/ no compression donned , slide sandals . Encouraged tennis shoes for next visit.     Treatment:   Ihsan received the following manual therapy techniques:- Manual Lymphatic Drainage were applied to the: RLE for 60 minutes, including: MLD and short stretch compression bandaging     MANUAL LYMPHATIC DRAINAGE (MLD):    While supine with LEs elevated stimulation at terminus, along GI region, B inguinal regions, drainage of entire RLE seng lower leg, ankle, and foot with return proximally,  Use of Aquaphor/Eucerin due to dryness.   Consider self massage to abdominal areas, B inguinal areas, thigh, and remaining LE within reach.    MULTILAYERED BANDAGING:  issued supplies and bandaged RLE with cotton stockinette, Rosidal soft section dorsum of foot, 2 komprex wedges post malleoli, 1 and a half Rosidal soft rolls ankle to knee, 1-8cm and 2- 10cm Rosidal K rolls foot to knee, to leave intact 12-24 hrs as tolerated, discontinue with any problems, return rolled bandages next session. Wash and wear schedules confirmed.     Ihsan  received therapeutic exercises to develop strength, endurance, ROM, flexibility and posture for 2 minutes including:  Aps, knee ROM, avoid dependency, avoid immobility, elevation, walking with compression, deep breathing, use of muscle pump to assist venous return    Home Exercises Provided and Patient Education Provided:  Self Care Home Management Training/Functional Therapeutic Activity     Reviewed purpose and benefits of performing manual lymphatic drainage (MLD) and compression bandaging.   Reviewed and practiced self MLD technique and encouraged to start performing daily .   Discussed importance of compression use and reviewed compression products such as knee high socks , knee high/thigh high garments , inelastic velcro wraps.     PATIENT/FAMILY Education: bandaging/compression wear schedule,  HEP,  Beginning of self massage,  Self or assisted bandaging, compression options, and Risk reduction    Written Home Exercises Provided: yes.  Home plan of management was reviewed and Ihsan was able to demonstrate understanding prior to the end of the session.  Ihsan demonstrated good  understanding of the education provided.     Assessment     Pt exhibits a moderate soft fullness to his R>L distal LEs. Initiated CDT on pts RLE today as well as discussed education on use of compression for long term management . Will continue to progress next .   Ihsan Is progressing well towards his goals.   Pt prognosis is Good.     Pt will continue to benefit from skilled outpatient physical therapy to address the deficits listed in the problem list box on initial evaluation, provide pt/family education and to maximize pt's level of independence in the home and community environment.   Pt's spiritual, cultural and educational needs considered and pt agreeable to plan of care and goals.     Anticipated Barriers for therapy: none     The following goals were discussed with the patient and patient is in agreement with them as to  be addressed in the treatment plan.      Short Term Goals: (6 weeks)  1. Patient will show decreased girth in R LE by up to 1 cm to allow for LE symmetry, shoe and clothing choice, and ability to apply needed compression.  (progressing, not met)   2. Patient will demonstrate 100% knowledge of lymphedema precautions and signs of infection to allow for reduced lymphedema risk, infection risk, and/or exacerbation of condition.  (progressing, not met)  3. Patient or caregiver will perform self-bandaging techniques and/or wearing of compression garments to allow for lymphatic drainage support, skin elasticity, and reduction in shape and size of limb. (progressing, not met)  4. Patient will perform self lymph drainage techniques to areas within reach to enhance lymphatic drainage and skin condition.  (progressing, not met)  5. Patient will tolerate daily activities with multilayered bandaging to allow for lymphatic and venous support.  (progressing, not met)     Long Term Goals: (12  weeks)  1. Patient will show decreased girth in R LE by up to 2 cm  to allow for LE symmetry, shoe and clothing choice, and ability to apply needed compression daily.  (progressing, not met)  2. Patient will show reduction in density to mild or less with improved contour of limb to allow for cosmesis, LE symmetry, infection risk reduction, and clothing and compression choice.   (progressing, not met)  3. Patient to abel/doff compression garment with daily compliance to assist in lymphedema management, skin elasticity, and tissue density.  (progressing, not met)  4. Pt to show improved postural awareness and alignment.  (progressing, not met)  5. Pt to be I and compliant with HEP to allow for increased function in affected limb.   (progressing, not met)  Plan   Plan of care Certification: 7/15/2022 to 8/31/2022.     Outpatient Physical Therapy 2 times weekly for 6 weeks to include the following interventions: Gait Training, Manual Therapy,  Neuromuscular Re-ed, Patient Education, Self Care, Therapeutic Activities and Therapeutic Exercise. Complete Decongestive Therapy- compression and home equipment needs to be addressed and assisted.      Ira Bruce, PTA

## 2022-07-20 NOTE — PLAN OF CARE
[facility]  Physical Therapy Initial Evaluation    Name: Ihsan Mays Sr.  Clinic Number: [id number]    Therapy Diagnosis:   Encounter Diagnosis   Name Primary?    Lymphedema of both lower extremities      Physician: Isaiah Zambrano MD*    Physician Orders: PT Eval and Treat - lymphedema  Medical Diagnosis from Referral: I89.0 (ICD-10-CM) - Lymphedema, not elsewhere classified     Evaluation Date: 7/15/2022  Authorization Period Expiration: 12/31/2022  Plan of Care Expiration: 8/31/2022  Visit # / Visits authorized: 1/ 1    Time In: 1:00pm   Time Out: 2:00pm   Total Billable Time: 60 minutes    Precautions: Standard    Subjective   Date of onset: years  History of current condition - Ihsan reports: He had a blood clot in the R leg. Both legs are swollen and they have been for years. He was on blood thinners and his most recent follow up showed no more clots. He has compression he got from the store, wears occasionally     Pt denies CHF, KF, DM and CA.   Fluid pill yes   Blood thinner- he is suppoed to go down a dose but hasn't gotten the new medication yet      Medical History:   Past Medical History:   Diagnosis Date    Glaucoma     Hypertension     Prostate cancer        Surgical History:   Ihsan Mays Sr.  has a past surgical history that includes Stomach surgery; Glaucoma surgery; Finger surgery; Esophagogastroduodenoscopy (N/A, 8/21/2020); and Colonoscopy (N/A, 8/21/2020).    Medications:   Ihsan has a current medication list which includes the following prescription(s): amlodipine, atorvastatin, carvedilol, cyclobenzaprine, latanoprost, naproxen, rivaroxaban, sildenafil, and valsartan-hydrochlorothiazide.    Allergies:   Review of patient's allergies indicates:  No Known Allergies     Imaging, No evidence of deep venous thrombosis bilaterally.  The right smaller saphenous vein demonstrates thickened walls with complete compressibility consistent with a history of  superficial venous thrombosis.  Bilateral GSV and right lower extremity SSV reflux noted.      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   Prior Level of Function: Independent   Current Level of Function: Independent   Gait: none    Transfers:Independent    Bed Mobility: Independent       Pain and Swelling:  Current 0/10, worst 0/10, best 0/10       Pts goals: Get his health stronger     Objective       Amount of Swelling/Location of Swelling: Moderate swelling of BLE, RLE more than L   Skin Integrity: intact    Palpation/Texture: pitting edema    + B Stemmer Sign   Dima's Sign- not tested, no tenderness, hx of DVT  Circulation: intact     Posture:  Rounded shoulders     Strength: functional screen of AROM against gravity and sit to stand transfers       Sensation:  intact to lt touch B LE    Girth Measurements (in centimeters)              CMS Impairment/Limitation/Restriction for FOTO edema Survey  Limitation: 23%    Therapist reviewed FOTO scores for Ihsan RIVERA Davon SrLuis Enrique on 7/15/2022.   FOTO documents entered into [employer] - see Media section.       TREATMENT     Total Treatment time separate from Evaluation: 30 minutes    Ihsan received therapeutic exercises to develop strength, endurance, ROM, flexibility and posture for 2 minutes including:  Aps, knee ROM, avoid dependency, avoid immobility, elevation, walking with compression, deep breathing, use of muscle pump to assist venous return    Ihsan received the following  manual therapy techniques: Manual Lymphatic Drainage were applied to the: LLE for 3 minutes, including:  Education and training in compression needs.  Complete Decongestive Therapy components and management with goals and plan of care review.    Demo of Manual Lymphatic Drainage and short stretch compression bandaging.     \  Self Care/Home Management training for 25 minutes including:    Pt was educated in potential compression needs.  Demo of products including socks, garments, and Inelastic Velcro wraps.   Discussed cost/coverage and authorization per insurance with Durable Medical Equipment(DME) provider.  Compression require orders from referring provider and coverage or purchase of products from DME or self order.      Discussed wear schedule, don/doff, wash and management of products.  Size and compression class and AM/PM needs.    Consideration for tubigrip as form of temporary compression use until securing compression needs.   Consideration for shorts/tights or capris style compression to compliment lower leg compression to support size/shape of LE.   Product information provider.   Vendor list provided.    Informed insurance coverage of compression is per DME provider and typically Medicare and Medicare group plans may not cover cost beyond pair of standard sized knee high garments.   Commitment to attendance as well as commitment to securing compression needs is critical to edema management.      Home Exercises and Patient Education Provided  Education provided:   - Lymph booklet   - Pt was educated in lymphedema etiology and management plans.  Pt was provided with written risk reductions and precautions for managing lymphedema.      This patient is in agreement to participate in Lymphedema treatment.    Written Home Exercises Provided: yes.  Exercises were reviewed and Ihsan was able to demonstrate them prior to the end of the session.  Ihsan demonstrated good  understanding of the education  provided.     See EMR under Patient Instructions for exercises provided 7/15/2022.    Assessment   Ihsan is a 61 y.o. male referred to outpatient Physical Therapy with a medical diagnosis of I89.0 (ICD-10-CM) - Lymphedema, not elsewhere classified   . This patient presents s/p DVT   resulting in: lymphedema of the BLEs,  compression needs, and placing the pt at higher risk of infection. Pt was educated on lymphedema causes and physiology, infection signs and symptoms, complete decongestive therapy and its components, and expectations for therapy. Pt was also educated on the need for some type of compression. Pt would benefit from therapy to decrease swelling, aid in compression, and prepare pt for home management     Pt prognosis is Good.   Pt will benefit from skilled outpatient Physical Therapy to address the deficits stated above and in the chart below, provide pt/family education, and to maximize pt's level of independence.     Plan of care discussed with patient: Yes  Pt's spiritual, cultural and educational needs considered and patient is agreeable to the plan of care and goals as stated below:     Medical Necessity is demonstrated by the following  History  Co-morbidities and personal factors that may impact the plan of care Co-morbidities:   history of cancer and HTN    Personal Factors:   no deficits     high   Examination  Body Structures and Functions, activity limitations and participation restrictions that may impact the plan of care Body Regions:   lower extremities    Body Systems:    gross symmetry  edema  skin integrity  skin color    Participation Restrictions:   none    Activity limitations:   Learning and applying knowledge  no deficits    General Tasks and Commands  no deficits    Communication  no deficits    Mobility  no deficits    Self care  no deficits    Domestic Life  no deficits    Interactions/Relationships  no deficits    Life Areas  no deficits    Community and Social Life  no  deficits         low   Clinical Presentation evolving clinical presentation with changing clinical characteristics moderate   Decision Making/ Complexity Score: low     Anticipated Barriers for therapy: none    The following goals were discussed with the patient and patient is in agreement with them as to be addressed in the treatment plan.     Short Term Goals: (6 weeks)  1. Patient will show decreased girth in R LE by up to 1 cm to allow for LE symmetry, shoe and clothing choice, and ability to apply needed compression.  (progressing, not met)   2. Patient will demonstrate 100% knowledge of lymphedema precautions and signs of infection to allow for reduced lymphedema risk, infection risk, and/or exacerbation of condition.  (progressing, not met)  3. Patient or caregiver will perform self-bandaging techniques and/or wearing of compression garments to allow for lymphatic drainage support, skin elasticity, and reduction in shape and size of limb. (progressing, not met)  4. Patient will perform self lymph drainage techniques to areas within reach to enhance lymphatic drainage and skin condition.  (progressing, not met)  5. Patient will tolerate daily activities with multilayered bandaging to allow for lymphatic and venous support.  (progressing, not met)    Long Term Goals: (12  weeks)  1. Patient will show decreased girth in R LE by up to 2 cm  to allow for LE symmetry, shoe and clothing choice, and ability to apply needed compression daily.  (progressing, not met)  2. Patient will show reduction in density to mild or less with improved contour of limb to allow for cosmesis, LE symmetry, infection risk reduction, and clothing and compression choice.   (progressing, not met)  3. Patient to abel/doff compression garment with daily compliance to assist in lymphedema management, skin elasticity, and tissue density.  (progressing, not met)  4. Pt to show improved postural awareness and alignment.  (progressing, not  met)  5. Pt to be I and compliant with HEP to allow for increased function in affected limb.   (progressing, not met)  Plan   Plan of care Certification: 7/15/2022 to 8/31/2022.    Outpatient Physical Therapy 2 times weekly for 6 weeks to include the following interventions: Gait Training, Manual Therapy, Neuromuscular Re-ed, Patient Education, Self Care, Therapeutic Activities and Therapeutic Exercise. Complete Decongestive Therapy- compression and home equipment needs to be addressed and assisted.    Pt may be seen by a PTA as part of the Rehab treatment team.  Plan of Care was discussed with ANGELINA Gregory, PT

## 2022-07-23 DIAGNOSIS — I82.811 THROMBOSIS OF RIGHT SAPHENOUS VEIN: ICD-10-CM

## 2022-07-24 ENCOUNTER — PATIENT MESSAGE (OUTPATIENT)
Dept: INTERNAL MEDICINE | Facility: CLINIC | Age: 62
End: 2022-07-24
Payer: COMMERCIAL

## 2022-07-24 ENCOUNTER — PATIENT MESSAGE (OUTPATIENT)
Dept: CARDIOLOGY | Facility: CLINIC | Age: 62
End: 2022-07-24
Payer: COMMERCIAL

## 2022-07-25 ENCOUNTER — CLINICAL SUPPORT (OUTPATIENT)
Dept: REHABILITATION | Facility: HOSPITAL | Age: 62
End: 2022-07-25
Payer: COMMERCIAL

## 2022-07-25 DIAGNOSIS — I89.0 LYMPHEDEMA OF BOTH LOWER EXTREMITIES: Primary | ICD-10-CM

## 2022-07-25 DIAGNOSIS — R60.0 EDEMA OF BOTH LOWER EXTREMITIES: ICD-10-CM

## 2022-07-25 PROCEDURE — 97140 MANUAL THERAPY 1/> REGIONS: CPT

## 2022-07-25 PROCEDURE — 97016 VASOPNEUMATIC DEVICE THERAPY: CPT

## 2022-07-25 RX ORDER — CARVEDILOL 12.5 MG/1
12.5 TABLET ORAL 2 TIMES DAILY WITH MEALS
Qty: 180 TABLET | Refills: 3 | Status: SHIPPED | OUTPATIENT
Start: 2022-07-25 | End: 2023-07-19

## 2022-07-25 RX ORDER — CARVEDILOL 12.5 MG/1
TABLET ORAL
Qty: 180 TABLET | Refills: 3 | OUTPATIENT
Start: 2022-07-25

## 2022-07-25 RX ORDER — RIVAROXABAN 15 MG/1
TABLET, FILM COATED ORAL
Qty: 42 TABLET | Refills: 0 | OUTPATIENT
Start: 2022-07-25

## 2022-07-25 NOTE — TELEPHONE ENCOUNTER
No new care gaps identified.  [facility] Embedded Care Gaps. Reference number: [id number]. 7/23/2022   7:53:02 PM CDT  
Refill Decision Note   Ihsan Mays  is requesting a refill authorization.  Brief Assessment and Rationale for Refill:  Quick Discontinue     Medication Therapy Plan:       Medication Reconciliation Completed: No   Comments:     No Care Gaps recommended.     Note composed:12:33 PM 07/25/2022            
none

## 2022-07-25 NOTE — PROGRESS NOTES
Physical Therapy Daily Treatment Note     Name: Ihsan Mays .  Clinic Number: [id number]    Therapy Diagnosis:   Encounter Diagnoses   Name Primary?    Lymphedema of both lower extremities Yes    Edema of both lower extremities      Physician: Isaiah Zambrano MD*    Visit Date: 7/25/2022    Physician Orders: PT Eval and Treat - lymphedema  Medical Diagnosis from Referral: I89.0 (ICD-10-CM) - Lymphedema, not elsewhere classified      Evaluation Date: 7/15/2022  Authorization Period Expiration: 12/31/2022  Plan of Care Expiration: 8/31/2022  Visit # / Visits authorized: 2/ 20     Time In: 1:00pm   Time Out: 2:00pm   Total Billable Time: 60 minutes     Precautions: Standard    Subjective     Pt reports: Was able to wear his bandages for recommended amount of time   He was compliant with home compression/exercise program.  Response to previous treatment: eval   Functional change: none    Pain: 0/10  Location: BLES , R>L    Objective     Pt presents c/ no compression donned , slide sandals . Encouraged tennis shoes for next visit.     Treatment:   Ihsan received the following manual therapy techniques:- Manual Lymphatic Drainage were applied to the: RLE for 40 minutes, including: MLD and short stretch compression bandaging     MANUAL LYMPHATIC DRAINAGE (MLD):    While supine with LEs elevated stimulation at terminus, along GI region, B inguinal regions, drainage of entire RLE seng lower leg, ankle, and foot with return proximally,  Use of Aquaphor/Eucerin due to dryness.   Consider self massage to abdominal areas, B inguinal areas, thigh, and remaining LE within reach.    MULTILAYERED BANDAGING:  issued supplies and bandaged RLE with cotton stockinette, Rosidal soft section dorsum of foot, 2 komprex wedges post malleoli, 1 and a half Rosidal soft rolls ankle to knee, 1-8cm and 2- 10cm Rosidal K rolls foot to knee, to leave intact 12-24 hrs as tolerated, discontinue with any problems, return rolled bandages  next session. Wash and wear schedules confirmed.     SEQUENTIAL COMPRESSION PUMP: full leg sleeve applied to RLE LE  VES 5200 with default setting with distal pressures starting at 45mmHg entire LE x 20 minutes      Ihsan received therapeutic exercises to develop strength, endurance, ROM, flexibility and posture for 2 minutes including:  Aps, knee ROM, avoid dependency, avoid immobility, elevation, walking with compression, deep breathing, use of muscle pump to assist venous return    Home Exercises Provided and Patient Education Provided:  Self Care Home Management Training/Functional Therapeutic Activity     Reviewed purpose and benefits of performing manual lymphatic drainage (MLD) and compression bandaging.   Reviewed and practiced self MLD technique and encouraged to start performing daily .   Discussed importance of compression use and reviewed compression products such as knee high socks , knee high/thigh high garments , inelastic velcro wraps.     PATIENT/FAMILY Education: bandaging/compression wear schedule,  HEP,  Beginning of self massage,  Self or assisted bandaging, compression options, and Risk reduction    Written Home Exercises Provided: yes.  Home plan of management was reviewed and Ihsan was able to demonstrate understanding prior to the end of the session.  Ihsan demonstrated good  understanding of the education provided.     Assessment     Pt tolerated treatment well with no reports of pain. Sequential compression pump initiated on the RLE for 20 minutes with no reports of discomfort and visible decreases. Pt requested recommendations on compression socks and was given ideas of several options  Ihsan Is progressing well towards his goals.   Pt prognosis is Good.     Pt will continue to benefit from skilled outpatient physical therapy to address the deficits listed in the problem list box on initial evaluation, provide pt/family education and to maximize pt's level of independence in the  home and community environment.   Pt's spiritual, cultural and educational needs considered and pt agreeable to plan of care and goals.     Anticipated Barriers for therapy: none     The following goals were discussed with the patient and patient is in agreement with them as to be addressed in the treatment plan.      Short Term Goals: (6 weeks)  1. Patient will show decreased girth in R LE by up to 1 cm to allow for LE symmetry, shoe and clothing choice, and ability to apply needed compression.  (progressing, not met)   2. Patient will demonstrate 100% knowledge of lymphedema precautions and signs of infection to allow for reduced lymphedema risk, infection risk, and/or exacerbation of condition.  (progressing, not met)  3. Patient or caregiver will perform self-bandaging techniques and/or wearing of compression garments to allow for lymphatic drainage support, skin elasticity, and reduction in shape and size of limb. (progressing, not met)  4. Patient will perform self lymph drainage techniques to areas within reach to enhance lymphatic drainage and skin condition.  (progressing, not met)  5. Patient will tolerate daily activities with multilayered bandaging to allow for lymphatic and venous support.  (progressing, not met)     Long Term Goals: (12  weeks)  1. Patient will show decreased girth in R LE by up to 2 cm  to allow for LE symmetry, shoe and clothing choice, and ability to apply needed compression daily.  (progressing, not met)  2. Patient will show reduction in density to mild or less with improved contour of limb to allow for cosmesis, LE symmetry, infection risk reduction, and clothing and compression choice.   (progressing, not met)  3. Patient to abel/doff compression garment with daily compliance to assist in lymphedema management, skin elasticity, and tissue density.  (progressing, not met)  4. Pt to show improved postural awareness and alignment.  (progressing, not met)  5. Pt to be I and  compliant with HEP to allow for increased function in affected limb.   (progressing, not met)  Plan   Plan of care Certification: 7/15/2022 to 8/31/2022.     Outpatient Physical Therapy 2 times weekly for 6 weeks to include the following interventions: Gait Training, Manual Therapy, Neuromuscular Re-ed, Patient Education, Self Care, Therapeutic Activities and Therapeutic Exercise. Complete Decongestive Therapy- compression and home equipment needs to be addressed and assisted.      Christal Ashraf, PT

## 2022-07-25 NOTE — TELEPHONE ENCOUNTER
No new care gaps identified.  [facility] Embedded Care Gaps. Reference number: [id number]. 7/25/2022   8:42:52 AM CDT

## 2022-07-29 ENCOUNTER — CLINICAL SUPPORT (OUTPATIENT)
Dept: REHABILITATION | Facility: HOSPITAL | Age: 62
End: 2022-07-29
Payer: COMMERCIAL

## 2022-07-29 DIAGNOSIS — R60.0 EDEMA OF BOTH LOWER EXTREMITIES: ICD-10-CM

## 2022-07-29 DIAGNOSIS — I89.0 LYMPHEDEMA OF BOTH LOWER EXTREMITIES: Primary | ICD-10-CM

## 2022-07-29 PROCEDURE — 97016 VASOPNEUMATIC DEVICE THERAPY: CPT

## 2022-07-29 PROCEDURE — 97140 MANUAL THERAPY 1/> REGIONS: CPT

## 2022-07-29 NOTE — PROGRESS NOTES
Physical Therapy Daily Treatment Note/ Progress Note      Name: Ihsan Mays .  Clinic Number: [id number]    Therapy Diagnosis:   No diagnosis found.  Physician: Isaiah Zambrano MD*    Visit Date: 7/29/2022    Physician Orders: PT Eval and Treat - lymphedema  Medical Diagnosis from Referral: I89.0 (ICD-10-CM) - Lymphedema, not elsewhere classified      Evaluation Date: 7/15/2022  Authorization Period Expiration: 12/31/2022  Plan of Care Expiration: 8/31/2022  Visit # / Visits authorized: 3/ 20     Time In: 2:00pm   Time Out: 3:00pm   Total Billable Time: 60 minutes     Precautions: Standard    Subjective     Pt reports: Was able to wear his bandages for recommended amount of time   He was compliant with home compression/exercise program.  Response to previous treatment: eval   Functional change: none    Pain: 0/10  Location: BLES , R>L    Objective     Pt presents c/ no compression donned , slide sandals . Encouraged tennis shoes for next visit.               Treatment:   Ihsan received the following manual therapy techniques:- Manual Lymphatic Drainage were applied to the: RLE for 60 minutes, including: MLD and short stretch compression bandaging     MANUAL LYMPHATIC DRAINAGE (MLD):    While supine with LEs elevated stimulation at terminus, along GI region, B inguinal regions, drainage of entire RLE seng lower leg, ankle, and foot with return proximally,  Use of Aquaphor/Eucerin due to dryness.   Consider self massage to abdominal areas, B inguinal areas, thigh, and remaining LE within reach.    MULTILAYERED BANDAGING:  issued supplies and bandaged RLE with cotton stockinette, Rosidal soft section dorsum of foot, 2 komprex wedges post malleoli, 1 and a half Rosidal soft rolls ankle to knee, 1-8cm and 2- 10cm Rosidal K rolls foot to knee, to leave intact 12-24 hrs as tolerated, discontinue with any problems, return rolled bandages next session. Wash and wear schedules confirmed.     SEQUENTIAL COMPRESSION  PUMP: full leg sleeve applied to RLE LE  VES 5200 with default setting with distal pressures starting at 45mmHg entire LE x 20 minutes      Ihsan received therapeutic exercises to develop strength, endurance, ROM, flexibility and posture for 2 minutes including:  Aps, knee ROM, avoid dependency, avoid immobility, elevation, walking with compression, deep breathing, use of muscle pump to assist venous return    Home Exercises Provided and Patient Education Provided:  Self Care Home Management Training/Functional Therapeutic Activity     Reviewed purpose and benefits of performing manual lymphatic drainage (MLD) and compression bandaging.   Reviewed and practiced self MLD technique and encouraged to start performing daily .   Discussed importance of compression use and reviewed compression products such as knee high socks , knee high/thigh high garments , inelastic velcro wraps.     PATIENT/FAMILY Education: bandaging/compression wear schedule,  HEP,  Beginning of self massage,  Self or assisted bandaging, compression options, and Risk reduction    Written Home Exercises Provided: yes.  Home plan of management was reviewed and Ihsan was able to demonstrate understanding prior to the end of the session.  Ihsan demonstrated good  understanding of the education provided.     Assessment     Pt tolerated treatment well with no reports of pain. Pt shows continued reductions in edema. Will continue to progress   Ihsan Is progressing well towards his goals.   Pt prognosis is Good.     Pt will continue to benefit from skilled outpatient physical therapy to address the deficits listed in the problem list box on initial evaluation, provide pt/family education and to maximize pt's level of independence in the home and community environment.   Pt's spiritual, cultural and educational needs considered and pt agreeable to plan of care and goals.     Anticipated Barriers for therapy: none     The following goals were  discussed with the patient and patient is in agreement with them as to be addressed in the treatment plan.      Short Term Goals: (6 weeks)  1. Patient will show decreased girth in R LE by up to 1 cm to allow for LE symmetry, shoe and clothing choice, and ability to apply needed compression.  (progressing, not met)   2. Patient will demonstrate 100% knowledge of lymphedema precautions and signs of infection to allow for reduced lymphedema risk, infection risk, and/or exacerbation of condition.  (progressing, not met)  3. Patient or caregiver will perform self-bandaging techniques and/or wearing of compression garments to allow for lymphatic drainage support, skin elasticity, and reduction in shape and size of limb. (progressing, not met)  4. Patient will perform self lymph drainage techniques to areas within reach to enhance lymphatic drainage and skin condition.  (progressing, not met)  5. Patient will tolerate daily activities with multilayered bandaging to allow for lymphatic and venous support.  (progressing, not met)     Long Term Goals: (12  weeks)  1. Patient will show decreased girth in R LE by up to 2 cm  to allow for LE symmetry, shoe and clothing choice, and ability to apply needed compression daily.  (progressing, not met)  2. Patient will show reduction in density to mild or less with improved contour of limb to allow for cosmesis, LE symmetry, infection risk reduction, and clothing and compression choice.   (progressing, not met)  3. Patient to abel/doff compression garment with daily compliance to assist in lymphedema management, skin elasticity, and tissue density.  (progressing, not met)  4. Pt to show improved postural awareness and alignment.  (progressing, not met)  5. Pt to be I and compliant with HEP to allow for increased function in affected limb.   (progressing, not met)  Plan   Plan of care Certification: 7/15/2022 to 8/31/2022.     Outpatient Physical Therapy 2 times weekly for 6 weeks to  include the following interventions: Gait Training, Manual Therapy, Neuromuscular Re-ed, Patient Education, Self Care, Therapeutic Activities and Therapeutic Exercise. Complete Decongestive Therapy- compression and home equipment needs to be addressed and assisted.      Christal Ashraf, PT

## 2022-08-12 ENCOUNTER — PATIENT MESSAGE (OUTPATIENT)
Dept: UROLOGY | Facility: CLINIC | Age: 62
End: 2022-08-12
Payer: COMMERCIAL

## 2022-08-17 ENCOUNTER — CLINICAL SUPPORT (OUTPATIENT)
Dept: REHABILITATION | Facility: HOSPITAL | Age: 62
End: 2022-08-17
Payer: COMMERCIAL

## 2022-08-17 DIAGNOSIS — I89.0 LYMPHEDEMA OF BOTH LOWER EXTREMITIES: Primary | ICD-10-CM

## 2022-08-17 DIAGNOSIS — R60.0 EDEMA OF BOTH LOWER EXTREMITIES: ICD-10-CM

## 2022-08-17 PROCEDURE — 97140 MANUAL THERAPY 1/> REGIONS: CPT

## 2022-08-17 PROCEDURE — 97016 VASOPNEUMATIC DEVICE THERAPY: CPT

## 2022-08-17 NOTE — PROGRESS NOTES
Physical Therapy Daily Treatment Note     Name: Ihsan Mays .  Clinic Number: [id number]    Therapy Diagnosis:   Encounter Diagnoses   Name Primary?    Lymphedema of both lower extremities Yes    Edema of both lower extremities      Physician: Isaiah Zambrano MD*    Visit Date: 8/17/2022    Physician Orders: PT Eval and Treat - lymphedema  Medical Diagnosis from Referral: I89.0 (ICD-10-CM) - Lymphedema, not elsewhere classified      Evaluation Date: 7/15/2022  Authorization Period Expiration: 12/31/2022  Plan of Care Expiration: 8/31/2022  Visit # / Visits authorized: 4/ 20     Time In: 3:00pm   Time Out: 3:50pm   Total Billable Time: 50 minutes     Precautions: Standard    Subjective     Pt reports: Had no issues with the bandages, was able to wear them for the recommended time.  He was compliant with home compression/exercise program.  Response to previous treatment: eval   Functional change: none    Pain: 0/10  Location: BLES , R>L    Objective     Pt presents c/ compression donned               Treatment:   Ihsan received the following manual therapy techniques:- Manual Lymphatic Drainage were applied to the: RLE for 50 minutes, including: MLD and short stretch compression bandaging     MANUAL LYMPHATIC DRAINAGE (MLD):    While supine with LEs elevated stimulation at terminus, along GI region, B inguinal regions, drainage of entire RLE seng lower leg, ankle, and foot with return proximally,  Use of Aquaphor/Eucerin due to dryness.   Consider self massage to abdominal areas, B inguinal areas, thigh, and remaining LE within reach.    MULTILAYERED BANDAGING:  issued supplies and bandaged RLE with cotton stockinette, Rosidal soft section dorsum of foot, 2 komprex wedges post malleoli, 1 and a half Rosidal soft rolls ankle to knee, 1-8cm and 2- 10cm Rosidal K rolls foot to knee, to leave intact 12-24 hrs as tolerated, discontinue with any problems, return rolled bandages next session. Wash and wear  schedules confirmed.     SEQUENTIAL COMPRESSION PUMP: full leg sleeve applied to RLE LE  VES 5200 with default setting with distal pressures starting at 45mmHg entire LE x 20 minutes      Ihsan received therapeutic exercises to develop strength, endurance, ROM, flexibility and posture for 2 minutes including:  Aps, knee ROM, avoid dependency, avoid immobility, elevation, walking with compression, deep breathing, use of muscle pump to assist venous return    Home Exercises Provided and Patient Education Provided:  Self Care Home Management Training/Functional Therapeutic Activity     Reviewed purpose and benefits of performing manual lymphatic drainage (MLD) and compression bandaging.   Reviewed and practiced self MLD technique and encouraged to start performing daily .   Discussed importance of compression use and reviewed compression products such as knee high socks , knee high/thigh high garments , inelastic velcro wraps.     PATIENT/FAMILY Education: bandaging/compression wear schedule,  HEP,  Beginning of self massage,  Self or assisted bandaging, compression options, and Risk reduction    Written Home Exercises Provided: yes.  Home plan of management was reviewed and Ihsan was able to demonstrate understanding prior to the end of the session.  Ihsan demonstrated good  understanding of the education provided.     Assessment     Pt tolerated treatment well with no reports of pain. Pt is complaint with use of daily compression and continues to see reductions. Discharge planning in place.     Ihsan Is progressing well towards his goals.   Pt prognosis is Good.     Pt will continue to benefit from skilled outpatient physical therapy to address the deficits listed in the problem list box on initial evaluation, provide pt/family education and to maximize pt's level of independence in the home and community environment.   Pt's spiritual, cultural and educational needs considered and pt agreeable to plan of care  and goals.     Anticipated Barriers for therapy: none     The following goals were discussed with the patient and patient is in agreement with them as to be addressed in the treatment plan.      Short Term Goals: (6 weeks)  1. Patient will show decreased girth in R LE by up to 1 cm to allow for LE symmetry, shoe and clothing choice, and ability to apply needed compression.  (progressing, not met)   2. Patient will demonstrate 100% knowledge of lymphedema precautions and signs of infection to allow for reduced lymphedema risk, infection risk, and/or exacerbation of condition.  (progressing, not met)  3. Patient or caregiver will perform self-bandaging techniques and/or wearing of compression garments to allow for lymphatic drainage support, skin elasticity, and reduction in shape and size of limb. (progressing, not met)  4. Patient will perform self lymph drainage techniques to areas within reach to enhance lymphatic drainage and skin condition.  (progressing, not met)  5. Patient will tolerate daily activities with multilayered bandaging to allow for lymphatic and venous support.  (progressing, not met)     Long Term Goals: (12  weeks)  1. Patient will show decreased girth in R LE by up to 2 cm  to allow for LE symmetry, shoe and clothing choice, and ability to apply needed compression daily.  (progressing, not met)  2. Patient will show reduction in density to mild or less with improved contour of limb to allow for cosmesis, LE symmetry, infection risk reduction, and clothing and compression choice.   (progressing, not met)  3. Patient to abel/doff compression garment with daily compliance to assist in lymphedema management, skin elasticity, and tissue density.  (progressing, not met)  4. Pt to show improved postural awareness and alignment.  (progressing, not met)  5. Pt to be I and compliant with HEP to allow for increased function in affected limb.   (progressing, not met)  Plan   Plan of care Certification:  7/15/2022 to 8/31/2022.     Outpatient Physical Therapy 2 times weekly for 6 weeks to include the following interventions: Gait Training, Manual Therapy, Neuromuscular Re-ed, Patient Education, Self Care, Therapeutic Activities and Therapeutic Exercise. Complete Decongestive Therapy- compression and home equipment needs to be addressed and assisted.      Christal Ashraf, PT

## 2022-08-22 ENCOUNTER — LAB VISIT (OUTPATIENT)
Dept: LAB | Facility: HOSPITAL | Age: 62
End: 2022-08-22
Attending: UROLOGY
Payer: COMMERCIAL

## 2022-08-22 DIAGNOSIS — R97.20 ELEVATED PSA: ICD-10-CM

## 2022-08-22 DIAGNOSIS — C61 PROSTATE CANCER: ICD-10-CM

## 2022-08-22 LAB — COMPLEXED PSA SERPL-MCNC: 5.1 NG/ML (ref 0–4)

## 2022-08-22 PROCEDURE — 84153 ASSAY OF PSA TOTAL: CPT | Performed by: UROLOGY

## 2022-08-22 PROCEDURE — 36415 COLL VENOUS BLD VENIPUNCTURE: CPT | Mod: PO | Performed by: UROLOGY

## 2022-08-24 ENCOUNTER — CLINICAL SUPPORT (OUTPATIENT)
Dept: REHABILITATION | Facility: HOSPITAL | Age: 62
End: 2022-08-24
Payer: COMMERCIAL

## 2022-08-24 DIAGNOSIS — I89.0 LYMPHEDEMA OF BOTH LOWER EXTREMITIES: ICD-10-CM

## 2022-08-24 DIAGNOSIS — R60.0 EDEMA OF BOTH LOWER EXTREMITIES: Primary | ICD-10-CM

## 2022-08-24 PROCEDURE — 97140 MANUAL THERAPY 1/> REGIONS: CPT

## 2022-08-24 NOTE — PROGRESS NOTES
Physical Therapy Daily Treatment Note/ Discharge      Name: Ihsan Mays .  Clinic Number: [id number]    Therapy Diagnosis:   Encounter Diagnoses   Name Primary?    Edema of both lower extremities Yes    Lymphedema of both lower extremities      Physician: Isaiah Zambrano MD*    Visit Date: 8/24/2022    Physician Orders: PT Eval and Treat - lymphedema  Medical Diagnosis from Referral: I89.0 (ICD-10-CM) - Lymphedema, not elsewhere classified      Evaluation Date: 7/15/2022  Authorization Period Expiration: 12/31/2022  Plan of Care Expiration: 8/31/2022  Visit # / Visits authorized: 5/ 20     Time In: 3:00pm   Time Out: 3:40pm    Total Billable Time: 40 minutes     Precautions: Standard    Subjective     Pt reports: Had no issues with the bandages. He has been wearing his compression every day with no issues and has several pairs   He was compliant with home compression/exercise program.  Response to previous treatment: eval   Functional change: none    Pain: 0/10  Location: BLES , R>L    Objective     Pt presents c/ compression donned                     Treatment:   Ihsan received the following manual therapy techniques:- Manual Lymphatic Drainage were applied to the: RLE for 38 minutes, including: MLD and short stretch compression bandaging     MANUAL LYMPHATIC DRAINAGE (MLD):    While supine with LEs elevated stimulation at terminus, along GI region, B inguinal regions, drainage of entire RLE seng lower leg, ankle, and foot with return proximally,  Use of Aquaphor/Eucerin due to dryness.   Consider self massage to abdominal areas, B inguinal areas, thigh, and remaining LE within reach.    Reviewed components of home management including bandaging, MLD, skin care, and signs of infection       Ihsan received therapeutic exercises to develop strength, endurance, ROM, flexibility and posture for 2 minutes including:  Aps, knee ROM, avoid dependency, avoid immobility, elevation, walking with compression,  deep breathing, use of muscle pump to assist venous return    Home Exercises Provided and Patient Education Provided:  Self Care Home Management Training/Functional Therapeutic Activity     Reviewed purpose and benefits of performing manual lymphatic drainage (MLD) and compression bandaging.   Reviewed and practiced self MLD technique and encouraged to start performing daily .   Discussed importance of compression use and reviewed compression products such as knee high socks , knee high/thigh high garments , inelastic velcro wraps.     PATIENT/FAMILY Education: bandaging/compression wear schedule,  HEP,  Beginning of self massage,  Self or assisted bandaging, compression options, and Risk reduction    Written Home Exercises Provided: yes.  Home plan of management was reviewed and Ihsan was able to demonstrate understanding prior to the end of the session.  Ihsan demonstrated good  understanding of the education provided.     Assessment     Pt's measurements show decreases in edema. Pt is complaint with the use of compression and is ready for discharge. Pt was educated on home management and displayed good understanding. Pt is discharged at this time     Ihsan Is progressing well towards his goals.   Pt prognosis is Good.     Pt will continue to benefit from skilled outpatient physical therapy to address the deficits listed in the problem list box on initial evaluation, provide pt/family education and to maximize pt's level of independence in the home and community environment.   Pt's spiritual, cultural and educational needs considered and pt agreeable to plan of care and goals.     Anticipated Barriers for therapy: none     The following goals were discussed with the patient and patient is in agreement with them as to be addressed in the treatment plan.      Short Term Goals: (6 weeks)  1. Patient will show decreased girth in R LE by up to 1 cm to allow for LE symmetry, shoe and clothing choice, and ability  to apply needed compression.  MET   2. Patient will demonstrate 100% knowledge of lymphedema precautions and signs of infection to allow for reduced lymphedema risk, infection risk, and/or exacerbation of condition.  MET   3. Patient or caregiver will perform self-bandaging techniques and/or wearing of compression garments to allow for lymphatic drainage support, skin elasticity, and reduction in shape and size of limb. (MET   4. Patient will perform self lymph drainage techniques to areas within reach to enhance lymphatic drainage and skin condition.  MET   5. Patient will tolerate daily activities with multilayered bandaging to allow for lymphatic and venous support. MET      Long Term Goals: (12  weeks)  1. Patient will show decreased girth in R LE by up to 2 cm  to allow for LE symmetry, shoe and clothing choice, and ability to apply needed compression daily. MET   2. Patient will show reduction in density to mild or less with improved contour of limb to allow for cosmesis, LE symmetry, infection risk reduction, and clothing and compression choice.  MET   3. Patient to abel/doff compression garment with daily compliance to assist in lymphedema management, skin elasticity, and tissue density.  MET   4. Pt to show improved postural awareness and alignment. MET   5. Pt to be I and compliant with HEP to allow for increased function in affected limb.  MET   Plan   Pt discharged at this time       Christal Ashraf, PT

## 2022-08-26 ENCOUNTER — OFFICE VISIT (OUTPATIENT)
Dept: UROLOGY | Facility: CLINIC | Age: 62
End: 2022-08-26
Payer: COMMERCIAL

## 2022-08-26 VITALS
SYSTOLIC BLOOD PRESSURE: 121 MMHG | DIASTOLIC BLOOD PRESSURE: 74 MMHG | HEART RATE: 73 BPM | WEIGHT: 235.88 LBS | HEIGHT: 68 IN | BODY MASS INDEX: 35.75 KG/M2

## 2022-08-26 DIAGNOSIS — C61 PROSTATE CANCER: Primary | ICD-10-CM

## 2022-08-26 PROCEDURE — 1160F PR REVIEW ALL MEDS BY PRESCRIBER/CLIN PHARMACIST DOCUMENTED: ICD-10-PCS | Mod: CPTII,S$GLB,, | Performed by: NURSE PRACTITIONER

## 2022-08-26 PROCEDURE — 99214 OFFICE O/P EST MOD 30 MIN: CPT | Mod: S$GLB,,, | Performed by: NURSE PRACTITIONER

## 2022-08-26 PROCEDURE — 3078F DIAST BP <80 MM HG: CPT | Mod: CPTII,S$GLB,, | Performed by: NURSE PRACTITIONER

## 2022-08-26 PROCEDURE — 99214 PR OFFICE/OUTPT VISIT, EST, LEVL IV, 30-39 MIN: ICD-10-PCS | Mod: S$GLB,,, | Performed by: NURSE PRACTITIONER

## 2022-08-26 PROCEDURE — 3008F PR BODY MASS INDEX (BMI) DOCUMENTED: ICD-10-PCS | Mod: CPTII,S$GLB,, | Performed by: NURSE PRACTITIONER

## 2022-08-26 PROCEDURE — 87086 URINE CULTURE/COLONY COUNT: CPT | Performed by: NURSE PRACTITIONER

## 2022-08-26 PROCEDURE — 1160F RVW MEDS BY RX/DR IN RCRD: CPT | Mod: CPTII,S$GLB,, | Performed by: NURSE PRACTITIONER

## 2022-08-26 PROCEDURE — 3008F BODY MASS INDEX DOCD: CPT | Mod: CPTII,S$GLB,, | Performed by: NURSE PRACTITIONER

## 2022-08-26 PROCEDURE — 1159F PR MEDICATION LIST DOCUMENTED IN MEDICAL RECORD: ICD-10-PCS | Mod: CPTII,S$GLB,, | Performed by: NURSE PRACTITIONER

## 2022-08-26 PROCEDURE — 1159F MED LIST DOCD IN RCRD: CPT | Mod: CPTII,S$GLB,, | Performed by: NURSE PRACTITIONER

## 2022-08-26 PROCEDURE — 3078F PR MOST RECENT DIASTOLIC BLOOD PRESSURE < 80 MM HG: ICD-10-PCS | Mod: CPTII,S$GLB,, | Performed by: NURSE PRACTITIONER

## 2022-08-26 PROCEDURE — 3074F PR MOST RECENT SYSTOLIC BLOOD PRESSURE < 130 MM HG: ICD-10-PCS | Mod: CPTII,S$GLB,, | Performed by: NURSE PRACTITIONER

## 2022-08-26 PROCEDURE — 3074F SYST BP LT 130 MM HG: CPT | Mod: CPTII,S$GLB,, | Performed by: NURSE PRACTITIONER

## 2022-08-26 RX ORDER — CIPROFLOXACIN 500 MG/1
500 TABLET ORAL 2 TIMES DAILY
Qty: 4 TABLET | Refills: 0 | Status: SHIPPED | OUTPATIENT
Start: 2022-08-26 | End: 2022-08-28

## 2022-08-26 NOTE — PROGRESS NOTES
"Subjective:      Ihsan Mays Sr. is a 61 y.o. male who returns today regarding his prostate cancer.    The patient has prostate cancer ([place] 6 cT1 cNc cMx) on active surveillance    Component      Latest Ref Rng & Units 8/22/2022 2/17/2022 5/4/2021 10/5/2020   PSA Total      0.00 - 4.00 ng/mL   4.6 (H)    PSA, Free      0.01 - 1.50 ng/mL   0.74    PSA, Free %      Not established %   16.09    PSA, Screen      0.00 - 4.00 ng/mL    4.3 (H)   PSA Diagnostic      0.00 - 4.00 ng/mL 5.1 (H) 4.7 (H)       The following portions of the patient's history were reviewed and updated as appropriate: allergies, current medications, past family history, past medical history, past social history, past surgical history and problem list.    Review of Systems  Constitutional: no fever or chills  ENT: no nasal congestion or sore throat  Respiratory: no cough or shortness of breath  Cardiovascular: no chest pain or palpitations  Gastrointestinal: no nausea or vomiting, tolerating diet  Genitourinary: as per HPI  Hematologic/Lymphatic: no easy bruising or lymphadenopathy  Musculoskeletal: no arthralgias or myalgias  Neurological: no seizures or tremors  Behavioral/Psych: no auditory or visual hallucinations     Objective:   Vitals: /74   Pulse 73   Ht 5' 8" (1.727 m)   Wt 107 kg (235 lb 14.3 oz)   BMI 35.87 kg/m²     Physical Exam   General: alert and oriented, no acute distress  Head: normocephalic, atraumatic  Neck: supple, normal ROM  Respiratory: Symmetric expansion, non-labored breathing  Cardiovascular: regular rate and rhythm  Abdomen: soft, non tender, non distended  Genitourinary: deferred  Skin: normal coloration and turgor, no rashes, no suspicious skin lesions noted  Neuro: alert and oriented x3, no gross deficits  Psych: normal judgment and insight, normal mood/affect and non-anxious    Lab Review   Urinalysis demonstrates negative for all components  Lab Results   Component Value Date    WBC 6.81 " 03/08/2022    HGB 13.9 (L) 03/08/2022    HCT 42.3 03/08/2022    MCV 98 03/08/2022     03/08/2022     Lab Results   Component Value Date    CREATININE 0.8 03/08/2022    BUN 12 03/08/2022     Lab Results   Component Value Date    PSA 4.3 (H) 10/05/2020     Imaging   None    Assessment:   Prostate cancer    Plan:   Ihsan was seen today for follow-up.    Diagnoses and all orders for this visit:    Prostate cancer  -     POCT URINE DIPSTICK WITHOUT MICROSCOPE  -     Urine culture  -     Basic Metabolic Panel; Future  -     MRI Prostate W W/O Contrast; Future  -     Transrectal Ultrasound w/ Biopsy; Future  -     ciprofloxacin HCl (CIPRO) 500 MG tablet; Take 1 tablet (500 mg total) by mouth 2 (two) times daily. Begin taking the day before the biopsy for 4 doses      Plan:  --Slight rise in PSA  --Urine culture  --Prostate MRI followed by uronav bx with Dr. Jansen (bx handout reviewed)

## 2022-08-27 LAB
BACTERIA UR CULT: NORMAL
BACTERIA UR CULT: NORMAL

## 2022-08-29 DIAGNOSIS — R82.90 ABNORMAL URINALYSIS: Primary | ICD-10-CM

## 2022-08-30 ENCOUNTER — TELEPHONE (OUTPATIENT)
Dept: UROLOGY | Facility: CLINIC | Age: 62
End: 2022-08-30
Payer: COMMERCIAL

## 2022-09-28 ENCOUNTER — LAB VISIT (OUTPATIENT)
Dept: LAB | Facility: HOSPITAL | Age: 62
End: 2022-09-28
Attending: INTERNAL MEDICINE
Payer: COMMERCIAL

## 2022-09-28 DIAGNOSIS — C61 PROSTATE CANCER: ICD-10-CM

## 2022-09-28 LAB
ANION GAP SERPL CALC-SCNC: 7 MMOL/L (ref 8–16)
BUN SERPL-MCNC: 10 MG/DL (ref 8–23)
CALCIUM SERPL-MCNC: 9.5 MG/DL (ref 8.7–10.5)
CHLORIDE SERPL-SCNC: 105 MMOL/L (ref 95–110)
CO2 SERPL-SCNC: 28 MMOL/L (ref 23–29)
CREAT SERPL-MCNC: 0.9 MG/DL (ref 0.5–1.4)
EST. GFR  (NO RACE VARIABLE): >60 ML/MIN/1.73 M^2
GLUCOSE SERPL-MCNC: 91 MG/DL (ref 70–110)
POTASSIUM SERPL-SCNC: 3.6 MMOL/L (ref 3.5–5.1)
SODIUM SERPL-SCNC: 140 MMOL/L (ref 136–145)

## 2022-09-28 PROCEDURE — 36415 COLL VENOUS BLD VENIPUNCTURE: CPT | Performed by: NURSE PRACTITIONER

## 2022-09-28 PROCEDURE — 80048 BASIC METABOLIC PNL TOTAL CA: CPT | Performed by: NURSE PRACTITIONER

## 2022-09-29 ENCOUNTER — PATIENT MESSAGE (OUTPATIENT)
Dept: UROLOGY | Facility: CLINIC | Age: 62
End: 2022-09-29
Payer: COMMERCIAL

## 2022-09-29 ENCOUNTER — PATIENT MESSAGE (OUTPATIENT)
Dept: INTERNAL MEDICINE | Facility: CLINIC | Age: 62
End: 2022-09-29
Payer: COMMERCIAL

## 2022-09-30 NOTE — TELEPHONE ENCOUNTER
Spoke to pt and his MRi was already enter and authorized by doctor that order the MRI .  Suggested he contact his insurance to discuss the cost

## 2022-10-01 NOTE — PROGRESS NOTES
[place]    Subjective:       Patient ID: Ihsan Mays Sr. is a 57 y.o. male.    Chief Complaint: Follow-up  Ihsan Mays Sr. 57 y.o. male is here for office visit to review care and physical exam, reports having chest pain lately. States pain comes and goes.  Says it feels like a pulled muscle?  Also says feels like pressure.  Did go to an  and had ekg, no labs, told to see pCP asap?  HPI  Review of Systems   Constitutional: Negative for activity change, appetite change, fatigue, fever and unexpected weight change.   HENT: Negative for congestion, hearing loss, postnasal drip and rhinorrhea.    Eyes: Negative for pain, discharge and visual disturbance.   Respiratory: Negative for cough, choking and shortness of breath.    Cardiovascular: Negative for chest pain, palpitations and leg swelling.   Gastrointestinal: Negative for abdominal pain, diarrhea and vomiting.   Genitourinary: Negative for dysuria, flank pain, hematuria and urgency.   Musculoskeletal: Negative for arthralgias, back pain, joint swelling and neck pain.   Skin: Negative for color change and rash.   Neurological: Negative for dizziness, tremors, syncope, weakness, numbness and headaches.   Psychiatric/Behavioral: Negative for agitation and confusion. The patient is not hyperactive.        Objective:      Physical Exam   Constitutional: He is oriented to person, place, and time. He appears well-developed and well-nourished.   HENT:   Head: Normocephalic.   Eyes: EOM are normal. Pupils are equal, round, and reactive to light.   Neck: Normal range of motion. Neck supple. No thyromegaly present.   Cardiovascular: Normal rate.  Exam reveals no gallop and no friction rub.    No murmur heard.  Pulmonary/Chest: Effort normal. No respiratory distress. He has no wheezes.   Abdominal: Soft. Bowel sounds are normal. He exhibits no mass. There is no tenderness.   Musculoskeletal: He exhibits no edema or tenderness.   Lymphadenopathy:     He has no cervical  adenopathy.   Neurological: He is alert and oriented to person, place, and time. He has normal reflexes. No cranial nerve deficit.   Skin: Skin is warm. No rash noted.   Psychiatric: He has a normal mood and affect. His behavior is normal.       Assessment:       1. Essential hypertension    2. Iron deficiency anemia due to chronic blood loss    3. Other chest pain        Plan:       Ihsan was seen today for follow-up.    Diagnoses and all orders for this visit:    Essential hypertension  - follow  Iron deficiency anemia due to chronic blood loss  - chart reviewed, follow, had had CRc scr  Other chest pain    - ER advised, called report

## 2022-10-03 ENCOUNTER — HOSPITAL ENCOUNTER (OUTPATIENT)
Dept: RADIOLOGY | Facility: HOSPITAL | Age: 62
Discharge: HOME | End: 2022-10-03
Attending: NURSE PRACTITIONER
Payer: COMMERCIAL

## 2022-10-03 DIAGNOSIS — C61 PROSTATE CANCER: ICD-10-CM

## 2022-10-03 PROCEDURE — 72197 MRI PELVIS W/O & W/DYE: CPT | Mod: 26,,, | Performed by: RADIOLOGY

## 2022-10-03 PROCEDURE — 72197 MRI PELVIS W/O & W/DYE: CPT | Mod: TC

## 2022-10-03 PROCEDURE — 72197 MRI PROSTATE W W/O CONTRAST: ICD-10-PCS | Mod: 26,,, | Performed by: RADIOLOGY

## 2022-10-03 PROCEDURE — A9585 GADOBUTROL INJECTION: HCPCS | Performed by: NURSE PRACTITIONER

## 2022-10-03 PROCEDURE — 25500020 PHARM REV CODE 255: Performed by: NURSE PRACTITIONER

## 2022-10-03 RX ORDER — GADOBUTROL 604.72 MG/ML
10 INJECTION INTRAVENOUS
Status: COMPLETED | OUTPATIENT
Start: 2022-10-03 | End: 2022-10-03

## 2022-10-03 RX ADMIN — GADOBUTROL 10 ML: 604.72 INJECTION INTRAVENOUS at 06:10

## 2022-10-04 ENCOUNTER — TELEPHONE (OUTPATIENT)
Dept: UROLOGY | Facility: CLINIC | Age: 62
End: 2022-10-04
Payer: COMMERCIAL

## 2022-10-04 ENCOUNTER — PATIENT MESSAGE (OUTPATIENT)
Dept: UROLOGY | Facility: CLINIC | Age: 62
End: 2022-10-04
Payer: COMMERCIAL

## 2022-10-18 ENCOUNTER — OFFICE VISIT (OUTPATIENT)
Dept: CARDIOLOGY | Facility: CLINIC | Age: 62
End: 2022-10-18
Payer: COMMERCIAL

## 2022-10-18 VITALS
BODY MASS INDEX: 36.36 KG/M2 | WEIGHT: 239.88 LBS | DIASTOLIC BLOOD PRESSURE: 78 MMHG | HEART RATE: 78 BPM | HEIGHT: 68 IN | SYSTOLIC BLOOD PRESSURE: 120 MMHG

## 2022-10-18 DIAGNOSIS — R60.0 EDEMA OF BOTH LOWER EXTREMITIES: ICD-10-CM

## 2022-10-18 DIAGNOSIS — I82.811 SAPHENOUS VEIN CLOT, RIGHT: ICD-10-CM

## 2022-10-18 DIAGNOSIS — E66.9 OBESITY (BMI 30-39.9): ICD-10-CM

## 2022-10-18 DIAGNOSIS — I89.0 LYMPHEDEMA OF BOTH LOWER EXTREMITIES: ICD-10-CM

## 2022-10-18 DIAGNOSIS — I10 ESSENTIAL HYPERTENSION: Primary | ICD-10-CM

## 2022-10-18 DIAGNOSIS — E78.2 MIXED HYPERLIPIDEMIA: ICD-10-CM

## 2022-10-18 PROCEDURE — 3074F SYST BP LT 130 MM HG: CPT | Mod: CPTII,S$GLB,, | Performed by: INTERNAL MEDICINE

## 2022-10-18 PROCEDURE — 1160F PR REVIEW ALL MEDS BY PRESCRIBER/CLIN PHARMACIST DOCUMENTED: ICD-10-PCS | Mod: CPTII,S$GLB,, | Performed by: INTERNAL MEDICINE

## 2022-10-18 PROCEDURE — 3078F PR MOST RECENT DIASTOLIC BLOOD PRESSURE < 80 MM HG: ICD-10-PCS | Mod: CPTII,S$GLB,, | Performed by: INTERNAL MEDICINE

## 2022-10-18 PROCEDURE — 99214 OFFICE O/P EST MOD 30 MIN: CPT | Mod: S$GLB,,, | Performed by: INTERNAL MEDICINE

## 2022-10-18 PROCEDURE — 99999 PR PBB SHADOW E&M-EST. PATIENT-LVL III: CPT | Mod: PBBFAC,,, | Performed by: INTERNAL MEDICINE

## 2022-10-18 PROCEDURE — 1160F RVW MEDS BY RX/DR IN RCRD: CPT | Mod: CPTII,S$GLB,, | Performed by: INTERNAL MEDICINE

## 2022-10-18 PROCEDURE — 99214 PR OFFICE/OUTPT VISIT, EST, LEVL IV, 30-39 MIN: ICD-10-PCS | Mod: S$GLB,,, | Performed by: INTERNAL MEDICINE

## 2022-10-18 PROCEDURE — 3078F DIAST BP <80 MM HG: CPT | Mod: CPTII,S$GLB,, | Performed by: INTERNAL MEDICINE

## 2022-10-18 PROCEDURE — 99999 PR PBB SHADOW E&M-EST. PATIENT-LVL III: ICD-10-PCS | Mod: PBBFAC,,, | Performed by: INTERNAL MEDICINE

## 2022-10-18 PROCEDURE — 1159F MED LIST DOCD IN RCRD: CPT | Mod: CPTII,S$GLB,, | Performed by: INTERNAL MEDICINE

## 2022-10-18 PROCEDURE — 3074F PR MOST RECENT SYSTOLIC BLOOD PRESSURE < 130 MM HG: ICD-10-PCS | Mod: CPTII,S$GLB,, | Performed by: INTERNAL MEDICINE

## 2022-10-18 PROCEDURE — 1159F PR MEDICATION LIST DOCUMENTED IN MEDICAL RECORD: ICD-10-PCS | Mod: CPTII,S$GLB,, | Performed by: INTERNAL MEDICINE

## 2022-10-18 NOTE — PROGRESS NOTES
HISTORY:    61-year-old male with a history of hypertension, hyperlipidemia, right lower extremity SVT, venous insufficiency, lymphedema, obesity, and prostate cancer presenting for initial evaluation by me.  The patient is usually followed by Dr. Zambrano.    Patient reports a chronic history of leg swelling secondary to venous insufficiency and secondary lymphedema.  He was noted to have a right lower extremity swelling earlier this year with SSV thrombosis and was treated with rivaroxaban for a period of time. He has since stopped due to cost. He also follows in lymphedema clinic and along with daily compression stocking use and lifestyle modifications reports controlled symptoms. Mild edema that is equal bilaterally with itching. Puts his legs up when he can.    The patient denies any symptoms of chest pain, shortness of breath, or dyspnea on exertion.    The patient denies any previous history of myocardial infarction, coronary artery disease, peripheral arterial disease, stroke, congestive heart failure, or cardiomyopathy.    Bps controlled at home on current regimen.    MEDICATIONS:      Current Outpatient Medications:     amLODIPine (NORVASC) 5 MG tablet, TAKE 1 TABLET BY MOUTH EVERY DAY, Disp: 90 tablet, Rfl: 3    atorvastatin (LIPITOR) 40 MG tablet, Take 1 tablet (40 mg total) by mouth once daily., Disp: 90 tablet, Rfl: 3    carvediloL (COREG) 12.5 MG tablet, Take 1 tablet (12.5 mg total) by mouth 2 (two) times daily with meals., Disp: 180 tablet, Rfl: 3    cyclobenzaprine (FLEXERIL) 10 MG tablet, Take 10 mg by mouth every 8 (eight) hours as needed., Disp: , Rfl:     latanoprost 0.005 % ophthalmic solution, 1 drop every evening., Disp: , Rfl:     rivaroxaban (XARELTO) 10 mg Tab, Take 1 tablet (10 mg total) by mouth daily with dinner or evening meal., Disp: 30 tablet, Rfl: 11    valsartan-hydrochlorothiazide (DIOVAN-HCT) 320-25 mg per tablet, TAKE 1 TABLET BY MOUTH EVERY DAY, Disp: 90 tablet, Rfl:  3      PHYSICAL EXAM:    Vitals:    10/18/22 1422   BP: 120/78   Pulse: 78       NAD, A+Ox3.  No jvd, no bruit.  RRR nml s1,s2. No murmurs.  CTA B no wheezes or crackles.  2+ B radial and 1+ B DP/PT. Mild edema.       LABS/STUDIES (imaging reviewed during clinic visit):    2022 hemoglobin 13.9.  Creatinine 0.9 with BUN of 10.  Albumin 3.9.   HDL 49.  TSH normal.    ECG 2022 sinus rhythm with no Q-waves or ST changes.    KIRILL/PVR 2022 normal bilaterally.  Venous duplex 2022 no evidence of DVT bilaterally.  Bilateral GSV and right SSV reflux noted.  Right SSV demonstrates thickened walls consistent with a history of SVT.      ASSESSMENT & PLAN:    1. Essential hypertension    2. Mixed hyperlipidemia    3. Saphenous vein clot, right    4. Lymphedema of both lower extremities    5. Edema of both lower extremities    6. Obesity (BMI 30-39.9)              History of right lower extremity SVT. Does have prostate ca and Dr. Zambrano favored prolonged AC. Pt prefers not to. No h/o DVT.  Continue conservative therapy with compression stockings, lifestyle modifications, and lymphedema compression devices for management of venous insufficiency and secondary lymphedema.    Blood pressure is controlled on current regimen of valsartan-HCTZ 320-25 x1, carvedilol 12.5 x 2, and amlodipine 5 x 1.   on atorvastatin 40 x 1.    Recommend diet modifications, increased exercise, and weight loss.    Follow-up with Dr. Zambrano in 6 months.       Jesus Raphael MD

## 2023-01-05 ENCOUNTER — TELEPHONE (OUTPATIENT)
Dept: UROLOGY | Facility: CLINIC | Age: 63
End: 2023-01-05
Payer: COMMERCIAL

## 2023-01-05 NOTE — TELEPHONE ENCOUNTER
ARRIVAL TIME HAS BEEN CONFIRMED WITH THE PATIENT FOR January 9,2023 AT THE [facility] FOR 12:30P.SOPHIA MOLINA MA

## 2023-01-09 ENCOUNTER — PROCEDURE VISIT (OUTPATIENT)
Dept: UROLOGY | Facility: CLINIC | Age: 63
End: 2023-01-09
Payer: COMMERCIAL

## 2023-01-09 VITALS
DIASTOLIC BLOOD PRESSURE: 80 MMHG | HEIGHT: 68 IN | HEART RATE: 78 BPM | TEMPERATURE: 99 F | SYSTOLIC BLOOD PRESSURE: 116 MMHG | WEIGHT: 245.5 LBS | RESPIRATION RATE: 16 BRPM | BODY MASS INDEX: 37.21 KG/M2

## 2023-01-09 DIAGNOSIS — C61 PROSTATE CANCER: Primary | ICD-10-CM

## 2023-01-09 PROCEDURE — 55700 TRANSRECTAL ULTRASOUND W/ BIOPSY: ICD-10-PCS | Mod: S$GLB,,, | Performed by: UROLOGY

## 2023-01-09 PROCEDURE — 55700 TRANSRECTAL ULTRASOUND W/ BIOPSY: CPT | Mod: S$GLB,,, | Performed by: UROLOGY

## 2023-01-09 PROCEDURE — 88341 PR IHC OR ICC EACH ADD'L SINGLE ANTIBODY  STAINPR: ICD-10-PCS | Mod: 26,,, | Performed by: PATHOLOGY

## 2023-01-09 PROCEDURE — 88305 TISSUE EXAM BY PATHOLOGIST: CPT | Performed by: PATHOLOGY

## 2023-01-09 PROCEDURE — 88342 IMHCHEM/IMCYTCHM 1ST ANTB: CPT | Mod: 26,,, | Performed by: PATHOLOGY

## 2023-01-09 PROCEDURE — 88341 IMHCHEM/IMCYTCHM EA ADD ANTB: CPT | Mod: 26,,, | Performed by: PATHOLOGY

## 2023-01-09 PROCEDURE — 88305 TISSUE EXAM BY PATHOLOGIST: ICD-10-PCS | Mod: 26,,, | Performed by: PATHOLOGY

## 2023-01-09 PROCEDURE — 88341 IMHCHEM/IMCYTCHM EA ADD ANTB: CPT | Performed by: PATHOLOGY

## 2023-01-09 PROCEDURE — 88342 IMHCHEM/IMCYTCHM 1ST ANTB: CPT | Performed by: PATHOLOGY

## 2023-01-09 PROCEDURE — 96372 THER/PROPH/DIAG INJ SC/IM: CPT | Mod: 59,S$GLB,, | Performed by: UROLOGY

## 2023-01-09 PROCEDURE — 88305 TISSUE EXAM BY PATHOLOGIST: CPT | Mod: 26,,, | Performed by: PATHOLOGY

## 2023-01-09 PROCEDURE — 76872 TRANSRECTAL ULTRASOUND W/ BIOPSY: ICD-10-PCS | Mod: 26,S$GLB,, | Performed by: UROLOGY

## 2023-01-09 PROCEDURE — 76872 US TRANSRECTAL: CPT | Mod: 26,S$GLB,, | Performed by: UROLOGY

## 2023-01-09 PROCEDURE — 96372 PR INJECTION,THERAP/PROPH/DIAG2ST, IM OR SUBCUT: ICD-10-PCS | Mod: 59,S$GLB,, | Performed by: UROLOGY

## 2023-01-09 PROCEDURE — 88342 CHG IMMUNOCYTOCHEMISTRY: ICD-10-PCS | Mod: 26,,, | Performed by: PATHOLOGY

## 2023-01-09 RX ORDER — LIDOCAINE HYDROCHLORIDE 10 MG/ML
10 INJECTION INFILTRATION; PERINEURAL
Status: COMPLETED | OUTPATIENT
Start: 2023-01-09 | End: 2023-01-09

## 2023-01-09 RX ORDER — LIDOCAINE HYDROCHLORIDE 20 MG/ML
JELLY TOPICAL
Status: COMPLETED | OUTPATIENT
Start: 2023-01-09 | End: 2023-01-09

## 2023-01-09 RX ORDER — CEFTRIAXONE 1 G/1
1 INJECTION, POWDER, FOR SOLUTION INTRAMUSCULAR; INTRAVENOUS
Status: COMPLETED | OUTPATIENT
Start: 2023-01-09 | End: 2023-01-09

## 2023-01-09 RX ADMIN — LIDOCAINE HYDROCHLORIDE 10 ML: 10 INJECTION INFILTRATION; PERINEURAL at 01:01

## 2023-01-09 RX ADMIN — LIDOCAINE HYDROCHLORIDE: 20 JELLY TOPICAL at 01:01

## 2023-01-09 RX ADMIN — CEFTRIAXONE 1 G: 1 INJECTION, POWDER, FOR SOLUTION INTRAMUSCULAR; INTRAVENOUS at 01:01

## 2023-01-09 NOTE — PATIENT INSTRUCTIONS
What to Expect After a Prostate Biopsy    Please be sure to finish your pre-procedure antibiotics as instructed.    You may have mild bleeding from the rectum or urine for about 1 week to 1 month, or in your ejaculate for several months. This bleeding is normal and expected, and it will stop. You may have mild discomfort in your rectal or urethral area for 24-48 hours.    You cannot do any strenuous lifting, straining, or exercising for 24 hours. You may return to full activity the day after the biopsy.    You may continue to take all your regular medications after the procedure except for the blood thinners.    You may resume all blood-thinning medications once you no longer see any bleeding or whenever your physician prescribing the medication says it is all right to do so. You may take Tylenol if you have a fever and your temperature is less than 100° F or if you have some discomfort.    You will receive a call from the Urology Department at [facility] with the results of your prostate biopsy within one week.    Signs and Symptoms to Report    Call your [facility] urologist at [phone] if you develop any of the following:  Temperature greater than 101°  F  Inability to urinate  A large amount of bleeding from the rectum or in the urine  Persistent or severe pain    After hours or on weekends, you may reach a urology resident on call at this number: [phone].

## 2023-01-09 NOTE — PROCEDURES
"Transrectal Ultrasound w/ Biopsy    Date/Time: 1/9/2023 1:47 PM  Performed by: Hitesh Jansen MD  Authorized by: Tiana Man NP     Consent Done?:  Yes (Written)  Time out: Immediately prior to procedure a "time out" was called to verify the correct patient, procedure, equipment, support staff and site/side marked as required.    Preparation: Patient was prepped and draped in usual sterile fashion    Anesthesia:  20cc's 1% Lidocaine, Lidocaine jelly and Pudendal nerve block  Patient sedated: No    Prostate Size:  53  Lesions:: No    Left Base Biopsies: 4  Left Mid Biopsies: 2  Left [place] Biopsies: 2  Right Base Biopsies: 2  Right Mid Biopsies: 2  Right [place] Biopsies: 2  Total Biopsies:  14    Patient tolerance:  Patient tolerated the procedure well with no immediate complications     Urine cs neg  Cipro and rocephin given  MR neg; no target    Prostate ca shwetha 6 active surveillance    RTC 2 weeks to see Dr Jansen to review path  Standard instructions given  "

## 2023-01-18 LAB
FINAL PATHOLOGIC DIAGNOSIS: NORMAL
GROSS: NORMAL
Lab: NORMAL

## 2023-01-23 NOTE — PROGRESS NOTES
Subjective:      Ihsan Mays Sr. is a 62 y.o. male who returns today regarding his     No issues following bipsy    No complaints    AUASS0  .    The following portions of the patient's history were reviewed and updated as appropriate: allergies, current medications, past family history, past medical history, past social history, past surgical history and problem list.    Review of Systems  Pertinent items are noted in HPI.  A comprehensive multipoint review of systems was negative except as otherwise stated in the HPI.    Past Medical History:   Diagnosis Date    Glaucoma     Hypertension     Prostate cancer      Past Surgical History:   Procedure Laterality Date    COLONOSCOPY N/A 8/21/2020    Procedure: COLONOSCOPYPrepopik;  Surgeon: Danie Conway MD;  Location: [facility];  Service: Endoscopy;  Laterality: N/A;    ESOPHAGOGASTRODUODENOSCOPY N/A 8/21/2020    Procedure: EGD (ESOPHAGOGASTRODUODENOSCOPY);  Surgeon: Danie Conway MD;  Location: [facility];  Service: Endoscopy;  Laterality: N/A;    FINGER SURGERY      right hand pinkie finger     GLAUCOMA SURGERY      STOMACH SURGERY      ulcers        Review of patient's allergies indicates:  No Known Allergies       Objective:   Vitals: There were no vitals taken for this visit.    Physical Exam   General: alert and oriented, no acute distress  Respiratory: Symmetric expansion, non-labored breathing  Cardiovascular: no peripheral edema  Abdomen: non distended  Skin: normal coloration and turgor, no rashes, no suspicious skin lesions noted  Neuro: no gross deficits  Psych: normal judgment and insight, normal mood/affect, and non-anxious    Physical Exam    Lab Review   Urinalysis demonstrates no specimen    Lab Results   Component Value Date    WBC 6.81 03/08/2022    HGB 13.9 (L) 03/08/2022    HCT 42.3 03/08/2022    MCV 98 03/08/2022     03/08/2022     Lab Results   Component Value Date    CREATININE 0.9 09/28/2022    BUN 10 09/28/2022     Final  Pathologic Diagnosis 1. Prostate, left apex, needle core biopsy:   - Prostatic adenocarcinoma, acinar type, [place] score 3+3=6, grade group 1,   present in 1 of 4 cores and comprising 5% of the total biopsy volume   - Immunostains for AMACR, HMWK, and p63 have been performed with   appropriately reactive controls and the area of interest shows weak AMACR   staining, and lacks basal cell staining with p63 and HMWK, supporting the   above interpretation.   2. Prostate, left middle, needle core biopsy:   - Benign prostatic tissue with atrophic changes   3. Prostate, left base, needle core biopsy:   - Benign prostatic tissue   4. Prostate, right apex, needle core biopsy:   - Benign prostatic tissue   5. Prostate, right middle, needle core biopsy:   - Prostatic adenocarcinoma, acinar type, [place] score 3+3=6, grade group 1,   present in 1 of 2 cores and comprising less than 5% of the total biopsy volume   - Immunostains for AMACR, HMWK, and p63 have been performed with   appropriately reactive controls and the area of interest shows weak AMACR   staining, and lacks basal cell staining with p63 and HMWK, supporting the   above interpretation.   6. Prostate, right base, needle core biopsy:   - Benign prostatic tissue    Comment: Interp By ESTEFANÍA Quintana MD, Signed on 01/18/2023 at 12:14     Lab Results   Component Value Date    PSA 4.3 (H) 10/05/2020    PSA 4.5 (H) 04/15/2020    PSA 2.8 10/09/2018    PSADIAG 5.1 (H) 08/22/2022    PSADIAG 4.7 (H) 02/17/2022    PSATOTAL 4.6 (H) 05/04/2021    PSAFREE 0.74 05/04/2021    PSAFREEPCT 16.09 05/04/2021         Imaging  MRI PROSTATE W W/O CONTRAST     CLINICAL HISTORY:  Prostate cancer, monitor;  Malignant neoplasm of prostate     Additional history: Jeff 6 cT1; PSA 5.1.     TECHNIQUE:  Multiparametric MRI of the prostate/pelvis performed on a 3T scanner with phase pelvic coil. Multiplanar, multisequence images including high resolution, small field-of-view T2-WI; axial  diffusion weighted images with multiple B-values and creation of ADC-maps; and dynamic contrast enhanced T1-weighted images through the prostate were obtained before, during, and after the administration of 10 cc intravenous gadolinium.     COMPARISON:  MRI prostate 08/22/2021.     FINDINGS:  Previous biopsy: 06/07/2021.  See pathology report.     PSA: 5.1 ng/mL 08/22/2022.     Prior therapy: None     Prostate: 7.4 x 4.9 x 6.3 cm corresponding to a computed volume of 88.7 cc.     Peripheral zone: No focal abnormalities with imaging features concerning for prostate cancer, score 1.  Prior foci of post biopsy hemorrhage is less conspicuous on today's exam.     Transitional zone: Benign prostatic hyperplasia without focal suspicious abnormality, score 2.     Neurovascular bundle: Unremarkable.     Seminal vesicles: Unremarkable.     Adjacent Organ Involvement: No evidence for urinary bladder or rectal invasion.  Stable mild bladder wall thickening.     Lymphadenopathy: None.     Other Findings: None.     Impression:     No suspicious nodule noted within the peripheral zone or transitional zone.     Overall Assessment: PI-RADS 2 - Low (clinically significant cancer is unlikely to be present)     Number of targets created for potential MR/US fusion biopsy     Peripheral zone: 0     Transition zone: 0     Electronically signed by resident: Butch Vasquez  Date:                                            10/04/2022  Time:                                           08:55     Electronically signed by: Aamir Lopes MD  Date:                                            10/04/2022  Time:                                           10:49  Assessment and Plan:   Prostate cancer  [place] 6 3+3 GG1 cT1c cN0 cMx; psa 5.1    We discussed his grade and stage of disease, life expectancy and active surveillance vs treatment.  We discussed the roles of surgery, radiation (external and brachytherapy), HIFU and Cryosurgery, hormonal  therapy and chemotherapy in the treatment of prostate cancer.  We discussed brachytherapy and external radiation therapy and open and robotic surgery.  We discussed the risks and benefits of each. We discussed erectile dysfunction, urinary incontinence and bowel issues.  We discussed referral to radiation oncology to further discuss options.  I answered his questions.    Discussed that active surveillance is the preferred method of management with low grade, low volume disease generally.    Cont active surveillance    RTC 6 months with psa    Enlarged prostate 89g  AUASS 0

## 2023-01-24 ENCOUNTER — OFFICE VISIT (OUTPATIENT)
Dept: UROLOGY | Facility: CLINIC | Age: 63
End: 2023-01-24
Payer: COMMERCIAL

## 2023-01-24 VITALS
SYSTOLIC BLOOD PRESSURE: 116 MMHG | HEIGHT: 68 IN | DIASTOLIC BLOOD PRESSURE: 79 MMHG | WEIGHT: 245 LBS | BODY MASS INDEX: 37.13 KG/M2 | HEART RATE: 78 BPM

## 2023-01-24 DIAGNOSIS — C61 PROSTATE CANCER: Primary | ICD-10-CM

## 2023-01-24 PROCEDURE — 3078F DIAST BP <80 MM HG: CPT | Mod: CPTII,S$GLB,, | Performed by: UROLOGY

## 2023-01-24 PROCEDURE — 3074F SYST BP LT 130 MM HG: CPT | Mod: CPTII,S$GLB,, | Performed by: UROLOGY

## 2023-01-24 PROCEDURE — 99999 PR PBB SHADOW E&M-EST. PATIENT-LVL III: ICD-10-PCS | Mod: PBBFAC,,, | Performed by: UROLOGY

## 2023-01-24 PROCEDURE — 3008F BODY MASS INDEX DOCD: CPT | Mod: CPTII,S$GLB,, | Performed by: UROLOGY

## 2023-01-24 PROCEDURE — 3008F PR BODY MASS INDEX (BMI) DOCUMENTED: ICD-10-PCS | Mod: CPTII,S$GLB,, | Performed by: UROLOGY

## 2023-01-24 PROCEDURE — 99999 PR PBB SHADOW E&M-EST. PATIENT-LVL III: CPT | Mod: PBBFAC,,, | Performed by: UROLOGY

## 2023-01-24 PROCEDURE — 3078F PR MOST RECENT DIASTOLIC BLOOD PRESSURE < 80 MM HG: ICD-10-PCS | Mod: CPTII,S$GLB,, | Performed by: UROLOGY

## 2023-01-24 PROCEDURE — 1159F PR MEDICATION LIST DOCUMENTED IN MEDICAL RECORD: ICD-10-PCS | Mod: CPTII,S$GLB,, | Performed by: UROLOGY

## 2023-01-24 PROCEDURE — 99214 PR OFFICE/OUTPT VISIT, EST, LEVL IV, 30-39 MIN: ICD-10-PCS | Mod: S$GLB,,, | Performed by: UROLOGY

## 2023-01-24 PROCEDURE — 3074F PR MOST RECENT SYSTOLIC BLOOD PRESSURE < 130 MM HG: ICD-10-PCS | Mod: CPTII,S$GLB,, | Performed by: UROLOGY

## 2023-01-24 PROCEDURE — 99214 OFFICE O/P EST MOD 30 MIN: CPT | Mod: S$GLB,,, | Performed by: UROLOGY

## 2023-01-24 PROCEDURE — 1159F MED LIST DOCD IN RCRD: CPT | Mod: CPTII,S$GLB,, | Performed by: UROLOGY

## 2023-02-02 ENCOUNTER — PATIENT MESSAGE (OUTPATIENT)
Dept: INTERNAL MEDICINE | Facility: CLINIC | Age: 63
End: 2023-02-02
Payer: COMMERCIAL

## 2023-02-02 ENCOUNTER — OFFICE VISIT (OUTPATIENT)
Dept: INTERNAL MEDICINE | Facility: CLINIC | Age: 63
End: 2023-02-02
Payer: COMMERCIAL

## 2023-02-02 VITALS
BODY MASS INDEX: 37.82 KG/M2 | RESPIRATION RATE: 15 BRPM | SYSTOLIC BLOOD PRESSURE: 120 MMHG | DIASTOLIC BLOOD PRESSURE: 76 MMHG | HEART RATE: 86 BPM | HEIGHT: 68 IN | WEIGHT: 249.56 LBS | OXYGEN SATURATION: 97 % | TEMPERATURE: 98 F

## 2023-02-02 DIAGNOSIS — I10 ESSENTIAL HYPERTENSION: Primary | ICD-10-CM

## 2023-02-02 DIAGNOSIS — C61 CANCER OF PROSTATE: Primary | ICD-10-CM

## 2023-02-02 DIAGNOSIS — I10 ESSENTIAL HYPERTENSION: ICD-10-CM

## 2023-02-02 DIAGNOSIS — I82.811 SAPHENOUS VEIN CLOT, RIGHT: ICD-10-CM

## 2023-02-02 DIAGNOSIS — B35.3 TINEA PEDIS, UNSPECIFIED LATERALITY: ICD-10-CM

## 2023-02-02 PROCEDURE — 3044F HG A1C LEVEL LT 7.0%: CPT | Mod: CPTII,S$GLB,, | Performed by: INTERNAL MEDICINE

## 2023-02-02 PROCEDURE — 99396 PREV VISIT EST AGE 40-64: CPT | Mod: S$GLB,,, | Performed by: INTERNAL MEDICINE

## 2023-02-02 PROCEDURE — 99999 PR PBB SHADOW E&M-EST. PATIENT-LVL V: ICD-10-PCS | Mod: PBBFAC,,, | Performed by: INTERNAL MEDICINE

## 2023-02-02 PROCEDURE — 3074F PR MOST RECENT SYSTOLIC BLOOD PRESSURE < 130 MM HG: ICD-10-PCS | Mod: CPTII,S$GLB,, | Performed by: INTERNAL MEDICINE

## 2023-02-02 PROCEDURE — 3044F PR MOST RECENT HEMOGLOBIN A1C LEVEL <7.0%: ICD-10-PCS | Mod: CPTII,S$GLB,, | Performed by: INTERNAL MEDICINE

## 2023-02-02 PROCEDURE — 3078F DIAST BP <80 MM HG: CPT | Mod: CPTII,S$GLB,, | Performed by: INTERNAL MEDICINE

## 2023-02-02 PROCEDURE — 3008F PR BODY MASS INDEX (BMI) DOCUMENTED: ICD-10-PCS | Mod: CPTII,S$GLB,, | Performed by: INTERNAL MEDICINE

## 2023-02-02 PROCEDURE — 3008F BODY MASS INDEX DOCD: CPT | Mod: CPTII,S$GLB,, | Performed by: INTERNAL MEDICINE

## 2023-02-02 PROCEDURE — 99999 PR PBB SHADOW E&M-EST. PATIENT-LVL V: CPT | Mod: PBBFAC,,, | Performed by: INTERNAL MEDICINE

## 2023-02-02 PROCEDURE — 3078F PR MOST RECENT DIASTOLIC BLOOD PRESSURE < 80 MM HG: ICD-10-PCS | Mod: CPTII,S$GLB,, | Performed by: INTERNAL MEDICINE

## 2023-02-02 PROCEDURE — 99396 PR PREVENTIVE VISIT,EST,40-64: ICD-10-PCS | Mod: S$GLB,,, | Performed by: INTERNAL MEDICINE

## 2023-02-02 PROCEDURE — 3074F SYST BP LT 130 MM HG: CPT | Mod: CPTII,S$GLB,, | Performed by: INTERNAL MEDICINE

## 2023-02-02 RX ORDER — MODERNA COVID-19 VACCINE, BIVALENT 25; 25 UG/.5ML; UG/.5ML
INJECTION, SUSPENSION INTRAMUSCULAR
COMMUNITY
Start: 2022-10-22 | End: 2023-07-05 | Stop reason: ALTCHOICE

## 2023-02-02 RX ORDER — INFLUENZA A VIRUS A/DELAWARE/55/2019 CVR-45 (H1N1) ANTIGEN (MDCK CELL DERIVED, PROPIOLACTONE INACTIVATED), INFLUENZA A VIRUS A/DARWIN/11/2021 (H3N2) ANTIGEN (MDCK CELL DERIVED, PROPIOLACTONE INACTIVATED), INFLUENZA B VIRUS B/SINGAPORE/WUH4618/2021 ANTIGEN (MDCK CELL DERIVED, PROPIOLACTONE INACTIVATED), INFLUENZA B VIRUS B/SINGAPORE/INFTT-16-0610/2016 ANTIGEN (MDCK CELL DERIVED, PROPIOLACTONE INACTIVATED) 15; 15; 15; 15 UG/.5ML; UG/.5ML; UG/.5ML; UG/.5ML
INJECTION, SUSPENSION INTRAMUSCULAR
COMMUNITY
Start: 2022-09-24 | End: 2023-02-02

## 2023-02-02 NOTE — PROGRESS NOTES
Subjective:       Patient ID: Ihsan Mays Sr. is a 62 y.o. male.    Chief Complaint: Annual Exam    HPI  62 y.o. Male here for annual exam.      Vaccines: Influenza (2021); Tetanus (2018); Shingrix (done)  Eye exam: 2018  Colonoscopy: 8/20     Exercise: no  Diet: regular     Past Medical History:  No date: Prostate cancer  No date: Glaucoma  No date: Hypertension  Past Surgical History:  6/22/2015: COLONOSCOPY; N/A      Comment:  Performed by Yosef Pérez MD at [facility] ENDO (4TH FLR)  6/22/2015: ESOPHAGOGASTRODUODENOSCOPY (EGD); N/A      Comment:  Performed by Yosef Pérez MD at [facility] ENDO (4TH FLR)  No date: FINGER SURGERY      Comment:  right hand pinkie finger   No date: GLAUCOMA SURGERY  No date: STOMACH SURGERY      Comment:  ulcers   Social History    Socioeconomic History      Marital status:       Spouse name: Not on file      Number of children: 3      Years of education: Not on file      Highest education level: Not on file    Social Needs      Financial resource strain: Not on file      Food insecurity - worry: Not on file      Food insecurity - inability: Not on file      Transportation needs - medical: Not on file      Transportation needs - non-medical: Not on file       Tobacco Use      Smoking status: Never Smoker      Smokeless tobacco: Never Used    Substance and Sexual Activity      Alcohol use: Yes        Comment: occasionally      Drug use: No      Sexual activity: Yes        Partners: Female       Social History Narrative       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   HENT:  Negative for nasal congestion, mouth sores, postnasal drip, rhinorrhea, sinus pressure/congestion, sneezing, sore throat, trouble swallowing and voice change.    Eyes:  Negative for  discharge, itching and visual disturbance.   Respiratory:  Negative for cough, chest tightness, shortness of breath and wheezing.    Cardiovascular:  Negative for chest pain, palpitations and leg swelling.   Gastrointestinal:  Negative for abdominal pain, blood in stool, constipation, diarrhea, nausea and vomiting.   Endocrine: Negative for cold intolerance and heat intolerance.   Genitourinary:  Negative for difficulty urinating, dysuria, flank pain, hematuria and urgency.   Musculoskeletal:  Negative for arthralgias, back pain, myalgias and neck pain.   Integumentary:  Negative for rash and wound.   Allergic/Immunologic: Negative for environmental allergies and food allergies.   Neurological:  Negative for dizziness, tremors, seizures, syncope, weakness and headaches.   Hematological:  Negative for adenopathy. Does not bruise/bleed easily.   Psychiatric/Behavioral:  Negative for confusion, sleep disturbance and suicidal ideas. The patient is not nervous/anxious.        Objective:      Physical Exam  Constitutional:       General: He is not in acute distress.     Appearance: Normal appearance. He is well-developed. He is not ill-appearing, toxic-appearing or diaphoretic.   HENT:      Head: Normocephalic and atraumatic.      Right Ear: External ear normal.      Left Ear: External ear normal.      Nose: Nose normal.      Mouth/Throat:      Pharynx: No oropharyngeal exudate.   Eyes:      General: No scleral icterus.        Right eye: No discharge.         Left eye: No discharge.      Extraocular Movements: Extraocular movements intact.      Conjunctiva/sclera: Conjunctivae normal.      Pupils: Pupils are equal, round, and reactive to light.   Neck:      Thyroid: No thyromegaly.      Vascular: No JVD.   Cardiovascular:      Rate and Rhythm: Normal rate and regular rhythm.      Pulses: Normal pulses.      Heart sounds: Normal heart sounds. No murmur heard.  Pulmonary:      Effort: Pulmonary effort is normal. No  respiratory distress.      Breath sounds: Normal breath sounds. No wheezing or rales.   Abdominal:      General: Bowel sounds are normal. There is no distension.      Palpations: Abdomen is soft.      Tenderness: There is no abdominal tenderness. There is no right CVA tenderness, left CVA tenderness, guarding or rebound.   Musculoskeletal:      Cervical back: Normal range of motion and neck supple. No rigidity.      Right lower leg: No edema.      Left lower leg: No edema.   Lymphadenopathy:      Cervical: No cervical adenopathy.   Skin:     General: Skin is warm and dry.      Capillary Refill: Capillary refill takes less than 2 seconds.      Coloration: Skin is not pale.      Findings: No rash.   Neurological:      General: No focal deficit present.      Mental Status: He is alert and oriented to person, place, and time. Mental status is at baseline.      Cranial Nerves: No cranial nerve deficit.      Sensory: No sensory deficit.      Motor: No weakness.      Coordination: Coordination normal.      Gait: Gait normal.      Deep Tendon Reflexes: Reflexes normal.   Psychiatric:         Mood and Affect: Mood normal.         Behavior: Behavior normal.         Thought Content: Thought content normal.         Judgment: Judgment normal.       Assessment:       Problem List Items Addressed This Visit          Cardiac/Vascular    Essential hypertension       Hematology    Saphenous vein clot, right    Relevant Medications    apixaban (ELIQUIS) 2.5 mg Tab       Oncology    Cancer of prostate - Primary     Other Visit Diagnoses       Tinea pedis, unspecified laterality        Relevant Orders    Ambulatory referral/consult to Podiatry            Plan:    Blood work ordered     HTN- stable on Diovan HCT/Coreg/Norvasc      ED- controlled on Viagra      HLD- on Lipitor     Prostate cancer- followed by Urology      Thrombosis of right lesser saphenous vein- pt stopped the Xarelto 2/2 cost    -will try Eliquis     F/u in 6  months

## 2023-02-03 ENCOUNTER — OFFICE VISIT (OUTPATIENT)
Dept: PODIATRY | Facility: CLINIC | Age: 63
End: 2023-02-03
Payer: COMMERCIAL

## 2023-02-03 ENCOUNTER — LAB VISIT (OUTPATIENT)
Dept: LAB | Facility: HOSPITAL | Age: 63
End: 2023-02-03
Attending: INTERNAL MEDICINE
Payer: COMMERCIAL

## 2023-02-03 VITALS
SYSTOLIC BLOOD PRESSURE: 133 MMHG | DIASTOLIC BLOOD PRESSURE: 85 MMHG | HEART RATE: 76 BPM | RESPIRATION RATE: 18 BRPM | WEIGHT: 248.38 LBS | HEIGHT: 68 IN | BODY MASS INDEX: 37.64 KG/M2

## 2023-02-03 DIAGNOSIS — B35.3 TINEA PEDIS, UNSPECIFIED LATERALITY: Primary | ICD-10-CM

## 2023-02-03 DIAGNOSIS — I10 ESSENTIAL HYPERTENSION: ICD-10-CM

## 2023-02-03 DIAGNOSIS — Z00.00 ANNUAL PHYSICAL EXAM: ICD-10-CM

## 2023-02-03 DIAGNOSIS — E78.2 MIXED HYPERLIPIDEMIA: ICD-10-CM

## 2023-02-03 LAB
BILIRUB UR QL STRIP: NEGATIVE
CLARITY UR REFRACT.AUTO: CLEAR
COLOR UR AUTO: YELLOW
GLUCOSE UR QL STRIP: NEGATIVE
HGB UR QL STRIP: NEGATIVE
KETONES UR QL STRIP: NEGATIVE
LEUKOCYTE ESTERASE UR QL STRIP: NEGATIVE
MICROSCOPIC COMMENT: NORMAL
NITRITE UR QL STRIP: NEGATIVE
PH UR STRIP: 7 [PH] (ref 5–8)
PROT UR QL STRIP: NEGATIVE
SP GR UR STRIP: 1.01 (ref 1–1.03)
URN SPEC COLLECT METH UR: NORMAL
UROBILINOGEN UR STRIP-ACNC: NEGATIVE EU/DL

## 2023-02-03 PROCEDURE — 1159F MED LIST DOCD IN RCRD: CPT | Mod: CPTII,S$GLB,, | Performed by: PODIATRIST

## 2023-02-03 PROCEDURE — 3075F PR MOST RECENT SYSTOLIC BLOOD PRESS GE 130-139MM HG: ICD-10-PCS | Mod: CPTII,S$GLB,, | Performed by: PODIATRIST

## 2023-02-03 PROCEDURE — 1160F PR REVIEW ALL MEDS BY PRESCRIBER/CLIN PHARMACIST DOCUMENTED: ICD-10-PCS | Mod: CPTII,S$GLB,, | Performed by: PODIATRIST

## 2023-02-03 PROCEDURE — 99203 OFFICE O/P NEW LOW 30 MIN: CPT | Mod: S$GLB,,, | Performed by: PODIATRIST

## 2023-02-03 PROCEDURE — 3079F PR MOST RECENT DIASTOLIC BLOOD PRESSURE 80-89 MM HG: ICD-10-PCS | Mod: CPTII,S$GLB,, | Performed by: PODIATRIST

## 2023-02-03 PROCEDURE — 3008F PR BODY MASS INDEX (BMI) DOCUMENTED: ICD-10-PCS | Mod: CPTII,S$GLB,, | Performed by: PODIATRIST

## 2023-02-03 PROCEDURE — 99203 PR OFFICE/OUTPT VISIT, NEW, LEVL III, 30-44 MIN: ICD-10-PCS | Mod: S$GLB,,, | Performed by: PODIATRIST

## 2023-02-03 PROCEDURE — 3079F DIAST BP 80-89 MM HG: CPT | Mod: CPTII,S$GLB,, | Performed by: PODIATRIST

## 2023-02-03 PROCEDURE — 1159F PR MEDICATION LIST DOCUMENTED IN MEDICAL RECORD: ICD-10-PCS | Mod: CPTII,S$GLB,, | Performed by: PODIATRIST

## 2023-02-03 PROCEDURE — 99999 PR PBB SHADOW E&M-EST. PATIENT-LVL V: CPT | Mod: PBBFAC,,, | Performed by: PODIATRIST

## 2023-02-03 PROCEDURE — 99999 PR PBB SHADOW E&M-EST. PATIENT-LVL V: ICD-10-PCS | Mod: PBBFAC,,, | Performed by: PODIATRIST

## 2023-02-03 PROCEDURE — 1160F RVW MEDS BY RX/DR IN RCRD: CPT | Mod: CPTII,S$GLB,, | Performed by: PODIATRIST

## 2023-02-03 PROCEDURE — 3075F SYST BP GE 130 - 139MM HG: CPT | Mod: CPTII,S$GLB,, | Performed by: PODIATRIST

## 2023-02-03 PROCEDURE — 81000 URINALYSIS NONAUTO W/SCOPE: CPT | Mod: PO | Performed by: INTERNAL MEDICINE

## 2023-02-03 PROCEDURE — 3008F BODY MASS INDEX DOCD: CPT | Mod: CPTII,S$GLB,, | Performed by: PODIATRIST

## 2023-02-03 RX ORDER — NAFTIFINE HYDROCHLORIDE 20 MG/G
1 GEL TOPICAL DAILY
Qty: 45 G | Refills: 2 | Status: SHIPPED | OUTPATIENT
Start: 2023-02-03 | End: 2023-07-05

## 2023-02-03 NOTE — PATIENT INSTRUCTIONS
"Athlete's Foot      Athlete's foot is a skin infection caused by fungus. A fungal infection may occur on any part of the body; on the foot, it is called athletes foot, or tinea pedis. Fungus commonly attacks the feet because it thrives in a dark, moist and warm environment, such as a shoe.    Fungal infections are more common in warm weather when feet tend to sweat more.  Fungus thrives in damp areas, such as swimming pools, showers and locker rooms.  Athletes often have sweaty feet and use the facilities where fungus is commonly found, thus the term "athlete's foot."    Athlete's foot usually produces itchy, dry, scaling skin. It is commonly seen on the soles of the feet and in between the toes. In advanced cases, inflammation, cracks and blisters may form; an infection caused by bacteria can also result. The fungus can spread to other areas of the body, including toenails.    Avoiding walking barefoot combined with good foot hygiene can help reduce the spread of the fungus. Feet should be washed every day with soap and water and thoroughly dried, including between the toes. Feet should be kept as dry as possible. If your feet sweat a lot you may need to change your socks during the day. Antifungal powders, sprays and/or creams are often used to treat athlete's foot. Your foot and ankle surgeon will recommend the best treatment for you.   "

## 2023-02-03 NOTE — PROGRESS NOTES
[facility] - PODIATRY  [address] [place] [address], [address]  [place] [address]  Dept: [phone]  Dept Fax: [phone]    Corey Fry Jr., JOSH     Assessment:   MDM    Coding  1. Tinea pedis, unspecified laterality  Ambulatory referral/consult to Podiatry    naftifine 2 % Gel          Plan:     Procedures  1. Tinea pedis, unspecified laterality  -     Ambulatory referral/consult to Podiatry  -     naftifine 2 % Gel; Apply 1 application topically once daily.  Dispense: 45 g; Refill: 2      Ihsan was seen today for tinea pedis and foot swelling.    Diagnoses and all orders for this visit:    Tinea pedis, unspecified laterality  -     Ambulatory referral/consult to Podiatry  -     naftifine 2 % Gel; Apply 1 application topically once daily.    Other orders  The following orders have not been finalized:  -     Cancel: ketoconazole 2 % Gel        -pt seen, evaluated, and managed  -dx discussed in detail. All questions/concerns addressed  -all tx options discussed. All alternatives, risks, benefits of all txs discussed  -will try topical rx first  -rxs dispensed: naftin gel  -referrals: none  -WB: wbat  - educated on antifungal footcare at home  - rec'd otc antifungal foot powder and spray. Use prn  - discussed nail bx in future in nonresponsive to rxd medication  -Discussed importance of keeping feet dry and changing socks and shoes on a regular basis to prevent spread of fungus      Follow up if symptoms worsen or fail to improve.    Subjective:      Patient ID: Ihsan Mays Sr. is a 62 y.o. male.    Chief Complaint:   Chief Complaint   Patient presents with    Tinea Pedis     Bilateral     Foot Swelling     Right        CC - fungal nails: Patient presents to the clinic complaining of rash on both feet. Patient is inquiring about treatment options.    HPI    Last Podiatry Enc: Visit date not found  Last Enc w/ Me: Visit date not found    Outside reports reviewed: historical medical  records.  Family hx: as below  Past Medical History:   Diagnosis Date    Glaucoma     Hypertension     Prostate cancer      Past Surgical History:   Procedure Laterality Date    COLONOSCOPY N/A 8/21/2020    Procedure: COLONOSCOPYPrepopik;  Surgeon: Danie Conway MD;  Location: [facility] ENDO;  Service: Endoscopy;  Laterality: N/A;    ESOPHAGOGASTRODUODENOSCOPY N/A 8/21/2020    Procedure: EGD (ESOPHAGOGASTRODUODENOSCOPY);  Surgeon: Danie Conway MD;  Location: [facility] ENDO;  Service: Endoscopy;  Laterality: N/A;    FINGER SURGERY      right hand pinkie finger     GLAUCOMA SURGERY      STOMACH SURGERY      ulcers      Family History   Problem Relation Age of Onset    Diabetes Mother     Hypertension Mother     Hypertension Father     Stroke Maternal Aunt     Heart disease Maternal Grandmother     Heart disease Maternal Grandfather     Colon cancer Neg Hx     Esophageal cancer Neg Hx     Rectal cancer Neg Hx     Stomach cancer Neg Hx     Ulcerative colitis Neg Hx     Irritable bowel syndrome Neg Hx     Crohn's disease Neg Hx     Celiac disease Neg Hx     Cancer Neg Hx      Current Outpatient Medications   Medication Sig Dispense Refill    amLODIPine (NORVASC) 5 MG tablet TAKE 1 TABLET BY MOUTH EVERY DAY 90 tablet 3    apixaban (ELIQUIS) 2.5 mg Tab Take 1 tablet (2.5 mg total) by mouth 2 (two) times daily. 60 tablet 11    atorvastatin (LIPITOR) 40 MG tablet Take 1 tablet (40 mg total) by mouth once daily. 90 tablet 3    carvediloL (COREG) 12.5 MG tablet Take 1 tablet (12.5 mg total) by mouth 2 (two) times daily with meals. 180 tablet 3    cyclobenzaprine (FLEXERIL) 10 MG tablet Take 10 mg by mouth every 8 (eight) hours as needed.      latanoprost 0.005 % ophthalmic solution 1 drop every evening.      MODERNA COVID BIVAL,6Y UP,,PF, 50 mcg/0.5 mL injection       valsartan-hydrochlorothiazide (DIOVAN-HCT) 320-25 mg per tablet TAKE 1 TABLET BY MOUTH EVERY DAY 90 tablet 3    naftifine 2 % Gel Apply 1 application topically once  daily. 45 g 2     No current facility-administered medications for this visit.     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      Social Determinants of Health     Financial Resource Strain: Low Risk     Difficulty of Paying Living Expenses: Not hard at all   Food Insecurity: No Food Insecurity    Worried About Running Out of Food in the Last Year: Never true    Ran Out of Food in the Last Year: Never true   Transportation Needs: No Transportation Needs    Lack of Transportation (Medical): No    Lack of Transportation (Non-Medical): No   Physical Activity: Inactive    Days of Exercise per Week: 1 day    Minutes of Exercise per Session: 0 min   Stress: Stress Concern Present    Feeling of Stress : To some extent   Social Connections: Unknown    Frequency of Communication with Friends and Family: More than three times a week    Frequency of Social Gatherings with Friends and Family: Never    Active Member of Clubs or Organizations: Yes    Attends Club or Organization Meetings: More than 4 times per year    Marital Status:    Housing Stability: Low Risk     Unable to Pay for Housing in the Last Year: No    Number of Places Lived in the Last Year: 1    Unstable Housing in the Last Year: No       ROS    REVIEW OF SYSTEMS: Negative as documented below as well as positive findings in bold.       Constitutional  Respiratory  Gastrointestinal  Skin   - Fever - Cough - Heartburn - Rash   - Chills - Spit blood - Nausea - Itching   - Weight Loss - Shortness of breath - Vomiting - Nail pain  "  - Malaise/Fatigue - Wheezing - Abdominal Pain  Wound/Ulcer   - Weight Gain   - Blood in Stool  Poor wound healing       - Diarrhea          Cardiovascular  Genitourinary  Neurological  HEENT   - Chest Pain - Dysuria - Burning Sensation of feet - Headache   - Palpitations - Hematuria - Tingling / Paresthesia - Congestion   - Pain at night in legs - Flank Pain - Dizziness - Sore Throat   - Cramping   - Tremor - Blurred Vision   - Leg Swelling   - Sensory Change - Double Vision   - Dizzy when standing   - Speech Change - Eye Redness       - Focal Weakness - Dry Eyes       - Loss of Consciousness          Endocrine  Musculoskeletal  Psychiatric   - Cold intolerance - Muscle Pain - Depression   - Heat intolerance - Neck Pain - Insomnia   - Anemia - Joint Pain - Memory Loss   -  Easy bruising, bleeding - Heel pain - Anxiety      Toe Pain        Leg/Ankle/Foot Pain         Objective:     /85 (BP Location: Left arm, Patient Position: Standing, BP Method: X-Large (Automatic))   Pulse 76   Resp 18   Ht 5' 8" (1.727 m)   Wt 112.6 kg (248 lb 5.6 oz)   BMI 37.76 kg/m²   Vitals:    02/03/23 0841   BP: 133/85   Pulse: 76   Resp: 18   Weight: 112.6 kg (248 lb 5.6 oz)   Height: 5' 8" (1.727 m)   PainSc: 0-No pain       Physical Exam    General Appearance:   Patient appears well developed, well nourished  Patient appears stated age    Psychiatric:   Patient is oriented to time, place, and person.  Patient has appropriate mood and affect    Neck:  Trachea Midline  No visible masses    Respiratory/Ears:  No distress or labored breathing.  Able to differentiate between normal talking voice and whisper.  Able to follow commands    Eyes:  Visual Acuity intact  Lids and conjunctivae normal. No discoloration noted.    Foot Exam  Physical Exam  Ortho Exam  Ortho/SPM Exam  Physical Exam  Neurologic Exam    B/l LE exam con't:  V:  DP 2/4, PT 2/4   CRT< 3s to all digits tested   Tibial and popliteal lymph nodes are w/o " abnormality    edema present, varicosities absent    N:  Patient displays normal ankle reflexes   SILT in SP/DP/T/Joey/Saph distributions    Ortho: +Motor EHL/FHL/TA/GA   No TTP  Compartments soft/compressible. No pain on passive stretch of big toe. No calf  Pain.    Derm:  skin intact, skin warm and dry, skin without ulcers or lesions, skin without induration, nails normal, + tinea all interspaces with maceration    Imaging / Labs:      No results found.      Note: This was dictated using a computer transcription program. Although proofread, it may contain computer transcription errors and phonetic errors. Other human proofreading errors may also exist. Corrections may be performed at a later time. Please contact us for any clarification if needed.    Corey Fry DPM  [facility]

## 2023-02-07 ENCOUNTER — TELEPHONE (OUTPATIENT)
Dept: INTERNAL MEDICINE | Facility: CLINIC | Age: 63
End: 2023-02-07
Payer: COMMERCIAL

## 2023-02-07 NOTE — TELEPHONE ENCOUNTER
" ----- Message from Elliot Ybarra DO sent at 2/6/2023 10:38 AM CST -----  You have developed Prediabetes, main thing is getting some weight off with exercise. Also need to lower intake of carbohydrates. I want you on a sugar busters diet(google it)    Lipids look good  Normal kidney/thyroid function  Trace anemia which is stable      FYI:  Low Carb Snacks & Diabetes friendly foods   Snacks can be an important part of a balanced, healthy meal plan.   They allow you to eat more frequently, feeling full and satisfied throughout the day.   Also, they allow you to spread carbohydrates evenly, which may stabilize blood sugars.  Plus, snacks are enjoyable!     The amount of carbohydrate needed at snacks varies. Generally, about 15-30 grams of carbohydrate per snack is recommended.    Below you will find some tasty treats.       0-5 gm carb  Crystal Light flavoring (I like fruit punch flavor!)  Vitamin Water Zero  Angie antioxidant drinks.   Herbal tea, unsweetened  2 tsp peanut butter on celery or carrots  1/2 cup sugar-free jell-o  1 sugar-free popsicle  ¼ cup blueberries or strawberries  8oz Blue Yadi unsweetened almond milk (or there is sugar free vanilla flavored as well).  5 baby carrots & celery sticks, cucumbers, bell peppers dipped in ¼ cup salsa, 2Tbsp light ranch dressing or 2Tbsp plain Greek yogurt  10 Goldfish crackers  ½ oz low-fat cheese or string cheese  1 closed handful of nuts, unsalted (example-->almonds, pistachios, cashews, peanuts, etc).  1 Tbsp of sunflower seeds, unsalted  1 cup Smart Pop popcorn  1 whole grain brown rice cake   "Think Thin" protein bars - my personal favorite is peanut butter, chocolate brownie, and oreo  Quest bars (my favorite is birthday cake, and also cinnamon roll)    Premier protein shakes - sold at [employer], or other brand alternatives - usually 1 - 2 grams of carbs (strawberry, vanilla, chocolate flavors)  Scrambled eggs! Or a fried egg or boiled eggs - add sliced " "tomatoes, cilantro or some chopped green onions.        15 gm carb  1 small piece of fruit or ½ banana or 1/2 cup lite canned fruit  3 sanjay cracker squares  3 cups Smart Pop popcorn, top spray butter, [place] lite salt or cinnamon and Truvia  5 Vanilla Wafers  ½ cup low fat, no added sugar ice cream or frozen yogurt (Blue bell, Blue Bunny, Weight Watchers, Skinny Cow)  ½ turkey, ham, or chicken sandwich  ½ c fruit with ½ c Cottage cheese  4-6 unsalted wheat crackers with 1 oz low fat cheese or 1 tbsp peanut butter   30-45 goldfish crackers (depending on flavor)   7-8 Zoroastrian mini brown rice cakes (caramel, apple cinnamon, chocolate)   12 Zoroastrian mini brown rice cakes (cheddar, bbq, ranch)   1/3 cup hummus dip with raw veg  1/2 whole wheat farhad, 1Tbsp hummus  Mini Pizza (1/2 whole wheat English muffin, low-fat  cheese, tomato sauce)  100 calorie snack pack (Oreo, Chips Ahoy, Ritz Mix, Baked Cheetos)  4-6 oz. light or Greek Style yogurt (Chobani, Yoplait, Okios, [place]oneyfield)  ½ cup sugar-free pudding    6 in. wheat tortilla or farhad oven toasted chips (topped with spray butter flavoring, cinnamon, Truvia OR spray butter, garlic powder, chili powder)   18 BBQ Popchips (available at Target, Whole Foods, Fresh Market)  Mini bagel (small size) toasted - add fat free cream cheese or avocado and sliced tomato on top - yum!   1/2 cup Halo top icecream - birthday cake is my go-to flavor.     Tips and Tricks:   My favorite "secret" seasoning to make things extra yummy is cinnamon for sweet taste or adding   "everything but the bagel" seasoning (you can get on amazon.com or at  joes. I have seen at local groceries such as [employer] too!).     Dark colored fruits are your friend -- blueberries, strawberries, raspberries, blackberries. They have a lower sugar content. So will be the best choice for you.   Light colored fruits are NOT your friend - they tend to be higher in sugar and are not the best options for an ADA diet - " "examples are oranges, bananas, peaches, watermelon, -- you can eat these, but carefully and in moderation.     Other snack choices   Celery with peanut butter  Celery with tuna salad  Dill pickles and cheddar cheese (no kidding, it's a great combo)  Nuts (keep raw ones in the freezer if you think you'll overeat them)  Sunflower seeds (get them in the shell so it will take longer to eat them)  Other seeds (How to Toast Pumpkin or Squash Seeds)   Pistachios or almonds - will fill you up and are tasty!   Low-Carb Trail Mix  Jerky (beef or turkey -- try to find low-sugar varieties)  Salami slices (you can find in the deli section)  Cheese sticks, such as string cheese  Sugar-free Jello, alone or with cottage cheese and a sprinkling of nuts. Make sugar-free lime Jello with part coconut milk -- For a large package, dissolve the powder in a cup of boiling water, add a can of coconut milk, and then add the rest of the water. Stir well.  Pepperoni "chips" -- Zap the slices in the microwave  Cheese with a few apple slices  4-ounce plain or sugar-free yogurt with berries and flax seed meal  Smoked salmon and cream cheese on cucumber slices  Lettuce Roll-ups -- Roll luncheon meat, egg salad, tuna or other filling and veggies in lettuce leaves  Lunch Meat Roll-ups -- Roll cheese or veggies in lunch meat (read the labels for carbs on the lunch meat)  Spread bean dip, spinach dip, or other low-carb dip or spread on the lunch meat or lettuce and then roll it up  Raw veggies and spinach dip, or other low-carb dip  Pork rinds (Chicharrón), with or without dip  Ricotta cheese with fruit and/or nuts and/or flax seed meal  Mushrooms with cheese spread inside (or other spreads or dips)  Low-carb snack bars (watch out for sugar alcohols, especially maltitol)  Product Review: Atkins Advantage Bars  Pepperoni Chips -- Microwave pepperoni slices until crisp. Great with cheeses and dips  Garlic Parmesan Flax Seed Crackers  Parmesan Crisps -- " Good when you want a crunchy snack.  Peanut Butter Protein Balls

## 2023-02-08 ENCOUNTER — TELEPHONE (OUTPATIENT)
Dept: PODIATRY | Facility: CLINIC | Age: 63
End: 2023-02-08
Payer: COMMERCIAL

## 2023-02-08 NOTE — TELEPHONE ENCOUNTER
----- Message from Aaron Serrato sent at 2/8/2023  8:20 AM CST -----  Type:  Needs Medical Advice    Who Called: Pt  Pharmacy name and phone #:  [id number] [address] AT [facility]   Phone: [phone]  Fax:  [phone]  Would the patient rather a call back or a response via [facility]? call  Best Call Back Number: [phone]  Additional Information: Pt calling regarding strip that was to be sent to pharmacy (naftifine 2 % Gel) pt has not received please send to above pharmacy

## 2023-02-08 NOTE — TELEPHONE ENCOUNTER
RX - naftifine 2 % Gel called into [facility] in [place] per patient request Rx given to Ashley quarles

## 2023-02-09 ENCOUNTER — TELEPHONE (OUTPATIENT)
Dept: PODIATRY | Facility: CLINIC | Age: 63
End: 2023-02-09
Payer: COMMERCIAL

## 2023-02-09 NOTE — TELEPHONE ENCOUNTER
----- Message from Malaika Malin sent at 2/9/2023  2:19 PM CST -----  Type:  Needs Medical Advice    Who Called: pt  Symptoms (please be specific): pt needs to get a return call pt needs to get a medication for his feet and the provider sent a Jell to the pharmacy and the pt insurance is denying it    How long has patient had these symptoms:    Pharmacy :[id number] - [address] AT [address] OF [address] [address]  Phone: [phone] Fax: [phone]      Would the patient rather a call back or a response via [facility]? call  Best Call Back Number: [phone]  Additional Information:

## 2023-02-09 NOTE — TELEPHONE ENCOUNTER
Spoke to patient, his Rx Naftifine 2% needs prior Authorization, Rx will be sent to [facility] . Informed patient they will work on Authorization and will contact office once approved or denied and podiatry dept will reach out to him

## 2023-02-13 ENCOUNTER — TELEPHONE (OUTPATIENT)
Dept: PHARMACY | Facility: CLINIC | Age: 63
End: 2023-02-13
Payer: COMMERCIAL

## 2023-07-05 ENCOUNTER — OFFICE VISIT (OUTPATIENT)
Dept: CARDIOLOGY | Facility: CLINIC | Age: 63
End: 2023-07-05
Payer: COMMERCIAL

## 2023-07-05 VITALS
SYSTOLIC BLOOD PRESSURE: 125 MMHG | BODY MASS INDEX: 35.52 KG/M2 | HEIGHT: 68 IN | HEART RATE: 59 BPM | DIASTOLIC BLOOD PRESSURE: 82 MMHG | WEIGHT: 234.38 LBS

## 2023-07-05 DIAGNOSIS — E66.01 SEVERE OBESITY (BMI 35.0-39.9) WITH COMORBIDITY: ICD-10-CM

## 2023-07-05 DIAGNOSIS — M54.50 CHRONIC RIGHT-SIDED LOW BACK PAIN WITHOUT SCIATICA: ICD-10-CM

## 2023-07-05 DIAGNOSIS — C61 CANCER OF PROSTATE: ICD-10-CM

## 2023-07-05 DIAGNOSIS — R60.0 EDEMA OF BOTH LOWER EXTREMITIES: Primary | ICD-10-CM

## 2023-07-05 DIAGNOSIS — G89.29 CHRONIC RIGHT-SIDED LOW BACK PAIN WITHOUT SCIATICA: ICD-10-CM

## 2023-07-05 DIAGNOSIS — R20.0 NUMBNESS IN RIGHT LEG: ICD-10-CM

## 2023-07-05 DIAGNOSIS — I10 ESSENTIAL HYPERTENSION: ICD-10-CM

## 2023-07-05 DIAGNOSIS — I82.811 SAPHENOUS VEIN CLOT, RIGHT: ICD-10-CM

## 2023-07-05 DIAGNOSIS — M54.16 LUMBAR RADICULOPATHY, CHRONIC: ICD-10-CM

## 2023-07-05 DIAGNOSIS — M54.40 ACUTE RIGHT-SIDED LOW BACK PAIN WITH SCIATICA, SCIATICA LATERALITY UNSPECIFIED: ICD-10-CM

## 2023-07-05 DIAGNOSIS — I89.0 LYMPHEDEMA OF BOTH LOWER EXTREMITIES: ICD-10-CM

## 2023-07-05 DIAGNOSIS — E78.2 MIXED HYPERLIPIDEMIA: ICD-10-CM

## 2023-07-05 PROCEDURE — 3008F BODY MASS INDEX DOCD: CPT | Mod: CPTII,S$GLB,, | Performed by: INTERNAL MEDICINE

## 2023-07-05 PROCEDURE — 3074F SYST BP LT 130 MM HG: CPT | Mod: CPTII,S$GLB,, | Performed by: INTERNAL MEDICINE

## 2023-07-05 PROCEDURE — 3044F HG A1C LEVEL LT 7.0%: CPT | Mod: CPTII,S$GLB,, | Performed by: INTERNAL MEDICINE

## 2023-07-05 PROCEDURE — 3074F PR MOST RECENT SYSTOLIC BLOOD PRESSURE < 130 MM HG: ICD-10-PCS | Mod: CPTII,S$GLB,, | Performed by: INTERNAL MEDICINE

## 2023-07-05 PROCEDURE — 3044F PR MOST RECENT HEMOGLOBIN A1C LEVEL <7.0%: ICD-10-PCS | Mod: CPTII,S$GLB,, | Performed by: INTERNAL MEDICINE

## 2023-07-05 PROCEDURE — 99215 OFFICE O/P EST HI 40 MIN: CPT | Mod: S$GLB,,, | Performed by: INTERNAL MEDICINE

## 2023-07-05 PROCEDURE — 3079F PR MOST RECENT DIASTOLIC BLOOD PRESSURE 80-89 MM HG: ICD-10-PCS | Mod: CPTII,S$GLB,, | Performed by: INTERNAL MEDICINE

## 2023-07-05 PROCEDURE — 3079F DIAST BP 80-89 MM HG: CPT | Mod: CPTII,S$GLB,, | Performed by: INTERNAL MEDICINE

## 2023-07-05 PROCEDURE — 99999 PR PBB SHADOW E&M-EST. PATIENT-LVL IV: ICD-10-PCS | Mod: PBBFAC,,, | Performed by: INTERNAL MEDICINE

## 2023-07-05 PROCEDURE — 3008F PR BODY MASS INDEX (BMI) DOCUMENTED: ICD-10-PCS | Mod: CPTII,S$GLB,, | Performed by: INTERNAL MEDICINE

## 2023-07-05 PROCEDURE — 99215 PR OFFICE/OUTPT VISIT, EST, LEVL V, 40-54 MIN: ICD-10-PCS | Mod: S$GLB,,, | Performed by: INTERNAL MEDICINE

## 2023-07-05 PROCEDURE — 1159F MED LIST DOCD IN RCRD: CPT | Mod: CPTII,S$GLB,, | Performed by: INTERNAL MEDICINE

## 2023-07-05 PROCEDURE — 1159F PR MEDICATION LIST DOCUMENTED IN MEDICAL RECORD: ICD-10-PCS | Mod: CPTII,S$GLB,, | Performed by: INTERNAL MEDICINE

## 2023-07-05 PROCEDURE — 99999 PR PBB SHADOW E&M-EST. PATIENT-LVL IV: CPT | Mod: PBBFAC,,, | Performed by: INTERNAL MEDICINE

## 2023-07-05 NOTE — PROGRESS NOTES
[facility]      Chief Complaint   Patient presents with    Edema of both lower extremities    Lymhedema of both lower extremities       Patient ID: Ihsan Mays Sr. is a 62 y.o. male with RLE superficial vein thrombosis, HTN, prostate cancer, obesity, who presents for a follow up  appointment.  Pertinent history/events are as follows:     -Pt kindly referred by Dr. Ybarra for evaluation of thrombosis of right saphenous vein.    -At our initial clinic visit on 4/11/2022, Mr. Mays reports leg swelling for several years.  On 3/8/2022 he reported right leg swelling and pain to his PCP (Dr. Ybarra), and was sent for a BLE venous ultrasound which revealed thrombosis of the right lesser saphenous vein and no evidence of lower extremity DVT.  He was started on Xarelto at that time.  Mr. Mays reports significant improvement in riight leg swelling and pain since starting Xarelto.  He has no claudication or tissue loss.  No smoking history.  He reports no bleeding issues since starting Xarelto.   Plan:   Thrombosis of right lesser saphenous vein- Likely due to underlying prostate cancer.  Other risk factors include lymphedema and venous insufficiency.  Continue Xarelto 20 mg daily for an additional 2 months, then check BLE venous reflux study to evaluate for resolution of the SVT.  Given underlying prostate cancer, pt will require at least prophylactic dose anticoagulation going forward.     Leg Swelling- Due to lymphedema, possible venous insufficiency, and contribution from amlodipine.  Refer to lymphedema clinic.  Limit sodium intake to 2,000 mg daily.  Limit volume intake to 1.5 liters daily.  Elevate legs when resting.   Obesity- Encourage diet, exercise and weight loss.    HLD- Start atorvastatin 40 mg daily.    -At follow up clinic visit on 6/22/2022, Mr. Mays reported no new symptoms.  Scheduled to start lymphedema clinic therapy in 7/2022.  BLE Venous Ultrasound on 6/16/2022 revealed  the right smaller saphenous vein demonstrates thickened walls with complete compressibility consistent with a history of superficial venous thrombosis.  Bilateral GSV and right lower extremity SSV reflux noted.  No evidence of deep venous thrombosis bilaterally.  Plan:   Thrombosis of right lesser saphenous vein- Likely due to underlying prostate cancer.  Other risk factors include lymphedema and venous insufficiency.  BLE Venous Ultrasound on 6/16/2022 revealed the right smaller saphenous vein demonstrates thickened walls with complete compressibility consistent with a history of superficial venous thrombosis.  Bilateral GSV and right lower extremity SSV reflux noted.  No evidence of deep venous thrombosis bilaterally.  Decrease Xarelto to 10 mg daily indefinitely.  Given underlying prostate cancer, pt will require at least prophylactic dose anticoagulation.     Leg Swelling- Due to lymphedema, possible venous insufficiency, and contribution from amlodipine.  Refer to lymphedema clinic.  Limit sodium intake to 2,000 mg daily.  Limit volume intake to 1.5 liters daily.  Elevate legs when resting.   Obesity- Encourage diet, exercise and weight loss.    HLD- Continue atorvastatin 40 mg daily.      HPI:  Mr. Mays reports occasional numbness/burning sensation in right leg.  States leg swelling remains improved and controlled.  Does not exercise regularly.      Past Medical History:   Diagnosis Date    Glaucoma     Hypertension     Prostate cancer      Past Surgical History:   Procedure Laterality Date    COLONOSCOPY N/A 8/21/2020    Procedure: COLONOSCOPYPrepopik;  Surgeon: Danie Conway MD;  Location: [facility];  Service: Endoscopy;  Laterality: N/A;    ESOPHAGOGASTRODUODENOSCOPY N/A 8/21/2020    Procedure: EGD (ESOPHAGOGASTRODUODENOSCOPY);  Surgeon: Danie Conway MD;  Location: [facility];  Service: Endoscopy;  Laterality: N/A;    FINGER SURGERY      right hand pinkie finger     GLAUCOMA SURGERY      STOMACH  SURGERY      ulcers      Social History     Socioeconomic History    Marital status:     Number of children: 3   Tobacco Use    Smoking status: Never    Smokeless tobacco: Never   Substance and Sexual Activity    Alcohol use: Yes     Comment: occasionally    Drug use: No    Sexual activity: Yes     Partners: Female     Birth control/protection: Post-menopausal   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      Social Determinants of Health     Financial Resource Strain: Low Risk     Difficulty of Paying Living Expenses: Not hard at all   Food Insecurity: No Food Insecurity    Worried About Running Out of Food in the Last Year: Never true    Ran Out of Food in the Last Year: Never true   Transportation Needs: No Transportation Needs    Lack of Transportation (Medical): No    Lack of Transportation (Non-Medical): No   Physical Activity: Inactive    Days of Exercise per Week: 0 days    Minutes of Exercise per Session: 0 min   Stress: Stress Concern Present    Feeling of Stress : To some extent   Social Connections: Unknown    Frequency of Communication with Friends and Family: More than three times a week    Frequency of Social Gatherings with Friends and Family: Once a week    Active Member of Clubs or Organizations: Yes    Attends Club or Organization Meetings: More than 4 times per year    Marital Status:    Housing Stability: Low Risk     Unable to Pay for Housing in the Last Year: No    Number of Places Lived in the Last Year: 1    Unstable Housing in the Last Year: No     Family History   Problem Relation Age of Onset    Diabetes Mother     Hypertension Mother     Hypertension Father     Stroke Maternal Aunt     Heart disease Maternal Grandmother     Heart disease Maternal Grandfather     Colon cancer Neg Hx     Esophageal cancer Neg Hx     Rectal cancer Neg Hx     Stomach cancer Neg Hx     Ulcerative colitis Neg Hx     Irritable bowel syndrome Neg Hx     Crohn's disease  "Neg Hx     Celiac disease Neg Hx     Cancer Neg Hx        Review of patient's allergies indicates:  No Known Allergies    Medication List with Changes/Refills   Current Medications    AMLODIPINE (NORVASC) 5 MG TABLET    TAKE 1 TABLET BY MOUTH EVERY DAY    APIXABAN (ELIQUIS) 2.5 MG TAB    Take 1 tablet (2.5 mg total) by mouth 2 (two) times daily.    ATORVASTATIN (LIPITOR) 40 MG TABLET    TAKE 1 TABLET BY MOUTH EVERY DAY    CARVEDILOL (COREG) 12.5 MG TABLET    Take 1 tablet (12.5 mg total) by mouth 2 (two) times daily with meals.    CICLOPIROX 0.77 % GEL    use as directed per     CYCLOBENZAPRINE (FLEXERIL) 10 MG TABLET    Take 10 mg by mouth every 8 (eight) hours as needed.    LATANOPROST 0.005 % OPHTHALMIC SOLUTION    INSTILL 1 DROP INTO BOTH EYES AT BEDTIME    VALSARTAN-HYDROCHLOROTHIAZIDE (DIOVAN-HCT) 320-25 MG PER TABLET    TAKE 1 TABLET BY MOUTH EVERY DAY   Discontinued Medications    MODERNA COVID BIVAL,6Y UP,,PF, 50 MCG/0.5 ML INJECTION        NAFTIFINE (NAFTIN) 2 % GEL    Apply topically once daily    NAFTIFINE 2 % GEL    Apply 1 application topically once daily.       Review of Systems  Constitution: Denies chills, fever, and sweats.  HENT: Denies headaches or blurry vision.  Cardiovascular: Denies chest pain or irregular heart beat.  Respiratory: Denies cough or shortness of breath.  Gastrointestinal: Denies abdominal pain, nausea, or vomiting.  Musculoskeletal: Positive for leg swelling.  Neurological: Denies dizziness or focal weakness.  Psychiatric/Behavioral: Normal mental status.  Hematologic/Lymphatic: Denies bleeding problem or easy bruising/bleeding.  Skin: Denies rash or suspicious lesions    Physical Examination  /82   Pulse (!) 59   Ht 5' 8" (1.727 m)   Wt 106.3 kg (234 lb 5.6 oz)   BMI 35.63 kg/m²     Constitutional: No acute distress, conversant  HEENT: Sclera anicteric, Pupils equal, round and reactive to light, extraocular motions intact, Oropharynx clear  Neck: No JVD, no carotid " bruits  Cardiovascular: regular rate and rhythm, no murmur, rubs or gallops, normal S1/S2  Pulmonary: Clear to auscultation bilaterally  Abdominal: Abdomen soft, nontender, nondistended, positive bowel sounds  Extremities: BLE's with 1 pitting edema, venous stasis dermatitis, and changes consistent with lymphedema (R>L)   Pulses:  Carotid pulses are 2+ on the right side, and 2+ on the left side.  Radial pulses are 2+ on the right side, and 2+ on the left side.   Femoral pulses are 2+ on the right side, and 2+ on the left side.  Popliteal pulses are 2+ on the right side, and 2+ on the left side.   Dorsalis pedis pulses are 2+ on the right side, and 2+ on the left side.   Posterior tibial pulses are 2+ on the right side, and 2+ on the left side.    Skin: No ecchymosis, erythema, or ulcers  Psych: Alert and oriented x 3, appropriate affect  Neuro: CNII-XII intact, no focal deficits    Labs:  Most Recent Data  CBC:   Lab Results   Component Value Date    WBC 7.91 02/03/2023    HGB 13.5 (L) 02/03/2023    HCT 39.9 (L) 02/03/2023     02/03/2023    MCV 93 02/03/2023    RDW 13.0 02/03/2023     BMP:   Lab Results   Component Value Date     02/03/2023    K 3.6 02/03/2023     02/03/2023    CO2 30 (H) 02/03/2023    BUN 20 02/03/2023    CREATININE 0.77 02/03/2023     (H) 02/03/2023    CALCIUM 9.2 02/03/2023     LFTS;   Lab Results   Component Value Date    PROT 7.5 02/03/2023    ALBUMIN 4.7 02/03/2023    BILITOT 1.0 02/03/2023    AST 32 02/03/2023    ALKPHOS 91 02/03/2023    ALT 41 02/03/2023     COAGS:   Lab Results   Component Value Date    INR 1.0 02/22/2012     FLP:   Lab Results   Component Value Date    CHOL 109 (L) 02/03/2023    HDL 48 02/03/2023    LDLCALC 47.0 (L) 02/03/2023    TRIG 70 02/03/2023    CHOLHDL 44.0 02/03/2023     CARDIAC:   Lab Results   Component Value Date    TROPONINI <0.006 05/03/2018    BNP 17 03/08/2022       Imaging:    BLE Venous Ultrasound 6/16/2022:  No evidence of deep  venous thrombosis bilaterally.  The right smaller saphenous vein demonstrates thickened walls with complete compressibility consistent with a history of superficial venous thrombosis.  Bilateral GSV and right lower extremity SSV reflux noted.    BLE Venous Ultrasound 3/9/2022:  The right lesser saphenous vein SVT.  There is no evidence of a left lower extremity DVT.    Assessment/Plan:  Ihsan Mays Sr. is a 61 y.o. male with RLE superficial vein thrombosis, HTN, prostate cancer, obesity, who presents for a follow up appointment.      1. Thrombosis of right lesser saphenous vein- Likely due to underlying prostate cancer.  Other risk factors include lymphedema and venous insufficiency.  BLE Venous Ultrasound on 6/16/2022 revealed the right smaller saphenous vein demonstrates thickened walls with complete compressibility consistent with a history of superficial venous thrombosis.  Bilateral GSV and right lower extremity SSV reflux noted.  No evidence of deep venous thrombosis bilaterally.  Given underlying prostate cancer, pt will require at least prophylactic dose anticoagulation.  Continue apixaban 2.5 mg bid.      2. Leg Swelling- Due to lymphedema, possible venous insufficiency, and contribution from amlodipine.  Refer to lymphedema clinic.  Limit sodium intake to 2,000 mg daily.  Limit volume intake to 1.5 liters daily.  Elevate legs when resting.     3. Obesity- Encourage diet, exercise and weight loss.      4. HLD- Continue atorvastatin 40 mg daily.      5. RLE Sciatica- Check MRI lumbar spine and refer to Neurosurgery for evaluation.     Follow up in 6 months    Total duration of face to face visit time 30 minutes.  Total time spent counseling greater than fifty percent of total visit time.  Counseling included discussion regarding imaging findings, diagnosis, possibilities, treatment options, risks and benefits.  The patient had many questions regarding the options and long-term effects.    Isaiah JOHNSON  MD Juan Manuel, PhD  Interventional Cardiology

## 2023-07-05 NOTE — PATIENT INSTRUCTIONS
Assessment/Plan:  Ihsan Mays Sr. is a 61 y.o. male with RLE superficial vein thrombosis, HTN, prostate cancer, obesity, who presents for a follow up appointment.      1. Thrombosis of right lesser saphenous vein- Likely due to underlying prostate cancer.  Other risk factors include lymphedema and venous insufficiency.  BLE Venous Ultrasound on 6/16/2022 revealed the right smaller saphenous vein demonstrates thickened walls with complete compressibility consistent with a history of superficial venous thrombosis.  Bilateral GSV and right lower extremity SSV reflux noted.  No evidence of deep venous thrombosis bilaterally.  Given underlying prostate cancer, pt will require at least prophylactic dose anticoagulation.  Continue apixaban 2.5 mg bid.      2. Leg Swelling- Due to lymphedema, possible venous insufficiency, and contribution from amlodipine.  Refer to lymphedema clinic.  Limit sodium intake to 2,000 mg daily.  Limit volume intake to 1.5 liters daily.  Elevate legs when resting.     3. Obesity- Encourage diet, exercise and weight loss.      4. HLD- Continue atorvastatin 40 mg daily.      5. RLE Sciatica- Check MRI lumbar spine and refer to Neurosurgery for evaluation.     Follow up in 6 months

## 2023-07-09 ENCOUNTER — HOSPITAL ENCOUNTER (OUTPATIENT)
Dept: RADIOLOGY | Facility: HOSPITAL | Age: 63
Discharge: HOME | End: 2023-07-09
Attending: INTERNAL MEDICINE
Payer: COMMERCIAL

## 2023-07-09 DIAGNOSIS — M54.16 LUMBAR RADICULOPATHY, CHRONIC: ICD-10-CM

## 2023-07-09 PROCEDURE — 72148 MRI LUMBAR SPINE W/O DYE: CPT | Mod: 26,,, | Performed by: RADIOLOGY

## 2023-07-09 PROCEDURE — 72148 MRI LUMBAR SPINE W/O DYE: CPT | Mod: TC

## 2023-07-09 PROCEDURE — 72148 MRI LUMBAR SPINE WITHOUT CONTRAST: ICD-10-PCS | Mod: 26,,, | Performed by: RADIOLOGY

## 2023-07-21 ENCOUNTER — LAB VISIT (OUTPATIENT)
Dept: LAB | Facility: HOSPITAL | Age: 63
End: 2023-07-21
Attending: UROLOGY
Payer: COMMERCIAL

## 2023-07-21 DIAGNOSIS — C61 PROSTATE CANCER: ICD-10-CM

## 2023-07-21 LAB — COMPLEXED PSA SERPL-MCNC: 3.8 NG/ML (ref 0–4)

## 2023-07-21 PROCEDURE — 36415 COLL VENOUS BLD VENIPUNCTURE: CPT | Mod: PO | Performed by: UROLOGY

## 2023-07-21 PROCEDURE — 84153 ASSAY OF PSA TOTAL: CPT | Performed by: UROLOGY

## 2023-07-24 ENCOUNTER — OFFICE VISIT (OUTPATIENT)
Dept: UROLOGY | Facility: CLINIC | Age: 63
End: 2023-07-24
Attending: UROLOGY
Payer: COMMERCIAL

## 2023-07-24 VITALS
WEIGHT: 239 LBS | DIASTOLIC BLOOD PRESSURE: 77 MMHG | HEART RATE: 64 BPM | BODY MASS INDEX: 36.22 KG/M2 | OXYGEN SATURATION: 97 % | SYSTOLIC BLOOD PRESSURE: 112 MMHG | HEIGHT: 68 IN | RESPIRATION RATE: 16 BRPM

## 2023-07-24 DIAGNOSIS — C61 PROSTATE CANCER: Primary | ICD-10-CM

## 2023-07-24 PROCEDURE — 99214 OFFICE O/P EST MOD 30 MIN: CPT | Mod: S$GLB,,, | Performed by: UROLOGY

## 2023-07-24 PROCEDURE — 99214 PR OFFICE/OUTPT VISIT, EST, LEVL IV, 30-39 MIN: ICD-10-PCS | Mod: S$GLB,,, | Performed by: UROLOGY

## 2023-07-24 NOTE — PROGRESS NOTES
"Subjective:      Ihsan Mays Sr. is a 62 y.o. male who returns today regarding his [place] 3+3 low volume prostate cancer and active surveillance.      62M initially diagnosed with [place] 3+3 low volume prostate cancer initially diagnosed in June 2021.  He has been on active surveillance.  Most recent biopsy was in January of 2023, significant for Jeff 3+3 cancer in 2/14 cores, <5% of total biopsy volume.  Last MRI in Oct 2022 was PIRADS-2.  He returns today to discuss most recent PSA, which is now 3.8 from 5.9 approximately 6 months ago.      Lab Results   Component Value Date    PSA 5.9 (H) 02/03/2023    PSA 4.3 (H) 10/05/2020    PSA 4.5 (H) 04/15/2020    PSADIAG 3.8 07/21/2023    PSADIAG 5.1 (H) 08/22/2022    PSADIAG 4.7 (H) 02/17/2022    PSATOTAL 4.6 (H) 05/04/2021    PSAFREE 0.74 05/04/2021    PSAFREEPCT 16.09 05/04/2021         The following portions of the patient's history were reviewed and updated as appropriate: allergies, current medications, past family history, past medical history, past social history, past surgical history and problem list.    Review of Systems  Pertinent items are noted in HPI.  A comprehensive multipoint review of systems was negative except as otherwise stated in the HPI.     Objective:   Vitals: /77 (BP Location: Left arm, Patient Position: Sitting, BP Method: Large (Automatic))   Pulse 64   Resp 16   Ht 5' 8" (1.727 m)   Wt 108.4 kg (238 lb 15.7 oz)   SpO2 97%   BMI 36.34 kg/m²     Physical Exam   General: alert and oriented, no acute distress  Respiratory: Symmetric expansion, non-labored breathing  Genitourinary: deferred      Lab Review   Urinalysis demonstrates negative for all components  Lab Results   Component Value Date    WBC 7.91 02/03/2023    HGB 13.5 (L) 02/03/2023    HCT 39.9 (L) 02/03/2023    MCV 93 02/03/2023     02/03/2023     Lab Results   Component Value Date    CREATININE 0.77 02/03/2023    BUN 20 02/03/2023     Lab Results "   Component Value Date    PSA 5.9 (H) 02/03/2023    PSADIAG 3.8 07/21/2023       Imaging none    Assessment and Plan:   1. Prostate cancer  - continue active surveillance   - RTC in 6 months with PSA   - PROSTATE SPECIFIC ANTIGEN, DIAGNOSTIC; Future           1. Prostate cancer  -     PROSTATE SPECIFIC ANTIGEN, DIAGNOSTIC; Future; Expected date: 01/24/2024

## 2023-07-27 ENCOUNTER — OFFICE VISIT (OUTPATIENT)
Dept: NEUROSURGERY | Facility: CLINIC | Age: 63
End: 2023-07-27
Payer: COMMERCIAL

## 2023-07-27 VITALS
WEIGHT: 231.69 LBS | BODY MASS INDEX: 35.11 KG/M2 | DIASTOLIC BLOOD PRESSURE: 75 MMHG | HEART RATE: 64 BPM | HEIGHT: 68 IN | SYSTOLIC BLOOD PRESSURE: 108 MMHG

## 2023-07-27 DIAGNOSIS — M54.16 LUMBAR RADICULOPATHY, CHRONIC: ICD-10-CM

## 2023-07-27 DIAGNOSIS — R20.0 NUMBNESS IN RIGHT LEG: ICD-10-CM

## 2023-07-27 PROCEDURE — 99999 PR PBB SHADOW E&M-EST. PATIENT-LVL III: ICD-10-PCS | Mod: PBBFAC,,, | Performed by: NEUROLOGICAL SURGERY

## 2023-07-27 PROCEDURE — 99205 OFFICE O/P NEW HI 60 MIN: CPT | Mod: S$GLB,,, | Performed by: NEUROLOGICAL SURGERY

## 2023-07-27 PROCEDURE — 99999 PR PBB SHADOW E&M-EST. PATIENT-LVL III: CPT | Mod: PBBFAC,,, | Performed by: NEUROLOGICAL SURGERY

## 2023-07-27 PROCEDURE — 99205 PR OFFICE/OUTPT VISIT, NEW, LEVL V, 60-74 MIN: ICD-10-PCS | Mod: S$GLB,,, | Performed by: NEUROLOGICAL SURGERY

## 2023-07-27 NOTE — PROGRESS NOTES
NEUROSURGICAL OUTPATIENT CONSULTATION NOTE    DATE OF SERVICE:  07/27/2023    ATTENDING PHYSICIAN:  Luis Alberto Posada MD    CONSULT REQUESTED BY:  Isaiah Zambrano     REASON FOR CONSULT:  Right lateral hip and leg pain    HISTORY OF PRESENT ILLNESS:  This is a very pleasant 62 y.o. male w prostate cancer under observation who presents with right lateral thigh and hip neuropathy. Pt has had 6 months of mild right sided pain 4/10 1-2 timex a day with no aggrevating/alleviating factors. Denies associated back pain, weakness or any other symptoms. MRI L spine 7/2023 relatively unremarkeable.    PAST MEDICAL HISTORY:  Active Ambulatory Problems     Diagnosis Date Noted    Anemia 06/22/2015    Esophageal reflux 12/29/2015    Lower back pain 07/14/2016    Chest pain 05/03/2018    Essential hypertension 10/08/2018    Erectile dysfunction 10/15/2019    Constipation 04/14/2020    Colon cancer screening 08/21/2020    Elevated PSA 10/16/2020    Decreased range of motion of lumbar spine 05/09/2021    Weakness of both hips 05/09/2021    Hamstring tightness of both lower extremities 05/09/2021    Hip tightness 05/09/2021    Cancer of prostate 08/24/2021    Saphenous vein clot, right 03/15/2022    Mixed hyperlipidemia 04/11/2022    Obesity (BMI 30-39.9) 04/11/2022    Lymphedema of both lower extremities 04/11/2022    Edema of both lower extremities 04/11/2022    Severe obesity (BMI 35.0-39.9) with comorbidity 07/05/2023    Numbness in right leg 07/05/2023     Resolved Ambulatory Problems     Diagnosis Date Noted    Hypertension 12/29/2015    Pre-operative examination for internal medicine 09/20/2017    Chronic low back pain without sciatica 11/19/2018     Past Medical History:   Diagnosis Date    Glaucoma     Prostate cancer        PAST SURGICAL HISTORY:  Past Surgical History:   Procedure Laterality Date    COLONOSCOPY N/A 8/21/2020    Procedure: COLONOSCOPYPrepopik;  Surgeon: Danie Conway MD;  Location: [facility];  Service:  Endoscopy;  Laterality: N/A;    ESOPHAGOGASTRODUODENOSCOPY N/A 8/21/2020    Procedure: EGD (ESOPHAGOGASTRODUODENOSCOPY);  Surgeon: Danie Conway MD;  Location: [facility];  Service: Endoscopy;  Laterality: N/A;    FINGER SURGERY      right hand pinkie finger     GLAUCOMA SURGERY      STOMACH SURGERY      ulcers        SOCIAL HISTORY:   Social History     Socioeconomic History    Marital status:     Number of children: 3   Tobacco Use    Smoking status: Never    Smokeless tobacco: Never   Substance and Sexual Activity    Alcohol use: Yes     Comment: occasionally    Drug use: No    Sexual activity: Yes     Partners: Female     Birth control/protection: Post-menopausal   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      Social Determinants of Health     Financial Resource Strain: Low Risk     Difficulty of Paying Living Expenses: Not hard at all   Food Insecurity: No Food Insecurity    Worried About Running Out of Food in the Last Year: Never true    Ran Out of Food in the Last Year: Never true   Transportation Needs: No Transportation Needs    Lack of Transportation (Medical): No    Lack of Transportation (Non-Medical): No   Physical Activity: Inactive    Days of Exercise per Week: 0 days    Minutes of Exercise per Session: 0 min   Stress: Stress Concern Present    Feeling of Stress : To some extent   Social Connections: Unknown    Frequency of Communication with Friends and Family: More than three times a week    Frequency of Social Gatherings with Friends and Family: Once a week    Active Member of Clubs or Organizations: Yes    Attends Club or Organization Meetings: More than 4 times per year    Marital Status:    Housing Stability: Low Risk     Unable to Pay for Housing in the Last Year: No    Number of Places Lived in the Last Year: 1    Unstable Housing in the Last Year: No       FAMILY HISTORY:  Family History   Problem Relation Age of Onset    Diabetes Mother      Hypertension Mother     Hypertension Father     Stroke Maternal Aunt     Heart disease Maternal Grandmother     Heart disease Maternal Grandfather     Colon cancer Neg Hx     Esophageal cancer Neg Hx     Rectal cancer Neg Hx     Stomach cancer Neg Hx     Ulcerative colitis Neg Hx     Irritable bowel syndrome Neg Hx     Crohn's disease Neg Hx     Celiac disease Neg Hx     Cancer Neg Hx        CURRENTS MEDICATIONS:  Current Outpatient Medications on File Prior to Visit   Medication Sig Dispense Refill    amLODIPine (NORVASC) 5 MG tablet TAKE 1 TABLET BY MOUTH EVERY DAY 90 tablet 3    apixaban (ELIQUIS) 2.5 mg Tab Take 1 tablet (2.5 mg total) by mouth 2 (two) times daily. 60 tablet 11    atorvastatin (LIPITOR) 40 MG tablet TAKE 1 TABLET BY MOUTH EVERY DAY 90 tablet 3    carvediloL (COREG) 12.5 MG tablet TAKE 1 TABLET BY MOUTH TWICE A DAY WITH MEALS 180 tablet 1    ciclopirox 0.77 % Gel use as directed per DR (Patient taking differently: Apply topically as needed.) 30 g 0    cyclobenzaprine (FLEXERIL) 10 MG tablet Take 10 mg by mouth every 8 (eight) hours as needed.      latanoprost 0.005 % ophthalmic solution INSTILL 1 DROP INTO BOTH EYES AT BEDTIME 7.5 mL 6    valsartan-hydrochlorothiazide (DIOVAN-HCT) 320-25 mg per tablet TAKE 1 TABLET BY MOUTH EVERY DAY 90 tablet 3     No current facility-administered medications on file prior to visit.       ALLERGIES:  Review of patient's allergies indicates:  No Known Allergies    REVIEW OF SYSTEMS:  ROS - per HPI    OBJECTIVE:    PHYSICAL EXAMINATION:   Vitals:    07/27/23 1359   BP: 108/75   Pulse: 64       General: well developed, well nourished, no distress.   Head: normocephalic, atraumatic  CV: RRR, pulses equal bilateral  Pulmonary: normal respirations, no signs of respiratory distress  Abdomen: soft, non-distended  Skin: Skin is warm, dry and intact.    Neuro:  Awake, alert, Ox4  PERRL, EOMI; CNII-XII grossly intact  Motor: Follows commands full strength  x4  SILT      DIAGNOSTIC DATA:  I personally interpreted the following imaging:     MRI L spine 7/2023 unremarkable    ASSESMENT:  This is a 62 y.o. male with right lateral leg thigh pain/numbness    Problem List Items Addressed This Visit          Other    Numbness in right leg     Other Visit Diagnoses       Lumbar radiculopathy, chronic                PLAN:  Diagnosis: right thigh neuropathy    Plan: recommend gabapentin after medication review w PCP    Follow-up: LUIS ALBERTO Posada MD, FAANS    Disclaimer: This note was partly generated using dictation software which may occasionally result in transcription errors.

## 2023-08-01 ENCOUNTER — PATIENT OUTREACH (OUTPATIENT)
Dept: ADMINISTRATIVE | Facility: HOSPITAL | Age: 63
End: 2023-08-01
Payer: COMMERCIAL

## 2023-08-02 ENCOUNTER — OFFICE VISIT (OUTPATIENT)
Dept: INTERNAL MEDICINE | Facility: CLINIC | Age: 63
End: 2023-08-02
Payer: COMMERCIAL

## 2023-08-02 ENCOUNTER — TELEPHONE (OUTPATIENT)
Dept: INTERNAL MEDICINE | Facility: CLINIC | Age: 63
End: 2023-08-02

## 2023-08-02 ENCOUNTER — LAB VISIT (OUTPATIENT)
Dept: LAB | Facility: HOSPITAL | Age: 63
End: 2023-08-02
Attending: INTERNAL MEDICINE
Payer: COMMERCIAL

## 2023-08-02 VITALS
DIASTOLIC BLOOD PRESSURE: 72 MMHG | BODY MASS INDEX: 35.11 KG/M2 | RESPIRATION RATE: 18 BRPM | OXYGEN SATURATION: 97 % | HEIGHT: 68 IN | HEART RATE: 49 BPM | WEIGHT: 231.69 LBS | SYSTOLIC BLOOD PRESSURE: 110 MMHG | TEMPERATURE: 97 F

## 2023-08-02 DIAGNOSIS — I10 ESSENTIAL HYPERTENSION: ICD-10-CM

## 2023-08-02 DIAGNOSIS — E78.2 MIXED HYPERLIPIDEMIA: ICD-10-CM

## 2023-08-02 DIAGNOSIS — R73.03 PREDIABETES: ICD-10-CM

## 2023-08-02 DIAGNOSIS — I89.0 LYMPHEDEMA OF BOTH LOWER EXTREMITIES: ICD-10-CM

## 2023-08-02 DIAGNOSIS — E66.01 SEVERE OBESITY (BMI 35.0-39.9) WITH COMORBIDITY: ICD-10-CM

## 2023-08-02 DIAGNOSIS — I87.2 VENOUS INSUFFICIENCY OF BOTH LOWER EXTREMITIES: ICD-10-CM

## 2023-08-02 DIAGNOSIS — Z00.00 ANNUAL PHYSICAL EXAM: ICD-10-CM

## 2023-08-02 DIAGNOSIS — R97.20 ELEVATED PSA: ICD-10-CM

## 2023-08-02 DIAGNOSIS — C61 CANCER OF PROSTATE: Primary | ICD-10-CM

## 2023-08-02 DIAGNOSIS — I10 ESSENTIAL HYPERTENSION: Primary | ICD-10-CM

## 2023-08-02 PROBLEM — Z12.11 COLON CANCER SCREENING: Status: RESOLVED | Noted: 2020-08-21 | Resolved: 2023-08-02

## 2023-08-02 LAB
ALBUMIN SERPL BCP-MCNC: 3.7 G/DL (ref 3.5–5.2)
ALP SERPL-CCNC: 86 U/L (ref 55–135)
ALT SERPL W/O P-5'-P-CCNC: 23 U/L (ref 10–44)
ANION GAP SERPL CALC-SCNC: 6 MMOL/L (ref 8–16)
AST SERPL-CCNC: 22 U/L (ref 10–40)
BASOPHILS # BLD AUTO: 0.02 K/UL (ref 0–0.2)
BASOPHILS NFR BLD: 0.3 % (ref 0–1.9)
BILIRUB SERPL-MCNC: 0.9 MG/DL (ref 0.1–1)
BUN SERPL-MCNC: 14 MG/DL (ref 8–23)
CALCIUM SERPL-MCNC: 9.4 MG/DL (ref 8.7–10.5)
CHLORIDE SERPL-SCNC: 106 MMOL/L (ref 95–110)
CO2 SERPL-SCNC: 27 MMOL/L (ref 23–29)
CREAT SERPL-MCNC: 0.9 MG/DL (ref 0.5–1.4)
DIFFERENTIAL METHOD: ABNORMAL
EOSINOPHIL # BLD AUTO: 0.2 K/UL (ref 0–0.5)
EOSINOPHIL NFR BLD: 3.2 % (ref 0–8)
ERYTHROCYTE [DISTWIDTH] IN BLOOD BY AUTOMATED COUNT: 13.6 % (ref 11.5–14.5)
EST. GFR  (NO RACE VARIABLE): >60 ML/MIN/1.73 M^2
ESTIMATED AVG GLUCOSE: 123 MG/DL (ref 68–131)
GLUCOSE SERPL-MCNC: 152 MG/DL (ref 70–110)
HBA1C MFR BLD: 5.9 % (ref 4–5.6)
HCT VFR BLD AUTO: 38.9 % (ref 40–54)
HGB BLD-MCNC: 12.9 G/DL (ref 14–18)
IMM GRANULOCYTES # BLD AUTO: 0.01 K/UL (ref 0–0.04)
IMM GRANULOCYTES NFR BLD AUTO: 0.2 % (ref 0–0.5)
LYMPHOCYTES # BLD AUTO: 1.5 K/UL (ref 1–4.8)
LYMPHOCYTES NFR BLD: 23.6 % (ref 18–48)
MCH RBC QN AUTO: 31.4 PG (ref 27–31)
MCHC RBC AUTO-ENTMCNC: 33.2 G/DL (ref 32–36)
MCV RBC AUTO: 95 FL (ref 82–98)
MONOCYTES # BLD AUTO: 0.5 K/UL (ref 0.3–1)
MONOCYTES NFR BLD: 8.1 % (ref 4–15)
NEUTROPHILS # BLD AUTO: 4 K/UL (ref 1.8–7.7)
NEUTROPHILS NFR BLD: 64.6 % (ref 38–73)
NRBC BLD-RTO: 0 /100 WBC
PLATELET # BLD AUTO: 170 K/UL (ref 150–450)
PMV BLD AUTO: 11.2 FL (ref 9.2–12.9)
POTASSIUM SERPL-SCNC: 3 MMOL/L (ref 3.5–5.1)
PROT SERPL-MCNC: 6.9 G/DL (ref 6–8.4)
RBC # BLD AUTO: 4.11 M/UL (ref 4.6–6.2)
SODIUM SERPL-SCNC: 139 MMOL/L (ref 136–145)
WBC # BLD AUTO: 6.26 K/UL (ref 3.9–12.7)

## 2023-08-02 PROCEDURE — 99214 PR OFFICE/OUTPT VISIT, EST, LEVL IV, 30-39 MIN: ICD-10-PCS | Mod: S$GLB,,, | Performed by: INTERNAL MEDICINE

## 2023-08-02 PROCEDURE — 99999 PR PBB SHADOW E&M-EST. PATIENT-LVL IV: CPT | Mod: PBBFAC,,, | Performed by: INTERNAL MEDICINE

## 2023-08-02 PROCEDURE — 99214 OFFICE O/P EST MOD 30 MIN: CPT | Mod: S$GLB,,, | Performed by: INTERNAL MEDICINE

## 2023-08-02 PROCEDURE — 85025 COMPLETE CBC W/AUTO DIFF WBC: CPT | Performed by: INTERNAL MEDICINE

## 2023-08-02 PROCEDURE — 80053 COMPREHEN METABOLIC PANEL: CPT | Performed by: INTERNAL MEDICINE

## 2023-08-02 PROCEDURE — 36415 COLL VENOUS BLD VENIPUNCTURE: CPT | Mod: PO | Performed by: INTERNAL MEDICINE

## 2023-08-02 PROCEDURE — 99999 PR PBB SHADOW E&M-EST. PATIENT-LVL IV: ICD-10-PCS | Mod: PBBFAC,,, | Performed by: INTERNAL MEDICINE

## 2023-08-02 PROCEDURE — 83036 HEMOGLOBIN GLYCOSYLATED A1C: CPT | Performed by: INTERNAL MEDICINE

## 2023-08-02 RX ORDER — VALSARTAN 160 MG/1
1 TABLET ORAL DAILY
COMMUNITY
End: 2023-08-07 | Stop reason: ALTCHOICE

## 2023-08-02 NOTE — PROGRESS NOTES
Subjective     Patient ID: Ihsan Mays Sr. is a 62 y.o. male.    Chief Complaint: Follow-up (6 mos follow up )    HPI  Pt with HTN, Prostate cancer, HLD, Prediabetes, Hx of Thrombosis of right lesser saphenous vein, B/L LE venous insufficiency/reflux is here for 6 month f/u. No acute complaints today.   Review of Systems   Constitutional:  Negative for activity change, appetite change, chills, diaphoresis, fatigue, fever and unexpected weight change.   HENT:  Negative for postnasal drip, rhinorrhea, sinus pressure/congestion, sneezing, sore throat, trouble swallowing and voice change.    Respiratory:  Negative for cough, shortness of breath and wheezing.    Cardiovascular:  Negative for chest pain, palpitations and leg swelling.   Gastrointestinal:  Negative for abdominal pain, blood in stool, constipation, diarrhea, nausea and vomiting.   Genitourinary:  Negative for dysuria.   Musculoskeletal:  Negative for arthralgias and myalgias.   Integumentary:  Negative for rash and wound.   Allergic/Immunologic: Negative for environmental allergies and food allergies.   Hematological:  Negative for adenopathy. Does not bruise/bleed easily.          Objective     Physical Exam  Constitutional:       General: He is not in acute distress.     Appearance: Normal appearance. He is well-developed. He is not diaphoretic.   HENT:      Head: Normocephalic and atraumatic.      Right Ear: External ear normal.      Left Ear: External ear normal.      Nose: Nose normal.      Mouth/Throat:      Pharynx: No oropharyngeal exudate.   Eyes:      General: No scleral icterus.        Right eye: No discharge.         Left eye: No discharge.      Conjunctiva/sclera: Conjunctivae normal.      Pupils: Pupils are equal, round, and reactive to light.   Neck:      Vascular: No JVD.   Cardiovascular:      Rate and Rhythm: Normal rate and regular rhythm.      Pulses: Normal pulses.      Heart sounds: Normal heart sounds. No murmur  heard.  Pulmonary:      Effort: Pulmonary effort is normal. No respiratory distress.      Breath sounds: Normal breath sounds. No wheezing or rales.   Abdominal:      General: Bowel sounds are normal.      Tenderness: There is no abdominal tenderness. There is no guarding or rebound.   Musculoskeletal:      Cervical back: Normal range of motion and neck supple.      Right lower leg: Edema (trace) present.      Left lower leg: Edema (trace) present.   Lymphadenopathy:      Cervical: No cervical adenopathy.   Skin:     General: Skin is warm and dry.      Capillary Refill: Capillary refill takes less than 2 seconds.      Coloration: Skin is not pale.      Findings: No rash.   Neurological:      Mental Status: He is alert and oriented to person, place, and time. Mental status is at baseline.      Cranial Nerves: No cranial nerve deficit.   Psychiatric:         Mood and Affect: Mood normal.         Behavior: Behavior normal.            Assessment and Plan     1. Cancer of prostate    2. Essential hypertension  -     CBC Auto Differential; Future; Expected date: 08/02/2023  -     Comprehensive Metabolic Panel; Future; Expected date: 08/02/2023    3. Mixed hyperlipidemia    4. Elevated PSA    5. Severe obesity (BMI 35.0-39.9) with comorbidity    6. Lymphedema of both lower extremities    7. Prediabetes  -     CBC Auto Differential; Future; Expected date: 08/02/2023  -     Comprehensive Metabolic Panel; Future; Expected date: 08/02/2023  -     Hemoglobin A1C; Future; Expected date: 08/02/2023    8. Venous insufficiency of both lower extremities         HTN- stable on Diovan HCT/Coreg/Norvasc      ED- controlled on Viagra      HLD- on Lipitor      Prostate cancer- followed by Urology      Thrombosis of right lesser saphenous vein- suspect 2/2 underlying prostate cancer    -continue prophylactic dose of Eliquis 2.5 mg BID indefinitely per cardio     Prediabetes- trending      B/L LE venous insufficiency/reflux- stable with  compression stockings      F/u in 6 months for annual exam

## 2023-08-23 ENCOUNTER — LAB VISIT (OUTPATIENT)
Dept: LAB | Facility: HOSPITAL | Age: 63
End: 2023-08-23
Attending: INTERNAL MEDICINE
Payer: COMMERCIAL

## 2023-08-23 ENCOUNTER — TELEPHONE (OUTPATIENT)
Dept: INTERNAL MEDICINE | Facility: CLINIC | Age: 63
End: 2023-08-23
Payer: COMMERCIAL

## 2023-08-23 DIAGNOSIS — D64.9 ANEMIA, UNSPECIFIED TYPE: ICD-10-CM

## 2023-08-23 DIAGNOSIS — D64.9 ANEMIA, UNSPECIFIED TYPE: Primary | ICD-10-CM

## 2023-08-23 DIAGNOSIS — I10 ESSENTIAL HYPERTENSION: ICD-10-CM

## 2023-08-23 LAB
ALBUMIN SERPL BCP-MCNC: 4.2 G/DL (ref 3.5–5.2)
ALP SERPL-CCNC: 94 U/L (ref 38–126)
ALT SERPL W/O P-5'-P-CCNC: 27 U/L (ref 10–44)
ANION GAP SERPL CALC-SCNC: 11 MMOL/L (ref 8–16)
AST SERPL-CCNC: 28 U/L (ref 15–46)
BASOPHILS # BLD AUTO: 0.03 K/UL (ref 0–0.2)
BASOPHILS # BLD AUTO: 0.03 K/UL (ref 0–0.2)
BASOPHILS NFR BLD: 0.4 % (ref 0–1.9)
BASOPHILS NFR BLD: 0.4 % (ref 0–1.9)
BILIRUB SERPL-MCNC: 0.6 MG/DL (ref 0.1–1)
CALCIUM SERPL-MCNC: 8.6 MG/DL (ref 8.7–10.5)
CHLORIDE SERPL-SCNC: 109 MMOL/L (ref 95–110)
CO2 SERPL-SCNC: 22 MMOL/L (ref 23–29)
CREAT SERPL-MCNC: 0.82 MG/DL (ref 0.5–1.4)
DIFFERENTIAL METHOD: ABNORMAL
DIFFERENTIAL METHOD: ABNORMAL
EOSINOPHIL # BLD AUTO: 0.2 K/UL (ref 0–0.5)
EOSINOPHIL # BLD AUTO: 0.2 K/UL (ref 0–0.5)
EOSINOPHIL NFR BLD: 3.1 % (ref 0–8)
EOSINOPHIL NFR BLD: 3.1 % (ref 0–8)
ERYTHROCYTE [DISTWIDTH] IN BLOOD BY AUTOMATED COUNT: 13.6 % (ref 11.5–14.5)
ERYTHROCYTE [DISTWIDTH] IN BLOOD BY AUTOMATED COUNT: 13.6 % (ref 11.5–14.5)
EST. GFR  (NO RACE VARIABLE): >60 ML/MIN/1.73 M^2
ESTIMATED AVG GLUCOSE: 120 MG/DL (ref 68–131)
FERRITIN SERPL-MCNC: 254 NG/ML (ref 20–300)
GLUCOSE SERPL-MCNC: 89 MG/DL (ref 70–110)
HBA1C MFR BLD: 5.8 % (ref 4–5.6)
HCT VFR BLD AUTO: 37.8 % (ref 40–54)
HCT VFR BLD AUTO: 37.8 % (ref 40–54)
HGB BLD-MCNC: 12.9 G/DL (ref 14–18)
HGB BLD-MCNC: 12.9 G/DL (ref 14–18)
IMM GRANULOCYTES # BLD AUTO: 0.02 K/UL (ref 0–0.04)
IMM GRANULOCYTES # BLD AUTO: 0.02 K/UL (ref 0–0.04)
IMM GRANULOCYTES NFR BLD AUTO: 0.3 % (ref 0–0.5)
IMM GRANULOCYTES NFR BLD AUTO: 0.3 % (ref 0–0.5)
IRON SERPL-MCNC: 59 UG/DL (ref 45–160)
LYMPHOCYTES # BLD AUTO: 1.7 K/UL (ref 1–4.8)
LYMPHOCYTES # BLD AUTO: 1.7 K/UL (ref 1–4.8)
LYMPHOCYTES NFR BLD: 24.7 % (ref 18–48)
LYMPHOCYTES NFR BLD: 24.7 % (ref 18–48)
MCH RBC QN AUTO: 31.9 PG (ref 27–31)
MCH RBC QN AUTO: 31.9 PG (ref 27–31)
MCHC RBC AUTO-ENTMCNC: 34.1 G/DL (ref 32–36)
MCHC RBC AUTO-ENTMCNC: 34.1 G/DL (ref 32–36)
MCV RBC AUTO: 94 FL (ref 82–98)
MCV RBC AUTO: 94 FL (ref 82–98)
MONOCYTES # BLD AUTO: 0.7 K/UL (ref 0.3–1)
MONOCYTES # BLD AUTO: 0.7 K/UL (ref 0.3–1)
MONOCYTES NFR BLD: 9.4 % (ref 4–15)
MONOCYTES NFR BLD: 9.4 % (ref 4–15)
NEUTROPHILS # BLD AUTO: 4.4 K/UL (ref 1.8–7.7)
NEUTROPHILS # BLD AUTO: 4.4 K/UL (ref 1.8–7.7)
NEUTROPHILS NFR BLD: 62.1 % (ref 38–73)
NEUTROPHILS NFR BLD: 62.1 % (ref 38–73)
NRBC BLD-RTO: 0 /100 WBC
NRBC BLD-RTO: 0 /100 WBC
PLATELET # BLD AUTO: 212 K/UL (ref 150–450)
PLATELET # BLD AUTO: 212 K/UL (ref 150–450)
PMV BLD AUTO: 10 FL (ref 9.2–12.9)
PMV BLD AUTO: 10 FL (ref 9.2–12.9)
POTASSIUM SERPL-SCNC: 3.4 MMOL/L (ref 3.5–5.1)
PROT SERPL-MCNC: 7.2 G/DL (ref 6–8.4)
RBC # BLD AUTO: 4.04 M/UL (ref 4.6–6.2)
RBC # BLD AUTO: 4.04 M/UL (ref 4.6–6.2)
RETICS/RBC NFR AUTO: 2.3 % (ref 0.4–2)
SATURATED IRON: 20 % (ref 20–50)
SODIUM SERPL-SCNC: 142 MMOL/L (ref 136–145)
TOTAL IRON BINDING CAPACITY: 292 UG/DL (ref 250–450)
TRANSFERRIN SERPL-MCNC: 197 MG/DL (ref 200–375)
UUN UR-MCNC: 14 MG/DL (ref 2–20)
WBC # BLD AUTO: 7.05 K/UL (ref 3.9–12.7)
WBC # BLD AUTO: 7.05 K/UL (ref 3.9–12.7)

## 2023-08-23 PROCEDURE — 36415 COLL VENOUS BLD VENIPUNCTURE: CPT | Mod: PO | Performed by: INTERNAL MEDICINE

## 2023-08-23 PROCEDURE — 82728 ASSAY OF FERRITIN: CPT | Performed by: INTERNAL MEDICINE

## 2023-08-23 PROCEDURE — 80053 COMPREHEN METABOLIC PANEL: CPT | Mod: PO | Performed by: INTERNAL MEDICINE

## 2023-08-23 PROCEDURE — 85045 AUTOMATED RETICULOCYTE COUNT: CPT | Mod: PO | Performed by: INTERNAL MEDICINE

## 2023-08-23 PROCEDURE — 83540 ASSAY OF IRON: CPT | Mod: PO | Performed by: INTERNAL MEDICINE

## 2023-08-23 PROCEDURE — 84466 ASSAY OF TRANSFERRIN: CPT | Mod: PO | Performed by: INTERNAL MEDICINE

## 2023-08-23 PROCEDURE — 85025 COMPLETE CBC W/AUTO DIFF WBC: CPT | Mod: PO | Performed by: INTERNAL MEDICINE

## 2023-08-23 PROCEDURE — 83036 HEMOGLOBIN GLYCOSYLATED A1C: CPT | Performed by: INTERNAL MEDICINE

## 2023-08-23 NOTE — TELEPHONE ENCOUNTER
----- Message from Elliot Ybarra DO sent at 8/23/2023 12:50 PM CDT -----  Potassium was slightly low, I want you to increase intake of Avocado, Spinach, sweet potato, coconut water or bananas over the next week    Blood counts are trending down slowly- I want to do some additional test and also test your stool for blood  Prediabetes has improved !

## 2023-08-24 ENCOUNTER — LAB VISIT (OUTPATIENT)
Dept: LAB | Facility: HOSPITAL | Age: 63
End: 2023-08-24
Attending: INTERNAL MEDICINE
Payer: COMMERCIAL

## 2023-08-24 DIAGNOSIS — D64.9 ANEMIA, UNSPECIFIED TYPE: ICD-10-CM

## 2023-08-24 LAB — OB PNL STL: NEGATIVE

## 2023-08-24 PROCEDURE — 82272 OCCULT BLD FECES 1-3 TESTS: CPT | Mod: PO | Performed by: INTERNAL MEDICINE

## 2023-08-26 ENCOUNTER — TELEPHONE (OUTPATIENT)
Dept: PRIMARY CARE CLINIC | Facility: CLINIC | Age: 63
End: 2023-08-26
Payer: COMMERCIAL

## 2023-08-26 DIAGNOSIS — D64.9 ANEMIA, UNSPECIFIED TYPE: Primary | ICD-10-CM

## 2023-08-26 DIAGNOSIS — R70.1 RETICULOCYTOSIS: ICD-10-CM

## 2023-09-15 ENCOUNTER — OFFICE VISIT (OUTPATIENT)
Dept: GASTROENTEROLOGY | Facility: CLINIC | Age: 63
End: 2023-09-15
Payer: COMMERCIAL

## 2023-09-15 VITALS
SYSTOLIC BLOOD PRESSURE: 118 MMHG | BODY MASS INDEX: 34.55 KG/M2 | DIASTOLIC BLOOD PRESSURE: 80 MMHG | HEART RATE: 68 BPM | WEIGHT: 227.94 LBS | OXYGEN SATURATION: 97 % | HEIGHT: 68 IN

## 2023-09-15 DIAGNOSIS — D64.9 ANEMIA, UNSPECIFIED TYPE: ICD-10-CM

## 2023-09-15 DIAGNOSIS — R70.1 RETICULOCYTOSIS: ICD-10-CM

## 2023-09-15 PROBLEM — R07.9 CHEST PAIN: Status: RESOLVED | Noted: 2018-05-03 | Resolved: 2023-09-15

## 2023-09-15 PROBLEM — K59.00 CONSTIPATION: Status: RESOLVED | Noted: 2020-04-14 | Resolved: 2023-09-15

## 2023-09-15 PROCEDURE — 99204 OFFICE O/P NEW MOD 45 MIN: CPT | Mod: S$GLB,,, | Performed by: NURSE PRACTITIONER

## 2023-09-15 PROCEDURE — 99999 PR PBB SHADOW E&M-EST. PATIENT-LVL V: ICD-10-PCS | Mod: PBBFAC,,, | Performed by: NURSE PRACTITIONER

## 2023-09-15 PROCEDURE — 99999 PR PBB SHADOW E&M-EST. PATIENT-LVL V: CPT | Mod: PBBFAC,,, | Performed by: NURSE PRACTITIONER

## 2023-09-15 PROCEDURE — 99204 PR OFFICE/OUTPT VISIT, NEW, LEVL IV, 45-59 MIN: ICD-10-PCS | Mod: S$GLB,,, | Performed by: NURSE PRACTITIONER

## 2023-09-15 RX ORDER — SODIUM, POTASSIUM,MAG SULFATES 17.5-3.13G
1 SOLUTION, RECONSTITUTED, ORAL ORAL DAILY
Qty: 1 KIT | Refills: 0 | Status: SHIPPED | OUTPATIENT
Start: 2023-09-15 | End: 2023-09-17

## 2023-09-15 NOTE — PROGRESS NOTES
Subjective:       Patient ID: Ihsan Mays Sr. is a 62 y.o. male.    Chief Complaint: Anemia    61 y/o male with hx of HTN, prostate cancer, and DVT on Eliquis referred by PCP for anemia. No overt signs of GI bleed. FOBT negative. No chest pain, shortness of breath, abdominal pain, hematochezia, melena, or unintentional weight loss. Has BMs daily; occasional constipation; no diarrhea. No known FH colon polyps or cancer. EGD/cscope in 8/2020 revealed LA Grade A reflux esophagitis; 3 cm hiatal hernia; erythematous mucosa in the antrum; normal examined duodenum; colonoscopy normal with exception of IH.       Component      Latest Ref Rng 2/3/2023 8/2/2023 8/23/2023   WBC      3.90 - 12.70 K/uL 7.91  6.26  7.05    RBC      4.60 - 6.20 M/uL 4.27 (L)  4.11 (L)  4.04 (L)    Hemoglobin      14.0 - 18.0 g/dL 13.5 (L)  12.9 (L)  12.9 (L)    Hematocrit      40.0 - 54.0 % 39.9 (L)  38.9 (L)  37.8 (L)    MCV      82 - 98 fL 93  95  94    MCH      27.0 - 31.0 pg 31.6 (H)  31.4 (H)  31.9 (H)    MCHC      32.0 - 36.0 g/dL 33.8  33.2  34.1    RDW      11.5 - 14.5 % 13.0  13.6  13.6    Platelets      150 - 450 K/uL 229  170  212    MPV      9.2 - 12.9 fL 10.4  11.2  10.0    Immature Granulocytes      0.0 - 0.5 % 0.3  0.2  0.3    Gran # (ANC)      1.8 - 7.7 K/uL 5.2  4.0  4.4    Immature Grans (Abs)      0.00 - 0.04 K/uL 0.02  0.01  0.02    Lymph #      1.0 - 4.8 K/uL 1.7  1.5  1.7    Mono #      0.3 - 1.0 K/uL 0.8  0.5  0.7    Eos #      0.0 - 0.5 K/uL 0.2  0.2  0.2    Baso #      0.00 - 0.20 K/uL 0.04  0.02  0.03    nRBC      0 /100 WBC 0  0  0    Gran %      38.0 - 73.0 % 65.4  64.6  62.1    Lymph %      18.0 - 48.0 % 20.9  23.6  24.7    Mono %      4.0 - 15.0 % 10.0  8.1  9.4    Eosinophil %      0.0 - 8.0 % 2.9  3.2  3.1    Basophil %      0.0 - 1.9 % 0.5  0.3  0.4    Differential Method Automated  Automated  Automated    Iron      45 - 160 ug/dL   59    Transferrin      200 - 375 mg/dL   197 (L)    TIBC      250 - 450  ug/dL   292    Saturated Iron      20 - 50 %   20    Ferritin      20.0 - 300.0 ng/mL   254    Retic      0.4 - 2.0 %   2.3 (H)         Past Medical History:   Diagnosis Date    Glaucoma     Hypertension     Prostate cancer        Past Surgical History:   Procedure Laterality Date    COLONOSCOPY N/A 8/21/2020    Procedure: COLONOSCOPYPrepopik;  Surgeon: Danie Conway MD;  Location: [facility];  Service: Endoscopy;  Laterality: N/A;    ESOPHAGOGASTRODUODENOSCOPY N/A 8/21/2020    Procedure: EGD (ESOPHAGOGASTRODUODENOSCOPY);  Surgeon: Danie Conway MD;  Location: [facility];  Service: Endoscopy;  Laterality: N/A;    FINGER SURGERY      right hand pinkie finger     GLAUCOMA SURGERY      STOMACH SURGERY      ulcers        Family History   Problem Relation Age of Onset    Diabetes Mother     Hypertension Mother     Hypertension Father     Stroke Maternal Aunt     Heart disease Maternal Grandmother     Heart disease Maternal Grandfather     Colon cancer Neg Hx     Esophageal cancer Neg Hx     Rectal cancer Neg Hx     Stomach cancer Neg Hx     Ulcerative colitis Neg Hx     Irritable bowel syndrome Neg Hx     Crohn's disease Neg Hx     Celiac disease Neg Hx     Cancer Neg Hx        Social History     Socioeconomic History    Marital status:     Number of children: 3   Tobacco Use    Smoking status: Never    Smokeless tobacco: Never   Substance and Sexual Activity    Alcohol use: Yes     Comment: occasionally    Drug use: No    Sexual activity: Yes     Partners: Female     Birth control/protection: Post-menopausal   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      Social Determinants of Health     Financial Resource Strain: Low Risk  (8/1/2023)    Overall Financial Resource Strain (CARDIA)     Difficulty of Paying Living Expenses: Not hard at all   Food Insecurity: No Food Insecurity (8/1/2023)    Hunger Vital Sign     Worried About Running Out of Food in the Last Year: Never true      Ran Out of Food in the Last Year: Never true   Transportation Needs: No Transportation Needs (8/1/2023)    PRAPARE - Transportation     Lack of Transportation (Medical): No     Lack of Transportation (Non-Medical): No   Physical Activity: Inactive (8/1/2023)    Exercise Vital Sign     Days of Exercise per Week: 0 days     Minutes of Exercise per Session: 0 min   Stress: No Stress Concern Present (8/1/2023)    [employer] [place] [employer] - Occupational Stress Questionnaire     Feeling of Stress : Not at all   Recent Concern: Stress - Stress Concern Present (7/5/2023)    [employer] [employer] - Occupational Stress Questionnaire     Feeling of Stress : To some extent   Social Connections: Unknown (8/1/2023)    Social Connection and Isolation Panel [NHANES]     Frequency of Communication with Friends and Family: Once a week     Frequency of Social Gatherings with Friends and Family: Once a week     Active Member of Clubs or Organizations: Yes     Attends Club or Organization Meetings: More than 4 times per year     Marital Status:    Housing Stability: Low Risk  (8/1/2023)    Housing Stability Vital Sign     Unable to Pay for Housing in the Last Year: No     Number of Places Lived in the Last Year: 1     Unstable Housing in the Last Year: No       Review of Systems   Constitutional:  Negative for appetite change and unexpected weight change.   HENT:  Negative for trouble swallowing.    Respiratory:  Negative for shortness of breath.    Cardiovascular:  Negative for chest pain.   Gastrointestinal:  Negative for abdominal pain.   Musculoskeletal:  Negative for back pain.   Hematological:  Does not bruise/bleed easily.   Psychiatric/Behavioral:  Negative for dysphoric mood.          Objective:     Vitals:    09/15/23 1527   BP: 118/80   BP Location: Right arm   Patient Position: Sitting   BP Method: Large (Manual)   Pulse: 68   SpO2: 97%   Weight: 103.4 kg (227 lb 15.3 oz)   Height:  "5' 8" (1.727 m)          Physical Exam  Constitutional:       General: He is not in acute distress.     Appearance: Normal appearance.   HENT:      Head: Normocephalic.   Eyes:      Conjunctiva/sclera: Conjunctivae normal.   Pulmonary:      Effort: Pulmonary effort is normal. No respiratory distress.   Musculoskeletal:         General: Normal range of motion.      Cervical back: Normal range of motion.   Skin:     General: Skin is warm and dry.   Neurological:      Mental Status: He is alert and oriented to person, place, and time.   Psychiatric:         Mood and Affect: Mood normal.         Behavior: Behavior normal.               Assessment:         ICD-10-CM ICD-9-CM   1. Anemia, unspecified type  D64.9 285.9   2. Reticulocytosis  R70.1 790.99       Plan:       Anemia, unspecified type  -     Ambulatory referral/consult to Gastroenterology  -     sodium,potassium,mag sulfates (SUPREP BOWEL PREP KIT) 17.5-3.13-1.6 gram SolR; Take 177 mLs by mouth once daily. for 2 days  Dispense: 1 kit; Refill: 0  -     Case Request Endoscopy: EGD (ESOPHAGOGASTRODUODENOSCOPY), COLONOSCOPY  -     Ambulatory referral/consult to Hematology / Oncology; Future; Expected date: 09/22/2023    Reticulocytosis  -     Ambulatory referral/consult to Gastroenterology  -     Ambulatory referral/consult to Hematology / Oncology; Future; Expected date: 09/22/2023    - Schedule EGD/cscope pending approval to hold Eliquis for procedure.    Follow up if symptoms worsen or fail to improve.     Patient's Medications   New Prescriptions    SODIUM,POTASSIUM,MAG SULFATES (SUPREP BOWEL PREP KIT) 17.5-3.13-1.6 GRAM SOLR    Take 177 mLs by mouth once daily. for 2 days   Previous Medications    AMLODIPINE (NORVASC) 5 MG TABLET    TAKE 1 TABLET BY MOUTH EVERY DAY    APIXABAN (ELIQUIS) 2.5 MG TAB    Take 1 tablet (2.5 mg total) by mouth 2 (two) times daily.    ATORVASTATIN (LIPITOR) 40 MG TABLET    TAKE 1 TABLET BY MOUTH EVERY DAY    CARVEDILOL (COREG) 12.5 MG " TABLET    TAKE 1 TABLET BY MOUTH TWICE A DAY WITH MEALS    CICLOPIROX 0.77 % GEL    use as directed per     CYCLOBENZAPRINE (FLEXERIL) 10 MG TABLET    Take 10 mg by mouth every 8 (eight) hours as needed.    LATANOPROST 0.005 % OPHTHALMIC SOLUTION    INSTILL 1 DROP INTO BOTH EYES AT BEDTIME    VALSARTAN (DIOVAN) 320 MG TABLET    Take 1 tablet (320 mg total) by mouth once daily.   Modified Medications    No medications on file   Discontinued Medications    No medications on file

## 2023-09-15 NOTE — PATIENT INSTRUCTIONS
- Start Miralax 1 capful in 6-8 ounces of water or juice daily one week prior to endoscopy      - Stop Eliquis on ___________      SUPREP Instructions    [facility] [address]  [address]    You are scheduled for a colonoscopy with Dr. Langley on 9/29/2023 at [facility].   To ensure that your test is accurate and complete, you MUST follow these instructions listed below.  If you have any questions, please call our office at [phone].  Plan on being at the hospital for your procedure for 3-4 hours.    1.  Follow a CLEAR LIQUID DIET for the entire day before your scheduled colonoscopy.  This means no solid food the entire day starting when you wake.  You may have as much of the clear liquids as you want throughout the day.   CLEAR LIQUID DIET:   - Avoid Red, Orange, Purple, and/or Blue food coloring   - NO DAIRY   - You can have:  Coffee with sugar (no creamer), tea, water, soda, apple or white grape juice, chicken or beef broth/bouillon (no meat, noodles, or veggies), green/yellow popsicles, green/yellow Jell-O, lemonade.    2.  AT 5 pm the evening before your colonoscopy, POUR ONE (1) BOTTLE OF SUPREP INTO THE MIXING CONTAINER, PROVIDED INSIDE THE BOX.  ADD WATER TO THE LINE ON THE CONTAINER AND MIX IT WELL.  DRINK THE ENTIRE CONTAINER AND THEN DRINK TWO (2) MORE CONTAINERS OF WATER OVER THE NEXT 1 HOUR.  This is sometimes easier to drink if this solution is cold, so you can mix the solution 20 minutes ahead of time and place in the refrigerator prior to drinking.  You have to drink the solution within 30-45 minutes of mixing it.  Do NOT put this solution over ice.  It IS ok to drink with a straw.    3.  The endoscopy department will call you 1 day before your colonoscopy to tell you the exact time to arrive, AND to tell you the exact time to drink the 2nd portion of your prep (which will be FIVE HOURS BEFORE YOUR ARRIVAL TIME).  At this time given to you,  POUR ONE (1) BOTTLE OF SUPREP INTO THE MIXING CONTAINER, PROVIDED INSIDE THE BOX.  ADD WATER TO THE LINE ON THE CONTAINER AND MIX IT WELL.  DRINK THE ENTIRE CONTAINER AND THEN DRINK TWO (2) MORE CONTAINERS OF WATER OVER THE NEXT 1 HOUR.  This is sometimes easier to drink if this solution is cold, so you can mix the solution 20 minutes ahead of time and place in the refrigerator prior to drinking.  You have to drink the solution within 30-45 minutes of mixing it.  Do NOT put this solution over ice.  It IS ok to drink with a straw.  Once this is complete, you may not have ANYTHING else by mouth!    4.  You must have someone with you to DRIVE YOU HOME since you will be receiving IV sedation for the colonoscopy.    5.  It is ok to take MOST of your REGULAR MEDICATIONS  in the morning of your test with a SIP of water.  THE ONLY MEDS YOU NEED TO HOLD ARE YOUR DIABETES MEDICATIONS,  SOME BLOOD PRESSURE MEDS, AND BLOOD THINNERS IF OK'D BY YOUR DOCTOR.  Do NOT have anything else to eat or drink the morning of your colonoscopy.  It is ok to brush your teeth.    6.  If you are on blood thinners THAT YOU HAVE BEEN INSTRUCTED TO HOLD BY YOUR DOCTOR FOR THIS PROCEDURE, then do NOT take this the morning of your colonoscopy.  Do NOT stop these medications on your own, they must be approved to be held by your doctor.  Your colonoscopy can NOT be done if you are on these medications.  Examples of blood thinners include: Coumadin, Aggrenox, Plavix, Pradaxa, Reapro, Pletal, Xarelto, Ticagrelor, Brilinta, Eliquis, and high dose aspirin (325 mg).  You do not have to stop baby aspirin 81 mg.    7.  IF YOU ARE DIABETIC:  NO INSULIN OR ORAL MEDICATIONS THE MORNING OF THE COLONOSCOPY.  TAKE ONLY HALF THE DOSE OF YOUR INSULIN THE DAY BEFORE THE COLONOSCOPY.  DO NOT TAKE ANY ORAL DIABETIC MEDICATIONS THE DAY BEFORE THE COLONOSCOPY.  IF YOU ARE AN INSULIN DEPENDENT DIABETIC WITH UNSTABLE BLOOD SUGARS, NOTIFY YOUR PRIMARY CARE PHYSICIAN FOR  INSTRUCTIONS.    You will receive a call the afternoon before your colonoscopy to tell you the time to arrive.  If you have not received a call by the day before your procedure, call the Pre-op Coordinator at [phone].

## 2023-09-18 ENCOUNTER — TELEPHONE (OUTPATIENT)
Dept: GASTROENTEROLOGY | Facility: CLINIC | Age: 63
End: 2023-09-18
Payer: COMMERCIAL

## 2023-09-18 ENCOUNTER — TELEPHONE (OUTPATIENT)
Dept: CARDIOLOGY | Facility: CLINIC | Age: 63
End: 2023-09-18
Payer: COMMERCIAL

## 2023-09-18 ENCOUNTER — TELEPHONE (OUTPATIENT)
Dept: HEMATOLOGY/ONCOLOGY | Facility: CLINIC | Age: 63
End: 2023-09-18
Payer: COMMERCIAL

## 2023-09-18 NOTE — TELEPHONE ENCOUNTER
Informed patient of clearance from cardiologist to hold Eliquis prior to procedure. Patient informed that last dose of Eliquis is 09/26/2023 and to resume taking day after procedure. Patient expressed verbal understanding.   
Whitney Ortiz MA 14 minutes ago (10:44 AM)     AJ  Patient scheduled for colonoscopy 09/29/2023, requesting clearance and approval to hold Eliquis prior to procedure.      ----- Message from YESSY Prabhakar sent at 9/15/2023  3:49 PM CDT -----  Please obtain clearance and approval to hold Eliquis for endoscopy from [facility] Cardiologist, Dr. Zambrano.        
Type II diabetes mellitus

## 2023-09-18 NOTE — TELEPHONE ENCOUNTER
"----- Message from Gem Baiely RN sent at 9/18/2023  9:38 AM CDT -----    ----- Message -----  From: Royce Garner  Sent: 9/18/2023   9:09 AM CDT  To: [facility] Navigation    Reschedule Existing Appointment        Appt Date: 10/3    Type of appt: NP - Anemia    Physician: Harish    Reason for rescheduling?  Requesting to r/s for 9/19 (if the appt is still available)    Caller: Self    Contact Preference: [phone]         Additional Information:  "Thank you for all that you do for our patients"       "

## 2023-09-18 NOTE — TELEPHONE ENCOUNTER
Patient scheduled for colonoscopy 09/29/2023, requesting clearance and approval to hold Eliquis prior to procedure.     ----- Message from YESSY Prabhakar sent at 9/15/2023  3:49 PM CDT -----  Please obtain clearance and approval to hold Eliquis for endoscopy from [facility], Dr. Zambrano.

## 2023-09-19 ENCOUNTER — OFFICE VISIT (OUTPATIENT)
Dept: HEMATOLOGY/ONCOLOGY | Facility: CLINIC | Age: 63
End: 2023-09-19
Payer: COMMERCIAL

## 2023-09-19 ENCOUNTER — LAB VISIT (OUTPATIENT)
Dept: LAB | Facility: HOSPITAL | Age: 63
End: 2023-09-19
Payer: COMMERCIAL

## 2023-09-19 VITALS
SYSTOLIC BLOOD PRESSURE: 128 MMHG | DIASTOLIC BLOOD PRESSURE: 75 MMHG | HEART RATE: 66 BPM | BODY MASS INDEX: 34.69 KG/M2 | WEIGHT: 228.19 LBS | OXYGEN SATURATION: 97 %

## 2023-09-19 DIAGNOSIS — R70.1 RETICULOCYTOSIS: ICD-10-CM

## 2023-09-19 DIAGNOSIS — D64.9 ANEMIA, UNSPECIFIED TYPE: ICD-10-CM

## 2023-09-19 DIAGNOSIS — I10 ESSENTIAL HYPERTENSION: ICD-10-CM

## 2023-09-19 DIAGNOSIS — R97.20 ELEVATED PSA: ICD-10-CM

## 2023-09-19 DIAGNOSIS — D64.9 ANEMIA, UNSPECIFIED TYPE: Primary | ICD-10-CM

## 2023-09-19 LAB
FOLATE SERPL-MCNC: 14 NG/ML (ref 4–24)
HAPTOGLOB SERPL-MCNC: 123 MG/DL (ref 30–250)
LDH SERPL L TO P-CCNC: 159 U/L (ref 110–260)
VIT B12 SERPL-MCNC: 449 PG/ML (ref 210–950)

## 2023-09-19 PROCEDURE — 99203 PR OFFICE/OUTPT VISIT, NEW, LEVL III, 30-44 MIN: ICD-10-PCS | Mod: S$GLB,,, | Performed by: INTERNAL MEDICINE

## 2023-09-19 PROCEDURE — 1159F MED LIST DOCD IN RCRD: CPT | Mod: CPTII,S$GLB,, | Performed by: INTERNAL MEDICINE

## 2023-09-19 PROCEDURE — 4010F ACE/ARB THERAPY RXD/TAKEN: CPT | Mod: CPTII,S$GLB,, | Performed by: INTERNAL MEDICINE

## 2023-09-19 PROCEDURE — 3074F PR MOST RECENT SYSTOLIC BLOOD PRESSURE < 130 MM HG: ICD-10-PCS | Mod: CPTII,S$GLB,, | Performed by: INTERNAL MEDICINE

## 2023-09-19 PROCEDURE — 83010 ASSAY OF HAPTOGLOBIN QUANT: CPT | Performed by: INTERNAL MEDICINE

## 2023-09-19 PROCEDURE — 99203 OFFICE O/P NEW LOW 30 MIN: CPT | Mod: S$GLB,,, | Performed by: INTERNAL MEDICINE

## 2023-09-19 PROCEDURE — 99999 PR PBB SHADOW E&M-EST. PATIENT-LVL III: ICD-10-PCS | Mod: PBBFAC,,, | Performed by: INTERNAL MEDICINE

## 2023-09-19 PROCEDURE — 83615 LACTATE (LD) (LDH) ENZYME: CPT | Performed by: INTERNAL MEDICINE

## 2023-09-19 PROCEDURE — 3044F PR MOST RECENT HEMOGLOBIN A1C LEVEL <7.0%: ICD-10-PCS | Mod: CPTII,S$GLB,, | Performed by: INTERNAL MEDICINE

## 2023-09-19 PROCEDURE — 36415 COLL VENOUS BLD VENIPUNCTURE: CPT | Performed by: INTERNAL MEDICINE

## 2023-09-19 PROCEDURE — 82607 VITAMIN B-12: CPT | Performed by: INTERNAL MEDICINE

## 2023-09-19 PROCEDURE — 3078F DIAST BP <80 MM HG: CPT | Mod: CPTII,S$GLB,, | Performed by: INTERNAL MEDICINE

## 2023-09-19 PROCEDURE — 84630 ASSAY OF ZINC: CPT | Performed by: INTERNAL MEDICINE

## 2023-09-19 PROCEDURE — 99999 PR PBB SHADOW E&M-EST. PATIENT-LVL III: CPT | Mod: PBBFAC,,, | Performed by: INTERNAL MEDICINE

## 2023-09-19 PROCEDURE — 84238 ASSAY NONENDOCRINE RECEPTOR: CPT | Performed by: INTERNAL MEDICINE

## 2023-09-19 PROCEDURE — 1159F PR MEDICATION LIST DOCUMENTED IN MEDICAL RECORD: ICD-10-PCS | Mod: CPTII,S$GLB,, | Performed by: INTERNAL MEDICINE

## 2023-09-19 PROCEDURE — 3044F HG A1C LEVEL LT 7.0%: CPT | Mod: CPTII,S$GLB,, | Performed by: INTERNAL MEDICINE

## 2023-09-19 PROCEDURE — 3078F PR MOST RECENT DIASTOLIC BLOOD PRESSURE < 80 MM HG: ICD-10-PCS | Mod: CPTII,S$GLB,, | Performed by: INTERNAL MEDICINE

## 2023-09-19 PROCEDURE — 3008F BODY MASS INDEX DOCD: CPT | Mod: CPTII,S$GLB,, | Performed by: INTERNAL MEDICINE

## 2023-09-19 PROCEDURE — 82525 ASSAY OF COPPER: CPT | Performed by: INTERNAL MEDICINE

## 2023-09-19 PROCEDURE — 3008F PR BODY MASS INDEX (BMI) DOCUMENTED: ICD-10-PCS | Mod: CPTII,S$GLB,, | Performed by: INTERNAL MEDICINE

## 2023-09-19 PROCEDURE — 4010F PR ACE/ARB THEARPY RXD/TAKEN: ICD-10-PCS | Mod: CPTII,S$GLB,, | Performed by: INTERNAL MEDICINE

## 2023-09-19 PROCEDURE — 82746 ASSAY OF FOLIC ACID SERUM: CPT | Performed by: INTERNAL MEDICINE

## 2023-09-19 PROCEDURE — 3074F SYST BP LT 130 MM HG: CPT | Mod: CPTII,S$GLB,, | Performed by: INTERNAL MEDICINE

## 2023-09-19 NOTE — PROGRESS NOTES
PATIENT: Ihsan Mays .  MRN: [id number]  DATE: 9/19/2023    Diagnosis:   1. Anemia, unspecified type    2. Reticulocytosis    3. Elevated PSA    4. Essential hypertension        Chief Complaint: No chief complaint on file.         Subjective:    History of Present Illness: Mr. Mays is a 62 y.o. male who presents for evaluation and management of anemia. On review of his historical labs it appears anemia has been chronic problem, and his labs show he has had sporadic anemia dating back at least 13 years. Anemia has typically been mild and normocytic. Generally there have not been other cytopenias noted. He had iron and ferritin tested last month and iron saturation was at the lln and ferritin was normal.   He is scheduled to undergo endoscopy next week.   He denies dyspnea on exertion. Energy levels are at baseline. He denies bleeding issues.   Prior history of DVT--on chronic eliquis.     Past medical, surgical, family, and social histories have been reviewed and updated below.    Past Medical History:   Past Medical History:   Diagnosis Date    Glaucoma     Hypertension     Prostate cancer        Past Surgical History:   Past Surgical History:   Procedure Laterality Date    COLONOSCOPY N/A 8/21/2020    Procedure: COLONOSCOPYPrepopik;  Surgeon: Danie Conway MD;  Location: [facility];  Service: Endoscopy;  Laterality: N/A;    ESOPHAGOGASTRODUODENOSCOPY N/A 8/21/2020    Procedure: EGD (ESOPHAGOGASTRODUODENOSCOPY);  Surgeon: Danie Conway MD;  Location: [facility];  Service: Endoscopy;  Laterality: N/A;    FINGER SURGERY      right hand pinkie finger     GLAUCOMA SURGERY      STOMACH SURGERY      ulcers        Family History:   Family History   Problem Relation Age of Onset    Diabetes Mother     Hypertension Mother     Hypertension Father     Stroke Maternal Aunt     Heart disease Maternal Grandmother     Heart disease Maternal Grandfather     Colon cancer Neg Hx     Esophageal cancer Neg Hx     Rectal  cancer Neg Hx     Stomach cancer Neg Hx     Ulcerative colitis Neg Hx     Irritable bowel syndrome Neg Hx     Crohn's disease Neg Hx     Celiac disease Neg Hx     Cancer Neg Hx        Social History:  reports that he has never smoked. He has never used smokeless tobacco. He reports current alcohol use. He reports that he does not use drugs.    Allergies:  Review of patient's allergies indicates:  No Known Allergies    Medications:  Current Outpatient Medications   Medication Sig Dispense Refill    amLODIPine (NORVASC) 5 MG tablet TAKE 1 TABLET BY MOUTH EVERY DAY 90 tablet 3    apixaban (ELIQUIS) 2.5 mg Tab Take 1 tablet (2.5 mg total) by mouth 2 (two) times daily. 60 tablet 11    atorvastatin (LIPITOR) 40 MG tablet TAKE 1 TABLET BY MOUTH EVERY DAY 90 tablet 3    carvediloL (COREG) 12.5 MG tablet TAKE 1 TABLET BY MOUTH TWICE A DAY WITH MEALS 180 tablet 1    ciclopirox 0.77 % Gel use as directed per  (Patient taking differently: Apply topically as needed.) 30 g 0    cyclobenzaprine (FLEXERIL) 10 MG tablet Take 10 mg by mouth every 8 (eight) hours as needed.      latanoprost 0.005 % ophthalmic solution INSTILL 1 DROP INTO BOTH EYES AT BEDTIME 7.5 mL 6    valsartan (DIOVAN) 320 MG tablet Take 1 tablet (320 mg total) by mouth once daily. 90 tablet 1     No current facility-administered medications for this visit.       Review of Systems  A comprehensive review of systems was performed; pertinent positives and negatives are noted in the HPI.        Objective:      Vitals:   Vitals:    09/19/23 1436   BP: 128/75   Pulse: 66   SpO2: 97%   Weight: 103.5 kg (228 lb 2.8 oz)     BMI: Body mass index is 34.69 kg/m².    Physical Exam  Vitals and nursing note reviewed.   Constitutional:       General: He is not in acute distress.     Appearance: Normal appearance. He is normal weight. He is not toxic-appearing.   HENT:      Head: Normocephalic and atraumatic.   Eyes:      General: No scleral icterus.     Conjunctiva/sclera:  Conjunctivae normal.   Cardiovascular:      Rate and Rhythm: Normal rate and regular rhythm.      Heart sounds: Normal heart sounds.   Pulmonary:      Effort: Pulmonary effort is normal.      Breath sounds: Normal breath sounds. No wheezing, rhonchi or rales.   Abdominal:      General: Abdomen is flat. Bowel sounds are normal.      Palpations: Abdomen is soft.      Tenderness: There is no abdominal tenderness.   Musculoskeletal:         General: No tenderness or deformity.      Cervical back: Neck supple.   Lymphadenopathy:      Cervical: No cervical adenopathy.   Skin:     General: Skin is warm and dry.      Coloration: Skin is not jaundiced.      Findings: No bruising or erythema.   Neurological:      General: No focal deficit present.      Mental Status: He is alert and oriented to person, place, and time. Mental status is at baseline.   Psychiatric:         Mood and Affect: Mood normal.         Behavior: Behavior normal.         Thought Content: Thought content normal.         Laboratory Data:  Labs have been reviewed.          Assessment:       1. Anemia, unspecified type    2. Reticulocytosis    3. Elevated PSA    4. Essential hypertension    Anemia needs further workup       Plan:       Workup anemia today, follow-up with results.      Beto Moreira MD, FACP  Hematology/Oncology  [facility] [address], [address]  [place] [address]  Phone: [phone]  Fax: [phone]

## 2023-09-21 LAB — STFR SERPL-MCNC: 3.2 MG/L (ref 1.8–4.6)

## 2023-09-22 LAB
COPPER SERPL-MCNC: 1015 UG/L (ref 665–1480)
ZINC SERPL-MCNC: 82 UG/DL (ref 60–130)

## 2023-10-09 ENCOUNTER — TELEPHONE (OUTPATIENT)
Dept: GASTROENTEROLOGY | Facility: CLINIC | Age: 63
End: 2023-10-09
Payer: MEDICARE

## 2023-10-09 NOTE — TELEPHONE ENCOUNTER
Called pt with EGD/Colonoscopy results. Informed pt of results and informed he will need repeat colonoscopy in 3 years and to continue taking pantoprazole for esophagitis noted on EGD. Pt had verbal understanding.       ----- Message from Edel Langley MD sent at 10/5/2023  3:15 PM CDT -----  Celiac (-), HP (-), cont PPI for esophagitis noted on EGD, RTC as needed    Large benign adenoma, repeat 3 years, please let him know

## 2023-10-09 NOTE — TELEPHONE ENCOUNTER
Please see previous encounter.       ----- Message from Gem Rachel sent at 10/9/2023  4:24 PM CDT -----  Contact: pt  Type:  Patient Returning Call    Who Called:pt  Who Left Message for Patient:Lalita   Does the patient know what this is regarding?:results   Would the patient rather a call back or a response via [facility]? Call   Best Call Back Number:[phone]  Additional Information:

## 2023-10-09 NOTE — TELEPHONE ENCOUNTER
LM for pt to call office back regarding egd and colonoscopy results.      ----- Message from Edel Langley MD sent at 10/5/2023  3:15 PM CDT -----  Celiac (-), HP (-), cont PPI for esophagitis noted on EGD, RTC as needed    Large benign adenoma, repeat 3 years, please let him know

## 2023-11-03 ENCOUNTER — TELEPHONE (OUTPATIENT)
Dept: HEMATOLOGY/ONCOLOGY | Facility: CLINIC | Age: 63
End: 2023-11-03
Payer: MEDICARE

## 2023-11-03 NOTE — TELEPHONE ENCOUNTER
----- Message from Aaron Serrato sent at 11/3/2023  2:21 PM CDT -----  Type:  Test Results    Who Called: pt spouse   Name of Test (Lab/Mammo/Etc): labs  Date of Test: 09/19/2023  Ordering Provider: Harish  Where the test was performed: [facility]  Would the patient rather a call back or a response via [facility]? call  Best Call Back Number: [phone]  Additional Information:

## 2023-11-03 NOTE — TELEPHONE ENCOUNTER
----- Message from Criss Chacon sent at 11/3/2023  4:13 PM CDT -----  Type:  Patient Returning Call    Who Called:pt's spouse   Who Left Message for Patient:phoebe   Does the patient know what this is regarding?:returning a call  Would the patient rather a call back or a response via [facility]? Call   Best Call Back Number: [phone]  Additional Information:

## 2023-12-01 ENCOUNTER — LAB VISIT (OUTPATIENT)
Dept: LAB | Facility: HOSPITAL | Age: 63
End: 2023-12-01
Attending: NURSE PRACTITIONER
Payer: MEDICARE

## 2023-12-01 ENCOUNTER — OFFICE VISIT (OUTPATIENT)
Dept: INTERNAL MEDICINE | Facility: CLINIC | Age: 63
End: 2023-12-01
Payer: MEDICARE

## 2023-12-01 VITALS
SYSTOLIC BLOOD PRESSURE: 129 MMHG | DIASTOLIC BLOOD PRESSURE: 83 MMHG | BODY MASS INDEX: 34.75 KG/M2 | WEIGHT: 229.25 LBS | OXYGEN SATURATION: 97 % | HEART RATE: 89 BPM | HEIGHT: 68 IN

## 2023-12-01 DIAGNOSIS — M54.40 ACUTE RIGHT-SIDED LOW BACK PAIN WITH SCIATICA, SCIATICA LATERALITY UNSPECIFIED: ICD-10-CM

## 2023-12-01 DIAGNOSIS — E66.9 OBESITY (BMI 30-39.9): ICD-10-CM

## 2023-12-01 DIAGNOSIS — Z00.00 ENCOUNTER FOR PREVENTIVE HEALTH EXAMINATION: Primary | ICD-10-CM

## 2023-12-01 DIAGNOSIS — M62.9 HAMSTRING TIGHTNESS OF BOTH LOWER EXTREMITIES: ICD-10-CM

## 2023-12-01 DIAGNOSIS — I10 ESSENTIAL HYPERTENSION: ICD-10-CM

## 2023-12-01 DIAGNOSIS — N52.9 ERECTILE DYSFUNCTION, UNSPECIFIED ERECTILE DYSFUNCTION TYPE: ICD-10-CM

## 2023-12-01 DIAGNOSIS — Z11.4 ENCOUNTER FOR SCREENING FOR HIV: ICD-10-CM

## 2023-12-01 DIAGNOSIS — I82.811 SAPHENOUS VEIN CLOT, RIGHT: ICD-10-CM

## 2023-12-01 DIAGNOSIS — R29.898 HIP TIGHTNESS: ICD-10-CM

## 2023-12-01 DIAGNOSIS — M53.86 DECREASED RANGE OF MOTION OF LUMBAR SPINE: ICD-10-CM

## 2023-12-01 DIAGNOSIS — Z11.59 NEED FOR HEPATITIS C SCREENING TEST: ICD-10-CM

## 2023-12-01 DIAGNOSIS — I89.0 LYMPHEDEMA OF BOTH LOWER EXTREMITIES: ICD-10-CM

## 2023-12-01 DIAGNOSIS — C61 CANCER OF PROSTATE: ICD-10-CM

## 2023-12-01 DIAGNOSIS — R20.0 NUMBNESS IN RIGHT LEG: ICD-10-CM

## 2023-12-01 DIAGNOSIS — E78.2 MIXED HYPERLIPIDEMIA: ICD-10-CM

## 2023-12-01 DIAGNOSIS — I87.2 VENOUS INSUFFICIENCY OF BOTH LOWER EXTREMITIES: ICD-10-CM

## 2023-12-01 PROBLEM — E66.01 SEVERE OBESITY (BMI 35.0-39.9) WITH COMORBIDITY: Status: RESOLVED | Noted: 2023-07-05 | Resolved: 2023-12-01

## 2023-12-01 LAB
HCV AB SERPL QL IA: NORMAL
HIV 1+2 AB+HIV1 P24 AG SERPL QL IA: NORMAL

## 2023-12-01 PROCEDURE — 87389 HIV-1 AG W/HIV-1&-2 AB AG IA: CPT | Mod: PN | Performed by: NURSE PRACTITIONER

## 2023-12-01 PROCEDURE — 1160F PR REVIEW ALL MEDS BY PRESCRIBER/CLIN PHARMACIST DOCUMENTED: ICD-10-PCS | Mod: CPTII,S$GLB,, | Performed by: NURSE PRACTITIONER

## 2023-12-01 PROCEDURE — 1159F MED LIST DOCD IN RCRD: CPT | Mod: CPTII,S$GLB,, | Performed by: NURSE PRACTITIONER

## 2023-12-01 PROCEDURE — 3074F SYST BP LT 130 MM HG: CPT | Mod: CPTII,S$GLB,, | Performed by: NURSE PRACTITIONER

## 2023-12-01 PROCEDURE — 3288F PR FALLS RISK ASSESSMENT DOCUMENTED: ICD-10-PCS | Mod: CPTII,S$GLB,, | Performed by: NURSE PRACTITIONER

## 2023-12-01 PROCEDURE — 86803 HEPATITIS C AB TEST: CPT | Mod: PN | Performed by: NURSE PRACTITIONER

## 2023-12-01 PROCEDURE — 99999 PR PBB SHADOW E&M-EST. PATIENT-LVL IV: CPT | Mod: PBBFAC,,, | Performed by: NURSE PRACTITIONER

## 2023-12-01 PROCEDURE — 1159F PR MEDICATION LIST DOCUMENTED IN MEDICAL RECORD: ICD-10-PCS | Mod: CPTII,S$GLB,, | Performed by: NURSE PRACTITIONER

## 2023-12-01 PROCEDURE — 3288F FALL RISK ASSESSMENT DOCD: CPT | Mod: CPTII,S$GLB,, | Performed by: NURSE PRACTITIONER

## 2023-12-01 PROCEDURE — G0402 INITIAL PREVENTIVE EXAM: HCPCS | Mod: S$GLB,,, | Performed by: NURSE PRACTITIONER

## 2023-12-01 PROCEDURE — 4010F ACE/ARB THERAPY RXD/TAKEN: CPT | Mod: CPTII,S$GLB,, | Performed by: NURSE PRACTITIONER

## 2023-12-01 PROCEDURE — 1160F RVW MEDS BY RX/DR IN RCRD: CPT | Mod: CPTII,S$GLB,, | Performed by: NURSE PRACTITIONER

## 2023-12-01 PROCEDURE — 3044F PR MOST RECENT HEMOGLOBIN A1C LEVEL <7.0%: ICD-10-PCS | Mod: CPTII,S$GLB,, | Performed by: NURSE PRACTITIONER

## 2023-12-01 PROCEDURE — 3079F DIAST BP 80-89 MM HG: CPT | Mod: CPTII,S$GLB,, | Performed by: NURSE PRACTITIONER

## 2023-12-01 PROCEDURE — 1101F PR PT FALLS ASSESS DOC 0-1 FALLS W/OUT INJ PAST YR: ICD-10-PCS | Mod: CPTII,S$GLB,, | Performed by: NURSE PRACTITIONER

## 2023-12-01 PROCEDURE — 4010F PR ACE/ARB THEARPY RXD/TAKEN: ICD-10-PCS | Mod: CPTII,S$GLB,, | Performed by: NURSE PRACTITIONER

## 2023-12-01 PROCEDURE — 36415 COLL VENOUS BLD VENIPUNCTURE: CPT | Mod: PN | Performed by: NURSE PRACTITIONER

## 2023-12-01 PROCEDURE — 3074F PR MOST RECENT SYSTOLIC BLOOD PRESSURE < 130 MM HG: ICD-10-PCS | Mod: CPTII,S$GLB,, | Performed by: NURSE PRACTITIONER

## 2023-12-01 PROCEDURE — 3044F HG A1C LEVEL LT 7.0%: CPT | Mod: CPTII,S$GLB,, | Performed by: NURSE PRACTITIONER

## 2023-12-01 PROCEDURE — 99999 PR PBB SHADOW E&M-EST. PATIENT-LVL IV: ICD-10-PCS | Mod: PBBFAC,,, | Performed by: NURSE PRACTITIONER

## 2023-12-01 PROCEDURE — 1101F PT FALLS ASSESS-DOCD LE1/YR: CPT | Mod: CPTII,S$GLB,, | Performed by: NURSE PRACTITIONER

## 2023-12-01 PROCEDURE — 3079F PR MOST RECENT DIASTOLIC BLOOD PRESSURE 80-89 MM HG: ICD-10-PCS | Mod: CPTII,S$GLB,, | Performed by: NURSE PRACTITIONER

## 2023-12-01 PROCEDURE — G0402 PR WELCOME MEDICARE PREVENTIVE VISIT NEW ENROLLEE: ICD-10-PCS | Mod: S$GLB,,, | Performed by: NURSE PRACTITIONER

## 2023-12-01 NOTE — PATIENT INSTRUCTIONS
Counseling and Referral of Other Preventative  (Italic type indicates deductible and co-insurance are waived)    Patient Name: Ihsan Mays  Today's Date: 12/1/2023    Health Maintenance         Date Due Completion Date    Hepatitis C Screening Never done ---    HIV Screening Never done ---    RSV Vaccine (Age 60+ and Pregnant patients) (1 - 1-dose 60+ series) Never done ---    COVID-19 Vaccine (6 - 2023-24 season) 09/01/2023 10/22/2022    Hemoglobin A1c (Prediabetes) 08/23/2024 8/23/2023    Colorectal Cancer Screening 09/29/2026 9/29/2023    Lipid Panel 02/03/2028 2/3/2023    TETANUS VACCINE 10/08/2028 10/8/2018          Orders Placed This Encounter   Procedures    Hepatitis C Antibody    HIV 1/2 Ag/Ab (4th Gen)       The following information is provided to all patients.  This information is to help you find resources for any of the problems found today that may be affecting your health:                Living healthy guide: www.[facility].[place].gov      Understanding Diabetes: www.diabetes.org      Eating healthy: www.cdc.gov/healthyweight      CDC home safety checklist: www.cdc.gov/steadi/patient.html      Agency on Aging: www.goea.[place].gov      Alcoholics anonymous (AA): www.aa.org      Physical Activity: www.alcides.nih.gov/gm5zbgn      Tobacco use: www.quitwithusla.org

## 2023-12-01 NOTE — PROGRESS NOTES
"  Ihsan Mays presented for a  Medicare AWV and comprehensive Health Risk Assessment today. The following components were reviewed and updated:    Medical history  Family History  Social history  Allergies and Current Medications  Health Risk Assessment  Health Maintenance  Care Team         ** See Completed Assessments for Annual Wellness Visit within the encounter summary.**         The following assessments were completed:  Living Situation  CAGE  Depression Screening  Timed Get Up and Go  Whisper Test  Cognitive Function Screening  Nutrition Screening  ADL Screening  PAQ Screening        Vitals:    12/01/23 1401   BP: 129/83   Pulse: 89   SpO2: 97%   Weight: 104 kg (229 lb 4.5 oz)   Height: 5' 8" (1.727 m)     Body mass index is 34.86 kg/m².  Physical Exam  Vitals and nursing note reviewed.   Constitutional:       General: He is not in acute distress.     Appearance: He is well-developed. He is obese. He is not ill-appearing.   HENT:      Head: Normocephalic and atraumatic.   Eyes:      Pupils: Pupils are equal, round, and reactive to light.   Cardiovascular:      Rate and Rhythm: Normal rate.   Pulmonary:      Effort: Pulmonary effort is normal. No respiratory distress.   Musculoskeletal:         General: Normal range of motion.      Cervical back: Normal range of motion.   Skin:     General: Skin is warm and dry.   Neurological:      Mental Status: He is alert and oriented to person, place, and time.      Coordination: Coordination normal.   Psychiatric:         Mood and Affect: Mood normal.         Behavior: Behavior normal.         Thought Content: Thought content normal.         Judgment: Judgment normal.               Diagnoses and health risks identified today and associated recommendations/orders:    1. Encounter for preventive health examination    2. Cancer of prostate  Chronic; stable. Continue current treatment plan as previously prescribed by PCP.     3. Erectile dysfunction, unspecified erectile " dysfunction type  Chronic; stable. Continue current treatment plan as previously prescribed by PCP.     4. Saphenous vein clot, right  Chronic; stable. Continue current treatment plan as previously prescribed by PCP.     5. Venous insufficiency of both lower extremities  Chronic; stable. Continue current treatment plan as previously prescribed by PCP.     6. Numbness in right leg  Chronic; stable. Continue current treatment plan as previously prescribed by PCP.     7. Mixed hyperlipidemia  Chronic; stable. Continue current treatment plan as previously prescribed by PCP.     8. Essential hypertension  Chronic; stable. Continue current treatment plan as previously prescribed by PCP.     9. Decreased range of motion of lumbar spine  Chronic; stable. Continue current treatment plan as previously prescribed by PCP.     10. Acute right-sided low back pain with sciatica, sciatica laterality unspecified  Chronic; stable. Continue current treatment plan as previously prescribed by PCP.     11. Hip tightness  Chronic; stable. Continue current treatment plan as previously prescribed by PCP.     12. Hamstring tightness of both lower extremities  Chronic; stable. Continue current treatment plan as previously prescribed by PCP.     13. Lymphedema of both lower extremities  Chronic; stable. Continue current treatment plan as previously prescribed by PCP.     14. Obesity (BMI 30-39.9)  Current BMI 34.86. Lifestyle modifications discussed with patient.     15. Need for hepatitis C screening test  - Hepatitis C Antibody; Future    16. Encounter for screening for HIV  - HIV 1/2 Ag/Ab (4th Gen); Future      Provided Ihsan with a 5-10 year written screening schedule and personal prevention plan. Recommendations were developed using the USPSTF age appropriate recommendations. Education, counseling, and referrals were provided as needed. After Visit Summary printed and given to patient which includes a list of additional  screenings\tests needed.    Review for Opioid Screening: Patient does not have rx for Opioids.  Review for Substance Use Disorders: Patient does not use substance.    Follow up for AWV in 1 year.    Brianna Hughes NP    I offered to discuss advanced care planning, including how to pick a person who would make decisions for you if you were unable to make them for yourself, called a health care power of , and what kind of decisions you might make such as use of life sustaining treatments such as ventilators and tube feeding when faced with a life limiting illness recorded on a living will that they will need to know. (How you want to be cared for as you near the end of your natural life)     X Patient is interested in learning more about how to make advanced directives.  I provided them paperwork and offered to discuss this with them.

## 2023-12-08 ENCOUNTER — TELEPHONE (OUTPATIENT)
Dept: UROLOGY | Facility: CLINIC | Age: 63
End: 2023-12-08
Payer: MEDICARE

## 2023-12-08 NOTE — TELEPHONE ENCOUNTER
----- Message from Brianna Hughes NP sent at 12/8/2023  3:49 PM CST -----  Please inform patient via telephone that his hep c antibody blood work was non-reactive. This is normal results.

## 2023-12-13 NOTE — PROGRESS NOTES
CRS Office Visit History and Physical    Referring Md:   Edel Langley Md  [address]  [place] [address]    SUBJECTIVE:     Chief Complaint: hemorrhoids    History of Present Illness:  The patient is a new patient to this practice.   Course is as follows:  Patient is a 63 y.o. male presents with hemorrhoids  He underwent colonoscopy at the end of September 2023 was found to have some abnormal perianal tissue.  He was therefore referred for evaluation.  Although he has a longstanding history of hemorrhoidal disease, he denies any discomfort, pain, prolapse, bleeding, difficulty with cleanliness.  He has regular bowel movements but often spends 15-20 minutes on the toilet for each bowel movement.  No family history of colon rectal cancer or inflammatory bowel disease.  Nonsmoker.  No past anorectal surgeries.  No issues with fecal incontinence.  Does have a history of prostate cancer being treated conservatively.    Last Colonoscopy: 9/29/23  Impression:            - Hemorrhoids found on perianal exam with some                          atypical appearing mucosa.                          - One 10 mm polyp in the cecum, removed with a hot                          snare. Resected and retrieved.                          - Redundant left colon.                          - Internal hemorrhoids.                          - No cause for anemia seen.     Review of patient's allergies indicates:  No Known Allergies    Past Medical History:   Diagnosis Date    Glaucoma     Hypertension     Prostate cancer      Past Surgical History:   Procedure Laterality Date    COLONOSCOPY N/A 08/21/2020    Procedure: COLONOSCOPYPrepopik;  Surgeon: Danie Conway MD;  Location: [facility];  Service: Endoscopy;  Laterality: N/A;    COLONOSCOPY N/A 09/29/2023    Procedure: COLONOSCOPY;  Surgeon: Edel Langley MD;  Location: [facility];  Service: Endoscopy;  Laterality: N/A;    ESOPHAGOGASTRODUODENOSCOPY N/A  08/21/2020    Procedure: EGD (ESOPHAGOGASTRODUODENOSCOPY);  Surgeon: Danie Conway MD;  Location: [facility];  Service: Endoscopy;  Laterality: N/A;    ESOPHAGOGASTRODUODENOSCOPY N/A 09/29/2023    Procedure: EGD (ESOPHAGOGASTRODUODENOSCOPY);  Surgeon: Edel Langley MD;  [facility] END[facility];  Service: Endoscopy;  Laterality: N/A;    FINGER SURGERY      right hand pinkie finger     GLAUCOMA SURGERY Bilateral     STOMACH SURGERY      ulcers      Family History   Problem Relation Age of Onset    Diabetes Mother     Hypertension Mother     Hypertension Father     Glaucoma Father     Hypertension Sister     Heart disease Sister     Heart attack Sister     Hypertension Brother     Anemia Daughter     No Known Problems Son     Stroke Maternal Aunt     Heart disease Maternal Grandmother     Heart disease Maternal Grandfather     Colon cancer Neg Hx     Esophageal cancer Neg Hx     Rectal cancer Neg Hx     Stomach cancer Neg Hx     Ulcerative colitis Neg Hx     Irritable bowel syndrome Neg Hx     Crohn's disease Neg Hx     Celiac disease Neg Hx     Cancer Neg Hx      Social History     Tobacco Use    Smoking status: Never    Smokeless tobacco: Never   Substance Use Topics    Alcohol use: Yes     Comment: occasionally    Drug use: Not Currently     Types: Marijuana     Comment: Quit 35 years ago        Review of Systems:  Review of Systems   Constitutional:  Negative for chills, diaphoresis, fever, malaise/fatigue and weight loss.   HENT:  Negative for congestion.    Respiratory:  Negative for shortness of breath.    Cardiovascular:  Negative for chest pain and leg swelling.   Gastrointestinal:  Negative for abdominal pain, blood in stool, constipation, nausea and vomiting.   Genitourinary:  Negative for dysuria.   Musculoskeletal:  Negative for back pain and myalgias.   Skin:  Negative for rash.   Neurological:  Negative for dizziness and weakness.   Endo/Heme/Allergies:  Does not bruise/bleed easily.  "  Psychiatric/Behavioral:  Negative for depression.        OBJECTIVE:     Vital Signs (Most Recent)  /78   Ht 5' 8" (1.727 m)   Wt 105.9 kg (233 lb 7.5 oz)   BMI 35.50 kg/m²     Physical Exam:  General: Black or  male in no distress   Neuro: alert and oriented x 4.  Moves all extremities.     HEENT: no icterus.  Trachea midline  Respiratory: respirations are even and unlabored  Cardiac: regular rate  Abdomen:  Soft, nontender, no masses  Extremities: Warm dry and intact  Skin: no rashes  Anorectal:  External exam with right posterior prolapsed internal hemorrhoid with associated semi pedunculated external hemorrhoidal skin tag with skin discoloration.  Skin tag with skin discoloration is soft and mobile.  Detailed anorectal exam performed.  Digital exam with normal tone soft internal hemorrhoids palpated.  Detailed anoscopy performed.  Large mixed internal and external right posterior hemorrhoid.  Internal exam otherwise normal.    Labs: H&H 13 and  38.  HIV negative.  Alb 4.2.  normal renal function    Imaging: none      ASSESSMENT/PLAN:     Diagnoses and all orders for this visit:    Hemorrhoids, unspecified hemorrhoid type  -     Ambulatory referral/consult to Colorectal Surgery        63 y.o. male with large mixed internal and external right posterior hemorrhoid with semi pedunculated external component.  This appears most consistent with a skin tag.  Reassurance provided.  He can follow up with me with any future issues or concerns.  Resection was offered to him but he wishes to defer as he is currently asymptomatic.    FELIPE Alaniz MD, FACS, FASCRS  Staff Surgeon  Colon & Rectal Surgery     "

## 2023-12-14 ENCOUNTER — TELEPHONE (OUTPATIENT)
Dept: SURGERY | Facility: CLINIC | Age: 63
End: 2023-12-14
Payer: MEDICARE

## 2023-12-14 ENCOUNTER — OFFICE VISIT (OUTPATIENT)
Dept: SURGERY | Facility: CLINIC | Age: 63
End: 2023-12-14
Payer: MEDICARE

## 2023-12-14 VITALS
SYSTOLIC BLOOD PRESSURE: 122 MMHG | DIASTOLIC BLOOD PRESSURE: 78 MMHG | BODY MASS INDEX: 35.38 KG/M2 | HEIGHT: 68 IN | WEIGHT: 233.44 LBS

## 2023-12-14 DIAGNOSIS — K64.9 HEMORRHOIDS, UNSPECIFIED HEMORRHOID TYPE: ICD-10-CM

## 2023-12-14 PROCEDURE — 46600 PR DIAG2STIC A2SCOPY: ICD-10-PCS | Mod: S$GLB,,, | Performed by: COLON & RECTAL SURGERY

## 2023-12-14 PROCEDURE — 3008F BODY MASS INDEX DOCD: CPT | Mod: CPTII,S$GLB,, | Performed by: COLON & RECTAL SURGERY

## 2023-12-14 PROCEDURE — 3078F PR MOST RECENT DIASTOLIC BLOOD PRESSURE < 80 MM HG: ICD-10-PCS | Mod: CPTII,S$GLB,, | Performed by: COLON & RECTAL SURGERY

## 2023-12-14 PROCEDURE — 1159F PR MEDICATION LIST DOCUMENTED IN MEDICAL RECORD: ICD-10-PCS | Mod: CPTII,S$GLB,, | Performed by: COLON & RECTAL SURGERY

## 2023-12-14 PROCEDURE — 3074F PR MOST RECENT SYSTOLIC BLOOD PRESSURE < 130 MM HG: ICD-10-PCS | Mod: CPTII,S$GLB,, | Performed by: COLON & RECTAL SURGERY

## 2023-12-14 PROCEDURE — 1101F PR PT FALLS ASSESS DOC 0-1 FALLS W/OUT INJ PAST YR: ICD-10-PCS | Mod: CPTII,S$GLB,, | Performed by: COLON & RECTAL SURGERY

## 2023-12-14 PROCEDURE — 1101F PT FALLS ASSESS-DOCD LE1/YR: CPT | Mod: CPTII,S$GLB,, | Performed by: COLON & RECTAL SURGERY

## 2023-12-14 PROCEDURE — 99212 OFFICE O/P EST SF 10 MIN: CPT | Mod: 25,S$GLB,, | Performed by: COLON & RECTAL SURGERY

## 2023-12-14 PROCEDURE — 3044F HG A1C LEVEL LT 7.0%: CPT | Mod: CPTII,S$GLB,, | Performed by: COLON & RECTAL SURGERY

## 2023-12-14 PROCEDURE — 3074F SYST BP LT 130 MM HG: CPT | Mod: CPTII,S$GLB,, | Performed by: COLON & RECTAL SURGERY

## 2023-12-14 PROCEDURE — 3078F DIAST BP <80 MM HG: CPT | Mod: CPTII,S$GLB,, | Performed by: COLON & RECTAL SURGERY

## 2023-12-14 PROCEDURE — 3008F PR BODY MASS INDEX (BMI) DOCUMENTED: ICD-10-PCS | Mod: CPTII,S$GLB,, | Performed by: COLON & RECTAL SURGERY

## 2023-12-14 PROCEDURE — 1160F PR REVIEW ALL MEDS BY PRESCRIBER/CLIN PHARMACIST DOCUMENTED: ICD-10-PCS | Mod: CPTII,S$GLB,, | Performed by: COLON & RECTAL SURGERY

## 2023-12-14 PROCEDURE — 99999 PR PBB SHADOW E&M-EST. PATIENT-LVL III: ICD-10-PCS | Mod: PBBFAC,,, | Performed by: COLON & RECTAL SURGERY

## 2023-12-14 PROCEDURE — 1160F RVW MEDS BY RX/DR IN RCRD: CPT | Mod: CPTII,S$GLB,, | Performed by: COLON & RECTAL SURGERY

## 2023-12-14 PROCEDURE — 3288F FALL RISK ASSESSMENT DOCD: CPT | Mod: CPTII,S$GLB,, | Performed by: COLON & RECTAL SURGERY

## 2023-12-14 PROCEDURE — 4010F ACE/ARB THERAPY RXD/TAKEN: CPT | Mod: CPTII,S$GLB,, | Performed by: COLON & RECTAL SURGERY

## 2023-12-14 PROCEDURE — 99999 PR PBB SHADOW E&M-EST. PATIENT-LVL III: CPT | Mod: PBBFAC,,, | Performed by: COLON & RECTAL SURGERY

## 2023-12-14 PROCEDURE — 99212 PR OFFICE/OUTPT VISIT, EST, LEVL II, 10-19 MIN: ICD-10-PCS | Mod: 25,S$GLB,, | Performed by: COLON & RECTAL SURGERY

## 2023-12-14 PROCEDURE — 4010F PR ACE/ARB THEARPY RXD/TAKEN: ICD-10-PCS | Mod: CPTII,S$GLB,, | Performed by: COLON & RECTAL SURGERY

## 2023-12-14 PROCEDURE — 3044F PR MOST RECENT HEMOGLOBIN A1C LEVEL <7.0%: ICD-10-PCS | Mod: CPTII,S$GLB,, | Performed by: COLON & RECTAL SURGERY

## 2023-12-14 PROCEDURE — 3288F PR FALLS RISK ASSESSMENT DOCUMENTED: ICD-10-PCS | Mod: CPTII,S$GLB,, | Performed by: COLON & RECTAL SURGERY

## 2023-12-14 PROCEDURE — 46600 DIAGNOSTIC ANOSCOPY SPX: CPT | Mod: S$GLB,,, | Performed by: COLON & RECTAL SURGERY

## 2023-12-14 PROCEDURE — 1159F MED LIST DOCD IN RCRD: CPT | Mod: CPTII,S$GLB,, | Performed by: COLON & RECTAL SURGERY

## 2023-12-14 NOTE — TELEPHONE ENCOUNTER
Spoke with patient, informed him of a cancellation on Dr Alaniz's schedule and that he may come in earlier if he would like. Patient states that he is on his way.

## 2023-12-27 ENCOUNTER — PATIENT MESSAGE (OUTPATIENT)
Dept: INTERNAL MEDICINE | Facility: CLINIC | Age: 63
End: 2023-12-27
Payer: MEDICARE

## 2023-12-27 ENCOUNTER — PATIENT MESSAGE (OUTPATIENT)
Dept: OTHER | Facility: OTHER | Age: 63
End: 2023-12-27
Payer: MEDICARE

## 2023-12-29 ENCOUNTER — OFFICE VISIT (OUTPATIENT)
Dept: INTERNAL MEDICINE | Facility: CLINIC | Age: 63
End: 2023-12-29
Payer: MEDICARE

## 2023-12-29 VITALS
BODY MASS INDEX: 35.99 KG/M2 | SYSTOLIC BLOOD PRESSURE: 118 MMHG | OXYGEN SATURATION: 96 % | HEIGHT: 68 IN | TEMPERATURE: 97 F | DIASTOLIC BLOOD PRESSURE: 82 MMHG | RESPIRATION RATE: 20 BRPM | WEIGHT: 237.44 LBS | HEART RATE: 77 BPM

## 2023-12-29 DIAGNOSIS — I10 ESSENTIAL HYPERTENSION: Primary | ICD-10-CM

## 2023-12-29 PROCEDURE — 4010F PR ACE/ARB THEARPY RXD/TAKEN: ICD-10-PCS | Mod: CPTII,S$GLB,, | Performed by: NURSE PRACTITIONER

## 2023-12-29 PROCEDURE — 3008F PR BODY MASS INDEX (BMI) DOCUMENTED: ICD-10-PCS | Mod: CPTII,S$GLB,, | Performed by: NURSE PRACTITIONER

## 2023-12-29 PROCEDURE — 99213 OFFICE O/P EST LOW 20 MIN: CPT | Mod: S$GLB,,, | Performed by: NURSE PRACTITIONER

## 2023-12-29 PROCEDURE — 1159F MED LIST DOCD IN RCRD: CPT | Mod: CPTII,S$GLB,, | Performed by: NURSE PRACTITIONER

## 2023-12-29 PROCEDURE — 3008F BODY MASS INDEX DOCD: CPT | Mod: CPTII,S$GLB,, | Performed by: NURSE PRACTITIONER

## 2023-12-29 PROCEDURE — 1160F RVW MEDS BY RX/DR IN RCRD: CPT | Mod: CPTII,S$GLB,, | Performed by: NURSE PRACTITIONER

## 2023-12-29 PROCEDURE — 1160F PR REVIEW ALL MEDS BY PRESCRIBER/CLIN PHARMACIST DOCUMENTED: ICD-10-PCS | Mod: CPTII,S$GLB,, | Performed by: NURSE PRACTITIONER

## 2023-12-29 PROCEDURE — 3079F PR MOST RECENT DIASTOLIC BLOOD PRESSURE 80-89 MM HG: ICD-10-PCS | Mod: CPTII,S$GLB,, | Performed by: NURSE PRACTITIONER

## 2023-12-29 PROCEDURE — 3074F SYST BP LT 130 MM HG: CPT | Mod: CPTII,S$GLB,, | Performed by: NURSE PRACTITIONER

## 2023-12-29 PROCEDURE — 99999 PR PBB SHADOW E&M-EST. PATIENT-LVL IV: CPT | Mod: PBBFAC,,, | Performed by: NURSE PRACTITIONER

## 2023-12-29 PROCEDURE — 99213 PR OFFICE/OUTPT VISIT, EST, LEVL III, 20-29 MIN: ICD-10-PCS | Mod: S$GLB,,, | Performed by: NURSE PRACTITIONER

## 2023-12-29 PROCEDURE — 3288F PR FALLS RISK ASSESSMENT DOCUMENTED: ICD-10-PCS | Mod: CPTII,S$GLB,, | Performed by: NURSE PRACTITIONER

## 2023-12-29 PROCEDURE — 3044F PR MOST RECENT HEMOGLOBIN A1C LEVEL <7.0%: ICD-10-PCS | Mod: CPTII,S$GLB,, | Performed by: NURSE PRACTITIONER

## 2023-12-29 PROCEDURE — 3079F DIAST BP 80-89 MM HG: CPT | Mod: CPTII,S$GLB,, | Performed by: NURSE PRACTITIONER

## 2023-12-29 PROCEDURE — 1101F PR PT FALLS ASSESS DOC 0-1 FALLS W/OUT INJ PAST YR: ICD-10-PCS | Mod: CPTII,S$GLB,, | Performed by: NURSE PRACTITIONER

## 2023-12-29 PROCEDURE — 99999 PR PBB SHADOW E&M-EST. PATIENT-LVL IV: ICD-10-PCS | Mod: PBBFAC,,, | Performed by: NURSE PRACTITIONER

## 2023-12-29 PROCEDURE — 3074F PR MOST RECENT SYSTOLIC BLOOD PRESSURE < 130 MM HG: ICD-10-PCS | Mod: CPTII,S$GLB,, | Performed by: NURSE PRACTITIONER

## 2023-12-29 PROCEDURE — 3044F HG A1C LEVEL LT 7.0%: CPT | Mod: CPTII,S$GLB,, | Performed by: NURSE PRACTITIONER

## 2023-12-29 PROCEDURE — 4010F ACE/ARB THERAPY RXD/TAKEN: CPT | Mod: CPTII,S$GLB,, | Performed by: NURSE PRACTITIONER

## 2023-12-29 PROCEDURE — 1101F PT FALLS ASSESS-DOCD LE1/YR: CPT | Mod: CPTII,S$GLB,, | Performed by: NURSE PRACTITIONER

## 2023-12-29 PROCEDURE — 3288F FALL RISK ASSESSMENT DOCD: CPT | Mod: CPTII,S$GLB,, | Performed by: NURSE PRACTITIONER

## 2023-12-29 PROCEDURE — 1159F PR MEDICATION LIST DOCUMENTED IN MEDICAL RECORD: ICD-10-PCS | Mod: CPTII,S$GLB,, | Performed by: NURSE PRACTITIONER

## 2023-12-29 NOTE — ASSESSMENT & PLAN NOTE
There was some discrepancy between his automatic home blood pressure cuff and the manual cuff in office this a.m..    Patient recommended to continue with his current regimen and to contact the digital Medicine team to inquire about receiving a new blood pressure cuff as the 1 he currently has is probably more than 4 years old.  Patient reassured that his blood pressure is well controlled on his current regimen and sometimes monitoring and seeing high numbers can also exacerbate his elevations.      Patient recommended to follow up with PCP as needed.

## 2023-12-29 NOTE — PROGRESS NOTES
Subjective     Patient ID: Ihsan Mays Sr. is a 63 y.o. male.    Chief Complaint: Hypertension    Patient in office for continued management of his hypertension.    Patient is enrolled in the digital Medicine program.  His amlodipine was recently increased from 5 mg to 10 mg daily due to elevated blood pressures consistently in the 140s to 150s.  Patient brought in his blood pressure cuff to visit this morning to compare readings with our manual BP cuff.  Since increase in amlodipine dose patient reports his blood pressures at home have remained in the 140s.  He denies any associated symptoms such as headache, which he experienced earlier on in the week.  He also denies any chest pain, shortness and breath, dizziness, palpitations or syncope.  Patient has made significant changes to his diet to help improve his blood pressure.  He no longer eats salt, has cut rice out of his diet, eats relatively well but admits that he has not been able to exercise due to the demands of his current job.    Hypertension      Review of Systems   All other systems reviewed and are negative.         Objective     Physical Exam  Vitals reviewed.   Constitutional:       Appearance: Normal appearance. He is obese.   HENT:      Head: Normocephalic and atraumatic.      Mouth/Throat:      Mouth: Mucous membranes are moist.   Eyes:      Pupils: Pupils are equal, round, and reactive to light.   Cardiovascular:      Rate and Rhythm: Normal rate and regular rhythm.      Heart sounds: Normal heart sounds.   Pulmonary:      Effort: Pulmonary effort is normal.      Breath sounds: Normal breath sounds.   Abdominal:      Palpations: Abdomen is soft.   Musculoskeletal:         General: Normal range of motion.      Cervical back: Normal range of motion.   Skin:     General: Skin is warm and dry.   Neurological:      General: No focal deficit present.      Mental Status: He is alert and oriented to person, place, and time.   Psychiatric:          Mood and Affect: Mood normal.         Behavior: Behavior normal.            Assessment and Plan     1. Essential hypertension  Assessment & Plan:  There was some discrepancy between his automatic home blood pressure cuff and the manual cuff in office this a.m..    Patient recommended to continue with his current regimen and to contact the digital Medicine team to inquire about receiving a new blood pressure cuff as the 1 he currently has is probably more than 4 years old.  Patient reassured that his blood pressure is well controlled on his current regimen and sometimes monitoring and seeing high numbers can also exacerbate his elevations.      Patient recommended to follow up with PCP as needed.          RTC as needed.

## 2024-01-23 ENCOUNTER — LAB VISIT (OUTPATIENT)
Dept: LAB | Facility: HOSPITAL | Age: 64
End: 2024-01-23
Attending: INTERNAL MEDICINE
Payer: MEDICARE

## 2024-01-23 DIAGNOSIS — Z00.00 ANNUAL PHYSICAL EXAM: ICD-10-CM

## 2024-01-23 DIAGNOSIS — I10 ESSENTIAL HYPERTENSION: ICD-10-CM

## 2024-01-23 DIAGNOSIS — E78.2 MIXED HYPERLIPIDEMIA: ICD-10-CM

## 2024-01-23 LAB
ALBUMIN SERPL BCP-MCNC: 4.1 G/DL (ref 3.5–5.2)
ALP SERPL-CCNC: 117 U/L (ref 38–126)
ALT SERPL W/O P-5'-P-CCNC: 26 U/L (ref 10–44)
ANION GAP SERPL CALC-SCNC: 9 MMOL/L (ref 8–16)
AST SERPL-CCNC: 28 U/L (ref 15–46)
BACTERIA #/AREA URNS AUTO: NORMAL /HPF
BASOPHILS # BLD AUTO: 0.04 K/UL (ref 0–0.2)
BASOPHILS NFR BLD: 0.6 % (ref 0–1.9)
BILIRUB SERPL-MCNC: 0.9 MG/DL (ref 0.1–1)
BILIRUB UR QL STRIP: NEGATIVE
CALCIUM SERPL-MCNC: 8.8 MG/DL (ref 8.7–10.5)
CHLORIDE SERPL-SCNC: 105 MMOL/L (ref 95–110)
CHOLEST SERPL-MCNC: 119 MG/DL (ref 120–199)
CHOLEST/HDLC SERPL: 2.5 {RATIO} (ref 2–5)
CLARITY UR REFRACT.AUTO: CLEAR
CO2 SERPL-SCNC: 26 MMOL/L (ref 23–29)
COLOR UR AUTO: YELLOW
CREAT SERPL-MCNC: 0.74 MG/DL (ref 0.5–1.4)
DIFFERENTIAL METHOD BLD: ABNORMAL
EOSINOPHIL # BLD AUTO: 0.2 K/UL (ref 0–0.5)
EOSINOPHIL NFR BLD: 3.6 % (ref 0–8)
ERYTHROCYTE [DISTWIDTH] IN BLOOD BY AUTOMATED COUNT: 13.4 % (ref 11.5–14.5)
EST. GFR  (NO RACE VARIABLE): >60 ML/MIN/1.73 M^2
ESTIMATED AVG GLUCOSE: 123 MG/DL (ref 68–131)
GLUCOSE SERPL-MCNC: 115 MG/DL (ref 70–110)
GLUCOSE UR QL STRIP: NEGATIVE
HBA1C MFR BLD: 5.9 % (ref 4–5.6)
HCT VFR BLD AUTO: 39.2 % (ref 40–54)
HDLC SERPL-MCNC: 48 MG/DL (ref 40–75)
HDLC SERPL: 40.3 % (ref 20–50)
HGB BLD-MCNC: 13.2 G/DL (ref 14–18)
HGB UR QL STRIP: NEGATIVE
IMM GRANULOCYTES # BLD AUTO: 0.01 K/UL (ref 0–0.04)
IMM GRANULOCYTES NFR BLD AUTO: 0.2 % (ref 0–0.5)
KETONES UR QL STRIP: NEGATIVE
LDLC SERPL CALC-MCNC: 58.4 MG/DL (ref 63–159)
LEUKOCYTE ESTERASE UR QL STRIP: NEGATIVE
LYMPHOCYTES # BLD AUTO: 1.3 K/UL (ref 1–4.8)
LYMPHOCYTES NFR BLD: 20.5 % (ref 18–48)
MCH RBC QN AUTO: 31.3 PG (ref 27–31)
MCHC RBC AUTO-ENTMCNC: 33.7 G/DL (ref 32–36)
MCV RBC AUTO: 93 FL (ref 82–98)
MICROSCOPIC COMMENT: NORMAL
MONOCYTES # BLD AUTO: 0.5 K/UL (ref 0.3–1)
MONOCYTES NFR BLD: 8.4 % (ref 4–15)
NEUTROPHILS # BLD AUTO: 4.3 K/UL (ref 1.8–7.7)
NEUTROPHILS NFR BLD: 66.7 % (ref 38–73)
NITRITE UR QL STRIP: NEGATIVE
NONHDLC SERPL-MCNC: 71 MG/DL
NRBC BLD-RTO: 0 /100 WBC
PH UR STRIP: 7 [PH] (ref 5–8)
PLATELET # BLD AUTO: 192 K/UL (ref 150–450)
PMV BLD AUTO: 10.9 FL (ref 9.2–12.9)
POTASSIUM SERPL-SCNC: 3.6 MMOL/L (ref 3.5–5.1)
PROT SERPL-MCNC: 7.2 G/DL (ref 6–8.4)
PROT UR QL STRIP: NEGATIVE
RBC # BLD AUTO: 4.22 M/UL (ref 4.6–6.2)
SODIUM SERPL-SCNC: 140 MMOL/L (ref 136–145)
SP GR UR STRIP: 1.01 (ref 1–1.03)
TRIGL SERPL-MCNC: 63 MG/DL (ref 30–150)
TSH SERPL DL<=0.005 MIU/L-ACNC: 2.78 UIU/ML (ref 0.4–4)
URN SPEC COLLECT METH UR: NORMAL
UROBILINOGEN UR STRIP-ACNC: NEGATIVE EU/DL
UUN UR-MCNC: 12 MG/DL (ref 2–20)
WBC # BLD AUTO: 6.44 K/UL (ref 3.9–12.7)
WBC #/AREA URNS AUTO: 3 /HPF (ref 0–5)

## 2024-01-23 PROCEDURE — 80053 COMPREHEN METABOLIC PANEL: CPT | Mod: PN | Performed by: INTERNAL MEDICINE

## 2024-01-23 PROCEDURE — 85025 COMPLETE CBC W/AUTO DIFF WBC: CPT | Mod: PN | Performed by: INTERNAL MEDICINE

## 2024-01-23 PROCEDURE — 84443 ASSAY THYROID STIM HORMONE: CPT | Mod: PN | Performed by: INTERNAL MEDICINE

## 2024-01-23 PROCEDURE — 83036 HEMOGLOBIN GLYCOSYLATED A1C: CPT | Performed by: INTERNAL MEDICINE

## 2024-01-23 PROCEDURE — 81000 URINALYSIS NONAUTO W/SCOPE: CPT | Mod: PN | Performed by: INTERNAL MEDICINE

## 2024-01-23 PROCEDURE — 36415 COLL VENOUS BLD VENIPUNCTURE: CPT | Mod: PN | Performed by: INTERNAL MEDICINE

## 2024-01-23 PROCEDURE — 80061 LIPID PANEL: CPT | Performed by: INTERNAL MEDICINE

## 2024-01-25 ENCOUNTER — OFFICE VISIT (OUTPATIENT)
Dept: UROLOGY | Facility: CLINIC | Age: 64
End: 2024-01-25
Payer: MEDICARE

## 2024-01-25 ENCOUNTER — OFFICE VISIT (OUTPATIENT)
Dept: CARDIOLOGY | Facility: CLINIC | Age: 64
End: 2024-01-25
Payer: MEDICARE

## 2024-01-25 VITALS
BODY MASS INDEX: 35.38 KG/M2 | HEIGHT: 68 IN | OXYGEN SATURATION: 94 % | SYSTOLIC BLOOD PRESSURE: 148 MMHG | DIASTOLIC BLOOD PRESSURE: 85 MMHG | HEART RATE: 67 BPM | WEIGHT: 233.44 LBS

## 2024-01-25 VITALS
HEIGHT: 68 IN | SYSTOLIC BLOOD PRESSURE: 133 MMHG | WEIGHT: 233.69 LBS | BODY MASS INDEX: 35.42 KG/M2 | DIASTOLIC BLOOD PRESSURE: 88 MMHG | HEART RATE: 69 BPM

## 2024-01-25 DIAGNOSIS — R60.0 EDEMA OF BOTH LOWER EXTREMITIES: ICD-10-CM

## 2024-01-25 DIAGNOSIS — C61 PROSTATE CANCER: Primary | ICD-10-CM

## 2024-01-25 DIAGNOSIS — E78.2 MIXED HYPERLIPIDEMIA: ICD-10-CM

## 2024-01-25 DIAGNOSIS — I89.0 LYMPHEDEMA OF BOTH LOWER EXTREMITIES: ICD-10-CM

## 2024-01-25 DIAGNOSIS — M54.40 ACUTE RIGHT-SIDED LOW BACK PAIN WITH SCIATICA, SCIATICA LATERALITY UNSPECIFIED: ICD-10-CM

## 2024-01-25 DIAGNOSIS — E66.01 SEVERE OBESITY (BMI 35.0-39.9) WITH COMORBIDITY: ICD-10-CM

## 2024-01-25 DIAGNOSIS — I87.2 VENOUS INSUFFICIENCY OF BOTH LOWER EXTREMITIES: ICD-10-CM

## 2024-01-25 DIAGNOSIS — I10 PRIMARY HYPERTENSION: Primary | ICD-10-CM

## 2024-01-25 PROCEDURE — 3075F SYST BP GE 130 - 139MM HG: CPT | Mod: CPTII,S$GLB,, | Performed by: INTERNAL MEDICINE

## 2024-01-25 PROCEDURE — 1126F AMNT PAIN NOTED NONE PRSNT: CPT | Mod: CPTII,S$GLB,, | Performed by: NURSE PRACTITIONER

## 2024-01-25 PROCEDURE — 99215 OFFICE O/P EST HI 40 MIN: CPT | Mod: S$GLB,,, | Performed by: INTERNAL MEDICINE

## 2024-01-25 PROCEDURE — 3079F DIAST BP 80-89 MM HG: CPT | Mod: CPTII,S$GLB,, | Performed by: INTERNAL MEDICINE

## 2024-01-25 PROCEDURE — 1160F RVW MEDS BY RX/DR IN RCRD: CPT | Mod: CPTII,S$GLB,, | Performed by: NURSE PRACTITIONER

## 2024-01-25 PROCEDURE — 1101F PT FALLS ASSESS-DOCD LE1/YR: CPT | Mod: CPTII,S$GLB,, | Performed by: INTERNAL MEDICINE

## 2024-01-25 PROCEDURE — 3008F BODY MASS INDEX DOCD: CPT | Mod: CPTII,S$GLB,, | Performed by: NURSE PRACTITIONER

## 2024-01-25 PROCEDURE — 1126F AMNT PAIN NOTED NONE PRSNT: CPT | Mod: CPTII,S$GLB,, | Performed by: INTERNAL MEDICINE

## 2024-01-25 PROCEDURE — 3044F HG A1C LEVEL LT 7.0%: CPT | Mod: CPTII,S$GLB,, | Performed by: INTERNAL MEDICINE

## 2024-01-25 PROCEDURE — 3044F HG A1C LEVEL LT 7.0%: CPT | Mod: CPTII,S$GLB,, | Performed by: NURSE PRACTITIONER

## 2024-01-25 PROCEDURE — 3288F FALL RISK ASSESSMENT DOCD: CPT | Mod: CPTII,S$GLB,, | Performed by: INTERNAL MEDICINE

## 2024-01-25 PROCEDURE — 3008F BODY MASS INDEX DOCD: CPT | Mod: CPTII,S$GLB,, | Performed by: INTERNAL MEDICINE

## 2024-01-25 PROCEDURE — 3077F SYST BP >= 140 MM HG: CPT | Mod: CPTII,S$GLB,, | Performed by: NURSE PRACTITIONER

## 2024-01-25 PROCEDURE — 1159F MED LIST DOCD IN RCRD: CPT | Mod: CPTII,S$GLB,, | Performed by: INTERNAL MEDICINE

## 2024-01-25 PROCEDURE — 99999 PR PBB SHADOW E&M-EST. PATIENT-LVL III: CPT | Mod: PBBFAC,,, | Performed by: INTERNAL MEDICINE

## 2024-01-25 PROCEDURE — 3079F DIAST BP 80-89 MM HG: CPT | Mod: CPTII,S$GLB,, | Performed by: NURSE PRACTITIONER

## 2024-01-25 PROCEDURE — 99213 OFFICE O/P EST LOW 20 MIN: CPT | Mod: S$GLB,,, | Performed by: NURSE PRACTITIONER

## 2024-01-25 PROCEDURE — 1159F MED LIST DOCD IN RCRD: CPT | Mod: CPTII,S$GLB,, | Performed by: NURSE PRACTITIONER

## 2024-01-25 NOTE — PROGRESS NOTES
[facility]      Chief Complaint   Patient presents with    Edema of both lower extremities    Lymphedema of both lower extremities       Patient ID: Ihsan Mays Sr. is a 63 y.o. male with RLE superficial vein thrombosis, HTN, prostate cancer, obesity, who presents for a follow up  appointment.  Pertinent history/events are as follows:     -Pt kindly referred by Dr. Ybarra for evaluation of thrombosis of right saphenous vein.    -At our initial clinic visit on 4/11/2022, Mr. Mays reports leg swelling for several years.  On 3/8/2022 he reported right leg swelling and pain to his PCP (Dr. Ybarra), and was sent for a BLE venous ultrasound which revealed thrombosis of the right lesser saphenous vein and no evidence of lower extremity DVT.  He was started on Xarelto at that time.  Mr. Mays reports significant improvement in riight leg swelling and pain since starting Xarelto.  He has no claudication or tissue loss.  No smoking history.  He reports no bleeding issues since starting Xarelto.   Plan:   Thrombosis of right lesser saphenous vein- Likely due to underlying prostate cancer.  Other risk factors include lymphedema and venous insufficiency.  Continue Xarelto 20 mg daily for an additional 2 months, then check BLE venous reflux study to evaluate for resolution of the SVT.  Given underlying prostate cancer, pt will require at least prophylactic dose anticoagulation going forward.     Leg Swelling- Due to lymphedema, possible venous insufficiency, and contribution from amlodipine.  Refer to lymphedema clinic.  Limit sodium intake to 2,000 mg daily.  Limit volume intake to 1.5 liters daily.  Elevate legs when resting.   Obesity- Encourage diet, exercise and weight loss.    HLD- Start atorvastatin 40 mg daily.    -At follow up clinic visit on 6/22/2022, Mr. Mays reported no new symptoms.  Scheduled to start lymphedema clinic therapy in 7/2022.  BLE Venous Ultrasound on 6/16/2022  revealed the right smaller saphenous vein demonstrates thickened walls with complete compressibility consistent with a history of superficial venous thrombosis.  Bilateral GSV and right lower extremity SSV reflux noted.  No evidence of deep venous thrombosis bilaterally.  Plan:   Thrombosis of right lesser saphenous vein- Likely due to underlying prostate cancer.  Other risk factors include lymphedema and venous insufficiency.  BLE Venous Ultrasound on 6/16/2022 revealed the right smaller saphenous vein demonstrates thickened walls with complete compressibility consistent with a history of superficial venous thrombosis.  Bilateral GSV and right lower extremity SSV reflux noted.  No evidence of deep venous thrombosis bilaterally.  Decrease Xarelto to 10 mg daily indefinitely.  Given underlying prostate cancer, pt will require at least prophylactic dose anticoagulation.     Leg Swelling- Due to lymphedema, possible venous insufficiency, and contribution from amlodipine.  Refer to lymphedema clinic.  Limit sodium intake to 2,000 mg daily.  Limit volume intake to 1.5 liters daily.  Elevate legs when resting.   Obesity- Encourage diet, exercise and weight loss.    HLD- Continue atorvastatin 40 mg daily.      - At clinic visit on 7/5/2023 Mr. Mays reports occasional numbness/burning sensation in right leg.  States leg swelling remains improved and controlled.  Does not exercise regularly.  Plan:   Thrombosis of right lesser saphenous vein- Likely due to underlying prostate cancer.  Other risk factors include lymphedema and venous insufficiency.  BLE Venous Ultrasound on 6/16/2022 revealed the right smaller saphenous vein demonstrates thickened walls with complete compressibility consistent with a history of superficial venous thrombosis.  Bilateral GSV and right lower extremity SSV reflux noted.  No evidence of deep venous thrombosis bilaterally.  Given underlying prostate cancer, pt will require at least prophylactic  dose anticoagulation.  Continue apixaban 2.5 mg bid.    Leg Swelling- Due to lymphedema, possible venous insufficiency, and contribution from amlodipine.  Refer to lymphedema clinic.  Limit sodium intake to 2,000 mg daily.  Limit volume intake to 1.5 liters daily.  Elevate legs when resting.   Obesity- Encourage diet, exercise and weight loss.    HLD- Continue atorvastatin 40 mg daily.    RLE Sciatica- Check MRI lumbar spine and refer to Neurosurgery for evaluation.    HPI:  Mr. Mays reports significant improvement in numbness/burning sensation in right leg.  States leg swelling remains improved and controlled.  Does not exercise regularly.  LDL 58 on 1/23/2024. MRI Lumbar Spine 7/9/2023 showed mild broad-based bulging of disc material L3-L4 and L4-L5 with mild bilateral LEFT greater than RIGHT neural foraminal encroachment.     Past Medical History:   Diagnosis Date    Glaucoma     Hypertension     Prostate cancer      Past Surgical History:   Procedure Laterality Date    COLONOSCOPY N/A 08/21/2020    Procedure: COLONOSCOPYPrepopik;  Surgeon: Danie Conway MD;  Location: [facility];  Service: Endoscopy;  Laterality: N/A;    COLONOSCOPY N/A 09/29/2023    Procedure: COLONOSCOPY;  Surgeon: Edel Langley MD;  Location: [facility];  Service: Endoscopy;  Laterality: N/A;    ESOPHAGOGASTRODUODENOSCOPY N/A 08/21/2020    Procedure: EGD (ESOPHAGOGASTRODUODENOSCOPY);  Surgeon: Danie Conway MD;  Location: [facility];  Service: Endoscopy;  Laterality: N/A;    ESOPHAGOGASTRODUODENOSCOPY N/A 09/29/2023    Procedure: EGD (ESOPHAGOGASTRODUODENOSCOPY);  Surgeon: Edel Langley MD;  Location: [facility];  Service: Endoscopy;  Laterality: N/A;    FINGER SURGERY      right hand pinkie finger     GLAUCOMA SURGERY Bilateral     STOMACH SURGERY      ulcers      Social History     Socioeconomic History    Marital status:     Number of children: 3   Tobacco Use    Smoking status: Never    Smokeless tobacco: Never    Substance and Sexual Activity    Alcohol use: Yes     Comment: occasionally    Drug use: Not Currently     Types: Marijuana     Comment: Quit 35 years ago    Sexual activity: Yes     Partners: Female     Birth control/protection: Post-menopausal   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      Social Determinants of Health     Financial Resource Strain: Low Risk  (12/1/2023)    Overall Financial Resource Strain (CARDIA)     Difficulty of Paying Living Expenses: Not hard at all   Food Insecurity: No Food Insecurity (12/1/2023)    Hunger Vital Sign     Worried About Running Out of Food in the Last Year: Never true     Ran Out of Food in the Last Year: Never true   Transportation Needs: No Transportation Needs (12/1/2023)    PRAPARE - Transportation     Lack of Transportation (Medical): No     Lack of Transportation (Non-Medical): No   Physical Activity: Inactive (12/1/2023)    Exercise Vital Sign     Days of Exercise per Week: 0 days     Minutes of Exercise per Session: 0 min   Stress: No Stress Concern Present (12/1/2023)    [employer] - Occupational Stress Questionnaire     Feeling of Stress : Only a little   Social Connections: Unknown (12/1/2023)    Social Connection and Isolation Panel [NHANES]     Frequency of Communication with Friends and Family: More than three times a week     Frequency of Social Gatherings with Friends and Family: Never     Active Member of Clubs or Organizations: Yes     Attends Club or Organization Meetings: More than 4 times per year     Marital Status:    Housing Stability: Low Risk  (12/1/2023)    Housing Stability Vital Sign     Unable to Pay for Housing in the Last Year: No     Number of Places Lived in the Last Year: 1     Unstable Housing in the Last Year: No     Family History   Problem Relation Age of Onset    Diabetes Mother     Hypertension Mother     Hypertension Father     Glaucoma Father     Hypertension  "Sister     Heart disease Sister     Heart attack Sister     Hypertension Brother     Anemia Daughter     No Known Problems Son     Stroke Maternal Aunt     Heart disease Maternal Grandmother     Heart disease Maternal Grandfather     Colon cancer Neg Hx     Esophageal cancer Neg Hx     Rectal cancer Neg Hx     Stomach cancer Neg Hx     Ulcerative colitis Neg Hx     Irritable bowel syndrome Neg Hx     Crohn's disease Neg Hx     Celiac disease Neg Hx     Cancer Neg Hx        Review of patient's allergies indicates:  No Known Allergies    Medication List with Changes/Refills   Current Medications    AMLODIPINE (NORVASC) 10 MG TABLET    Take 1 tablet (10 mg total) by mouth once daily.    APIXABAN (ELIQUIS) 2.5 MG TAB    Take 1 tablet (2.5 mg total) by mouth 2 (two) times daily.    ATORVASTATIN (LIPITOR) 40 MG TABLET    TAKE 1 TABLET BY MOUTH EVERY DAY    CARVEDILOL (COREG) 12.5 MG TABLET    TAKE 1 TABLET BY MOUTH TWICE A DAY WITH MEALS    CYCLOBENZAPRINE (FLEXERIL) 10 MG TABLET    Take 10 mg by mouth every 8 (eight) hours as needed.    LATANOPROST 0.005 % OPHTHALMIC SOLUTION    INSTILL 1 DROP INTO BOTH EYES AT BEDTIME    PANTOPRAZOLE (PROTONIX) 20 MG TABLET    Take 1 tablet (20 mg total) by mouth once daily.    VALSARTAN (DIOVAN) 320 MG TABLET    Take 1 tablet (320 mg total) by mouth once daily.       Review of Systems  Constitution: Denies chills, fever, and sweats.  HENT: Denies headaches or blurry vision.  Cardiovascular: Denies chest pain or irregular heart beat.  Respiratory: Denies cough or shortness of breath.  Gastrointestinal: Denies abdominal pain, nausea, or vomiting.  Musculoskeletal: Positive for leg swelling (improved)  Neurological: Denies dizziness or focal weakness.  Psychiatric/Behavioral: Normal mental status.  Hematologic/Lymphatic: Denies bleeding problem or easy bruising/bleeding.  Skin: Denies rash or suspicious lesions    Physical Examination  /88   Pulse 69   Ht 5' 8" (1.727 m)   Wt 106 " kg (233 lb 11 oz)   BMI 35.53 kg/m²     Constitutional: No acute distress, conversant  HEENT: Sclera anicteric, Pupils equal, round and reactive to light, extraocular motions intact, Oropharynx clear  Neck: No JVD, no carotid bruits  Cardiovascular: regular rate and rhythm, no murmur, rubs or gallops, normal S1/S2  Pulmonary: Clear to auscultation bilaterally  Abdominal: Abdomen soft, nontender, nondistended, positive bowel sounds  Extremities: BLE's with 1 pitting edema, venous stasis dermatitis, and changes consistent with lymphedema (R>L)   Pulses:  Carotid pulses are 2+ on the right side, and 2+ on the left side.  Radial pulses are 2+ on the right side, and 2+ on the left side.   Femoral pulses are 2+ on the right side, and 2+ on the left side.  Popliteal pulses are 2+ on the right side, and 2+ on the left side.   Dorsalis pedis pulses are 2+ on the right side, and 2+ on the left side.   Posterior tibial pulses are 2+ on the right side, and 2+ on the left side.    Skin: No ecchymosis, erythema, or ulcers  Psych: Alert and oriented x 3, appropriate affect  Neuro: CNII-XII intact, no focal deficits    Labs:  Most Recent Data  CBC:   Lab Results   Component Value Date    WBC 6.44 01/23/2024    HGB 13.2 (L) 01/23/2024    HCT 39.2 (L) 01/23/2024     01/23/2024    MCV 93 01/23/2024    RDW 13.4 01/23/2024     BMP:   Lab Results   Component Value Date     01/23/2024    K 3.6 01/23/2024     01/23/2024    CO2 26 01/23/2024    BUN 12 01/23/2024    CREATININE 0.74 01/23/2024     (H) 01/23/2024    CALCIUM 8.8 01/23/2024     LFTS;   Lab Results   Component Value Date    PROT 7.2 01/23/2024    ALBUMIN 4.1 01/23/2024    BILITOT 0.9 01/23/2024    AST 28 01/23/2024    ALKPHOS 117 01/23/2024    ALT 26 01/23/2024     COAGS:   Lab Results   Component Value Date    INR 1.0 02/22/2012     FLP:   Lab Results   Component Value Date    CHOL 119 (L) 01/23/2024    HDL 48 01/23/2024    LDLCALC 58.4 (L) 01/23/2024     TRIG 63 01/23/2024    CHOLHDL 40.3 01/23/2024     CARDIAC:   Lab Results   Component Value Date    TROPONINI <0.006 05/03/2018    BNP 17 03/08/2022       Imaging:    BLE Venous Ultrasound 6/16/2022:  No evidence of deep venous thrombosis bilaterally.  The right smaller saphenous vein demonstrates thickened walls with complete compressibility consistent with a history of superficial venous thrombosis.  Bilateral GSV and right lower extremity SSV reflux noted.    BLE Venous Ultrasound 3/9/2022:  The right lesser saphenous vein SVT.  There is no evidence of a left lower extremity DVT.    Assessment/Plan:  Ihsan Mays Sr. is a 63 y.o. male with RLE superficial vein thrombosis, HTN, prostate cancer, obesity, who presents for a follow up appointment.      1. Thrombosis of right lesser saphenous vein- Likely due to underlying prostate cancer.  Other risk factors include lymphedema and venous insufficiency.  BLE Venous Ultrasound on 6/16/2022 revealed the right smaller saphenous vein demonstrates thickened walls with complete compressibility consistent with a history of superficial venous thrombosis.  Bilateral GSV and right lower extremity SSV reflux noted.  No evidence of deep venous thrombosis bilaterally.  Given underlying prostate cancer, pt will require at least prophylactic dose anticoagulation.  Continue apixaban 2.5 mg bid.      2. Leg Swelling- Due to lymphedema, possible venous insufficiency, and contribution from amlodipine. Encouraged to continue home lymphedema therapy.  Limit sodium intake to 2,000 mg daily.  Limit volume intake to 1.5 liters daily.  Elevate legs when resting.     3. Obesity- Encourage diet, exercise and weight loss.  Encouraged to walk 10,000 steps in day. Encouraged to walk 30 minutes a day, 5 days in a week     4. HLD- Continue atorvastatin 40 mg daily.      5. RLE Sciatica- MRI Lumbar Spine 7/9/2023 showed mild broad-based bulging of disc material L3-L4 and L4-L5 with mild  bilateral LEFT greater than RIGHT neural foraminal encroachment.  Appreciated and encouraged to continue following Neurosurgery.     Follow up in 6 months    Total duration of face to face visit time 30 minutes.  Total time spent counseling greater than fifty percent of total visit time.  Counseling included discussion regarding imaging findings, diagnosis, possibilities, treatment options, risks and benefits.  The patient had many questions regarding the options and long-term effects.    Patient seen and discussed with Dr. Zambrano. Further recommendations per the attending addendum.    Don Luis MD  PGY IV - Vascular Medicine Fellow   [facility]

## 2024-01-25 NOTE — PROGRESS NOTES
Subjective:      Ihsan Mays Sr. is a 63 y.o. male who returns today regarding his prostate cancer.     The patient was initially diagnosed with [place] 3+3 low volume prostate cancer in June 2021.  He has been on active surveillance.  Most recent biopsy was in January of 2023, significant for [place] 3+3 cancer in 2/14 cores, <5% of total biopsy volume.  Last MRI in Oct 2022 was PIRADS-2.        PSA Diagnostic Prostate Specific Antigen   Latest Ref Rng 0.00 - 4.00 ng/mL 0.00 - 4.00 ng/mL   2/17/2022 4.7 (H)     8/22/2022 5.1 (H)     2/3/2023  5.9 (H)    7/21/2023 3.8        Voiding well. Good urinary stream. Reports nocturia several times per night. Does not limit PM fluids.     The following portions of the patient's history were reviewed and updated as appropriate: allergies, current medications, past family history, past medical history, past social history, past surgical history and problem list.    Review of Systems  Constitutional: no fever or chills  ENT: no nasal congestion or sore throat  Respiratory: no cough or shortness of breath  Cardiovascular: no chest pain or palpitations  Gastrointestinal: no nausea or vomiting, tolerating diet  Genitourinary: as per HPI  Hematologic/Lymphatic: no easy bruising or lymphadenopathy  Musculoskeletal: no arthralgias or myalgias  Neurological: no seizures or tremors  Behavioral/Psych: no auditory or visual hallucinations     Objective:   Vitals:   Vitals:    01/25/24 1413   BP: (!) 148/85   Pulse: 67     Physical Exam   General: alert and oriented, no acute distress  Head: normocephalic, atraumatic  Neck: supple, normal ROM  Respiratory: Symmetric expansion, non-labored breathing  Cardiovascular: regular rate and rhythm  Abdomen: soft, non tender, non distended  Genitourinary: deferred  Skin: normal coloration and turgor, no rashes, no suspicious skin lesions noted  Neuro: alert and oriented x3, no gross deficits  Psych: normal judgment and insight, normal  mood/affect, and non-anxious    Lab Review   Urinalysis demonstrates negative for all components  Lab Results   Component Value Date    WBC 6.44 01/23/2024    HGB 13.2 (L) 01/23/2024    HCT 39.2 (L) 01/23/2024    MCV 93 01/23/2024     01/23/2024     Lab Results   Component Value Date    CREATININE 0.74 01/23/2024    BUN 12 01/23/2024     Lab Results   Component Value Date    PSA 5.9 (H) 02/03/2023     Imaging   None    Assessment:     1. Prostate cancer      Plan:   Ihsan was seen today for follow-up.    Diagnoses and all orders for this visit:    Prostate cancer  -     Prostate Specific Antigen, Diagnostic; Future    Plan:  --PSA today   --If PSA remains stable will continue to monitor Q6 months

## 2024-01-26 DIAGNOSIS — C61 PROSTATE CANCER: Primary | ICD-10-CM

## 2024-01-26 NOTE — PATIENT INSTRUCTIONS
Assessment/Plan:  Ihsan Mays Sr. is a 63 y.o. male with RLE superficial vein thrombosis, HTN, prostate cancer, obesity, who presents for a follow up appointment.      1. Thrombosis of right lesser saphenous vein- Likely due to underlying prostate cancer.  Other risk factors include lymphedema and venous insufficiency.  BLE Venous Ultrasound on 6/16/2022 revealed the right smaller saphenous vein demonstrates thickened walls with complete compressibility consistent with a history of superficial venous thrombosis.  Bilateral GSV and right lower extremity SSV reflux noted.  No evidence of deep venous thrombosis bilaterally.  Given underlying prostate cancer, pt will require at least prophylactic dose anticoagulation.  Continue apixaban 2.5 mg bid.      2. Leg Swelling- Due to lymphedema, possible venous insufficiency, and contribution from amlodipine. Encouraged to continue home lymphedema therapy.  Limit sodium intake to 2,000 mg daily.  Limit volume intake to 1.5 liters daily.  Elevate legs when resting.     3. Obesity- Encourage diet, exercise and weight loss.  Encouraged to walk 10,000 steps in day. Encouraged to walk 30 minutes a day, 5 days in a week     4. HLD- Continue atorvastatin 40 mg daily.      5. RLE Sciatica- MRI Lumbar Spine 7/9/2023 showed mild broad-based bulging of disc material L3-L4 and L4-L5 with mild bilateral LEFT greater than RIGHT neural foraminal encroachment.  Appreciated and encouraged to continue following Neurosurgery.     Follow up in 6 months

## 2024-01-30 ENCOUNTER — PATIENT MESSAGE (OUTPATIENT)
Dept: INTERNAL MEDICINE | Facility: CLINIC | Age: 64
End: 2024-01-30
Payer: MEDICARE

## 2024-02-02 ENCOUNTER — TELEPHONE (OUTPATIENT)
Dept: INTERNAL MEDICINE | Facility: CLINIC | Age: 64
End: 2024-02-02
Payer: MEDICARE

## 2024-02-02 ENCOUNTER — OFFICE VISIT (OUTPATIENT)
Dept: INTERNAL MEDICINE | Facility: CLINIC | Age: 64
End: 2024-02-02
Payer: MEDICARE

## 2024-02-02 VITALS
SYSTOLIC BLOOD PRESSURE: 126 MMHG | TEMPERATURE: 97 F | BODY MASS INDEX: 34.96 KG/M2 | HEIGHT: 68 IN | OXYGEN SATURATION: 97 % | DIASTOLIC BLOOD PRESSURE: 80 MMHG | WEIGHT: 230.69 LBS | HEART RATE: 72 BPM

## 2024-02-02 DIAGNOSIS — N52.9 ERECTILE DYSFUNCTION, UNSPECIFIED ERECTILE DYSFUNCTION TYPE: ICD-10-CM

## 2024-02-02 DIAGNOSIS — R73.03 PREDIABETES: Primary | ICD-10-CM

## 2024-02-02 DIAGNOSIS — E66.01 SEVERE OBESITY (BMI 35.0-39.9) WITH COMORBIDITY: ICD-10-CM

## 2024-02-02 DIAGNOSIS — E78.2 MIXED HYPERLIPIDEMIA: ICD-10-CM

## 2024-02-02 DIAGNOSIS — I82.811 SAPHENOUS VEIN CLOT, RIGHT: ICD-10-CM

## 2024-02-02 DIAGNOSIS — I10 ESSENTIAL HYPERTENSION: ICD-10-CM

## 2024-02-02 DIAGNOSIS — I87.2 VENOUS INSUFFICIENCY OF BOTH LOWER EXTREMITIES: ICD-10-CM

## 2024-02-02 DIAGNOSIS — C61 CANCER OF PROSTATE: ICD-10-CM

## 2024-02-02 DIAGNOSIS — I10 PRIMARY HYPERTENSION: Primary | ICD-10-CM

## 2024-02-02 PROBLEM — E66.9 OBESITY (BMI 30-39.9): Status: RESOLVED | Noted: 2022-04-11 | Resolved: 2024-02-02

## 2024-02-02 PROBLEM — R97.20 ELEVATED PSA: Status: RESOLVED | Noted: 2020-10-16 | Resolved: 2024-02-02

## 2024-02-02 PROCEDURE — 1126F AMNT PAIN NOTED NONE PRSNT: CPT | Mod: CPTII,S$GLB,, | Performed by: INTERNAL MEDICINE

## 2024-02-02 PROCEDURE — 99215 OFFICE O/P EST HI 40 MIN: CPT | Mod: S$GLB,,, | Performed by: INTERNAL MEDICINE

## 2024-02-02 PROCEDURE — 99999 PR PBB SHADOW E&M-EST. PATIENT-LVL III: CPT | Mod: PBBFAC,,, | Performed by: INTERNAL MEDICINE

## 2024-02-02 PROCEDURE — 3044F HG A1C LEVEL LT 7.0%: CPT | Mod: CPTII,S$GLB,, | Performed by: INTERNAL MEDICINE

## 2024-02-02 PROCEDURE — 1159F MED LIST DOCD IN RCRD: CPT | Mod: CPTII,S$GLB,, | Performed by: INTERNAL MEDICINE

## 2024-02-02 PROCEDURE — 3074F SYST BP LT 130 MM HG: CPT | Mod: CPTII,S$GLB,, | Performed by: INTERNAL MEDICINE

## 2024-02-02 PROCEDURE — 3079F DIAST BP 80-89 MM HG: CPT | Mod: CPTII,S$GLB,, | Performed by: INTERNAL MEDICINE

## 2024-02-02 PROCEDURE — 3008F BODY MASS INDEX DOCD: CPT | Mod: CPTII,S$GLB,, | Performed by: INTERNAL MEDICINE

## 2024-02-02 PROCEDURE — 1101F PT FALLS ASSESS-DOCD LE1/YR: CPT | Mod: CPTII,S$GLB,, | Performed by: INTERNAL MEDICINE

## 2024-02-02 PROCEDURE — 3288F FALL RISK ASSESSMENT DOCD: CPT | Mod: CPTII,S$GLB,, | Performed by: INTERNAL MEDICINE

## 2024-02-02 NOTE — PROGRESS NOTES
Subjective     Patient ID: Ihsan Mays Sr. is a 63 y.o. male.    Chief Complaint: Annual Exam    HPI  63 y.o. Male here for annual exam.      Vaccines: Influenza (2023); Tetanus (2018); Shingrix (done)  Eye exam: 2018  Colonoscopy: 8/20     Exercise: no  Diet: regular     Past Medical History:  No date: Prostate cancer  No date: Glaucoma  No date: Hypertension  No date: Thrombosis of right lesser saphenous vein  No date: Prediabetes   Past Surgical History:  6/22/2015: COLONOSCOPY; N/A      Comment:  Performed by Yosef Pérez MD at [facility] (4TH FLR)  6/22/2015: ESOPHAGOGASTRODUODENOSCOPY (EGD); N/A      Comment:  Performed by Yosef Pérez MD at [facility] (4TH FLR)  No date: FINGER SURGERY      Comment:  right hand pinkie finger   No date: GLAUCOMA SURGERY  No date: STOMACH SURGERY      Comment:  ulcers   Social History    Socioeconomic History      Marital status:       Spouse name: Not on file      Number of children: 3      Years of education: Not on file      Highest education level: Not on file    Social Needs      Financial resource strain: Not on file      Food insecurity - worry: Not on file      Food insecurity - inability: Not on file      Transportation needs - medical: Not on file      Transportation needs - non-medical: Not on file       Tobacco Use      Smoking status: Never Smoker      Smokeless tobacco: Never Used    Substance and Sexual Activity      Alcohol use: Yes        Comment: occasionally      Drug use: No      Sexual activity: Yes        Partners: Female       Social History Narrative       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   HENT:  Negative for nasal congestion, mouth sores, postnasal drip, rhinorrhea, sinus pressure/congestion, sneezing, sore  throat, trouble swallowing and voice change.    Eyes:  Negative for discharge, itching and visual disturbance.   Respiratory:  Negative for cough, chest tightness, shortness of breath and wheezing.    Cardiovascular:  Negative for chest pain, palpitations and leg swelling.   Gastrointestinal:  Negative for abdominal pain, blood in stool, constipation, diarrhea, nausea and vomiting.   Endocrine: Negative for cold intolerance and heat intolerance.   Genitourinary:  Negative for difficulty urinating, dysuria, flank pain, hematuria and urgency.   Musculoskeletal:  Negative for arthralgias, back pain, myalgias and neck pain.   Integumentary:  Negative for rash and wound.   Allergic/Immunologic: Negative for environmental allergies and food allergies.   Neurological:  Negative for dizziness, tremors, seizures, syncope, weakness and headaches.   Hematological:  Negative for adenopathy. Does not bruise/bleed easily.   Psychiatric/Behavioral:  Negative for confusion, sleep disturbance and suicidal ideas. The patient is not nervous/anxious.           Objective     Physical Exam  Constitutional:       General: He is not in acute distress.     Appearance: Normal appearance. He is well-developed. He is not ill-appearing, toxic-appearing or diaphoretic.   HENT:      Head: Normocephalic and atraumatic.      Right Ear: External ear normal.      Left Ear: External ear normal.      Nose: Nose normal.      Mouth/Throat:      Pharynx: No oropharyngeal exudate.   Eyes:      General: No scleral icterus.        Right eye: No discharge.         Left eye: No discharge.      Extraocular Movements: Extraocular movements intact.      Conjunctiva/sclera: Conjunctivae normal.      Pupils: Pupils are equal, round, and reactive to light.   Neck:      Thyroid: No thyromegaly.      Vascular: No JVD.   Cardiovascular:      Rate and Rhythm: Normal rate and regular rhythm.      Pulses: Normal pulses.      Heart sounds: Normal heart sounds. No murmur  heard.  Pulmonary:      Effort: Pulmonary effort is normal. No respiratory distress.      Breath sounds: Normal breath sounds. No wheezing or rales.   Abdominal:      General: Bowel sounds are normal. There is no distension.      Palpations: Abdomen is soft.      Tenderness: There is no abdominal tenderness. There is no right CVA tenderness, left CVA tenderness, guarding or rebound.   Musculoskeletal:      Cervical back: Normal range of motion and neck supple. No rigidity.      Right lower leg: No edema.      Left lower leg: No edema.   Lymphadenopathy:      Cervical: No cervical adenopathy.   Skin:     General: Skin is warm and dry.      Capillary Refill: Capillary refill takes less than 2 seconds.      Coloration: Skin is not pale.      Findings: No rash.   Neurological:      General: No focal deficit present.      Mental Status: He is alert and oriented to person, place, and time. Mental status is at baseline.      Cranial Nerves: No cranial nerve deficit.      Sensory: No sensory deficit.      Motor: No weakness.      Coordination: Coordination normal.      Gait: Gait normal.      Deep Tendon Reflexes: Reflexes normal.   Psychiatric:         Mood and Affect: Mood normal.         Behavior: Behavior normal.         Thought Content: Thought content normal.         Judgment: Judgment normal.            Assessment and Plan     1. Primary hypertension    2. Mixed hyperlipidemia    3. Venous insufficiency of both lower extremities    4. Cancer of prostate    5. Saphenous vein clot, right    6. Severe obesity (BMI 35.0-39.9) with comorbidity    7. Erectile dysfunction, unspecified erectile dysfunction type         HTN- stable on Diovan HCT/Coreg/Norvasc      ED- controlled on Viagra      HLD- on Lipitor      Prostate cancer- stable, followed by Urology      Thrombosis of right lesser saphenous vein- suspect 2/2 underlying prostate cancer    -continue prophylactic dose of Eliquis 2.5 mg BID indefinitely per cardio       Prediabetes- trending       B/L LE venous insufficiency/reflux- stable with compression stockings       F/u in 6 months     Over 1/2 of 40 minute visit spent reviewing pt's medical records, education/discussion of pt's medical conditions and medical management

## 2024-03-04 ENCOUNTER — PATIENT MESSAGE (OUTPATIENT)
Dept: INTERNAL MEDICINE | Facility: CLINIC | Age: 64
End: 2024-03-04
Payer: MEDICARE

## 2024-03-04 DIAGNOSIS — I82.811 SAPHENOUS VEIN CLOT, RIGHT: ICD-10-CM

## 2024-03-05 RX ORDER — CARVEDILOL 12.5 MG/1
12.5 TABLET ORAL 2 TIMES DAILY WITH MEALS
Qty: 180 TABLET | Refills: 3 | Status: SHIPPED | OUTPATIENT
Start: 2024-03-05

## 2024-03-05 NOTE — TELEPHONE ENCOUNTER
No care due was identified.  [facility] Embedded Care Due Messages. Reference number: [id number].   3/04/2024 7:57:57 PM CST

## 2024-03-05 NOTE — TELEPHONE ENCOUNTER
Refill Decision Note   Ihsan Mays  is requesting a refill authorization.  Brief Assessment and Rationale for Refill:  Approve     Medication Therapy Plan:        Comments:     Note composed:9:42 AM 03/05/2024

## 2024-04-15 ENCOUNTER — PATIENT MESSAGE (OUTPATIENT)
Dept: GASTROENTEROLOGY | Facility: CLINIC | Age: 64
End: 2024-04-15
Payer: MEDICARE

## 2024-04-30 DIAGNOSIS — I10 ESSENTIAL HYPERTENSION: ICD-10-CM

## 2024-04-30 NOTE — TELEPHONE ENCOUNTER
----- Message from Gillian Matias sent at 4/30/2024  2:40 PM CDT -----  Contact: [facility]   PHARMACY CHANGE    PLEASE SEND to [facility] NOT [facility]. Thank you        Requesting an RX refill or new RX.  Is this a refill or new RX: new  RX name and strength (copy/paste from chart):  atorvastatin (LIPITOR) 40 MG tablet  Is this a 30 day or 90 day RX:   Pharmacy name and phone # (copy/paste from chart):   [id number] - [place], [place] - [address] AT [facility] & [address] [facility] [address]  Phone: [phone] Fax: [phone]     Requesting an RX refill or new RX.  Is this a refill or new RX: new  RX name and strength (copy/paste from chart):  valsartan (DIOVAN) 320 MG tablet  Is this a 30 day or 90 day RX:   Pharmacy name and phone # (copy/paste from chart):    [id number] - [place], [place] - [address] AT [facility] & [address] [facility] [place] [address]  Phone: [phone] Fax: [phone]

## 2024-04-30 NOTE — TELEPHONE ENCOUNTER
No care due was identified.  [facility] Embedded Care Due Messages. Reference number: [id number].   4/30/2024 2:36:05 PM CDT

## 2024-05-01 RX ORDER — VALSARTAN 320 MG/1
320 TABLET ORAL
Qty: 90 TABLET | Refills: 3 | Status: SHIPPED | OUTPATIENT
Start: 2024-05-01

## 2024-05-01 RX ORDER — ATORVASTATIN CALCIUM 40 MG/1
40 TABLET, FILM COATED ORAL DAILY
Qty: 90 TABLET | Refills: 3 | Status: SHIPPED | OUTPATIENT
Start: 2024-05-01

## 2024-07-24 ENCOUNTER — LAB VISIT (OUTPATIENT)
Dept: LAB | Facility: HOSPITAL | Age: 64
End: 2024-07-24
Attending: NURSE PRACTITIONER
Payer: MEDICARE

## 2024-07-24 DIAGNOSIS — C61 PROSTATE CANCER: ICD-10-CM

## 2024-07-24 LAB — COMPLEXED PSA SERPL-MCNC: 5.2 NG/ML (ref 0–4)

## 2024-07-24 PROCEDURE — 36415 COLL VENOUS BLD VENIPUNCTURE: CPT | Mod: PN | Performed by: NURSE PRACTITIONER

## 2024-07-24 PROCEDURE — 84153 ASSAY OF PSA TOTAL: CPT | Performed by: NURSE PRACTITIONER

## 2024-07-29 ENCOUNTER — OFFICE VISIT (OUTPATIENT)
Dept: UROLOGY | Facility: CLINIC | Age: 64
End: 2024-07-29
Payer: MEDICARE

## 2024-07-29 ENCOUNTER — TELEPHONE (OUTPATIENT)
Dept: ORTHOPEDICS | Facility: CLINIC | Age: 64
End: 2024-07-29
Payer: MEDICARE

## 2024-07-29 VITALS
HEART RATE: 74 BPM | WEIGHT: 230 LBS | SYSTOLIC BLOOD PRESSURE: 130 MMHG | DIASTOLIC BLOOD PRESSURE: 80 MMHG | BODY MASS INDEX: 34.97 KG/M2

## 2024-07-29 DIAGNOSIS — R35.1 NOCTURIA: ICD-10-CM

## 2024-07-29 DIAGNOSIS — C61 PROSTATE CANCER: Primary | ICD-10-CM

## 2024-07-29 LAB
BILIRUB SERPL-MCNC: NEGATIVE MG/DL
BLOOD URINE, POC: NEGATIVE
CLARITY, POC UA: CLEAR
COLOR, POC UA: YELLOW
GLUCOSE UR QL STRIP: NORMAL
KETONES UR QL STRIP: NEGATIVE
LEUKOCYTE ESTERASE URINE, POC: NORMAL
NITRITE, POC UA: NEGATIVE
PH, POC UA: 5
PROTEIN, POC: NORMAL
SPECIFIC GRAVITY, POC UA: 1.02
UROBILINOGEN, POC UA: NORMAL

## 2024-07-29 PROCEDURE — 3008F BODY MASS INDEX DOCD: CPT | Mod: CPTII,S$GLB,, | Performed by: NURSE PRACTITIONER

## 2024-07-29 PROCEDURE — 4010F ACE/ARB THERAPY RXD/TAKEN: CPT | Mod: CPTII,S$GLB,, | Performed by: NURSE PRACTITIONER

## 2024-07-29 PROCEDURE — 1126F AMNT PAIN NOTED NONE PRSNT: CPT | Mod: CPTII,S$GLB,, | Performed by: NURSE PRACTITIONER

## 2024-07-29 PROCEDURE — 3075F SYST BP GE 130 - 139MM HG: CPT | Mod: CPTII,S$GLB,, | Performed by: NURSE PRACTITIONER

## 2024-07-29 PROCEDURE — 81002 URINALYSIS NONAUTO W/O SCOPE: CPT | Mod: S$GLB,,, | Performed by: NURSE PRACTITIONER

## 2024-07-29 PROCEDURE — 87186 SC STD MICRODIL/AGAR DIL: CPT | Performed by: NURSE PRACTITIONER

## 2024-07-29 PROCEDURE — 3079F DIAST BP 80-89 MM HG: CPT | Mod: CPTII,S$GLB,, | Performed by: NURSE PRACTITIONER

## 2024-07-29 PROCEDURE — 1101F PT FALLS ASSESS-DOCD LE1/YR: CPT | Mod: CPTII,S$GLB,, | Performed by: NURSE PRACTITIONER

## 2024-07-29 PROCEDURE — 87086 URINE CULTURE/COLONY COUNT: CPT | Performed by: NURSE PRACTITIONER

## 2024-07-29 PROCEDURE — 87088 URINE BACTERIA CULTURE: CPT | Performed by: NURSE PRACTITIONER

## 2024-07-29 PROCEDURE — 1159F MED LIST DOCD IN RCRD: CPT | Mod: CPTII,S$GLB,, | Performed by: NURSE PRACTITIONER

## 2024-07-29 PROCEDURE — 1160F RVW MEDS BY RX/DR IN RCRD: CPT | Mod: CPTII,S$GLB,, | Performed by: NURSE PRACTITIONER

## 2024-07-29 PROCEDURE — 3288F FALL RISK ASSESSMENT DOCD: CPT | Mod: CPTII,S$GLB,, | Performed by: NURSE PRACTITIONER

## 2024-07-29 PROCEDURE — 3044F HG A1C LEVEL LT 7.0%: CPT | Mod: CPTII,S$GLB,, | Performed by: NURSE PRACTITIONER

## 2024-07-29 PROCEDURE — 99213 OFFICE O/P EST LOW 20 MIN: CPT | Mod: S$GLB,,, | Performed by: NURSE PRACTITIONER

## 2024-07-29 NOTE — PROGRESS NOTES
Subjective:      Ihsan Mays Sr. is a 63 y.o. male who returns today with PSA.     The patient was initially diagnosed with [place] 3+3 low volume prostate cancer in June 2021.  He has been on active surveillance.  Most recent biopsy was in January of 2023, significant for [place] 3+3 cancer in 2/14 cores, <5% of total biopsy volume.  Last MRI in Oct 2022 was PIRADS-2.       PSA Diagnostic Prostate Specific Antigen   Latest Ref Rng 0.00 - 4.00 ng/mL 0.00 - 4.00 ng/mL   8/22/2022 5.1 (H)     2/3/2023  5.9 (H)    7/21/2023 3.8     1/25/2024 5.1 (H)     7/24/2024 5.2 (H)       Returns today with repeat PSA.     Voiding well. Good urinary stream. Reports nocturia several times per night. Does not limit PM fluids. Snores. Denies daytime frequency and urgency. Good urinary stream.     The following portions of the patient's history were reviewed and updated as appropriate: allergies, current medications, past family history, past medical history, past social history, past surgical history and problem list.    Review of Systems  Constitutional: no fever or chills  ENT: no nasal congestion or sore throat  Respiratory: no cough or shortness of breath  Cardiovascular: no chest pain or palpitations  Gastrointestinal: no nausea or vomiting, tolerating diet  Genitourinary: as per HPI  Hematologic/Lymphatic: no easy bruising or lymphadenopathy  Musculoskeletal: no arthralgias or myalgias  Neurological: no seizures or tremors  Behavioral/Psych: no auditory or visual hallucinations     Objective:   Vitals:   Vitals:    07/29/24 1425   BP: 130/80   Pulse: 74     Physical Exam   General: alert and oriented, no acute distress  Head: normocephalic, atraumatic  Neck: supple, normal ROM  Respiratory: Symmetric expansion, non-labored breathing  Cardiovascular: regular rate and rhythm  Abdomen: soft, non tender, non distended  Genitourinary: deferred  Skin: normal coloration and turgor, no rashes, no suspicious skin lesions  noted  Neuro: alert and oriented x3, no gross deficits  Psych: normal judgment and insight, normal mood/affect, and non-anxious    Lab Review   Urinalysis demonstrates positive for leukocytes (trace)  Lab Results   Component Value Date    WBC 6.44 01/23/2024    HGB 13.2 (L) 01/23/2024    HCT 39.2 (L) 01/23/2024    MCV 93 01/23/2024     01/23/2024     Lab Results   Component Value Date    CREATININE 0.74 01/23/2024    BUN 12 01/23/2024     Lab Results   Component Value Date    PSA 5.9 (H) 02/03/2023     Imaging   None    Assessment:     1. Prostate cancer    2. Nocturia      Plan:   Diagnoses and all orders for this visit:    Prostate cancer  -     Prostate Specific Antigen, Diagnostic; Future  -     POCT URINE DIPSTICK WITHOUT MICROSCOPE    Nocturia  -     Ambulatory referral/consult to Sleep Disorders; Future  -     Urine culture    Plan:  --PSA remains stable  --Urine culture  --Reduce PM fluids  --Referral to al for sleep apnea   --Discussed surveillance MRI/bx- defer for now  --Follow up in 6 months for repeat PSA

## 2024-07-31 LAB — BACTERIA UR CULT: ABNORMAL

## 2024-08-01 DIAGNOSIS — N30.00 ACUTE CYSTITIS WITHOUT HEMATURIA: Primary | ICD-10-CM

## 2024-08-01 RX ORDER — CEPHALEXIN 500 MG/1
500 CAPSULE ORAL EVERY 12 HOURS
Qty: 14 CAPSULE | Refills: 0 | Status: SHIPPED | OUTPATIENT
Start: 2024-08-01 | End: 2024-08-08

## 2024-08-02 ENCOUNTER — LAB VISIT (OUTPATIENT)
Dept: LAB | Facility: HOSPITAL | Age: 64
End: 2024-08-02
Attending: INTERNAL MEDICINE
Payer: MEDICARE

## 2024-08-02 ENCOUNTER — OFFICE VISIT (OUTPATIENT)
Dept: INTERNAL MEDICINE | Facility: CLINIC | Age: 64
End: 2024-08-02
Payer: MEDICARE

## 2024-08-02 VITALS
RESPIRATION RATE: 20 BRPM | SYSTOLIC BLOOD PRESSURE: 123 MMHG | HEART RATE: 79 BPM | WEIGHT: 240.19 LBS | BODY MASS INDEX: 36.4 KG/M2 | DIASTOLIC BLOOD PRESSURE: 78 MMHG | OXYGEN SATURATION: 96 % | HEIGHT: 68 IN

## 2024-08-02 DIAGNOSIS — I87.2 VENOUS INSUFFICIENCY OF BOTH LOWER EXTREMITIES: ICD-10-CM

## 2024-08-02 DIAGNOSIS — R73.03 PREDIABETES: ICD-10-CM

## 2024-08-02 DIAGNOSIS — I10 ESSENTIAL HYPERTENSION: ICD-10-CM

## 2024-08-02 DIAGNOSIS — E78.2 MIXED HYPERLIPIDEMIA: ICD-10-CM

## 2024-08-02 DIAGNOSIS — I10 PRIMARY HYPERTENSION: ICD-10-CM

## 2024-08-02 DIAGNOSIS — C61 CANCER OF PROSTATE: Primary | ICD-10-CM

## 2024-08-02 DIAGNOSIS — I82.811 SAPHENOUS VEIN CLOT, RIGHT: ICD-10-CM

## 2024-08-02 LAB
ALBUMIN SERPL BCP-MCNC: 3.8 G/DL (ref 3.5–5.2)
ALP SERPL-CCNC: 117 U/L (ref 55–135)
ALT SERPL W/O P-5'-P-CCNC: 19 U/L (ref 10–44)
ANION GAP SERPL CALC-SCNC: 11 MMOL/L (ref 8–16)
AST SERPL-CCNC: 17 U/L (ref 10–40)
BASOPHILS # BLD AUTO: 0.03 K/UL (ref 0–0.2)
BASOPHILS NFR BLD: 0.5 % (ref 0–1.9)
BILIRUB SERPL-MCNC: 0.8 MG/DL (ref 0.1–1)
BUN SERPL-MCNC: 14 MG/DL (ref 8–23)
CALCIUM SERPL-MCNC: 9.3 MG/DL (ref 8.7–10.5)
CHLORIDE SERPL-SCNC: 111 MMOL/L (ref 95–110)
CO2 SERPL-SCNC: 21 MMOL/L (ref 23–29)
CREAT SERPL-MCNC: 0.9 MG/DL (ref 0.5–1.4)
DIFFERENTIAL METHOD BLD: ABNORMAL
EOSINOPHIL # BLD AUTO: 0.2 K/UL (ref 0–0.5)
EOSINOPHIL NFR BLD: 3 % (ref 0–8)
ERYTHROCYTE [DISTWIDTH] IN BLOOD BY AUTOMATED COUNT: 13.5 % (ref 11.5–14.5)
EST. GFR  (NO RACE VARIABLE): >60 ML/MIN/1.73 M^2
ESTIMATED AVG GLUCOSE: 120 MG/DL (ref 68–131)
GLUCOSE SERPL-MCNC: 151 MG/DL (ref 70–110)
HBA1C MFR BLD: 5.8 % (ref 4–5.6)
HCT VFR BLD AUTO: 39.5 % (ref 40–54)
HGB BLD-MCNC: 13.3 G/DL (ref 14–18)
IMM GRANULOCYTES # BLD AUTO: 0.01 K/UL (ref 0–0.04)
IMM GRANULOCYTES NFR BLD AUTO: 0.2 % (ref 0–0.5)
LYMPHOCYTES # BLD AUTO: 1.2 K/UL (ref 1–4.8)
LYMPHOCYTES NFR BLD: 17.5 % (ref 18–48)
MCH RBC QN AUTO: 32 PG (ref 27–31)
MCHC RBC AUTO-ENTMCNC: 33.7 G/DL (ref 32–36)
MCV RBC AUTO: 95 FL (ref 82–98)
MONOCYTES # BLD AUTO: 0.6 K/UL (ref 0.3–1)
MONOCYTES NFR BLD: 9.1 % (ref 4–15)
NEUTROPHILS # BLD AUTO: 4.6 K/UL (ref 1.8–7.7)
NEUTROPHILS NFR BLD: 69.7 % (ref 38–73)
NRBC BLD-RTO: 0 /100 WBC
PLATELET # BLD AUTO: 186 K/UL (ref 150–450)
PMV BLD AUTO: 10.9 FL (ref 9.2–12.9)
POTASSIUM SERPL-SCNC: 3.4 MMOL/L (ref 3.5–5.1)
PROT SERPL-MCNC: 6.9 G/DL (ref 6–8.4)
RBC # BLD AUTO: 4.16 M/UL (ref 4.6–6.2)
SODIUM SERPL-SCNC: 143 MMOL/L (ref 136–145)
WBC # BLD AUTO: 6.61 K/UL (ref 3.9–12.7)

## 2024-08-02 PROCEDURE — 99999 PR PBB SHADOW E&M-EST. PATIENT-LVL III: CPT | Mod: PBBFAC,,, | Performed by: INTERNAL MEDICINE

## 2024-08-02 PROCEDURE — 80053 COMPREHEN METABOLIC PANEL: CPT | Performed by: INTERNAL MEDICINE

## 2024-08-02 PROCEDURE — 83036 HEMOGLOBIN GLYCOSYLATED A1C: CPT | Performed by: INTERNAL MEDICINE

## 2024-08-02 PROCEDURE — 36415 COLL VENOUS BLD VENIPUNCTURE: CPT | Mod: PO | Performed by: INTERNAL MEDICINE

## 2024-08-02 PROCEDURE — 85025 COMPLETE CBC W/AUTO DIFF WBC: CPT | Performed by: INTERNAL MEDICINE

## 2024-08-02 RX ORDER — TADALAFIL 20 MG/1
TABLET ORAL
Qty: 30 TABLET | Refills: 11 | Status: SHIPPED | OUTPATIENT
Start: 2024-08-02

## 2024-08-02 NOTE — PROGRESS NOTES
Subjective     Patient ID: Ihsan Mays Sr. is a 63 y.o. male.    Chief Complaint: Follow-up    HPI  Pt with HTN, Prostate cancer, HLD, Prediabetes, Hx of Thrombosis of right lesser saphenous vein, B/L LE venous insufficiency/reflux is here for 6 month f/u. No acute complaints today.   Review of Systems   Constitutional:  Negative for activity change, appetite change, chills, diaphoresis, fatigue, fever and unexpected weight change.   HENT:  Negative for postnasal drip, rhinorrhea, sinus pressure/congestion, sneezing, sore throat, trouble swallowing and voice change.    Respiratory:  Negative for cough, shortness of breath and wheezing.    Cardiovascular:  Negative for chest pain, palpitations and leg swelling.   Gastrointestinal:  Negative for abdominal pain, blood in stool, constipation, diarrhea, nausea and vomiting.   Genitourinary:  Negative for dysuria.   Musculoskeletal:  Negative for arthralgias and myalgias.   Integumentary:  Negative for rash and wound.   Allergic/Immunologic: Negative for environmental allergies and food allergies.   Hematological:  Negative for adenopathy. Does not bruise/bleed easily.          Objective     Physical Exam  Constitutional:       General: He is not in acute distress.     Appearance: Normal appearance. He is well-developed. He is not diaphoretic.   HENT:      Head: Normocephalic and atraumatic.      Right Ear: External ear normal.      Left Ear: External ear normal.      Nose: Nose normal.      Mouth/Throat:      Pharynx: No oropharyngeal exudate.   Eyes:      General: No scleral icterus.        Right eye: No discharge.         Left eye: No discharge.      Conjunctiva/sclera: Conjunctivae normal.      Pupils: Pupils are equal, round, and reactive to light.   Neck:      Vascular: No JVD.   Cardiovascular:      Rate and Rhythm: Normal rate and regular rhythm.      Pulses: Normal pulses.      Heart sounds: Normal heart sounds. No murmur heard.  Pulmonary:      Effort:  Pulmonary effort is normal. No respiratory distress.      Breath sounds: Normal breath sounds. No wheezing or rales.   Abdominal:      General: Bowel sounds are normal.      Tenderness: There is no abdominal tenderness. There is no guarding or rebound.   Musculoskeletal:      Cervical back: Normal range of motion and neck supple.      Right lower leg: No edema.      Left lower leg: No edema.   Lymphadenopathy:      Cervical: No cervical adenopathy.   Skin:     General: Skin is warm and dry.      Capillary Refill: Capillary refill takes less than 2 seconds.      Coloration: Skin is not pale.      Findings: No rash.   Neurological:      Mental Status: He is alert and oriented to person, place, and time. Mental status is at baseline.      Cranial Nerves: No cranial nerve deficit.   Psychiatric:         Mood and Affect: Mood normal.         Behavior: Behavior normal.            Assessment and Plan     1. Cancer of prostate    2. Saphenous vein clot, right    3. Primary hypertension    4. Mixed hyperlipidemia    5. Venous insufficiency of both lower extremities    6. Prediabetes    Other orders  -     tadalafiL (CIALIS) 20 MG Tab; Use one tablet daily PRN sexual activity  Dispense: 30 tablet; Refill: 11         HTN- stable on Diovan HCT/Coreg/Norvasc      ED- controlled on Viagra      HLD- on Lipitor      Prostate cancer- stable, followed by Urology      Thrombosis of right lesser saphenous vein- suspect 2/2 underlying prostate cancer    -continue prophylactic dose of Eliquis 2.5 mg BID indefinitely per cardio      Prediabetes- trending       B/L LE venous insufficiency/reflux- stable with compression stockings       F/u in 6 months for annual exam

## 2024-08-06 ENCOUNTER — TELEPHONE (OUTPATIENT)
Dept: INTERNAL MEDICINE | Facility: CLINIC | Age: 64
End: 2024-08-06
Payer: MEDICARE

## 2024-08-06 DIAGNOSIS — I10 PRIMARY HYPERTENSION: Primary | ICD-10-CM

## 2024-08-06 DIAGNOSIS — E78.2 MIXED HYPERLIPIDEMIA: ICD-10-CM

## 2024-08-06 DIAGNOSIS — R73.03 PREDIABETES: ICD-10-CM

## 2024-08-06 DIAGNOSIS — Z00.00 ANNUAL PHYSICAL EXAM: ICD-10-CM

## 2024-08-06 DIAGNOSIS — Z12.5 ENCOUNTER FOR SCREENING FOR MALIGNANT NEOPLASM OF PROSTATE: ICD-10-CM

## 2024-10-01 ENCOUNTER — PATIENT MESSAGE (OUTPATIENT)
Dept: INTERNAL MEDICINE | Facility: CLINIC | Age: 64
End: 2024-10-01
Payer: MEDICARE

## 2024-10-08 ENCOUNTER — PATIENT MESSAGE (OUTPATIENT)
Dept: INTERNAL MEDICINE | Facility: CLINIC | Age: 64
End: 2024-10-08
Payer: MEDICARE

## 2024-10-08 ENCOUNTER — TELEPHONE (OUTPATIENT)
Dept: INTERNAL MEDICINE | Facility: CLINIC | Age: 64
End: 2024-10-08
Payer: MEDICARE

## 2024-10-08 DIAGNOSIS — I82.811 SAPHENOUS VEIN CLOT, RIGHT: ICD-10-CM

## 2024-10-08 NOTE — TELEPHONE ENCOUNTER
----- Message from Uyen sent at 10/8/2024 11:10 AM CDT -----  Contact: [facility] [phone]  [facility] called and stated that she sent the incorrect fax and wanted to know if you can cut in half of  apixaban (ELIQUIS) 2.5 mg Tab The cost would be cheaper.     [id number]  Thank you

## 2024-10-15 ENCOUNTER — PATIENT MESSAGE (OUTPATIENT)
Dept: CARDIOLOGY | Facility: CLINIC | Age: 64
End: 2024-10-15
Payer: MEDICARE

## 2024-10-15 ENCOUNTER — PATIENT MESSAGE (OUTPATIENT)
Dept: INTERNAL MEDICINE | Facility: CLINIC | Age: 64
End: 2024-10-15
Payer: MEDICARE

## 2024-10-15 NOTE — TELEPHONE ENCOUNTER
Scheduled a virtual for pt as requested. Notified pt that if the pain continues or gets worse to go to the ER to be evaluated. Pt verbalized understanding.

## 2024-10-16 ENCOUNTER — OFFICE VISIT (OUTPATIENT)
Dept: INTERNAL MEDICINE | Facility: CLINIC | Age: 64
End: 2024-10-16
Payer: MEDICARE

## 2024-10-16 VITALS
SYSTOLIC BLOOD PRESSURE: 130 MMHG | WEIGHT: 243.81 LBS | BODY MASS INDEX: 36.95 KG/M2 | OXYGEN SATURATION: 99 % | TEMPERATURE: 97 F | HEART RATE: 78 BPM | DIASTOLIC BLOOD PRESSURE: 84 MMHG | HEIGHT: 68 IN

## 2024-10-16 DIAGNOSIS — G57.11 MERALGIA PARAESTHETICA, RIGHT: Primary | ICD-10-CM

## 2024-10-16 PROCEDURE — 99212 OFFICE O/P EST SF 10 MIN: CPT | Mod: GC,S$GLB,,

## 2024-10-16 PROCEDURE — 3008F BODY MASS INDEX DOCD: CPT | Mod: CPTII,GC,S$GLB,

## 2024-10-16 PROCEDURE — 1126F AMNT PAIN NOTED NONE PRSNT: CPT | Mod: CPTII,GC,S$GLB,

## 2024-10-16 PROCEDURE — 3079F DIAST BP 80-89 MM HG: CPT | Mod: CPTII,GC,S$GLB,

## 2024-10-16 PROCEDURE — 3075F SYST BP GE 130 - 139MM HG: CPT | Mod: CPTII,GC,S$GLB,

## 2024-10-16 PROCEDURE — 3044F HG A1C LEVEL LT 7.0%: CPT | Mod: CPTII,GC,S$GLB,

## 2024-10-16 PROCEDURE — 1101F PT FALLS ASSESS-DOCD LE1/YR: CPT | Mod: CPTII,GC,S$GLB,

## 2024-10-16 PROCEDURE — 1159F MED LIST DOCD IN RCRD: CPT | Mod: CPTII,GC,S$GLB,

## 2024-10-16 PROCEDURE — 4010F ACE/ARB THERAPY RXD/TAKEN: CPT | Mod: CPTII,GC,S$GLB,

## 2024-10-16 PROCEDURE — 99999 PR PBB SHADOW E&M-EST. PATIENT-LVL IV: CPT | Mod: PBBFAC,GC,,

## 2024-10-16 PROCEDURE — 3288F FALL RISK ASSESSMENT DOCD: CPT | Mod: CPTII,GC,S$GLB,

## 2024-10-17 PROBLEM — G57.11 MERALGIA PARAESTHETICA, RIGHT: Status: ACTIVE | Noted: 2024-10-17

## 2024-10-17 NOTE — PROGRESS NOTES
Clinic Note  10/17/2024      Subjective:       Patient ID:  patient is a 64 y.o. male being seen for a wellness visit    Chief Complaint: Hip Pain (Rt hip/Thigh pain been about a year comes and goes. Last 3 days been more persistent. Burning and electric feeling pt stated  )     64 y.o. male with hx of RLE superficial vein thrombosis, obesity who presents for evaluation of nerve pain in right leg. Pt states that for the past several months he has had burning pain intermittently along his lateral right thigh. Lasts for a period of time then goes away on its own. Does not occur everyday but frequently over the course of each week. Of note, BMI is 37. He has hx of superficial saphenous vein thrombosis for which he takes eliquis daily. Compliant with medications and no suspicion for re-thrombosis in office today.     Review of Systems   Constitutional:  Negative for fever, malaise/fatigue and weight loss.   Cardiovascular:  Negative for leg swelling.   Musculoskeletal:  Negative for falls, joint pain and myalgias.   Skin:  Negative for rash.   Neurological:  Positive for tingling and sensory change. Negative for dizziness, focal weakness and weakness.   Psychiatric/Behavioral: Negative.         Medication List with Changes/Refills   Current Medications    AMLODIPINE (NORVASC) 10 MG TABLET    TAKE 1 TABLET(10 MG) BY MOUTH EVERY DAY    APIXABAN (ELIQUIS) 5 MG TAB    1/2 tab PO BID    ATORVASTATIN (LIPITOR) 40 MG TABLET    Take 1 tablet (40 mg total) by mouth once daily.    CARVEDILOL (COREG) 12.5 MG TABLET    Take 1 tablet (12.5 mg total) by mouth 2 (two) times daily with meals.    CYCLOBENZAPRINE (FLEXERIL) 10 MG TABLET    Take 10 mg by mouth every 8 (eight) hours as needed.    LATANOPROST 0.005 % OPHTHALMIC SOLUTION    INSTILL 1 DROP INTO BOTH EYES AT BEDTIME    PANTOPRAZOLE (PROTONIX) 20 MG TABLET    Take 1 tablet (20 mg total) by mouth once daily.    TADALAFIL (CIALIS) 20 MG TAB    Use one tablet daily PRN sexual  "activity    VALSARTAN (DIOVAN) 320 MG TABLET    TAKE 1 TABLET BY MOUTH EVERY DAY       Patient Active Problem List   Diagnosis    Anemia    Hypertension    Lower back pain    Erectile dysfunction    Decreased range of motion of lumbar spine    Weakness of both hips    Hamstring tightness of both lower extremities    Hip tightness    Cancer of prostate    Saphenous vein clot, right    Mixed hyperlipidemia    Lymphedema of both lower extremities    Edema of both lower extremities    Severe obesity (BMI 35.0-39.9) with comorbidity    Numbness in right leg    Venous insufficiency of both lower extremities    Prediabetes             Health Maintenance         Date Due Completion Date    RSV Vaccine (Age 60+ and Pregnant patients) (1 - Risk 60-74 years 1-dose series) Never done ---    COVID-19 Vaccine (5 - 2024-25 season) 12/06/2024 10/11/2024    Hemoglobin A1c (Prediabetes) 08/02/2025 8/2/2024    Colorectal Cancer Screening 09/29/2026 9/29/2023    TETANUS VACCINE 10/08/2028 10/8/2018    Lipid Panel 01/23/2029 1/23/2024             Objective:   Vital Signs:  Vitals:    10/16/24 1531   BP: 130/84   Pulse: 78   Temp: 96.6 °F (35.9 °C)   TempSrc: Temporal   SpO2: 99%   Weight: 110.6 kg (243 lb 13.3 oz)   Height: 5' 8" (1.727 m)          /84 (BP Location: Left arm, Patient Position: Sitting)   Pulse 78   Temp 96.6 °F (35.9 °C) (Temporal)   Ht 5' 8" (1.727 m)   Wt 110.6 kg (243 lb 13.3 oz)   SpO2 99%   BMI 37.07 kg/m²   Estimated body mass index is 37.07 kg/m² as calculated from the following:    Height as of this encounter: 5' 8" (1.727 m).    Weight as of this encounter: 110.6 kg (243 lb 13.3 oz).    Physical Exam  Constitutional:       Appearance: Normal appearance.   Eyes:      Extraocular Movements: Extraocular movements intact.      Conjunctiva/sclera: Conjunctivae normal.   Cardiovascular:      Rate and Rhythm: Normal rate and regular rhythm.      Pulses: Normal pulses.   Pulmonary:      Effort: Pulmonary " effort is normal. No respiratory distress.   Abdominal:      General: There is no distension.      Tenderness: There is no abdominal tenderness.   Musculoskeletal:         General: No tenderness or signs of injury. Normal range of motion.      Cervical back: Normal range of motion.      Right lower leg: No edema.      Left lower leg: No edema.   Skin:     General: Skin is warm and dry.   Neurological:      General: No focal deficit present.      Mental Status: He is alert and oriented to person, place, and time.      Sensory: Sensory deficit present.   Psychiatric:         Mood and Affect: Mood normal.         Behavior: Behavior normal.         Thought Content: Thought content normal.         Judgment: Judgment normal.           Assessment and Plan:     Meralgia paraesthetica, right   - Clinical history and physical exam c/w meralgia paraesthetica   - Offered neuropathic pain medication such as gabapentin, though pt denies at this time, would prefer to be seen my pain management for potential for more long term treatment such as nerve block   - Educated on risk factors for condition and counseled regarding weight loss to help alleviate flare ups in future  -     Ambulatory referral/consult to Pain Clinic; Future; Expected date: 10/23/2024           Ady Bell M.D.  Internal Medicine PGY-2  [facility]

## 2024-10-29 ENCOUNTER — TELEPHONE (OUTPATIENT)
Dept: SLEEP MEDICINE | Facility: CLINIC | Age: 64
End: 2024-10-29
Payer: MEDICARE

## 2024-11-12 ENCOUNTER — OFFICE VISIT (OUTPATIENT)
Dept: CARDIOLOGY | Facility: CLINIC | Age: 64
End: 2024-11-12
Payer: MEDICARE

## 2024-11-12 VITALS
BODY MASS INDEX: 35.61 KG/M2 | SYSTOLIC BLOOD PRESSURE: 147 MMHG | HEIGHT: 68 IN | DIASTOLIC BLOOD PRESSURE: 99 MMHG | WEIGHT: 235 LBS

## 2024-11-12 DIAGNOSIS — R20.0 NUMBNESS IN RIGHT LEG: Primary | ICD-10-CM

## 2024-11-12 DIAGNOSIS — I10 PRIMARY HYPERTENSION: ICD-10-CM

## 2024-11-12 DIAGNOSIS — E78.2 MIXED HYPERLIPIDEMIA: ICD-10-CM

## 2024-11-12 DIAGNOSIS — M54.16 LUMBAR RADICULOPATHY, CHRONIC: ICD-10-CM

## 2024-11-12 DIAGNOSIS — I87.2 VENOUS INSUFFICIENCY OF BOTH LOWER EXTREMITIES: ICD-10-CM

## 2024-11-12 DIAGNOSIS — I89.0 LYMPHEDEMA OF BOTH LOWER EXTREMITIES: ICD-10-CM

## 2024-11-12 DIAGNOSIS — R60.0 EDEMA OF BOTH LOWER EXTREMITIES: ICD-10-CM

## 2024-11-12 DIAGNOSIS — M54.16 LUMBAR RADICULOPATHY, RIGHT: ICD-10-CM

## 2024-11-12 PROCEDURE — 3008F BODY MASS INDEX DOCD: CPT | Mod: CPTII,95,, | Performed by: INTERNAL MEDICINE

## 2024-11-12 PROCEDURE — 1159F MED LIST DOCD IN RCRD: CPT | Mod: CPTII,95,, | Performed by: INTERNAL MEDICINE

## 2024-11-12 PROCEDURE — 3288F FALL RISK ASSESSMENT DOCD: CPT | Mod: CPTII,95,, | Performed by: INTERNAL MEDICINE

## 2024-11-12 PROCEDURE — 99214 OFFICE O/P EST MOD 30 MIN: CPT | Mod: 95,,, | Performed by: INTERNAL MEDICINE

## 2024-11-12 PROCEDURE — 1101F PT FALLS ASSESS-DOCD LE1/YR: CPT | Mod: CPTII,95,, | Performed by: INTERNAL MEDICINE

## 2024-11-12 PROCEDURE — 3080F DIAST BP >= 90 MM HG: CPT | Mod: CPTII,95,, | Performed by: INTERNAL MEDICINE

## 2024-11-12 PROCEDURE — 4010F ACE/ARB THERAPY RXD/TAKEN: CPT | Mod: CPTII,95,, | Performed by: INTERNAL MEDICINE

## 2024-11-12 PROCEDURE — 1126F AMNT PAIN NOTED NONE PRSNT: CPT | Mod: CPTII,95,, | Performed by: INTERNAL MEDICINE

## 2024-11-12 PROCEDURE — 3044F HG A1C LEVEL LT 7.0%: CPT | Mod: CPTII,95,, | Performed by: INTERNAL MEDICINE

## 2024-11-12 PROCEDURE — 3077F SYST BP >= 140 MM HG: CPT | Mod: CPTII,95,, | Performed by: INTERNAL MEDICINE

## 2024-11-12 NOTE — PROGRESS NOTES
[facility]      Chief Complaint   Patient presents with    Primary hypertension       Patient ID: Ihsan Mays Sr. is a 64 y.o. male with RLE superficial vein thrombosis, HTN, prostate cancer, obesity, who presents for a follow up  appointment.  Pertinent history/events are as follows:     -Pt kindly referred by Dr. Ybarra for evaluation of thrombosis of right saphenous vein.    -At our initial clinic visit on 4/11/2022, Mr. Mays reports leg swelling for several years.  On 3/8/2022 he reported right leg swelling and pain to his PCP (Dr. Ybarra), and was sent for a BLE venous ultrasound which revealed thrombosis of the right lesser saphenous vein and no evidence of lower extremity DVT.  He was started on Xarelto at that time.  Mr. Mays reports significant improvement in riight leg swelling and pain since starting Xarelto.  He has no claudication or tissue loss.  No smoking history.  He reports no bleeding issues since starting Xarelto.   Plan:   Thrombosis of right lesser saphenous vein- Likely due to underlying prostate cancer.  Other risk factors include lymphedema and venous insufficiency.  Continue Xarelto 20 mg daily for an additional 2 months, then check BLE venous reflux study to evaluate for resolution of the SVT.  Given underlying prostate cancer, pt will require at least prophylactic dose anticoagulation going forward.     Leg Swelling- Due to lymphedema, possible venous insufficiency, and contribution from amlodipine.  Refer to lymphedema clinic.  Limit sodium intake to 2,000 mg daily.  Limit volume intake to 1.5 liters daily.  Elevate legs when resting.   Obesity- Encourage diet, exercise and weight loss.    HLD- Start atorvastatin 40 mg daily.    -At follow up clinic visit on 6/22/2022, Mr. Mays reported no new symptoms.  Scheduled to start lymphedema clinic therapy in 7/2022.  BLE Venous Ultrasound on 6/16/2022 revealed the right smaller saphenous vein demonstrates  thickened walls with complete compressibility consistent with a history of superficial venous thrombosis.  Bilateral GSV and right lower extremity SSV reflux noted.  No evidence of deep venous thrombosis bilaterally.  Plan:   Thrombosis of right lesser saphenous vein- Likely due to underlying prostate cancer.  Other risk factors include lymphedema and venous insufficiency.  BLE Venous Ultrasound on 6/16/2022 revealed the right smaller saphenous vein demonstrates thickened walls with complete compressibility consistent with a history of superficial venous thrombosis.  Bilateral GSV and right lower extremity SSV reflux noted.  No evidence of deep venous thrombosis bilaterally.  Decrease Xarelto to 10 mg daily indefinitely.  Given underlying prostate cancer, pt will require at least prophylactic dose anticoagulation.     Leg Swelling- Due to lymphedema, possible venous insufficiency, and contribution from amlodipine.  Refer to lymphedema clinic.  Limit sodium intake to 2,000 mg daily.  Limit volume intake to 1.5 liters daily.  Elevate legs when resting.   Obesity- Encourage diet, exercise and weight loss.    HLD- Continue atorvastatin 40 mg daily.      -At clinic visit on 7/5/2023 Mr. Mays reports occasional numbness/burning sensation in right leg.  States leg swelling remains improved and controlled.  Does not exercise regularly.  Plan:   Thrombosis of right lesser saphenous vein- Likely due to underlying prostate cancer.  Other risk factors include lymphedema and venous insufficiency.  BLE Venous Ultrasound on 6/16/2022 revealed the right smaller saphenous vein demonstrates thickened walls with complete compressibility consistent with a history of superficial venous thrombosis.  Bilateral GSV and right lower extremity SSV reflux noted.  No evidence of deep venous thrombosis bilaterally.  Given underlying prostate cancer, pt will require at least prophylactic dose anticoagulation.  Continue apixaban 2.5 mg bid.     Leg Swelling- Due to lymphedema, possible venous insufficiency, and contribution from amlodipine.  Refer to lymphedema clinic.  Limit sodium intake to 2,000 mg daily.  Limit volume intake to 1.5 liters daily.  Elevate legs when resting.   Obesity- Encourage diet, exercise and weight loss.    HLD- Continue atorvastatin 40 mg daily.    RLE Sciatica- Check MRI lumbar spine and refer to Neurosurgery for evaluation.    1/25/2023 clinic visit: Mr. Mays reports significant improvement in numbness/burning sensation in right leg.  States leg swelling remains improved and controlled.  Does not exercise regularly.  LDL 58 on 1/23/2024. MRI Lumbar Spine 7/9/2023 showed mild broad-based bulging of disc material L3-L4 and L4-L5 with mild bilateral LEFT greater than RIGHT neural foraminal encroachment.  Plan:  Thrombosis of right lesser saphenous vein- Likely due to underlying prostate cancer.  Other risk factors include lymphedema and venous insufficiency.  BLE Venous Ultrasound on 6/16/2022 revealed the right smaller saphenous vein demonstrates thickened walls with complete compressibility consistent with a history of superficial venous thrombosis.  Bilateral GSV and right lower extremity SSV reflux noted.  No evidence of deep venous thrombosis bilaterally.  Given underlying prostate cancer, pt will require at least prophylactic dose anticoagulation.  Continue apixaban 2.5 mg bid.    Leg Swelling- Due to lymphedema, possible venous insufficiency, and contribution from amlodipine. Encouraged to continue home lymphedema therapy.  Limit sodium intake to 2,000 mg daily.  Limit volume intake to 1.5 liters daily.  Elevate legs when resting.   Obesity- Encourage diet, exercise and weight loss.  Encouraged to walk 10,000 steps in day. Encouraged to walk 30 minutes a day, 5 days in a week   HLD- Continue atorvastatin 40 mg daily.    RLE Sciatica- MRI Lumbar Spine 7/9/2023 showed mild broad-based bulging of disc material L3-L4 and  L4-L5 with mild bilateral LEFT greater than RIGHT neural foraminal encroachment.  Appreciated and encouraged to continue following Neurosurgery.     HPI:  Mr. Mays reports worsening in numbness/burning sensation in right leg over the past 3 months. States leg swelling remains improved and controlled.      Past Medical History:   Diagnosis Date    Glaucoma     Hypertension     Prostate cancer      Past Surgical History:   Procedure Laterality Date    COLONOSCOPY N/A 08/21/2020    Procedure: COLONOSCOPYPrepopik;  Surgeon: Danie Conway MD;  Location: [facility];  Service: Endoscopy;  Laterality: N/A;    COLONOSCOPY N/A 09/29/2023    Procedure: COLONOSCOPY;  Surgeon: Edel Langley MD;  Location: [facility];  Service: Endoscopy;  Laterality: N/A;    ESOPHAGOGASTRODUODENOSCOPY N/A 08/21/2020    Procedure: EGD (ESOPHAGOGASTRODUODENOSCOPY);  Surgeon: Danie Conway MD;  Location: [facility];  Service: Endoscopy;  Laterality: N/A;    ESOPHAGOGASTRODUODENOSCOPY N/A 09/29/2023    Procedure: EGD (ESOPHAGOGASTRODUODENOSCOPY);  Surgeon: Edel Langley MD;  Location: [facility];  Service: Endoscopy;  Laterality: N/A;    FINGER SURGERY      right hand pinkie finger     GLAUCOMA SURGERY Bilateral     STOMACH SURGERY      ulcers      Social History     Socioeconomic History    Marital status:     Number of children: 3   Tobacco Use    Smoking status: Never    Smokeless tobacco: Never   Substance and Sexual Activity    Alcohol use: Yes     Comment: occasionally    Drug use: Not Currently     Types: Marijuana     Comment: Quit 35 years ago    Sexual activity: Yes     Partners: Female   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      Social Drivers of Health     Financial Resource Strain: Low Risk  (2/1/2024)    Overall Financial Resource Strain (CARDIA)     Difficulty of Paying Living Expenses: Not hard at all   Food Insecurity: No Food Insecurity (2/1/2024)    Hunger Vital Sign      Worried About Running Out of Food in the Last Year: Never true     Ran Out of Food in the Last Year: Never true   Transportation Needs: No Transportation Needs (2/1/2024)    PRAPARE - Transportation     Lack of Transportation (Medical): No     Lack of Transportation (Non-Medical): No   Physical Activity: Inactive (2/1/2024)    Exercise Vital Sign     Days of Exercise per Week: 0 days     Minutes of Exercise per Session: 0 min   Stress: No Stress Concern Present (2/1/2024)    [employer] - Occupational Stress Questionnaire     Feeling of Stress : Only a little   Housing Stability: Low Risk  (2/1/2024)    Housing Stability Vital Sign     Unable to Pay for Housing in the Last Year: No     Number of Places Lived in the Last Year: 1     Unstable Housing in the Last Year: No     Family History   Problem Relation Name Age of Onset    Diabetes Mother      Hypertension Mother      Hypertension Father      Glaucoma Father      Hypertension Sister      Heart disease Sister      Heart attack Sister      Hypertension Brother      Anemia Daughter Nisa     No Known Problems Son x2     Stroke Maternal Aunt      Heart disease Maternal Grandmother      Heart disease Maternal Grandfather      Colon cancer Neg Hx      Esophageal cancer Neg Hx      Rectal cancer Neg Hx      Stomach cancer Neg Hx      Ulcerative colitis Neg Hx      Irritable bowel syndrome Neg Hx      Crohn's disease Neg Hx      Celiac disease Neg Hx      Cancer Neg Hx         Review of patient's allergies indicates:  No Known Allergies    Medication List with Changes/Refills   Current Medications    AMLODIPINE (NORVASC) 10 MG TABLET    TAKE 1 TABLET(10 MG) BY MOUTH EVERY DAY    APIXABAN (ELIQUIS) 5 MG TAB    1/2 tab PO BID    ATORVASTATIN (LIPITOR) 40 MG TABLET    Take 1 tablet (40 mg total) by mouth once daily.    CARVEDILOL (COREG) 12.5 MG TABLET    Take 1 tablet (12.5 mg total) by mouth 2 (two) times daily with meals.     "CYCLOBENZAPRINE (FLEXERIL) 10 MG TABLET    Take 10 mg by mouth every 8 (eight) hours as needed.    LATANOPROST 0.005 % OPHTHALMIC SOLUTION    INSTILL 1 DROP INTO BOTH EYES AT BEDTIME    PANTOPRAZOLE (PROTONIX) 20 MG TABLET    Take 1 tablet (20 mg total) by mouth once daily.    VALSARTAN (DIOVAN) 320 MG TABLET    TAKE 1 TABLET BY MOUTH EVERY DAY   Discontinued Medications    TADALAFIL (CIALIS) 20 MG TAB    Use one tablet daily PRN sexual activity       Review of Systems  Constitution: Denies chills, fever, and sweats.  HENT: Denies headaches or blurry vision.  Cardiovascular: Denies chest pain or irregular heart beat.  Respiratory: Denies cough or shortness of breath.  Gastrointestinal: Denies abdominal pain, nausea, or vomiting.  Musculoskeletal: Positive for leg swelling (improved)  Neurological: Denies dizziness or focal weakness.  Psychiatric/Behavioral: Normal mental status.  Hematologic/Lymphatic: Denies bleeding problem or easy bruising/bleeding.  Skin: Denies rash or suspicious lesions    Physical Examination  BP (!) 147/99   Ht 5' 8" (1.727 m)   Wt 106.6 kg (235 lb)   BMI 35.73 kg/m²     Constitutional: No acute distress, conversant  HEENT: Sclera anicteric, Pupils equal, round and reactive to light, extraocular motions intact, Oropharynx clear  Neck: No JVD, no carotid bruits  Cardiovascular: regular rate and rhythm, no murmur, rubs or gallops, normal S1/S2  Pulmonary: Clear to auscultation bilaterally  Abdominal: Abdomen soft, nontender, nondistended, positive bowel sounds  Extremities: BLE's with 1 pitting edema, venous stasis dermatitis, and changes consistent with lymphedema (R>L)   Pulses:  Carotid pulses are 2+ on the right side, and 2+ on the left side.  Radial pulses are 2+ on the right side, and 2+ on the left side.   Femoral pulses are 2+ on the right side, and 2+ on the left side.  Popliteal pulses are 2+ on the right side, and 2+ on the left side.   Dorsalis pedis pulses are 2+ on the right " side, and 2+ on the left side.   Posterior tibial pulses are 2+ on the right side, and 2+ on the left side.    Skin: No ecchymosis, erythema, or ulcers  Psych: Alert and oriented x 3, appropriate affect  Neuro: CNII-XII intact, no focal deficits    Labs:  Most Recent Data  CBC:   Lab Results   Component Value Date    WBC 6.61 08/02/2024    HGB 13.3 (L) 08/02/2024    HCT 39.5 (L) 08/02/2024     08/02/2024    MCV 95 08/02/2024    RDW 13.5 08/02/2024     BMP:   Lab Results   Component Value Date     08/02/2024    K 3.4 (L) 08/02/2024     (H) 08/02/2024    CO2 21 (L) 08/02/2024    BUN 14 08/02/2024    CREATININE 0.9 08/02/2024     (H) 08/02/2024    CALCIUM 9.3 08/02/2024     LFTS;   Lab Results   Component Value Date    PROT 6.9 08/02/2024    ALBUMIN 3.8 08/02/2024    BILITOT 0.8 08/02/2024    AST 17 08/02/2024    ALKPHOS 117 08/02/2024    ALT 19 08/02/2024     COAGS:   Lab Results   Component Value Date    INR 1.0 02/22/2012     FLP:   Lab Results   Component Value Date    CHOL 119 (L) 01/23/2024    HDL 48 01/23/2024    LDLCALC 58.4 (L) 01/23/2024    TRIG 63 01/23/2024    CHOLHDL 40.3 01/23/2024     CARDIAC:   Lab Results   Component Value Date    TROPONINI <0.006 05/03/2018    BNP 17 03/08/2022       Imaging:    BLE Venous Ultrasound 6/16/2022:  No evidence of deep venous thrombosis bilaterally.  The right smaller saphenous vein demonstrates thickened walls with complete compressibility consistent with a history of superficial venous thrombosis.  Bilateral GSV and right lower extremity SSV reflux noted.    BLE Venous Ultrasound 3/9/2022:  The right lesser saphenous vein SVT.  There is no evidence of a left lower extremity DVT.    Assessment/Plan:  Ihsan RIVERA Davon Soni is a 64 y.o. male with RLE superficial vein thrombosis, HTN, prostate cancer, obesity, who presents for a follow up appointment.      1. Thrombosis of right lesser saphenous vein- Likely due to underlying prostate cancer.  Other  risk factors include lymphedema and venous insufficiency.  BLE Venous Ultrasound on 6/16/2022 revealed the right smaller saphenous vein demonstrates thickened walls with complete compressibility consistent with a history of superficial venous thrombosis.  Bilateral GSV and right lower extremity SSV reflux noted.  No evidence of deep venous thrombosis bilaterally.  Given underlying prostate cancer, pt will require at least prophylactic dose anticoagulation. Continue apixaban 2.5 mg bid.      2. Leg Swelling- Due to lymphedema, possible venous insufficiency, and contribution from amlodipine. Encouraged to continue home lymphedema therapy.  Limit sodium intake to 2,000 mg daily.  Limit volume intake to 1.5 liters daily.  Elevate legs when resting.     3. Obesity- Encourage diet, exercise and weight loss.  Encouraged to walk 10,000 steps in day. Encouraged to walk 30 minutes a day, 5 days in a week     4. HLD- Continue atorvastatin 40 mg daily.      5. RLE Sciatica- Mr. Mays reports worsening in numbness/burning sensation in right leg over the past 3 months. MRI Lumbar Spine 7/9/2023 showed mild broad-based bulging of disc material L3-L4 and L4-L5 with mild bilateral LEFT greater than RIGHT neural foraminal encroachment.  Refer to Neurosurgery for evaluation. .     Follow up in 6 months    Total duration of face to face visit time 30 minutes.  Total time spent counseling greater than fifty percent of total visit time.  Counseling included discussion regarding imaging findings, diagnosis, possibilities, treatment options, risks and benefits.  The patient had many questions regarding the options and long-term effects.    Isaiah Zambrano MD, PhD  Interventional Cardiology

## 2024-11-12 NOTE — PATIENT INSTRUCTIONS
Assessment/Plan:  Ihsan Mays Sr. is a 64 y.o. male with RLE superficial vein thrombosis, HTN, prostate cancer, obesity, who presents for a follow up appointment.      1. Thrombosis of right lesser saphenous vein- Likely due to underlying prostate cancer.  Other risk factors include lymphedema and venous insufficiency.  BLE Venous Ultrasound on 6/16/2022 revealed the right smaller saphenous vein demonstrates thickened walls with complete compressibility consistent with a history of superficial venous thrombosis.  Bilateral GSV and right lower extremity SSV reflux noted.  No evidence of deep venous thrombosis bilaterally.  Given underlying prostate cancer, pt will require at least prophylactic dose anticoagulation. Continue apixaban 2.5 mg bid.      2. Leg Swelling- Due to lymphedema, possible venous insufficiency, and contribution from amlodipine. Encouraged to continue home lymphedema therapy.  Limit sodium intake to 2,000 mg daily.  Limit volume intake to 1.5 liters daily.  Elevate legs when resting.     3. Obesity- Encourage diet, exercise and weight loss.  Encouraged to walk 10,000 steps in day. Encouraged to walk 30 minutes a day, 5 days in a week     4. HLD- Continue atorvastatin 40 mg daily.      5. RLE Sciatica- Mr. Mays reports worsening in numbness/burning sensation in right leg over the past 3 months. MRI Lumbar Spine 7/9/2023 showed mild broad-based bulging of disc material L3-L4 and L4-L5 with mild bilateral LEFT greater than RIGHT neural foraminal encroachment.  Refer to Neurosurgery for evaluation. .     Follow up in 6 months

## 2024-11-18 ENCOUNTER — HOSPITAL ENCOUNTER (OUTPATIENT)
Dept: RADIOLOGY | Facility: HOSPITAL | Age: 64
Discharge: HOME | End: 2024-11-18
Attending: INTERNAL MEDICINE
Payer: MEDICARE

## 2024-11-18 DIAGNOSIS — M54.16 LUMBAR RADICULOPATHY, CHRONIC: ICD-10-CM

## 2024-11-18 PROCEDURE — 72148 MRI LUMBAR SPINE W/O DYE: CPT | Mod: 26,,, | Performed by: RADIOLOGY

## 2024-11-18 PROCEDURE — 72148 MRI LUMBAR SPINE W/O DYE: CPT | Mod: TC,PN

## 2024-11-21 ENCOUNTER — OFFICE VISIT (OUTPATIENT)
Dept: NEUROSURGERY | Facility: CLINIC | Age: 64
End: 2024-11-21
Payer: MEDICARE

## 2024-11-21 VITALS
HEART RATE: 73 BPM | DIASTOLIC BLOOD PRESSURE: 80 MMHG | SYSTOLIC BLOOD PRESSURE: 114 MMHG | WEIGHT: 235 LBS | BODY MASS INDEX: 35.73 KG/M2 | TEMPERATURE: 98 F

## 2024-11-21 DIAGNOSIS — M54.16 LUMBAR RADICULOPATHY, RIGHT: ICD-10-CM

## 2024-11-21 DIAGNOSIS — R20.0 NUMBNESS IN RIGHT LEG: ICD-10-CM

## 2024-11-21 PROCEDURE — 99999 PR PBB SHADOW E&M-EST. PATIENT-LVL III: CPT | Mod: PBBFAC,,, | Performed by: NURSE PRACTITIONER

## 2024-11-21 NOTE — PROGRESS NOTES
Neurosurgery  History & Physical    SUBJECTIVE:     History of Present Illness: Ihsan Mays Sr. is a 64 y.o. male being seen in clinic today as a referral from Dr. Zambrano to discuss concerns with intermittent right hip/thigh pain. States that the pain is not consistent and fluctuates. It seems to be localized and resolves spontaneously after 5-10 minutes. Describes the pain as burning. Denies left sided pain.  Denies weakness, b/b dysfunction, saddle anesthesia, or gait instability.     Review of patient's allergies indicates:  No Known Allergies    Current Outpatient Medications   Medication Sig Dispense Refill    amLODIPine (NORVASC) 10 MG tablet TAKE 1 TABLET(10 MG) BY MOUTH EVERY DAY 90 tablet 2    apixaban (ELIQUIS) 5 mg Tab 1/2 tab PO BID 90 tablet 3    atorvastatin (LIPITOR) 40 MG tablet Take 1 tablet (40 mg total) by mouth once daily. 90 tablet 3    carvediloL (COREG) 12.5 MG tablet Take 1 tablet (12.5 mg total) by mouth 2 (two) times daily with meals. 180 tablet 3    cyclobenzaprine (FLEXERIL) 10 MG tablet Take 10 mg by mouth every 8 (eight) hours as needed.      latanoprost 0.005 % ophthalmic solution INSTILL 1 DROP INTO BOTH EYES AT BEDTIME 7.5 mL 6    valsartan (DIOVAN) 320 MG tablet TAKE 1 TABLET BY MOUTH EVERY DAY 90 tablet 3    pantoprazole (PROTONIX) 20 MG tablet Take 1 tablet (20 mg total) by mouth once daily. 90 tablet 3     No current facility-administered medications for this visit.       Past Medical History:   Diagnosis Date    Glaucoma     Hypertension     Prostate cancer      Past Surgical History:   Procedure Laterality Date    COLONOSCOPY N/A 08/21/2020    Procedure: COLONOSCOPYPrepopik;  Surgeon: Danie Conway MD;  Location: [facility] ENDO;  Service: Endoscopy;  Laterality: N/A;    COLONOSCOPY N/A 09/29/2023    Procedure: COLONOSCOPY;  Surgeon: Edel aLngley MD;  Location: [facility] ENDO;  Service: Endoscopy;  Laterality: N/A;    ESOPHAGOGASTRODUODENOSCOPY N/A 08/21/2020     Procedure: EGD (ESOPHAGOGASTRODUODENOSCOPY);  Surgeon: Danie Conway MD;  Location: [facility] ENDO;  Service: Endoscopy;  Laterality: N/A;    ESOPHAGOGASTRODUODENOSCOPY N/A 09/29/2023    Procedure: EGD (ESOPHAGOGASTRODUODENOSCOPY);  Surgeon: Edel Langley MD;  Location: [facility] ENDO;  Service: Endoscopy;  Laterality: N/A;    FINGER SURGERY      right hand pinkie finger     GLAUCOMA SURGERY Bilateral     STOMACH SURGERY      ulcers      Family History       Problem Relation (Age of Onset)    Anemia Daughter    Diabetes Mother    Glaucoma Father    Heart attack Sister    Heart disease Sister, Maternal Grandmother, Maternal Grandfather    Hypertension Mother, Father, Sister, Brother    No Known Problems Son    Stroke Maternal Aunt          Social History     Socioeconomic History    Marital status:     Number of children: 3   Tobacco Use    Smoking status: Never    Smokeless tobacco: Never   Substance and Sexual Activity    Alcohol use: Yes     Comment: occasionally    Drug use: Not Currently     Types: Marijuana     Comment: Quit 35 years ago    Sexual activity: Yes     Partners: Female   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      Social Drivers of Health     Financial Resource Strain: Low Risk  (2/1/2024)    Overall Financial Resource Strain (CARDIA)     Difficulty of Paying Living Expenses: Not hard at all   Food Insecurity: No Food Insecurity (2/1/2024)    Hunger Vital Sign     Worried About Running Out of Food in the Last Year: Never true     Ran Out of Food in the Last Year: Never true   Transportation Needs: No Transportation Needs (2/1/2024)    PRAPARE - Transportation     Lack of Transportation (Medical): No     Lack of Transportation (Non-Medical): No   Physical Activity: Inactive (2/1/2024)    Exercise Vital Sign     Days of Exercise per Week: 0 days     Minutes of Exercise per Session: 0 min   Stress: No Stress Concern Present (2/1/2024)    [employer] - Occupational Stress Questionnaire     Feeling of Stress : Only a little   Housing Stability: Low Risk  (2/1/2024)    Housing Stability Vital Sign     Unable to Pay for Housing in the Last Year: No     Number of Places Lived in the Last Year: 1     Unstable Housing in the Last Year: No       Review of Systems    OBJECTIVE:     Vital Signs  Temp: 97.5 °F (36.4 °C)  Pulse: 73  BP: 114/80  Pain Score: 0-No pain  Weight: 106.6 kg (235 lb 0.2 oz)  Body mass index is 35.73 kg/m².      Neurosurgery Physical Exam  General: well developed, well nourished, no distress.   Head: normocephalic, atraumatic  Neurologic: Alert and oriented. Thought content appropriate.  GCS: Motor: 6/Verbal: 5/Eyes: 4 GCS Total: 15  Mental Status: Awake, Alert, Oriented x 4  Language: No aphasia  Speech: No dysarthria  Cranial nerves: face symmetric, tongue midline, CN II-XII grossly intact.   Eyes: pupils equal, round, reactive to light with accomodation, EOMI.   Pulmonary: normal respirations, no signs of respiratory distress  Abdomen: soft, non-distended  Skin: Skin is warm, dry and intact.  Sensory: intact to light touch throughout  Motor Strength:Moves all extremities spontaneously with good tone.       Diagnostic Results:  I have personally reviewed the MRI lumbar spine dated 11/18/24 which shows no acute findings. Mild disc degeneration and Modic endplate changes at L1-2 and L4-5. No high-grade stenosis.     ASSESSMENT/PLAN:     Ihsan Mays SrLuis Enrique is a 64 y.o. male seen in clinic today as a referral from Dr. Zambrano to discuss concerns with intermittent right hip/thigh pain. States that his pain is intermittent and not bothersome today. He is uninterested in injections or additional treatment at this time.   I would like the patient to follow-up in clinic as needed. I have encouraged him to contact the clinic with any questions, concerns, or adverse clinical changes. He verbalized understanding.      Chyna Guerra,  FNP-C  Neurosurgery  [facility].      Note dictated with voice recognition software, please excuse any grammatical errors.

## 2024-12-07 ENCOUNTER — PATIENT MESSAGE (OUTPATIENT)
Dept: INTERNAL MEDICINE | Facility: CLINIC | Age: 64
End: 2024-12-07
Payer: MEDICARE

## 2024-12-09 RX ORDER — PANTOPRAZOLE SODIUM 20 MG/1
20 TABLET, DELAYED RELEASE ORAL DAILY
Qty: 90 TABLET | Refills: 2 | Status: SHIPPED | OUTPATIENT
Start: 2024-12-09 | End: 2025-12-09

## 2024-12-09 NOTE — TELEPHONE ENCOUNTER
Refill Routing Note   Medication(s) are not appropriate for processing by [facility] for the following reason(s):        No active prescription written by provider  Responsible provider unclear    [facility] action(s):  Defer   Requires labs : Yes             Appointments  past 12m or future 3m with PCP    Date Provider   Last Visit   8/2/2024 Elliot Ybarra, DO   Next Visit   2/7/2025 Elliot Ybarra, DO   ED visits in past 90 days: 0        Note composed:8:53 AM 12/09/2024

## 2024-12-09 NOTE — TELEPHONE ENCOUNTER
Care Due:                  Date            Visit Type   Department     Provider  --------------------------------------------------------------------------------                                EP -                              PRIMARY      MET INTERNAL  Last Visit: 08-      CARE ([facility])   MEDICINE       Elliot Ybarra                              EP -                              PRIMARY      [facility] INTERNAL  Next Visit: 02-      CARE ([facility])   [facility]       Elliot Ybarra                                                            Last  Test          Frequency    Reason                     Performed    Due Date  --------------------------------------------------------------------------------    Lipid Panel.  12 months..  atorvastatin.............  01- 01-    [facility] Embedded Care Due Messages. Reference number: [id number].   12/09/2024 8:18:57 AM CST

## 2024-12-14 ENCOUNTER — PATIENT MESSAGE (OUTPATIENT)
Dept: INTERNAL MEDICINE | Facility: CLINIC | Age: 64
End: 2024-12-14
Payer: MEDICARE

## 2024-12-14 ENCOUNTER — HOSPITAL ENCOUNTER (EMERGENCY)
Facility: HOSPITAL | Age: 64
Discharge: HOME | End: 2024-12-14
Attending: STUDENT IN AN ORGANIZED HEALTH CARE EDUCATION/TRAINING PROGRAM
Payer: MEDICARE

## 2024-12-14 VITALS
RESPIRATION RATE: 18 BRPM | TEMPERATURE: 99 F | WEIGHT: 240 LBS | OXYGEN SATURATION: 97 % | DIASTOLIC BLOOD PRESSURE: 88 MMHG | BODY MASS INDEX: 36.37 KG/M2 | HEIGHT: 68 IN | HEART RATE: 85 BPM | SYSTOLIC BLOOD PRESSURE: 136 MMHG
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
## 2024-12-14 DIAGNOSIS — F41.9 ANXIETY: ICD-10-CM

## 2024-12-14 DIAGNOSIS — M54.9 BACK PAIN, UNSPECIFIED BACK LOCATION, UNSPECIFIED BACK PAIN LATERALITY, UNSPECIFIED CHRONICITY: Primary | ICD-10-CM

## 2024-12-14 PROCEDURE — 99284 EMERGENCY DEPT VISIT MOD MDM: CPT | Mod: ER

## 2024-12-14 PROCEDURE — 25000003 PHARM REV CODE 250: Mod: ER

## 2024-12-14 RX ORDER — HYDROXYZINE HYDROCHLORIDE 50 MG/1
50 TABLET, FILM COATED ORAL 3 TIMES DAILY PRN
Qty: 90 TABLET | Refills: 0 | Status: SHIPPED | OUTPATIENT
Start: 2024-12-14 | End: 2025-01-13

## 2024-12-14 RX ORDER — HYDROXYZINE PAMOATE 25 MG/1
50 CAPSULE ORAL
Status: COMPLETED | OUTPATIENT
Start: 2024-12-14 | End: 2024-12-14

## 2024-12-14 RX ORDER — DICYCLOMINE HYDROCHLORIDE 20 MG/1
20 TABLET ORAL 3 TIMES DAILY
Qty: 90 TABLET | Refills: 0 | Status: SHIPPED | OUTPATIENT
Start: 2024-12-14 | End: 2025-01-13

## 2024-12-14 RX ORDER — DICYCLOMINE HYDROCHLORIDE 10 MG/1
20 CAPSULE ORAL
Status: COMPLETED | OUTPATIENT
Start: 2024-12-14 | End: 2024-12-14

## 2024-12-14 RX ORDER — ONDANSETRON 4 MG/1
4 TABLET, ORALLY DISINTEGRATING ORAL
Status: COMPLETED | OUTPATIENT
Start: 2024-12-14 | End: 2024-12-14

## 2024-12-14 RX ORDER — ONDANSETRON 4 MG/1
4 TABLET, ORALLY DISINTEGRATING ORAL EVERY 8 HOURS PRN
Qty: 9 TABLET | Refills: 0 | Status: SHIPPED | OUTPATIENT
Start: 2024-12-14 | End: 2024-12-17

## 2024-12-14 RX ADMIN — ONDANSETRON 4 MG: 4 TABLET, ORALLY DISINTEGRATING ORAL at 10:12

## 2024-12-14 RX ADMIN — DICYCLOMINE HYDROCHLORIDE 20 MG: 10 CAPSULE ORAL at 10:12

## 2024-12-14 RX ADMIN — HYDROXYZINE PAMOATE 50 MG: 25 CAPSULE ORAL at 10:12

## 2024-12-14 NOTE — ED NOTES
Psych: No acute distress is noted. Pt is calm and cooperative, good eye contact.    HEENT: Denies HEENT complaint or injury    LOC: The patient is awake, alert and aware of environment with an appropriate affect.    APPEARANCE: Patient is clean and well groomed    SKIN: The skin is warm, dry and intact. Patient has normal skin turgor and moist mucus membranes, no rashes or lesions. No Breakdown noted.    MUSCULOSKELETAL:  Normal range of motion noted. Moves all extremities well, No swelling, deformity or tenderness noted.    RESPIRATORY: Airway is open and patent, respirations are spontaneous; patient has a normal effort and rate. Pink nailbeds.     CARDIAC: Patient has a normal rate. Skin warm and dry.     GI/ : Soft and non tender to palpation, no distention noted.     PULSES: 2+  And symmetrical in all extremities    NEUROLOGIC:   Follows commands without difficulty. Speech is clear. No neuro deficits observed.

## 2024-12-14 NOTE — DISCHARGE INSTRUCTIONS
Please return to the Emergency Department for any new or worsening symptoms including: worsening abdominal pain, dark\black\bloody bowel movements, vomiting blood, hard abdomen, fever, chest pain, shortness of breath, loss of consciousness or any other concerns.     It is important to remember that some problems are difficult to diagnose and may not be found during your first visit. Be sure to follow up with your primary care doctor and review any labs/imaging that was performed during your visit with them. If you do not have a primary care doctor, you may contact the one listed on your discharge paperwork, or you may also call the [facility] Appointment Desk at [phone] to schedule an appointment.     All medications may potentially have side effects and it is impossible to predict which medications may give you side effects. If you feel that you are having a negative effect of any medication you should immediately stop taking them and seek medical attention. Do not drive or make any important decisions for 24 hours if you have received any pain medications, sedatives or mood altering drugs during your ER visit.    We will be happy to take care of you for all of your future medical needs. You may return to the ER at any time for any new/concerning symptoms, worsening condition, or failure to improve. We hope you feel better soon.     Thank you for allowing me to take care of you today.       Bobbi Ely PA-C
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
## 2024-12-16 RX ORDER — CYCLOBENZAPRINE HCL 10 MG
10 TABLET ORAL EVERY 8 HOURS PRN
Qty: 60 TABLET | Refills: 1 | Status: SHIPPED | OUTPATIENT
Start: 2024-12-16
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
## 2024-12-18 ENCOUNTER — OFFICE VISIT (OUTPATIENT)
Dept: INTERNAL MEDICINE | Facility: CLINIC | Age: 64
End: 2024-12-18
Payer: MEDICARE

## 2024-12-18 VITALS
DIASTOLIC BLOOD PRESSURE: 82 MMHG | TEMPERATURE: 99 F | WEIGHT: 238.13 LBS | SYSTOLIC BLOOD PRESSURE: 128 MMHG | HEART RATE: 99 BPM | BODY MASS INDEX: 36.09 KG/M2 | RESPIRATION RATE: 16 BRPM | HEIGHT: 68 IN | OXYGEN SATURATION: 96 %

## 2024-12-18 DIAGNOSIS — F41.9 ANXIETY: Primary | ICD-10-CM

## 2024-12-18 DIAGNOSIS — F51.04 PSYCHOPHYSIOLOGICAL INSOMNIA: ICD-10-CM

## 2024-12-18 PROCEDURE — 1159F MED LIST DOCD IN RCRD: CPT | Mod: CPTII,S$GLB,, | Performed by: NURSE PRACTITIONER

## 2024-12-18 PROCEDURE — 3074F SYST BP LT 130 MM HG: CPT | Mod: CPTII,S$GLB,, | Performed by: NURSE PRACTITIONER

## 2024-12-18 PROCEDURE — 3079F DIAST BP 80-89 MM HG: CPT | Mod: CPTII,S$GLB,, | Performed by: NURSE PRACTITIONER

## 2024-12-18 PROCEDURE — 3288F FALL RISK ASSESSMENT DOCD: CPT | Mod: CPTII,S$GLB,, | Performed by: NURSE PRACTITIONER

## 2024-12-18 PROCEDURE — 4010F ACE/ARB THERAPY RXD/TAKEN: CPT | Mod: CPTII,S$GLB,, | Performed by: NURSE PRACTITIONER

## 2024-12-18 PROCEDURE — 99214 OFFICE O/P EST MOD 30 MIN: CPT | Mod: S$GLB,,, | Performed by: NURSE PRACTITIONER

## 2024-12-18 PROCEDURE — 1101F PT FALLS ASSESS-DOCD LE1/YR: CPT | Mod: CPTII,S$GLB,, | Performed by: NURSE PRACTITIONER

## 2024-12-18 PROCEDURE — 3044F HG A1C LEVEL LT 7.0%: CPT | Mod: CPTII,S$GLB,, | Performed by: NURSE PRACTITIONER

## 2024-12-18 PROCEDURE — 1160F RVW MEDS BY RX/DR IN RCRD: CPT | Mod: CPTII,S$GLB,, | Performed by: NURSE PRACTITIONER

## 2024-12-18 PROCEDURE — 99999 PR PBB SHADOW E&M-EST. PATIENT-LVL IV: CPT | Mod: PBBFAC,,, | Performed by: NURSE PRACTITIONER

## 2024-12-18 PROCEDURE — 1126F AMNT PAIN NOTED NONE PRSNT: CPT | Mod: CPTII,S$GLB,, | Performed by: NURSE PRACTITIONER

## 2024-12-18 PROCEDURE — 3008F BODY MASS INDEX DOCD: CPT | Mod: CPTII,S$GLB,, | Performed by: NURSE PRACTITIONER

## 2024-12-18 RX ORDER — BUSPIRONE HYDROCHLORIDE 5 MG/1
5 TABLET ORAL 2 TIMES DAILY
Qty: 60 TABLET | Refills: 2 | Status: SHIPPED | OUTPATIENT
Start: 2024-12-18

## 2024-12-18 NOTE — ASSESSMENT & PLAN NOTE
-Ok to continue with cyclobenzaprine  -Hydroxyzine was ordered during ER visit but has not been used  -Cautioned not to use with muscle relaxant.
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
## 2024-12-25 ENCOUNTER — HOSPITAL ENCOUNTER (EMERGENCY)
Facility: HOSPITAL | Age: 64
Discharge: HOME | End: 2024-12-25
Attending: EMERGENCY MEDICINE
Payer: MEDICARE

## 2024-12-25 VITALS
HEART RATE: 80 BPM | HEIGHT: 68 IN | WEIGHT: 235 LBS | RESPIRATION RATE: 22 BRPM | TEMPERATURE: 98 F | OXYGEN SATURATION: 95 % | DIASTOLIC BLOOD PRESSURE: 83 MMHG | BODY MASS INDEX: 35.61 KG/M2 | SYSTOLIC BLOOD PRESSURE: 118 MMHG

## 2024-12-25 DIAGNOSIS — K59.00 CONSTIPATION, UNSPECIFIED CONSTIPATION TYPE: Primary | ICD-10-CM

## 2024-12-25 DIAGNOSIS — K64.4 EXTERNAL HEMORRHOID: ICD-10-CM

## 2024-12-25 DIAGNOSIS — R33.9 URINARY RETENTION: ICD-10-CM

## 2024-12-25 LAB
BILIRUB UR QL STRIP: NEGATIVE
CLARITY UR REFRACT.AUTO: CLEAR
COLOR UR AUTO: YELLOW
GLUCOSE UR QL STRIP: NEGATIVE
HGB UR QL STRIP: NEGATIVE
KETONES UR QL STRIP: NEGATIVE
LEUKOCYTE ESTERASE UR QL STRIP: NEGATIVE
NITRITE UR QL STRIP: NEGATIVE
PH UR STRIP: 7 [PH] (ref 5–8)
PROT UR QL STRIP: NEGATIVE
SP GR UR STRIP: 1.01 (ref 1–1.03)
URN SPEC COLLECT METH UR: NORMAL
UROBILINOGEN UR STRIP-ACNC: NEGATIVE EU/DL

## 2024-12-25 PROCEDURE — 25000003 PHARM REV CODE 250: Mod: ER

## 2024-12-25 PROCEDURE — 81003 URINALYSIS AUTO W/O SCOPE: CPT | Mod: ER | Performed by: NURSE PRACTITIONER

## 2024-12-25 PROCEDURE — 99283 EMERGENCY DEPT VISIT LOW MDM: CPT | Mod: 25,ER

## 2024-12-25 RX ORDER — TAMSULOSIN HYDROCHLORIDE 0.4 MG/1
0.4 CAPSULE ORAL
Status: COMPLETED | OUTPATIENT
Start: 2024-12-25 | End: 2024-12-25

## 2024-12-25 RX ADMIN — TAMSULOSIN HYDROCHLORIDE 0.4 MG: 0.4 CAPSULE ORAL at 12:12

## 2024-12-25 NOTE — FIRST PROVIDER EVALUATION
" Emergency Department TeleTriage Encounter Note      CHIEF COMPLAINT    Chief Complaint   Patient presents with    Constipation     Pt reports "I am constipated and haven't had a bowel movement in a couple of days. I also have hemorrhoids that are bleeding." Also reports urinary problems, last time pt urinated was at 0730. Denies decreased appetite, nausea, or vomiting. Miralax last and "tried to do an enema this morning." Abdomen rounded.       VITAL SIGNS   Initial Vitals [12/25/24 1022]   BP Pulse Resp Temp SpO2   118/83 80 (!) 22 97.5 °F (36.4 °C) 95 %      MAP       --            ALLERGIES    Review of patient's allergies indicates:  No Known Allergies    PROVIDER TRIAGE NOTE  Verbal consent for the teletriage evaluation was given by the patient at the start of the evaluation.  All efforts will be made to maintain patient's privacy during the evaluation.      This is a teletriage evaluation of a 64 y.o. male presenting to the ED with c/o abd cramping, constipation, and hemorrhoids.  Last BM 4 days PTA.  Tried Miralax last night and an enema this AM with no relief. Limited physical exam via telehealth: The patient is awake, alert, answering questions appropriately and is not in respiratory distress.  As the Teletriage provider, I performed an initial assessment and ordered appropriate labs and imaging studies, if any, to facilitate the patient's care once placed in the ED. Once a room is available, care and a full evaluation will be completed by an alternate ED provider.  Any additional orders and the final disposition will be determined by that provider.  All imaging and labs will not be followed-up by the Teletriage Team, including myself.      ORDERS  Labs Reviewed - No data to display    ED Orders (720h ago, onward)      Start Ordered     Status Ordering Provider    12/25/24 1033 12/25/24 1032  Vital signs  Every 2 hours         Ordered BRAD PARIKH    12/25/24 1033 12/25/24 1032  X-Ray Abdomen Flat And " Erect  1 time imaging         Ordered BRAD PARIKH    12/25/24 1032 12/25/24 1032  Diet NPO  Diet effective now         Ordered BRAD PARIKH    12/25/24 1032 12/25/24 1032  Insert peripheral IV  Once         Ordered BRAD PARIKH    12/25/24 1032 12/25/24 1032  CBC W/ AUTO DIFFERENTIAL  STAT         Ordered BRAD PARIKH    12/25/24 1032 12/25/24 1032  Comp. Metabolic Panel  STAT         Ordered BRAD PARIKH    12/25/24 1032 12/25/24 1032  Urinalysis, Reflex to Urine Culture Urine, Clean Catch  STAT         Ordered BRAD PARIKH              Virtual Visit Note: The provider triage portion of this emergency department evaluation and documentation was performed via [employer], a HIPAA-compliant telemedicine application, in concert with a tele-presenter in the room. A face to face patient evaluation with one of my colleagues will occur once the patient is placed in an emergency department room.      DISCLAIMER: This note was prepared with [employer] voice recognition transcription software. Garbled syntax, mangled pronouns, and other bizarre constructions may be attributed to that software system.

## 2024-12-25 NOTE — DISCHARGE INSTRUCTIONS
Thank you for letting me care for you today - it was nice to meet you and I hope you feel better soon.  Please follow up with your urologist.    I sent in the following medication to your pharmacy:   Flomax: once a day    Our goal at [facility] is to always give you outstanding care and exceptional service. You may receive a survey by mail or email in the next week about your experience in our ED. We would greatly appreciate you completing and returning the survey. Your feedback provides us with a way to recognize our staff who give very good care and it helps us learn how to improve when your experience was below our aspiration of excellence.     All the best,     Jeanine Caballero, MPH, PA-C  Emergency Department Physician Assistant  [facility]

## 2024-12-26 ENCOUNTER — PATIENT MESSAGE (OUTPATIENT)
Dept: UROLOGY | Facility: CLINIC | Age: 64
End: 2024-12-26
Payer: MEDICARE

## 2024-12-26 NOTE — ED PROVIDER NOTES
"Encounter Date: 12/25/2024       History     Chief Complaint   Patient presents with    Constipation     Pt reports "I am constipated and haven't had a bowel movement in a couple of days. I also have hemorrhoids that are bleeding." Also reports urinary problems, last time pt urinated was at 0730. Denies decreased appetite, nausea, or vomiting. Miralax last and "tried to do an enema this morning." Abdomen rounded.     Patient is a 64 y.o. male who presents to the ED for evaluation of constipation, hemorrhoids, and urinary retention.  States that he has not had a full bowel movement in 2 days.  Says that he has a history of external hemorrhoids and he has occasionally been having bright red blood on toilet paper after attempted bowel movements.  Says that he has used an enema and MiraLax.  Reports that he has been able to gas.  Denies any significant associated abdominal pain.    Additionally, patient states that he has been having trouble urinating.  States that he has not urinated since 730 this morning, which was 4 hours ago.  Patient has a history of prostate cancer.    Patient denies fever, chills. Patient denies nausea, vomiting.  Denies chest pain, shortness of breath, rashes, or any other complaints at this time.      The history is provided by the patient and the spouse.     Review of patient's allergies indicates:  No Known Allergies  Past Medical History:   Diagnosis Date    Anxiety 12/18/2024    Glaucoma     Hypertension     Prostate cancer      Past Surgical History:   Procedure Laterality Date    COLONOSCOPY N/A 08/21/2020    Procedure: COLONOSCOPYPrepopik;  Surgeon: Danie Conway MD;  Location: [facility];  Service: Endoscopy;  Laterality: N/A;    COLONOSCOPY N/A 09/29/2023    Procedure: COLONOSCOPY;  Surgeon: Edel Langley MD;  Location: [facility];  Service: Endoscopy;  Laterality: N/A;    ESOPHAGOGASTRODUODENOSCOPY N/A 08/21/2020    Procedure: EGD (ESOPHAGOGASTRODUODENOSCOPY);  Surgeon: Danie" SEDRICK Conway MD;  Location: [facility] ENDO;  Service: Endoscopy;  Laterality: N/A;    ESOPHAGOGASTRODUODENOSCOPY N/A 09/29/2023    Procedure: EGD (ESOPHAGOGASTRODUODENOSCOPY);  Surgeon: Edel Langley MD;  Location: [facility] ENDO;  Service: Endoscopy;  Laterality: N/A;    FINGER SURGERY      right hand pinkie finger     GLAUCOMA SURGERY Bilateral     STOMACH SURGERY      ulcers      Family History   Problem Relation Name Age of Onset    Diabetes Mother      Hypertension Mother      Hypertension Father      Glaucoma Father      Hypertension Sister      Heart disease Sister      Heart attack Sister      Hypertension Brother      Anemia Daughter Nisa     No Known Problems Son x2     Stroke Maternal Aunt      Heart disease Maternal Grandmother      Heart disease Maternal Grandfather      Colon cancer Neg Hx      Esophageal cancer Neg Hx      Rectal cancer Neg Hx      Stomach cancer Neg Hx      Ulcerative colitis Neg Hx      Irritable bowel syndrome Neg Hx      Crohn's disease Neg Hx      Celiac disease Neg Hx      Cancer Neg Hx       Social History     Tobacco Use    Smoking status: Never    Smokeless tobacco: Never   Substance Use Topics    Alcohol use: Yes     Comment: occasionally    Drug use: Not Currently     Types: Marijuana     Comment: Quit 35 years ago     Review of Systems   Constitutional:  Negative for chills and fever.   HENT:  Negative for congestion, rhinorrhea and sore throat.    Respiratory:  Negative for shortness of breath and wheezing.    Cardiovascular:  Negative for chest pain.   Gastrointestinal:  Positive for anal bleeding and constipation. Negative for abdominal distention, abdominal pain, diarrhea, nausea and vomiting.   Genitourinary:  Positive for difficulty urinating. Negative for dysuria, flank pain, frequency, hematuria and urgency.   Musculoskeletal:  Negative for back pain, neck pain and neck stiffness.   Skin:  Negative for rash.   Neurological:  Negative for weakness.   Hematological:   Does not bruise/bleed easily.   All other systems reviewed and are negative.      Physical Exam     Initial Vitals [12/25/24 1022]   BP Pulse Resp Temp SpO2   118/83 80 (!) 22 97.5 °F (36.4 °C) 95 %      MAP       --         Physical Exam    Vitals reviewed.  Constitutional: He appears well-developed and well-nourished.   HENT:   Head: Normocephalic and atraumatic.   Nose: Rhinorrhea present. Mouth/Throat: Uvula is midline. No oropharyngeal exudate, posterior oropharyngeal edema or posterior oropharyngeal erythema.   Eyes: EOM are normal. Pupils are equal, round, and reactive to light.   Neck:   Normal range of motion.  Cardiovascular:  Normal rate.           Abdominal: Bowel sounds are normal. He exhibits no distension and no mass. There is no abdominal tenderness.   No focal abdominal tenderness on exam.  Bowel sounds normal.  No rebound, rigidity, guarding. There is no rebound and no guarding.   Genitourinary:    Genitourinary Comments: Small external hemorrhoid, hemorrhoid is not thrombosed or actively bleeding.  No significant perirectal tenderness.     Musculoskeletal:      Cervical back: Normal range of motion.     Neurological: He is alert and oriented to person, place, and time.         ED Course   Procedures  Labs Reviewed   URINALYSIS, REFLEX TO URINE CULTURE       Result Value    Specimen UA Urine, Catheterized      Color, UA Yellow      Appearance, UA Clear      pH, UA 7.0      Specific Gravity, UA 1.015      Protein, UA Negative      Glucose, UA Negative      Ketones, UA Negative      Bilirubin (UA) Negative      Occult Blood UA Negative      Nitrite, UA Negative      Urobilinogen, UA Negative      Leukocytes, UA Negative      Narrative:     Preferred Collection Type->Urine, Catheterized  Specimen Source->Urine          Imaging Results              X-Ray Abdomen Flat And Erect (Final result)  Result time 12/25/24 11:55:19      Final result by APPLE Ribeiro Sr., MD (12/25/24 11:55:19)                    Impression:      The bowel gas pattern is normal in appearance.  There is a normal-appearing amount of fecal material within the colon.    Finalized on: 12/25/2024 11:55 AM By:  Corey Ribeiro MD  BRRG# [id number]      2024-12-25 11:57:28.017    JERMAIN               Narrative:      EXAM:  XR ABDOMEN FLAT AND ERECT    CLINICAL HISTORY: Constipation;    COMPARISON: None    FINDINGS: The bowel gas pattern is normal in appearance.  There is a normal-appearing amount of fecal material within the colon.  There is no pneumoperitoneum.  There is a transitional vertebral body between L4 and the sacrum.                                         Medications   tamsulosin 24 hr capsule 0.4 mg (0.4 mg Oral Given 12/25/24 1217)     Medical Decision Making  Patient is a afebrile, nontoxic appearing 64 y.o. male.  No abdominal tenderness on exam. There is not focal tenderness, guarding, rebound, distension, rigidity, or decreased bowel sounds. There is not CVA tenderness. Denies chest pain and shortness of breath, rashes.  VSS and not suggestive of sepsis. Tolerating PO. The patient remained comfortable and stable during their visit in the ED. Details of ED course documented in ED workup.     Differential diagnosis includes, but is not limited to:  Constipation, fecal impaction, bowel obstruction, gastroenteritis, diverticulitis, diverticulosis, appendicitis, urinary retention, BPH, prostate cancer cystitis, pyelonephritis, nephrolithiasis    All historical, clinical, radiographic, and laboratory findings reviewed.  X-ray without acute abnormalities.  Patient has been able to urinate in the ED. UA unremarkable, not consistent with UTI.  There is no significant focal abdominal tenderness to suggest cholecystitis, appendicitis, diverticulitis, or  source. There is no rebound/guarding/rigidity/distension or other peritoneal signs to suggest perforation or other emergent surgical process.  Small external hemorrhoid on exam, no  thrombosis.  Exam not consistent with perirectal abscess.    There are no concerning features on physical exam to suggest an emergent or life threatening condition. No further intervention is indicated at this time after having taken into account the patient's history, physical exam findings, and empirical and objective data obtained during the patient's emergency department workup. The patient is at low risk for an emergent/life threatening medical condition at this time, and I am of the belief that that it is safe to discharge the patient from the emergency department.     I have discussed the specifics of the workup with the patient and the patient has verbalized understanding of the details of the workup, the diagnosis, the treatment plan, and the need for outpatient follow-up. Although the patient has no emergent etiology today this does not preclude the development of an emergent condition so, in addition, I have advised the patient that they can return to the ED and/or activate EMS at any time with worsening of their symptoms, change of their symptoms, or with any other medical complaint. Additionally, patient instructed to follow up with PCP and with Urology in 2-3 days for recheck of today's complaints.    All patient questions answered. Patient given discharge instructions. Discharge and follow-up instructions discussed with the patient who expressed understanding and willingness to comply with recommendations. Patient discharged from the emergency department in stable condition, in no acute distress.     Amount and/or Complexity of Data Reviewed  Labs: ordered. Decision-making details documented in ED Course.    Risk  Prescription drug management.               ED Course as of 12/25/24 2042   Wed Dec 25, 2024   1126 Urinalysis, Reflex to Urine Culture Urine, Catheterized  UA unremarkable.  Not consistent with UTI.  No blood in urine. [OB]   2040 Xray abdomen independently reviewed: No acute abnormalities.  The bowel gas pattern is normal in appearance.  There is a normal-appearing amount of fecal material within the colon.   [OB]      ED Course User Index  [OB] Jeanine Caballero PA-C                           Clinical Impression:  Final diagnoses:  [K59.00] Constipation, unspecified constipation type (Primary)  [K64.4] External hemorrhoid  [R33.9] Urinary retention          ED Disposition Condition    Discharge Stable          ED Prescriptions    None       Follow-up Information    None          Jeanine Caballero PA-C  12/25/24 2042

## 2025-01-02 ENCOUNTER — OFFICE VISIT (OUTPATIENT)
Dept: UROLOGY | Facility: CLINIC | Age: 65
End: 2025-01-02
Payer: MEDICARE

## 2025-01-02 VITALS
RESPIRATION RATE: 18 BRPM | SYSTOLIC BLOOD PRESSURE: 130 MMHG | HEART RATE: 73 BPM | BODY MASS INDEX: 35.61 KG/M2 | HEIGHT: 68 IN | DIASTOLIC BLOOD PRESSURE: 84 MMHG | WEIGHT: 235 LBS

## 2025-01-02 DIAGNOSIS — R39.9 LOWER URINARY TRACT SYMPTOMS (LUTS): Primary | ICD-10-CM

## 2025-01-02 DIAGNOSIS — K59.00 CONSTIPATION, UNSPECIFIED CONSTIPATION TYPE: ICD-10-CM

## 2025-01-02 DIAGNOSIS — C61 PROSTATE CANCER: ICD-10-CM

## 2025-01-02 PROCEDURE — 3079F DIAST BP 80-89 MM HG: CPT | Mod: CPTII,S$GLB,, | Performed by: NURSE PRACTITIONER

## 2025-01-02 PROCEDURE — 1126F AMNT PAIN NOTED NONE PRSNT: CPT | Mod: CPTII,S$GLB,, | Performed by: NURSE PRACTITIONER

## 2025-01-02 PROCEDURE — 3075F SYST BP GE 130 - 139MM HG: CPT | Mod: CPTII,S$GLB,, | Performed by: NURSE PRACTITIONER

## 2025-01-02 PROCEDURE — 3008F BODY MASS INDEX DOCD: CPT | Mod: CPTII,S$GLB,, | Performed by: NURSE PRACTITIONER

## 2025-01-02 PROCEDURE — 99214 OFFICE O/P EST MOD 30 MIN: CPT | Mod: S$GLB,,, | Performed by: NURSE PRACTITIONER

## 2025-01-02 PROCEDURE — 1160F RVW MEDS BY RX/DR IN RCRD: CPT | Mod: CPTII,S$GLB,, | Performed by: NURSE PRACTITIONER

## 2025-01-02 PROCEDURE — 1159F MED LIST DOCD IN RCRD: CPT | Mod: CPTII,S$GLB,, | Performed by: NURSE PRACTITIONER

## 2025-01-02 RX ORDER — TAMSULOSIN HYDROCHLORIDE 0.4 MG/1
0.4 CAPSULE ORAL DAILY
Qty: 90 CAPSULE | Refills: 3 | Status: SHIPPED | OUTPATIENT
Start: 2025-01-02 | End: 2026-01-02

## 2025-01-02 NOTE — PROGRESS NOTES
"Subjective:      Ihsan Mays Sr. is a 64 y.o. male who returns today regarding his prostate cancer.     The patient was initially diagnosed with Jeff 3+3 low volume prostate cancer in June 2021.  He has been on active surveillance.  Most recent biopsy was in January of 2023, significant for [place] 3+3 cancer in 2/14 cores, <5% of total biopsy volume.  Last MRI in Oct 2022 was PIRADS-2.         PSA Diagnostic Prostate Specific Antigen   Latest Ref Rng 0.00 - 4.00 ng/mL 0.00 - 4.00 ng/mL   8/22/2022 5.1 (H)      2/3/2023   5.9 (H)    7/21/2023 3.8      1/25/2024 5.1 (H)      7/24/2024 5.2 (H)         Returns today for repeat PSA testing.    Treated for constipation and urinary retention in the ER on 12/25. I&O silva drained 300 mL of urine. Flomax was given in the ED but prescription was not sent.     Stream remains slow and dripping at times. He denies dysuria and gross hematuria.     The following portions of the patient's history were reviewed and updated as appropriate: allergies, current medications, past family history, past medical history, past social history, past surgical history and problem list.    Review of Systems  Constitutional: no fever or chills  ENT: no nasal congestion or sore throat  Respiratory: no cough or shortness of breath  Cardiovascular: no chest pain or palpitations  Gastrointestinal: no nausea or vomiting, tolerating diet  Genitourinary: as per HPI  Hematologic/Lymphatic: no easy bruising or lymphadenopathy  Musculoskeletal: no arthralgias or myalgias  Neurological: no seizures or tremors  Behavioral/Psych: no auditory or visual hallucinations     Objective:   Vitals: /84 (BP Location: Left arm, Patient Position: Sitting)   Pulse 73   Resp 18   Ht 5' 8" (1.727 m)   Wt 106.6 kg (235 lb 0.2 oz)   BMI 35.73 kg/m²     Physical Exam   General: alert and oriented, no acute distress  Head: normocephalic, atraumatic  Neck: supple, normal ROM  Respiratory: Symmetric expansion, " non-labored breathing  Cardiovascular: regular rate and rhythm  Abdomen: soft, non tender, non distended  Genitourinary: deferred  Skin: normal coloration and turgor, no rashes, no suspicious skin lesions noted  Neuro: alert and oriented x3, no gross deficits  Psych: normal judgment and insight, normal mood/affect, and non-anxious    Lab Review   Urinalysis demonstrates : negative for all components  PVR: 0 mL  Lab Results   Component Value Date    WBC 6.61 08/02/2024    HGB 13.3 (L) 08/02/2024    HCT 39.5 (L) 08/02/2024    MCV 95 08/02/2024     08/02/2024     Lab Results   Component Value Date    CREATININE 0.9 08/02/2024    BUN 14 08/02/2024     Lab Results   Component Value Date    PSA 5.9 (H) 02/03/2023     Imaging   None   Assessment:     1. Lower urinary tract symptoms (LUTS)    2. Prostate cancer    3. Constipation, unspecified constipation type      Plan:   Diagnoses and all orders for this visit:    Lower urinary tract symptoms (LUTS)  -     tamsulosin (FLOMAX) 0.4 mg Cap; Take 1 capsule (0.4 mg total) by mouth once daily.    Prostate cancer  -     Prostate Specific Antigen, Diagnostic; Future    Constipation, unspecified constipation type    Plan:  --PSA today  --Trial of flomax for LUTS- discussed potential SE   --Aggressively manage constipation   --If PSA remains stable recheck in 6 months for active surveillance

## 2025-01-03 ENCOUNTER — TELEPHONE (OUTPATIENT)
Dept: UROLOGY | Facility: CLINIC | Age: 65
End: 2025-01-03
Payer: MEDICARE

## 2025-01-03 DIAGNOSIS — C61 PROSTATE CANCER: Primary | ICD-10-CM

## 2025-01-03 NOTE — TELEPHONE ENCOUNTER
----- Message from Tiana Man NP sent at 1/3/2025  6:51 AM CST -----  Please schedule follow up with me in 6 months with PSA. Thanks

## 2025-02-07 ENCOUNTER — OFFICE VISIT (OUTPATIENT)
Dept: INTERNAL MEDICINE | Facility: CLINIC | Age: 65
End: 2025-02-07
Payer: MEDICARE

## 2025-02-07 VITALS
SYSTOLIC BLOOD PRESSURE: 124 MMHG | RESPIRATION RATE: 16 BRPM | HEART RATE: 76 BPM | HEIGHT: 68 IN | TEMPERATURE: 97 F | DIASTOLIC BLOOD PRESSURE: 80 MMHG | WEIGHT: 235.88 LBS | OXYGEN SATURATION: 97 % | BODY MASS INDEX: 35.75 KG/M2

## 2025-02-07 DIAGNOSIS — R73.03 PREDIABETES: ICD-10-CM

## 2025-02-07 DIAGNOSIS — I82.811 SAPHENOUS VEIN CLOT, RIGHT: ICD-10-CM

## 2025-02-07 DIAGNOSIS — I87.2 VENOUS INSUFFICIENCY OF BOTH LOWER EXTREMITIES: ICD-10-CM

## 2025-02-07 DIAGNOSIS — N52.9 ERECTILE DYSFUNCTION, UNSPECIFIED ERECTILE DYSFUNCTION TYPE: ICD-10-CM

## 2025-02-07 DIAGNOSIS — I10 PRIMARY HYPERTENSION: Primary | ICD-10-CM

## 2025-02-07 DIAGNOSIS — I89.0 LYMPHEDEMA OF BOTH LOWER EXTREMITIES: ICD-10-CM

## 2025-02-07 DIAGNOSIS — E66.01 SEVERE OBESITY (BMI 35.0-39.9) WITH COMORBIDITY: ICD-10-CM

## 2025-02-07 DIAGNOSIS — E78.2 MIXED HYPERLIPIDEMIA: ICD-10-CM

## 2025-02-07 DIAGNOSIS — C61 CANCER OF PROSTATE: ICD-10-CM

## 2025-02-07 PROCEDURE — 1159F MED LIST DOCD IN RCRD: CPT | Mod: CPTII,S$GLB,, | Performed by: INTERNAL MEDICINE

## 2025-02-07 PROCEDURE — 3079F DIAST BP 80-89 MM HG: CPT | Mod: CPTII,S$GLB,, | Performed by: INTERNAL MEDICINE

## 2025-02-07 PROCEDURE — 1126F AMNT PAIN NOTED NONE PRSNT: CPT | Mod: CPTII,S$GLB,, | Performed by: INTERNAL MEDICINE

## 2025-02-07 PROCEDURE — 1101F PT FALLS ASSESS-DOCD LE1/YR: CPT | Mod: CPTII,S$GLB,, | Performed by: INTERNAL MEDICINE

## 2025-02-07 PROCEDURE — 4010F ACE/ARB THERAPY RXD/TAKEN: CPT | Mod: CPTII,S$GLB,, | Performed by: INTERNAL MEDICINE

## 2025-02-07 PROCEDURE — 99215 OFFICE O/P EST HI 40 MIN: CPT | Mod: S$GLB,,, | Performed by: INTERNAL MEDICINE

## 2025-02-07 PROCEDURE — 99999 PR PBB SHADOW E&M-EST. PATIENT-LVL IV: CPT | Mod: PBBFAC,,, | Performed by: INTERNAL MEDICINE

## 2025-02-07 PROCEDURE — 3288F FALL RISK ASSESSMENT DOCD: CPT | Mod: CPTII,S$GLB,, | Performed by: INTERNAL MEDICINE

## 2025-02-07 PROCEDURE — 3008F BODY MASS INDEX DOCD: CPT | Mod: CPTII,S$GLB,, | Performed by: INTERNAL MEDICINE

## 2025-02-07 PROCEDURE — 3074F SYST BP LT 130 MM HG: CPT | Mod: CPTII,S$GLB,, | Performed by: INTERNAL MEDICINE

## 2025-02-07 NOTE — PROGRESS NOTES
Subjective     Patient ID: Ihsan Mays Sr. is a 64 y.o. male.    Chief Complaint: Annual Exam    HPI  64 y.o. Male here for annual exam.      Vaccines: Influenza (2024); Tetanus (2018); Prevnar 20 (2023); Shingrix (done)  Eye exam: 2018  Colonoscopy: 8/20     Exercise: no  Diet: regular     Past Medical History:  No date: Prostate cancer  No date: Glaucoma  No date: Hypertension  No date: Thrombosis of right lesser saphenous vein  No date: Prediabetes   No date: Thrombosis of right lesser saphenous vein  No date: Severe obesity  No date: B/L LE venous insufficiency/reflux  No date: ED    Past Surgical History:  6/22/2015: COLONOSCOPY; N/A      Comment:  Performed by Yosef P[facility]rez MD at [facility] ENDO (4TH FLR)  6/22/2015: ESOPHAGOGASTRODUODENOSCOPY (EGD); N/A      Comment:  Performed by Yosef Pérez MD at [facility] ENDO (4TH FLR)  No date: FINGER SURGERY      Comment:  right hand pinkie finger   No date: GLAUCOMA SURGERY  No date: STOMACH SURGERY      Comment:  ulcers   Social History    Socioeconomic History      Marital status:       Spouse name: Not on file      Number of children: 3      Years of education: Not on file      Highest education level: Not on file    Social Needs      Financial resource strain: Not on file      Food insecurity - worry: Not on file      Food insecurity - inability: Not on file      Transportation needs - medical: Not on file      Transportation needs - non-medical: Not on file       Tobacco Use      Smoking status: Never Smoker      Smokeless tobacco: Never Used    Substance and Sexual Activity      Alcohol use: Yes        Comment: occasionally      Drug use: No      Sexual activity: Yes        Partners: Female       Social History Narrative       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   HENT:  Negative for postnasal drip, rhinorrhea, sinus pressure/congestion, sneezing, sore throat, trouble swallowing and voice change.    Respiratory:  Negative for cough, shortness of breath and wheezing.    Cardiovascular:  Negative for chest pain, palpitations and leg swelling.   Gastrointestinal:  Negative for abdominal pain, blood in stool, constipation, diarrhea, nausea and vomiting.   Genitourinary:  Negative for dysuria.   Musculoskeletal:  Negative for arthralgias and myalgias.   Integumentary:  Negative for rash and wound.   Allergic/Immunologic: Negative for environmental allergies and food allergies.   Hematological:  Negative for adenopathy. Does not bruise/bleed easily.          Objective     Physical Exam  Constitutional:       General: He is not in acute distress.     Appearance: Normal appearance. He is well-developed. He is not ill-appearing, toxic-appearing or diaphoretic.   HENT:      Head: Normocephalic and atraumatic.      Right Ear: External ear normal.      Left Ear: External ear normal.      Nose: Nose normal.      Mouth/Throat:      Pharynx: No oropharyngeal exudate.   Eyes:      General: No scleral icterus.        Right eye: No discharge.         Left eye: No discharge.      Extraocular Movements: Extraocular movements intact.      Conjunctiva/sclera: Conjunctivae normal.      Pupils: Pupils are equal, round, and reactive to light.   Neck:      Thyroid: No thyromegaly.      Vascular: No JVD.   Cardiovascular:      Rate and Rhythm: Normal rate and regular rhythm.      Pulses: Normal pulses.      Heart sounds: Normal heart sounds. No murmur heard.  Pulmonary:      Effort: Pulmonary effort is normal. No respiratory distress.      Breath sounds: Normal breath sounds. No wheezing or rales.   Abdominal:      General: Bowel sounds are normal. There is no distension.      Palpations: Abdomen is soft.      Tenderness: There is no abdominal tenderness. There is no  right CVA tenderness, left CVA tenderness, guarding or rebound.   Musculoskeletal:      Cervical back: Normal range of motion and neck supple. No rigidity.      Right lower leg: No edema.      Left lower leg: No edema.   Lymphadenopathy:      Cervical: No cervical adenopathy.   Skin:     General: Skin is warm and dry.      Capillary Refill: Capillary refill takes less than 2 seconds.      Coloration: Skin is not pale.      Findings: No rash.   Neurological:      General: No focal deficit present.      Mental Status: He is alert and oriented to person, place, and time. Mental status is at baseline.      Cranial Nerves: No cranial nerve deficit.      Sensory: No sensory deficit.      Motor: No weakness.      Coordination: Coordination normal.      Gait: Gait normal.      Deep Tendon Reflexes: Reflexes normal.   Psychiatric:         Mood and Affect: Mood normal.         Behavior: Behavior normal.         Thought Content: Thought content normal.         Judgment: Judgment normal.            Assessment and Plan     1. Primary hypertension    2. Mixed hyperlipidemia    3. Venous insufficiency of both lower extremities    4. Saphenous vein clot, right    5. Cancer of prostate    6. Severe obesity (BMI 35.0-39.9) with comorbidity    7. Prediabetes    8. Lymphedema of both lower extremities    9. Erectile dysfunction, unspecified erectile dysfunction type         Blood work ordered      HTN- stable on Diovan HCT/Coreg/Norvasc      ED- controlled on Viagra      HLD- on Lipitor      Prostate cancer- stable, followed by Urology      Thrombosis of right lesser saphenous vein- suspect 2/2 underlying prostate cancer    -continue prophylactic dose of Eliquis 2.5 mg BID indefinitely per cardio      Prediabetes- trending       B/L LE venous insufficiency/reflux- stable with compression stockings      Severe obesity- stable      F/u in 6 months    Over 1/2 of 40 minute visit spent reviewing pt's medical records, education/discussion  of pt's medical conditions and medical management

## 2025-02-10 ENCOUNTER — LAB VISIT (OUTPATIENT)
Dept: LAB | Facility: HOSPITAL | Age: 65
End: 2025-02-10
Attending: INTERNAL MEDICINE
Payer: MEDICARE

## 2025-02-10 DIAGNOSIS — I10 PRIMARY HYPERTENSION: ICD-10-CM

## 2025-02-10 DIAGNOSIS — R73.03 PREDIABETES: ICD-10-CM

## 2025-02-10 DIAGNOSIS — E78.2 MIXED HYPERLIPIDEMIA: ICD-10-CM

## 2025-02-10 DIAGNOSIS — Z00.00 ANNUAL PHYSICAL EXAM: ICD-10-CM

## 2025-02-10 LAB
ALBUMIN SERPL BCP-MCNC: 4 G/DL (ref 3.5–5.2)
ALP SERPL-CCNC: 119 U/L (ref 38–126)
ALT SERPL W/O P-5'-P-CCNC: 26 U/L (ref 10–44)
ANION GAP SERPL CALC-SCNC: 10 MMOL/L (ref 8–16)
AST SERPL-CCNC: 29 U/L (ref 15–46)
BASOPHILS # BLD AUTO: 0.04 K/UL (ref 0–0.2)
BASOPHILS NFR BLD: 0.6 % (ref 0–1.9)
BILIRUB SERPL-MCNC: 0.9 MG/DL (ref 0.1–1)
CALCIUM SERPL-MCNC: 9 MG/DL (ref 8.7–10.5)
CHLORIDE SERPL-SCNC: 108 MMOL/L (ref 95–110)
CHOLEST SERPL-MCNC: 109 MG/DL (ref 120–199)
CHOLEST/HDLC SERPL: 2.7 {RATIO} (ref 2–5)
CO2 SERPL-SCNC: 22 MMOL/L (ref 23–29)
CREAT SERPL-MCNC: 0.72 MG/DL (ref 0.5–1.4)
DIFFERENTIAL METHOD BLD: ABNORMAL
EOSINOPHIL # BLD AUTO: 0.2 K/UL (ref 0–0.5)
EOSINOPHIL NFR BLD: 2.9 % (ref 0–8)
ERYTHROCYTE [DISTWIDTH] IN BLOOD BY AUTOMATED COUNT: 12.8 % (ref 11.5–14.5)
EST. GFR  (NO RACE VARIABLE): >60 ML/MIN/1.73 M^2
ESTIMATED AVG GLUCOSE: 131 MG/DL (ref 68–131)
GLUCOSE SERPL-MCNC: 110 MG/DL (ref 70–110)
HBA1C MFR BLD: 6.2 % (ref 4–5.6)
HCT VFR BLD AUTO: 41 % (ref 40–54)
HDLC SERPL-MCNC: 41 MG/DL (ref 40–75)
HDLC SERPL: 37.6 % (ref 20–50)
HGB BLD-MCNC: 13.6 G/DL (ref 14–18)
IMM GRANULOCYTES # BLD AUTO: 0.02 K/UL (ref 0–0.04)
IMM GRANULOCYTES NFR BLD AUTO: 0.3 % (ref 0–0.5)
LDLC SERPL CALC-MCNC: 57.4 MG/DL (ref 63–159)
LYMPHOCYTES # BLD AUTO: 1.3 K/UL (ref 1–4.8)
LYMPHOCYTES NFR BLD: 18.5 % (ref 18–48)
MCH RBC QN AUTO: 31.3 PG (ref 27–31)
MCHC RBC AUTO-ENTMCNC: 33.2 G/DL (ref 32–36)
MCV RBC AUTO: 94 FL (ref 82–98)
MONOCYTES # BLD AUTO: 0.6 K/UL (ref 0.3–1)
MONOCYTES NFR BLD: 8.3 % (ref 4–15)
NEUTROPHILS # BLD AUTO: 4.9 K/UL (ref 1.8–7.7)
NEUTROPHILS NFR BLD: 69.4 % (ref 38–73)
NONHDLC SERPL-MCNC: 68 MG/DL
NRBC BLD-RTO: 0 /100 WBC
PLATELET # BLD AUTO: 147 K/UL (ref 150–450)
PMV BLD AUTO: 11.5 FL (ref 9.2–12.9)
POTASSIUM SERPL-SCNC: 3.9 MMOL/L (ref 3.5–5.1)
PROT SERPL-MCNC: 7.2 G/DL (ref 6–8.4)
RBC # BLD AUTO: 4.35 M/UL (ref 4.6–6.2)
SODIUM SERPL-SCNC: 140 MMOL/L (ref 136–145)
TRIGL SERPL-MCNC: 53 MG/DL (ref 30–150)
TSH SERPL DL<=0.005 MIU/L-ACNC: 2.77 UIU/ML (ref 0.4–4)
UUN UR-MCNC: 14 MG/DL (ref 2–20)
WBC # BLD AUTO: 6.99 K/UL (ref 3.9–12.7)

## 2025-02-10 PROCEDURE — 84443 ASSAY THYROID STIM HORMONE: CPT | Mod: PN | Performed by: INTERNAL MEDICINE

## 2025-02-10 PROCEDURE — 83036 HEMOGLOBIN GLYCOSYLATED A1C: CPT | Performed by: INTERNAL MEDICINE

## 2025-02-10 PROCEDURE — 80061 LIPID PANEL: CPT | Performed by: INTERNAL MEDICINE

## 2025-02-10 PROCEDURE — 80053 COMPREHEN METABOLIC PANEL: CPT | Mod: PN | Performed by: INTERNAL MEDICINE

## 2025-02-10 PROCEDURE — 85025 COMPLETE CBC W/AUTO DIFF WBC: CPT | Mod: PN | Performed by: INTERNAL MEDICINE

## 2025-02-11 ENCOUNTER — TELEPHONE (OUTPATIENT)
Dept: INTERNAL MEDICINE | Facility: CLINIC | Age: 65
End: 2025-02-11
Payer: MEDICARE

## 2025-02-11 DIAGNOSIS — R73.03 PREDIABETES: Primary | ICD-10-CM

## 2025-02-11 DIAGNOSIS — E78.2 MIXED HYPERLIPIDEMIA: ICD-10-CM

## 2025-02-11 DIAGNOSIS — I10 PRIMARY HYPERTENSION: ICD-10-CM

## 2025-02-24 ENCOUNTER — RESULTS FOLLOW-UP (OUTPATIENT)
Dept: INTERNAL MEDICINE | Facility: CLINIC | Age: 65
End: 2025-02-24

## 2025-03-12 RX ORDER — CARVEDILOL 12.5 MG/1
12.5 TABLET ORAL 2 TIMES DAILY WITH MEALS
Qty: 180 TABLET | Refills: 3 | Status: SHIPPED | OUTPATIENT
Start: 2025-03-12

## 2025-03-12 NOTE — TELEPHONE ENCOUNTER
No care due was identified.  [facility] Embedded Care Due Messages. Reference number: [id number].   3/12/2025 12:04:17 PM CDT

## 2025-03-12 NOTE — TELEPHONE ENCOUNTER
Refill Decision Note   Ihsan Mays  is requesting a refill authorization.  Brief Assessment and Rationale for Refill:  Approve     Medication Therapy Plan:         Comments:     Note composed:4:00 PM 03/12/2025

## 2025-03-24 DIAGNOSIS — Z00.00 ENCOUNTER FOR MEDICARE ANNUAL WELLNESS EXAM: ICD-10-CM

## 2025-04-03 NOTE — PROGRESS NOTES
Subjective:      Ihsan Mays Sr. is a 64 y.o. male who returns today regarding his painful R testicle.     The patient was initially diagnosed with [place] 3+3 low volume prostate cancer in June 2021.  He has been on active surveillance.  Most recent biopsy was in January of 2023, significant for [place] 3+3 cancer in 2/14 cores, <5% of total biopsy volume.  Last MRI in Oct 2022 was PIRADS-2.         PSA Diagnostic Prostate Specific Antigen   Latest Ref Rng 0.00 - 4.00 ng/mL 0.00 - 4.00 ng/mL   8/22/2022 5.1 (H)      2/3/2023   5.9 (H)    7/21/2023 3.8      1/25/2024 5.1 (H)      7/24/2024 5.2 (H)         Treated for constipation and urinary retention in the ER on 12/25. I&O silva drained 300 mL of urine. Began flomax.    He presents today reporting a painful R testicle- pain began about a week ago and has improved significantly, now mild discomfort. Denies trauma to the groin. He denies dysuria, frequency, urgency, and gross hematuria. States that he stopped taking the flomax when symptoms began.      Denies penile discharge and potential STD exposure.     The following portions of the patient's history were reviewed and updated as appropriate: allergies, current medications, past family history, past medical history, past social history, past surgical history and problem list.    Review of Systems  Constitutional: no fever or chills  ENT: no nasal congestion or sore throat  Respiratory: no cough or shortness of breath  Cardiovascular: no chest pain or palpitations  Gastrointestinal: no nausea or vomiting, tolerating diet  Genitourinary: as per HPI  Hematologic/Lymphatic: no easy bruising or lymphadenopathy  Musculoskeletal: no arthralgias or myalgias  Neurological: no seizures or tremors  Behavioral/Psych: no auditory or visual hallucinations     Objective:   Vitals:   Vitals:    04/04/25 0851   BP: 121/80   Pulse: 69   Resp: 16     Physical Exam   General: alert and oriented, no acute distress  Head:  normocephalic, atraumatic  Neck: supple, normal ROM  Respiratory: Symmetric expansion, non-labored breathing  Cardiovascular: regular rate and rhythm  Abdomen: soft, non tender, non distended  Genitourinary:   Penis: normal, no lesions, patent orthotopic meatus, no plaques  Scrotum: no rashes or skin changes;   Testes: descended bilaterally, no masses, tender/full R epididymis, no fluctuance or crepitus; L epididymis non tender; no hydroceles  Skin: normal coloration and turgor, no rashes, no suspicious skin lesions noted  Neuro: alert and oriented x3, no gross deficits  Psych: normal judgment and insight, normal mood/affect, and non-anxious    Lab Review   Urinalysis demonstrates : negative for all components   Lab Results   Component Value Date    WBC 6.99 02/10/2025    HGB 13.6 (L) 02/10/2025    HCT 41.0 02/10/2025    MCV 94 02/10/2025     (L) 02/10/2025     Lab Results   Component Value Date    CREATININE 0.72 02/10/2025    BUN 14 02/10/2025     Lab Results   Component Value Date    PSA 5.9 (H) 02/03/2023     Imaging  None   Assessment:     1. Enlarged prostate    2. Epididymitis    3. Prostate cancer      Plan:   Diagnoses and all orders for this visit:    Enlarged prostate  -     alfuzosin (UROXATRAL) 10 mg Tb24; Take 1 tablet (10 mg total) by mouth once daily.    Epididymitis  -     Urine Culture High Risk  -     sulfamethoxazole-trimethoprim 800-160mg (BACTRIM DS) 800-160 mg Tab; Take 1 tablet by mouth 2 (two) times daily. for 10 days  -     ketorolac (TORADOL) 10 mg tablet; Take 1 tablet (10 mg total) by mouth every 6 (six) hours as needed for Pain.    Prostate cancer    Plan:  --Patient bothered by retrograde ejaculation- d/c flomax and trial uroxatral  --Urine culture  --Bactrim BID x 10 days  --Discussed conservative measures for relieving testicular pain - scrotal support, ibuprofen, scrotal elevation, ice packs  --Toradol PRN  --Follow up as scheduled with PSA

## 2025-04-04 ENCOUNTER — OFFICE VISIT (OUTPATIENT)
Dept: UROLOGY | Facility: CLINIC | Age: 65
End: 2025-04-04
Payer: MEDICARE

## 2025-04-04 VITALS
HEIGHT: 68 IN | WEIGHT: 235.88 LBS | HEART RATE: 69 BPM | BODY MASS INDEX: 35.75 KG/M2 | SYSTOLIC BLOOD PRESSURE: 121 MMHG | RESPIRATION RATE: 16 BRPM | DIASTOLIC BLOOD PRESSURE: 80 MMHG

## 2025-04-04 DIAGNOSIS — N40.0 ENLARGED PROSTATE: Primary | ICD-10-CM

## 2025-04-04 DIAGNOSIS — C61 PROSTATE CANCER: ICD-10-CM

## 2025-04-04 DIAGNOSIS — N45.1 EPIDIDYMITIS: ICD-10-CM

## 2025-04-04 LAB
BILIRUB SERPL-MCNC: NORMAL MG/DL
BLOOD URINE, POC: NORMAL
CLARITY, POC UA: CLEAR
COLOR, POC UA: YELLOW
GLUCOSE UR QL STRIP: NORMAL
KETONES UR QL STRIP: NORMAL
LEUKOCYTE ESTERASE URINE, POC: NORMAL
NITRITE, POC UA: NORMAL
PH, POC UA: 5
PROTEIN, POC: NORMAL
SPECIFIC GRAVITY, POC UA: 1.02
UROBILINOGEN, POC UA: NORMAL

## 2025-04-04 PROCEDURE — 87086 URINE CULTURE/COLONY COUNT: CPT | Performed by: NURSE PRACTITIONER

## 2025-04-04 RX ORDER — SULFAMETHOXAZOLE AND TRIMETHOPRIM 800; 160 MG/1; MG/1
1 TABLET ORAL 2 TIMES DAILY
Qty: 20 TABLET | Refills: 0 | Status: SHIPPED | OUTPATIENT
Start: 2025-04-04 | End: 2025-04-14

## 2025-04-04 RX ORDER — KETOROLAC TROMETHAMINE 10 MG/1
10 TABLET, FILM COATED ORAL EVERY 6 HOURS PRN
Qty: 20 TABLET | Refills: 0 | Status: SHIPPED | OUTPATIENT
Start: 2025-04-04 | End: 2025-04-09

## 2025-04-04 RX ORDER — ALFUZOSIN HYDROCHLORIDE 10 MG/1
10 TABLET, EXTENDED RELEASE ORAL DAILY
Qty: 30 TABLET | Refills: 11 | Status: SHIPPED | OUTPATIENT
Start: 2025-04-04 | End: 2026-04-04

## 2025-04-08 ENCOUNTER — RESULTS FOLLOW-UP (OUTPATIENT)
Dept: UROLOGY | Facility: CLINIC | Age: 65
End: 2025-04-08
Payer: MEDICARE

## 2025-04-08 DIAGNOSIS — N30.00 ACUTE CYSTITIS WITHOUT HEMATURIA: Primary | ICD-10-CM

## 2025-04-14 DIAGNOSIS — N39.0 FREQUENT UTI: Primary | ICD-10-CM

## 2025-04-14 RX ORDER — NITROFURANTOIN 25; 75 MG/1; MG/1
100 CAPSULE ORAL 2 TIMES DAILY
Qty: 14 CAPSULE | Refills: 0 | Status: SHIPPED | OUTPATIENT
Start: 2025-04-14 | End: 2025-04-21

## 2025-04-15 ENCOUNTER — HOSPITAL ENCOUNTER (OUTPATIENT)
Dept: RADIOLOGY | Facility: HOSPITAL | Age: 65
Discharge: HOME | End: 2025-04-15
Attending: NURSE PRACTITIONER
Payer: MEDICARE

## 2025-04-15 ENCOUNTER — RESULTS FOLLOW-UP (OUTPATIENT)
Dept: UROLOGY | Facility: CLINIC | Age: 65
End: 2025-04-15

## 2025-04-15 ENCOUNTER — TELEPHONE (OUTPATIENT)
Dept: UROLOGY | Facility: CLINIC | Age: 65
End: 2025-04-15
Payer: MEDICARE

## 2025-04-15 DIAGNOSIS — N39.0 FREQUENT UTI: ICD-10-CM

## 2025-04-15 DIAGNOSIS — N28.89 RENAL MASS: Primary | ICD-10-CM

## 2025-04-15 PROCEDURE — 76770 US EXAM ABDO BACK WALL COMP: CPT | Mod: 26,,, | Performed by: RADIOLOGY

## 2025-04-15 PROCEDURE — 76770 US EXAM ABDO BACK WALL COMP: CPT | Mod: TC,PN

## 2025-04-15 RX ORDER — ATORVASTATIN CALCIUM 40 MG/1
40 TABLET, FILM COATED ORAL DAILY
Qty: 90 TABLET | Refills: 3 | Status: SHIPPED | OUTPATIENT
Start: 2025-04-15

## 2025-04-15 NOTE — TELEPHONE ENCOUNTER
Refill Decision Note   Ihsan Mays  is requesting a refill authorization.  Brief Assessment and Rationale for Refill:  Approve     Medication Therapy Plan:         Comments:     Note composed:1:22 PM 04/15/2025             Appointments     Last Visit   2/7/2025 Elliot Ybarra, DO   Next Visit   8/12/2025 Elliot Ybarra, DO

## 2025-04-15 NOTE — TELEPHONE ENCOUNTER
----- Message from Tiana Man NP sent at 4/15/2025  9:24 AM CDT -----  Pt notified of US findings.     Please schedule BMP and CT abdomen/pelvis asap. Follow up after with Dr. Jansen (scheduled). Thanks!  ----- Message -----  From: Interface, Rad Results In  Sent: 4/15/2025   8:32 AM CDT  To: Tiana Man NP

## 2025-04-15 NOTE — TELEPHONE ENCOUNTER
No care due was identified.  [facility] Embedded Care Due Messages. Reference number: [id number].   4/15/2025 1:05:03 PM CDT

## 2025-04-16 ENCOUNTER — RESULTS FOLLOW-UP (OUTPATIENT)
Dept: UROLOGY | Facility: CLINIC | Age: 65
End: 2025-04-16
Payer: MEDICARE

## 2025-04-21 ENCOUNTER — HOSPITAL ENCOUNTER (OUTPATIENT)
Dept: RADIOLOGY | Facility: HOSPITAL | Age: 65
Discharge: HOME | End: 2025-04-21
Attending: NURSE PRACTITIONER
Payer: MEDICARE

## 2025-04-21 DIAGNOSIS — N28.89 RENAL MASS: ICD-10-CM

## 2025-04-21 PROCEDURE — 25500020 PHARM REV CODE 255: Mod: PN | Performed by: NURSE PRACTITIONER

## 2025-04-21 PROCEDURE — 74178 CT ABD&PLV WO CNTR FLWD CNTR: CPT | Mod: 26,,, | Performed by: RADIOLOGY

## 2025-04-21 PROCEDURE — 74178 CT ABD&PLV WO CNTR FLWD CNTR: CPT | Mod: TC,PN

## 2025-04-21 RX ADMIN — IOHEXOL 100 ML: 350 INJECTION, SOLUTION INTRAVENOUS at 07:04

## 2025-04-22 ENCOUNTER — OFFICE VISIT (OUTPATIENT)
Dept: UROLOGY | Facility: CLINIC | Age: 65
End: 2025-04-22
Payer: MEDICARE

## 2025-04-22 VITALS
BODY MASS INDEX: 36.07 KG/M2 | HEART RATE: 76 BPM | WEIGHT: 238 LBS | SYSTOLIC BLOOD PRESSURE: 130 MMHG | DIASTOLIC BLOOD PRESSURE: 79 MMHG | HEIGHT: 68 IN | OXYGEN SATURATION: 96 %

## 2025-04-22 DIAGNOSIS — N28.89 RENAL MASS: Primary | ICD-10-CM

## 2025-04-22 LAB
BILIRUB UR QL STRIP.AUTO: NEGATIVE
CLARITY UR: CLEAR
COLOR UR AUTO: YELLOW
GLUCOSE UR QL STRIP: NEGATIVE
HGB UR QL STRIP: NEGATIVE
HYALINE CASTS UR QL AUTO: 1 /LPF (ref 0–1)
KETONES UR QL STRIP: NEGATIVE
LEUKOCYTE ESTERASE UR QL STRIP: ABNORMAL
MICROSCOPIC COMMENT: NORMAL
NITRITE UR QL STRIP: NEGATIVE
PH UR STRIP: 6 [PH]
PROT UR QL STRIP: NEGATIVE
RBC #/AREA URNS AUTO: 1 /HPF (ref 0–4)
SP GR UR STRIP: 1.01
UROBILINOGEN UR STRIP-ACNC: NEGATIVE EU/DL
WBC #/AREA URNS AUTO: 2 /HPF (ref 0–5)

## 2025-04-22 PROCEDURE — 3075F SYST BP GE 130 - 139MM HG: CPT | Mod: CPTII,S$GLB,, | Performed by: UROLOGY

## 2025-04-22 PROCEDURE — 3008F BODY MASS INDEX DOCD: CPT | Mod: CPTII,S$GLB,, | Performed by: UROLOGY

## 2025-04-22 PROCEDURE — 3288F FALL RISK ASSESSMENT DOCD: CPT | Mod: CPTII,S$GLB,, | Performed by: UROLOGY

## 2025-04-22 PROCEDURE — 3044F HG A1C LEVEL LT 7.0%: CPT | Mod: CPTII,S$GLB,, | Performed by: UROLOGY

## 2025-04-22 PROCEDURE — 1126F AMNT PAIN NOTED NONE PRSNT: CPT | Mod: CPTII,S$GLB,, | Performed by: UROLOGY

## 2025-04-22 PROCEDURE — 3078F DIAST BP <80 MM HG: CPT | Mod: CPTII,S$GLB,, | Performed by: UROLOGY

## 2025-04-22 PROCEDURE — 99215 OFFICE O/P EST HI 40 MIN: CPT | Mod: S$GLB,,, | Performed by: UROLOGY

## 2025-04-22 PROCEDURE — G2211 COMPLEX E/M VISIT ADD ON: HCPCS | Mod: S$GLB,,, | Performed by: UROLOGY

## 2025-04-22 PROCEDURE — 1159F MED LIST DOCD IN RCRD: CPT | Mod: CPTII,S$GLB,, | Performed by: UROLOGY

## 2025-04-22 PROCEDURE — 81001 URINALYSIS AUTO W/SCOPE: CPT | Performed by: UROLOGY

## 2025-04-22 PROCEDURE — 4010F ACE/ARB THERAPY RXD/TAKEN: CPT | Mod: CPTII,S$GLB,, | Performed by: UROLOGY

## 2025-04-22 PROCEDURE — 1101F PT FALLS ASSESS-DOCD LE1/YR: CPT | Mod: CPTII,S$GLB,, | Performed by: UROLOGY

## 2025-04-22 RX ORDER — SYRING-NEEDL,DISP,INSUL,0.3 ML 29 G X1/2"
74 SYRINGE, EMPTY DISPOSABLE MISCELLANEOUS ONCE
COMMUNITY

## 2025-04-22 RX ORDER — CEFAZOLIN SODIUM 2 G/50ML
2 SOLUTION INTRAVENOUS
OUTPATIENT
Start: 2025-04-22

## 2025-04-22 RX ORDER — HEPARIN SODIUM 5000 [USP'U]/ML
5000 INJECTION, SOLUTION INTRAVENOUS; SUBCUTANEOUS
OUTPATIENT
Start: 2025-05-14 | End: 2025-05-14

## 2025-04-22 NOTE — Clinical Note
Dr Zambrano, Can Mr Mays stop Eliquis for partial nephrectomy on 5/14?  If so, would he be able to stay off it permanently?  His DVT was 2 years ago.  Thanks, Hitesh Jansen

## 2025-04-22 NOTE — H&P (VIEW-ONLY)
"Subjective:      Ihsan Mays Sr. is a 64 y.o. male who returns today regarding his     Incidentally discovered right renal mass  Found on renal US done for work up following an episode of epididymitis/UTI.    The following portions of the patient's history were reviewed and updated as appropriate: allergies, current medications, past family history, past medical history, past social history, past surgical history and problem list.    Review of Systems  Pertinent items are noted in HPI.  A comprehensive multipoint review of systems was negative except as otherwise stated in the HPI.    Past Medical History:   Diagnosis Date    Anxiety 12/18/2024    Glaucoma     Hypertension     Prostate cancer      Past Surgical History:   Procedure Laterality Date    COLONOSCOPY N/A 08/21/2020    Procedure: COLONOSCOPYPrepopik;  Surgeon: Danie Conway MD;  Location: [facility];  Service: Endoscopy;  Laterality: N/A;    COLONOSCOPY N/A 09/29/2023    Procedure: COLONOSCOPY;  Surgeon: Edel Langley MD;  Location: [facility];  Service: Endoscopy;  Laterality: N/A;    ESOPHAGOGASTRODUODENOSCOPY N/A 08/21/2020    Procedure: EGD (ESOPHAGOGASTRODUODENOSCOPY);  Surgeon: Danie Conway MD;  Location: [facility];  Service: Endoscopy;  Laterality: N/A;    ESOPHAGOGASTRODUODENOSCOPY N/A 09/29/2023    Procedure: EGD (ESOPHAGOGASTRODUODENOSCOPY);  Surgeon: Edel Langley MD;  Location: [facility];  Service: Endoscopy;  Laterality: N/A;    FINGER SURGERY      right hand pinkie finger     GLAUCOMA SURGERY Bilateral     STOMACH SURGERY      ulcers        Review of patient's allergies indicates:  No Known Allergies       Objective:   Vitals: /79 (BP Location: Right arm, Patient Position: Sitting)   Pulse 76   Ht 5' 8" (1.727 m)   Wt 108 kg (237 lb 15.8 oz)   SpO2 96%   BMI 36.19 kg/m²     Physical Exam   General: alert and oriented, no acute distress  Respiratory: Symmetric expansion, non-labored " breathing  Cardiovascular: no peripheral edema  Abdomen: non distended  Skin: normal coloration and turgor, no rashes, no suspicious skin lesions noted  Neuro: no gross deficits  Psych: normal judgment and insight, normal mood/affect, and non-anxious    Physical Exam    Lab Review   Urinalysis demonstrates no specimen    Lab Results   Component Value Date    WBC 6.99 02/10/2025    HGB 13.6 (L) 02/10/2025    HCT 41.0 02/10/2025    MCV 94 02/10/2025     (L) 02/10/2025     Lab Results   Component Value Date    CREATININE 0.9 04/15/2025    BUN 10 04/15/2025       Imaging  CT ABDOMEN PELVIS W WO CONTRAST     CLINICAL HISTORY:  Other specified disorders of kidney and ureterrenal mass noted on US;     TECHNIQUE:  Pre and postcontrast CT scan of the abdomen and pelvis.     COMPARISON:  04/15/2025     FINDINGS:  Lung bases are clear.     1.4 cm cyst in the left lobe of the liver.  1.5 cm cyst in the caudate region.  The spleen appears normal. The gallbladder is normal.     The pancreas is unremarkable.     Tiny cyst in the right kidney.  Additionally in the lower pole there is a 2.8 x 2.7 cm in diameter.  The adrenal glands are normal.     No free fluid or inflammatory change.     The bowel appears normal.     Mild bladder wall thickening.  The prostate is mildly enlarged with dense calcifications.     No significant osseous abnormality is identified.     Impression:     2.8 x 2.7 cm well-defined mildly enhancing mass in the lower pole of the right kidney.  Neoplastic process should be considered.     All CT scans at this facility are performed  using dose modulation techniques as appropriate to performed exam including the following:  automated exposure control; adjustment of mA and/or kV according to the patients size (this includes techniques or standardized protocols for targeted exams where dose is matched to indication/reason for exam: i.e. extremities or head);  iterative reconstruction technique.         Electronically signed by:Janusz Weinstein  Date:                                            04/21/2025  Time:                                           09:02        CHEST PA AND LATERAL     CLINICAL HISTORY:  Dyspnea, unspecified     FINDINGS:  Chest two views: Heart size is normal.  Lungs are clear.  The bones showed DJD.     Impression:     No acute process seen.        Electronically signed by:Gen Caldwell MD  Date:                                            03/08/2022  Time:                                           15:12    Assessment and Plan:   Renal mass    We discussed the natural history of small renal masses, the risk of malignancy and disease progression, and the small risk of mortality.  We discussed the risks and benefits of watchful waiting, biopsy, partial and total nephrectomy.  We discussed the benefits of renal preservation when possible.  We discussed percutaneous, laparoscopic and robotic approaches.  We discussed the management of positive surgical margins.  I answered all questions.      CXR    Schedule robotic partial nephrectomy 5/14    Prostate cancer  [place] 6 3+3 GG1 cT1c cN0 cMx; psa 5.1  Active surveillance    History of UTI  UA and reflex cs    Chronic anticoagulation  DVT 2 years ago  Hold Eliquis preop if OK with Dr Zambrano  (If he can safely stop this permanently,this would decrase his risk of post-op bleeding)    Gross hematuria  Flex cysto at time of robotic partial nephrectomy to complete hematuria work up      Visit today included increased complexity associated with the care of the episodic problem renal mass addressed and managing the longitudinal care of the patient due to the serious and/or complex managed problem(s) renal mass.

## 2025-04-22 NOTE — PATIENT INSTRUCTIONS
Preparing for Surgery                   [facility]- First Floor  [address]     Before Surgery  Stop all aspirin and NSAIDS (ibuprofen, naproxen, diclofenac, meloxicam, celecoxib) 7 days before. Tylenol (acetaminophen) is okay to take. Stop all herbal medications, vitamins and supplements 14 days before surgery.  If you are diabetic and take Ozempic, Semaglutide, Wegovy, Mounjaro, Trulicity or Rybelsus, continue taking until surgery. If you do not take the medication for diabetes, stop injections 1-2 weeks before the procedure.  Stop taking all blood thinners (Coumadin, Plavix, Xarelto, Eliquis, Brilinta) _____ days prior to surgery, or as directed by your doctor.  Anesthesia will call you with additional instructions. You may need to meet with them prior to surgery. Their number is [phone].  Surgery Information  Please bring a list of your medications to the surgery.  You will need someone to drive you home after the surgery. You will not be allowed to leave the facility alone or drive yourself after anesthesia and sedation.  Someone will call you the day before surgery with your arrival time. If you do not hear from us by noon, please call the office.  Preparing for Surgery  Take a complete shower or bath (shower is recommended) the evening before surgery using Hibiclens wash: Wash from your neck down with Hibiclens (chlorhexidine gluconate) soap.  Diet: do not eat anything after 9:00 pm the evening before surgery. Gatorade, Powerade and water are okay to have until you leave your home. Do not eat or drink anything on your way to the surgery center. If you have diabetes, only drink water.    2 hours before surgery (right before you come to the surgery center), drink 8 oz of regular Gatorade. The color does not matter, but please make sure to get the regular Gatorade, not the diet/low-sugar version. If you have diabetes, only drink water.      The afternoon  before your procedure, drink ¼ a bottle of Magnesium Citrate. The goal is to have a bowel movement before your procedure.      After Surgery  Incision(s)  You may have several incisions on your abdomen. The incisions will be closed with surgical glue.  Call if you experience yellow/green, foul-smelling, or pus-like discharge from incisions.    Pain control  Take prescribed medications as directed.   If you take opioid medication, DO NOT DRIVE or perform activities that require you to be alert while taking opioid pain medication, as these will make you drowsy.  You may restart all your normal medications taken prior to surgery when you get home, unless instructed otherwise on discharge from the hospital. Please contact your PCP regarding these medications.    Activity  Following surgery, you will be sore in the abdomen and/or pelvis. Return to activity SLOWLY and in moderation.  You will NOT be bed-bound, and you CAN walk.   You may resume driving when you are no longer taking narcotic pain medication, no longer have a silva, and feel perfect.  To prevent infection NO BATHTUBS, SWIMMING POOLS, OR HOT TUBS until the surgical glue is completely gone. This may be several weeks.   DO NOT lift more than 5 pounds (a 2 liter bottle of soda) for 6 weeks or until cleared by surgeon's office.    Diet/Bowel Function  Upon returning home, you may eat or drink anything you like.  It is IMPERATIVE to reduce straining with bowel movements after surgery. Due to the anesthesia and pain medication, you will be more constipated than normal.  Take colace or prescribed stool softeners for 6 weeks, drink A LOT of water, and increase dietary fiber.    Urination and Catheter Care  Voiding  You will wake up from surgery with an indwelling urinary catheter (Silva) in place.   The catheter will drain urine from your bladder to a bag.  During the day, you can use the smaller leg bag for your catheter. At night, please switch to the larger  night-time bag.      Catheter Care  The bag should always be lower than your bladder.  Make sure the catheter is always secured by the sticker on your leg.  To prevent infection, please shower or wash daily. You can use regular soap and water.  Sometimes, the bladder spasms. You may see small amounts of urine leak around the catheter at the tip of the penis. This is normal.   You may notice the urine in the bag is bloody. This is normal.  If you notice the catheter is no longer draining urine into the bag, call our office OR go to the ER.      Voiding Trial  We will remove the catheter in the office at least 1.5 weeks after surgery.  Please bring an adult incontinence brief or depends to this procedure.      Voiding Trial:    _____________________________________________    PLEASE WATCH OUT FOR THE FOLLOWING SYMPTOMS:  Fever > 101 degrees F or chills  Your catheter stops draining or comes out  Persistent nausea/vomiting  Pain that does not improve with prescribed pain medications  Constipation that does not resolve with laxatives  Persistent drainage from incisions    If you experience any of these symptoms or have concerns, please call your surgeon's office: [phone]    Directions to Inpatient Unit from the [facility]:     Take elevators in [facility] at [address] [place] [address] to the 2nd floor.   Exit the elevator, turn right and use the 2nd floor walk-bridge to the new building.   Take walk-bridge across [facility] through the tunnel and at end, turn left.   Walk through a lobby waiting area to the [place] elevators.   Take the [facility] elevators to the 3rd floor, [address] [place] [place] [facility].   Exit elevator and go right to the Nursing Unit.   Look for signage to the correct room number.    Please note that once you are admitted to the Inpatient Unit, it will be more convenient for family and visitors to park in the [facility], on the corner of [facility]. This garage has a walk-bridge on the 2nd  floor which leads to the lobby with the [facility] elevators.    Family and Visitor Waiting Rooms  There are several waiting areas available for family and friends:  1st Floor [facility] [phone]  2nd Floor [facility] [phone]  4th Floor [facility] [phone]   Each patient is allowed two family members or visitors in the waiting rooms while you are in surgery. For the convenience of visitors, we have open visiting hours so family members can visit loved ones whenever they want, for as long as they want. However, please respect the need for a quiet environment.  For the protection of all patients, people who are sick should not visit. All children must always be accompanied by an adult. Adults and children with fever, cough, rash, sore throat, nausea, diarrhea or recent exposure to illness should not visit. At any time, visitation may be limited to ensure continuity and quality of care.  Ask the nurse for more detailed information and visiting guidelines.            BLANK PAGEBLANKS PAGE PLBA .

## 2025-04-22 NOTE — PROGRESS NOTES
"Subjective:      Ihsan Mays Sr. is a 64 y.o. male who returns today regarding his     Incidentally discovered right renal mass  Found on renal US done for work up following an episode of epididymitis/UTI.    The following portions of the patient's history were reviewed and updated as appropriate: allergies, current medications, past family history, past medical history, past social history, past surgical history and problem list.    Review of Systems  Pertinent items are noted in HPI.  A comprehensive multipoint review of systems was negative except as otherwise stated in the HPI.    Past Medical History:   Diagnosis Date    Anxiety 12/18/2024    Glaucoma     Hypertension     Prostate cancer      Past Surgical History:   Procedure Laterality Date    COLONOSCOPY N/A 08/21/2020    Procedure: COLONOSCOPYPrepopik;  Surgeon: Danie Conway MD;  Location: [facility];  Service: Endoscopy;  Laterality: N/A;    COLONOSCOPY N/A 09/29/2023    Procedure: COLONOSCOPY;  Surgeon: Edel Langley MD;  Location: [facility];  Service: Endoscopy;  Laterality: N/A;    ESOPHAGOGASTRODUODENOSCOPY N/A 08/21/2020    Procedure: EGD (ESOPHAGOGASTRODUODENOSCOPY);  Surgeon: Danie Conway MD;  Location: [facility];  Service: Endoscopy;  Laterality: N/A;    ESOPHAGOGASTRODUODENOSCOPY N/A 09/29/2023    Procedure: EGD (ESOPHAGOGASTRODUODENOSCOPY);  Surgeon: Edel Langley MD;  Location: [facility];  Service: Endoscopy;  Laterality: N/A;    FINGER SURGERY      right hand pinkie finger     GLAUCOMA SURGERY Bilateral     STOMACH SURGERY      ulcers        Review of patient's allergies indicates:  No Known Allergies       Objective:   Vitals: /79 (BP Location: Right arm, Patient Position: Sitting)   Pulse 76   Ht 5' 8" (1.727 m)   Wt 108 kg (237 lb 15.8 oz)   SpO2 96%   BMI 36.19 kg/m²     Physical Exam   General: alert and oriented, no acute distress  Respiratory: Symmetric expansion, non-labored " breathing  Cardiovascular: no peripheral edema  Abdomen: non distended  Skin: normal coloration and turgor, no rashes, no suspicious skin lesions noted  Neuro: no gross deficits  Psych: normal judgment and insight, normal mood/affect, and non-anxious    Physical Exam    Lab Review   Urinalysis demonstrates no specimen    Lab Results   Component Value Date    WBC 6.99 02/10/2025    HGB 13.6 (L) 02/10/2025    HCT 41.0 02/10/2025    MCV 94 02/10/2025     (L) 02/10/2025     Lab Results   Component Value Date    CREATININE 0.9 04/15/2025    BUN 10 04/15/2025       Imaging  CT ABDOMEN PELVIS W WO CONTRAST     CLINICAL HISTORY:  Other specified disorders of kidney and ureterrenal mass noted on US;     TECHNIQUE:  Pre and postcontrast CT scan of the abdomen and pelvis.     COMPARISON:  04/15/2025     FINDINGS:  Lung bases are clear.     1.4 cm cyst in the left lobe of the liver.  1.5 cm cyst in the caudate region.  The spleen appears normal. The gallbladder is normal.     The pancreas is unremarkable.     Tiny cyst in the right kidney.  Additionally in the lower pole there is a 2.8 x 2.7 cm in diameter.  The adrenal glands are normal.     No free fluid or inflammatory change.     The bowel appears normal.     Mild bladder wall thickening.  The prostate is mildly enlarged with dense calcifications.     No significant osseous abnormality is identified.     Impression:     2.8 x 2.7 cm well-defined mildly enhancing mass in the lower pole of the right kidney.  Neoplastic process should be considered.     All CT scans at this facility are performed  using dose modulation techniques as appropriate to performed exam including the following:  automated exposure control; adjustment of mA and/or kV according to the patients size (this includes techniques or standardized protocols for targeted exams where dose is matched to indication/reason for exam: i.e. extremities or head);  iterative reconstruction technique.         Electronically signed by:Janusz Weinstein  Date:                                            04/21/2025  Time:                                           09:02        CHEST PA AND LATERAL     CLINICAL HISTORY:  Dyspnea, unspecified     FINDINGS:  Chest two views: Heart size is normal.  Lungs are clear.  The bones showed DJD.     Impression:     No acute process seen.        Electronically signed by:Gen Caldwell MD  Date:                                            03/08/2022  Time:                                           15:12    Assessment and Plan:   Renal mass    We discussed the natural history of small renal masses, the risk of malignancy and disease progression, and the small risk of mortality.  We discussed the risks and benefits of watchful waiting, biopsy, partial and total nephrectomy.  We discussed the benefits of renal preservation when possible.  We discussed percutaneous, laparoscopic and robotic approaches.  We discussed the management of positive surgical margins.  I answered all questions.      CXR    Schedule robotic partial nephrectomy 5/14    Prostate cancer  [place] 6 3+3 GG1 cT1c cN0 cMx; psa 5.1  Active surveillance    History of UTI  UA and reflex cs    Chronic anticoagulation  DVT 2 years ago  Hold Eliquis preop if OK with Dr Zambrano  (If he can safely stop this permanently,this would decrase his risk of post-op bleeding)    Gross hematuria  Flex cysto at time of robotic partial nephrectomy to complete hematuria work up      Visit today included increased complexity associated with the care of the episodic problem renal mass addressed and managing the longitudinal care of the patient due to the serious and/or complex managed problem(s) renal mass.

## 2025-04-25 ENCOUNTER — TELEPHONE (OUTPATIENT)
Dept: UROLOGY | Facility: HOSPITAL | Age: 65
End: 2025-04-25
Payer: MEDICARE

## 2025-04-25 NOTE — TELEPHONE ENCOUNTER
----- Message from Isaiah Zambrano MD PhD sent at 4/22/2025  1:36 PM CDT -----  Hi Dr. Jansen,Eliquis can be stopped prior to the partial nephrectomy on 5/14.  If he still has prostate cancer or other form of malignancy and the eliquis is stopped permanently, he will be at increased risk of recurrent venous thromboembolism.  Happy to discuss further. Please call me anytime. Thank you,Will [phone]  ----- Message -----  From: Hitesh Jansen MD  Sent: 4/22/2025  11:38 AM CDT  To: Elliot Ybarra, DO; Isaiah Zambrano#    Dr Zambrano, Can Mr Mays stop Eliquis for partial nephrectomy on 5/14?  If so, would he be able to stay off it permanently?  His DVT was 2 years ago.  Thanks, Hitesh Jansen

## 2025-04-28 ENCOUNTER — PATIENT MESSAGE (OUTPATIENT)
Dept: PREADMISSION TESTING | Facility: OTHER | Age: 65
End: 2025-04-28
Payer: MEDICARE

## 2025-04-29 DIAGNOSIS — I10 ESSENTIAL HYPERTENSION: ICD-10-CM

## 2025-04-29 RX ORDER — VALSARTAN 320 MG/1
320 TABLET ORAL DAILY
Qty: 90 TABLET | Refills: 3 | Status: SHIPPED | OUTPATIENT
Start: 2025-04-29

## 2025-04-29 NOTE — TELEPHONE ENCOUNTER
Refill Decision Note   Ihsan Mays  is requesting a refill authorization.  Brief Assessment and Rationale for Refill:  Approve     Medication Therapy Plan:         Comments:     Note composed:11:17 AM 04/29/2025

## 2025-04-29 NOTE — TELEPHONE ENCOUNTER
No care due was identified.  [facility] Embedded Care Due Messages. Reference number: [id number].   4/29/2025 9:23:14 AM CDT

## 2025-05-01 ENCOUNTER — PATIENT MESSAGE (OUTPATIENT)
Dept: CARDIOLOGY | Facility: CLINIC | Age: 65
End: 2025-05-01
Payer: MEDICARE

## 2025-05-05 ENCOUNTER — TELEPHONE (OUTPATIENT)
Dept: UROLOGY | Facility: CLINIC | Age: 65
End: 2025-05-05
Payer: MEDICARE

## 2025-05-05 ENCOUNTER — PATIENT MESSAGE (OUTPATIENT)
Dept: UROLOGY | Facility: CLINIC | Age: 65
End: 2025-05-05
Payer: MEDICARE

## 2025-05-05 NOTE — TELEPHONE ENCOUNTER
Please abstract the following data from this visit with this patient into the appropriate field in Epic:    Pap smear done on this date: 05/29/2015 (approximately), by this group: [employer], results in care everywhere.   HPV co-testing done on 05/29/2015    Ophelia Angel        Summary:    Patient is due/failing the following:   PAP    Action needed:   Patient needs office visit for PAP.    Type of outreach:    none per care everywhere UTD    Questions for provider review:    None                                                                                                                                    Ophelia Angel       Chart routed to Care Team .        Panel Management Review      Patient has the following on her problem list: None      Composite cancer screening  Chart review shows that this patient is due/due soon for the following Pap Smear     Staff called pt regarding [facility] message. Pt stated that insurance company denied his inpatient admission and stated that he does not need inpatient services after surgery. Pt stated that he is nervous about surgery as is without added stress. Staff reassured pt that things with be figured about and staff will inform Dr. Jansen. Pt confirmed and voiced understanding.

## 2025-05-06 ENCOUNTER — TELEPHONE (OUTPATIENT)
Dept: UROLOGY | Facility: CLINIC | Age: 65
End: 2025-05-06
Payer: MEDICARE

## 2025-05-06 NOTE — TELEPHONE ENCOUNTER
Staff called pt back regarding phone call from yesterday. Pt was informed to call billing to resolve issue, pt stated that he already spoke to billing and was informed to call clinic to have issue resolved. Pt was informed that he would receive a call back from clinic, pt confirmed and voiced understanding.

## 2025-05-07 ENCOUNTER — ANESTHESIA EVENT (OUTPATIENT)
Dept: SURGERY | Facility: OTHER | Age: 65
End: 2025-05-07
Payer: MEDICARE

## 2025-05-07 NOTE — ANESTHESIA PREPROCEDURE EVALUATION
05/07/2025  Ihsan Mays Sr. is a 64 y.o., male.      Pre-op Assessment    I have reviewed the Patient Summary Reports.     I have reviewed the Nursing Notes. I have reviewed the NPO Status.   I have reviewed the Medications.     Review of Systems  Anesthesia Hx:             Denies Family Hx of Anesthesia complications.    Denies Personal Hx of Anesthesia complications.                    Social:  Non-Smoker       Hematology/Oncology:       -- Anemia:                    --  Cancer in past history (prostate cancer):                     EENT/Dental:  EENT/Dental Normal  glaucoma          Cardiovascular:     Hypertension           hyperlipidemia    H/o  saphenous vein clot,                            Pulmonary:  Pulmonary Normal                       Renal/:  Renal/ Normal                 Hepatic/GI:  Hepatic/GI Normal       H/o stomach surgery             Musculoskeletal:         Spine Disorders: lumbar            Neurological:  Neurology Normal                                      Endocrine:  Endocrine Normal    prediabetes      Obesity / BMI > 30  Dermatological:  Skin Normal    Psych:   anxiety                 Physical Exam  General: Well nourished, Cooperative, Alert and Oriented    Airway:  Mallampati: II   Mouth Opening: Normal  TM Distance: Normal  Tongue: Normal  Neck ROM: Normal ROM    Dental:  Intact        Anesthesia Plan  Type of Anesthesia, risks & benefits discussed:    Anesthesia Type: Gen ETT  Intra-op Monitoring Plan: Standard ASA Monitors and Art Line  Post Op Pain Control Plan: multimodal analgesia and peripheral nerve block  Induction:  IV  Airway Plan: Video, Post-Induction  ASA Score: 3  Day of Surgery Review of History & Physical: H&P Update referred to the surgeon/provider.  Anesthesia Plan Notes: CBC, T&S  BMP 04/2025 in [facility]    Ready For Surgery From Anesthesia Perspective.      .

## 2025-05-08 ENCOUNTER — HOSPITAL ENCOUNTER (OUTPATIENT)
Dept: PREADMISSION TESTING | Facility: OTHER | Age: 65
Discharge: HOME | End: 2025-05-08
Attending: UROLOGY
Payer: MEDICARE

## 2025-05-08 ENCOUNTER — TELEPHONE (OUTPATIENT)
Dept: CARDIOLOGY | Facility: CLINIC | Age: 65
End: 2025-05-08
Payer: MEDICARE

## 2025-05-08 ENCOUNTER — TELEPHONE (OUTPATIENT)
Dept: UROLOGY | Facility: CLINIC | Age: 65
End: 2025-05-08
Payer: MEDICARE

## 2025-05-08 VITALS
OXYGEN SATURATION: 96 % | DIASTOLIC BLOOD PRESSURE: 79 MMHG | WEIGHT: 240.06 LBS | HEIGHT: 68 IN | BODY MASS INDEX: 36.38 KG/M2 | TEMPERATURE: 98 F | HEART RATE: 69 BPM | SYSTOLIC BLOOD PRESSURE: 117 MMHG

## 2025-05-08 DIAGNOSIS — Z01.818 PREOP TESTING: Primary | ICD-10-CM

## 2025-05-08 DIAGNOSIS — N28.89 RENAL MASS: ICD-10-CM

## 2025-05-08 LAB
ABORH RETYPE: NORMAL
ABSOLUTE EOSINOPHIL (OHS): 0.16 K/UL
ABSOLUTE MONOCYTE (OHS): 0.73 K/UL (ref 0.3–1)
ABSOLUTE NEUTROPHIL COUNT (OHS): 4.22 K/UL (ref 1.8–7.7)
ANION GAP (OHS): 10 MMOL/L (ref 8–16)
BACTERIA #/AREA URNS AUTO: NORMAL /HPF
BASOPHILS # BLD AUTO: 0.02 K/UL
BASOPHILS NFR BLD AUTO: 0.3 %
BILIRUB UR QL STRIP.AUTO: NEGATIVE
BUN SERPL-MCNC: 11 MG/DL (ref 8–23)
CALCIUM SERPL-MCNC: 8.8 MG/DL (ref 8.7–10.5)
CHLORIDE SERPL-SCNC: 113 MMOL/L (ref 95–110)
CLARITY UR: CLEAR
CO2 SERPL-SCNC: 20 MMOL/L (ref 23–29)
COLOR UR AUTO: YELLOW
CREAT SERPL-MCNC: 0.9 MG/DL (ref 0.5–1.4)
ERYTHROCYTE [DISTWIDTH] IN BLOOD BY AUTOMATED COUNT: 13.6 % (ref 11.5–14.5)
GFR SERPLBLD CREATININE-BSD FMLA CKD-EPI: >60 ML/MIN/1.73/M2
GLUCOSE SERPL-MCNC: 115 MG/DL (ref 70–110)
GLUCOSE UR QL STRIP: NEGATIVE
HCT VFR BLD AUTO: 40.3 % (ref 40–54)
HGB BLD-MCNC: 13.5 GM/DL (ref 14–18)
HGB UR QL STRIP: NEGATIVE
HOLD SPECIMEN: NORMAL
IMM GRANULOCYTES # BLD AUTO: 0.02 K/UL (ref 0–0.04)
IMM GRANULOCYTES NFR BLD AUTO: 0.3 % (ref 0–0.5)
INDIRECT COOMBS: NORMAL
KETONES UR QL STRIP: NEGATIVE
LEUKOCYTE ESTERASE UR QL STRIP: ABNORMAL
LYMPHOCYTES # BLD AUTO: 1.19 K/UL (ref 1–4.8)
MCH RBC QN AUTO: 31.3 PG (ref 27–31)
MCHC RBC AUTO-ENTMCNC: 33.5 G/DL (ref 32–36)
MCV RBC AUTO: 93 FL (ref 82–98)
MICROSCOPIC COMMENT: NORMAL
NITRITE UR QL STRIP: NEGATIVE
NUCLEATED RBC (/100WBC) (OHS): 0 /100 WBC
PH UR STRIP: 6 [PH]
PLATELET # BLD AUTO: 183 K/UL (ref 150–450)
PMV BLD AUTO: 10.1 FL (ref 9.2–12.9)
POTASSIUM SERPL-SCNC: 3.8 MMOL/L (ref 3.5–5.1)
PROT UR QL STRIP: NEGATIVE
RBC # BLD AUTO: 4.32 M/UL (ref 4.6–6.2)
RBC #/AREA URNS AUTO: <1 /HPF (ref 0–4)
RELATIVE EOSINOPHIL (OHS): 2.5 %
RELATIVE LYMPHOCYTE (OHS): 18.8 % (ref 18–48)
RELATIVE MONOCYTE (OHS): 11.5 % (ref 4–15)
RELATIVE NEUTROPHIL (OHS): 66.6 % (ref 38–73)
RH BLD: NORMAL
SODIUM SERPL-SCNC: 143 MMOL/L (ref 136–145)
SP GR UR STRIP: 1.02
SPECIMEN OUTDATE: NORMAL
UROBILINOGEN UR STRIP-ACNC: NEGATIVE EU/DL
WBC # BLD AUTO: 6.34 K/UL (ref 3.9–12.7)
WBC #/AREA URNS AUTO: 2 /HPF (ref 0–5)

## 2025-05-08 PROCEDURE — 80048 BASIC METABOLIC PNL TOTAL CA: CPT | Performed by: UROLOGY

## 2025-05-08 PROCEDURE — 81001 URINALYSIS AUTO W/SCOPE: CPT | Performed by: UROLOGY

## 2025-05-08 PROCEDURE — 85025 COMPLETE CBC W/AUTO DIFF WBC: CPT | Performed by: UROLOGY

## 2025-05-08 PROCEDURE — 86901 BLOOD TYPING SEROLOGIC RH(D): CPT | Performed by: UROLOGY

## 2025-05-08 RX ORDER — LIDOCAINE HYDROCHLORIDE 10 MG/ML
0.5 INJECTION, SOLUTION EPIDURAL; INFILTRATION; INTRACAUDAL; PERINEURAL ONCE
OUTPATIENT
Start: 2025-05-08 | End: 2025-05-08

## 2025-05-08 RX ORDER — SODIUM CHLORIDE, SODIUM LACTATE, POTASSIUM CHLORIDE, CALCIUM CHLORIDE 600; 310; 30; 20 MG/100ML; MG/100ML; MG/100ML; MG/100ML
INJECTION, SOLUTION INTRAVENOUS CONTINUOUS
OUTPATIENT
Start: 2025-05-08

## 2025-05-08 NOTE — DISCHARGE INSTRUCTIONS
-                       Information to Prepare you for your Surgery    PRE-ADMIT TESTING -  [phone]    [address] [facility]          Your surgery has been scheduled at [facility]. We are pleased to have the opportunity to serve you. For Further Information please call [phone].    On the day of surgery please report to the Information Desk on the 1st floor.    CONTACT YOUR PHYSICIAN'S OFFICE THE DAY PRIOR TO YOUR SURGERY TO OBTAIN YOUR ARRIVAL TIME.     The evening before surgery do not eat anything after 9 p.m. ( this includes hard candy, chewing gum and mints).  You may only have GATORADE, POWERADE AND WATER  from 9 p.m. until you leave your home.   DO NOT DRINK ANY LIQUIDS ON THE WAY TO THE HOSPITAL.      Why does your anesthesiologist allow you to drink Gatorade/Powerade before surgery?  Gatorade/Powerade helps to increase your comfort before surgery and to decrease your nausea after surgery. The carbohydrates in Gatorade/Powerade help reduce your body's stress response to surgery.  If you are a diabetic-drink only water prior to surgery.    Outpatient Surgery- May allow 2 adult (18 and older) Support Persons (1 being the designated ) for all surgical/procedural patients. A breastfeeding mother will be allowed her infant and 2 adult Support Persons. No one under the age of 18 will be allowed in the building.       SPECIAL MEDICATION INSTRUCTIONS: TAKE medications checked off by the Anesthesiologist on your Medication List.      Angiogram Patients: Take medications as instructed by your physician, including aspirin.       Surgery Patients:    If you take ASPIRIN - Your PHYSICIAN/SURGEON will need to inform you IF/OR when you need to stop taking aspirin prior to your surgery.           The week prior to surgery do not ot take any medications containing IBUPROFEN or NSAIDS ( Advil, Motrin, Goodys, BC, Aleve, Naproxen etc)         If you are not sure if you should  take a medicine please call your surgeon's office.        Ok to take Tylenol    Do Not Wear any make-up (especially eye make-up) to surgery. Please remove any false eyelashes or eyelash extensions. If you arrive the day of surgery with makeup/eyelashes on you will be required to remove prior to surgery. (There is a risk of corneal abrasions if eye makeup/eyelash extensions are not removed)      Leave all valuables at home. Do Not wear any jewelry or watches, including any metal in body piercings. Jewelry must be removed prior to coming to the hospital.  There is a possibility that rings that are unable to be removed may be cut off if they are on the surgical extremity.    Please remove all hair extensions, wigs, clips and any other metal accessories/ ornaments from your hair.  These items may pose a flammable/fire risk in Surgery and must be removed.    Do not shave your surgical area at least 5 days prior to your surgery. The surgical prep will be performed at the hospital according to Infection Control regulations.    Contact Lens must be removed before surgery. Either do not wear the contact lens or bring a case and solution for storage.        Please bring a container for eyeglasses or dentures as required.        Bring any paperwork your physician has provided, such as consent forms,        history and physicals, doctor's orders, etc.         Bring comfortable clothes that are loose fitting to wear upon discharge. Take into consideration the type of surgery being performed.        Maintain your diet as advised per your physician the day prior to surgery.    Park in the Parking lot behind the hospital or in the [facility] across the street from the parking lot. Parking is complimentary.        If you will be discharged the same day as your procedure, please arrange for a responsible adult to drive you home or to accompany you if traveling by taxi.         YOU WILL NOT BE PERMITTED TO DRIVE OR TO  LEAVE THE HOSPITAL ALONE AFTER SURGERY.         If you are being discharged the same day, it is strongly recommended that you arrange for someone to remain with you for the first 24 hrs following your surgery.    The Surgeon will speak to your family/visitor after your surgery regarding the outcome of your surgery and post op care.  The Surgeon may speak to you after your surgery, but there is a possibility you may not remember the details.  Please check with your family members regarding the conversation with the Surgeon.    We strongly recommend whoever is bringing you home be present for discharge instructions.  This will ensure a thorough understanding for your post op home care.    If the patient has fever, cough, or signs/symptoms of Flu or Covid please do not come in for your surgery. Contact your surgeon and your primary care physician for further instructions.             Bathing Instructions with Hibiclens    Shower the evening before and morning of your procedure with Chlorhexidine (Hibiclens)  do not use Chlorhexidine on your face or genitals. Do not get in your eyes.  Wash your face with water and your regular face wash/soap  Use your regular shampoo  Apply Chlorhexidine (Hibiclens) directly on your skin or on a wet washcloth and wash gently. When showering: Move away from the shower stream when applying Chlorhexidine (Hibiclens) to avoid rinsing off too soon.  Rinse thoroughly with warm water  Do not dilute Chlorhexidine (Hibiclens)   Dry off as usual, do not use any deodorant, powder, body lotions, perfume, after shave or cologne.

## 2025-05-08 NOTE — TELEPHONE ENCOUNTER
Staff called pt to inform him that insurance issues were resolved pertaining to surgery with . Pt confirmed and voiced understanding.

## 2025-05-08 NOTE — TELEPHONE ENCOUNTER
----- Message from Med Assistant Monica sent at 5/8/2025  3:09 PM CDT -----  Regarding: Vanessa Carreon from [facility] is calling for pt who would like to know when to stop taking his eliquis 5mg before his procedure on the 15th, and can be reached at [phone].Thank you.

## 2025-05-09 ENCOUNTER — TELEPHONE (OUTPATIENT)
Dept: UROLOGY | Facility: CLINIC | Age: 65
End: 2025-05-09
Payer: MEDICARE

## 2025-05-09 NOTE — TELEPHONE ENCOUNTER
LVM instructing patient to hold Eliquis 2 days prior to procedure.    ----- Message from Isaiah Zambrano MD PhD sent at 5/8/2025  5:26 PM CDT -----  Hold Eliquis 2 days prior to the procedure.  Restart after the procedure when safe from a bleeding perspective.  Thank you  ----- Message -----  From: Gillian Erazo RN  Sent: 5/8/2025   9:49 AM CDT  To: Hitesh Jansen MD; Isaiah Zambrano MD #    Good Morning!Patient is scheduled for a partial nephrectomy on 5/14. How many days prior should he stop his Eliquis?ThanksGillian

## 2025-05-12 ENCOUNTER — TELEPHONE (OUTPATIENT)
Dept: UROLOGY | Facility: CLINIC | Age: 65
End: 2025-05-12
Payer: MEDICARE

## 2025-05-12 RX ORDER — DEXTROMETHORPHAN HYDROBROMIDE AND GUAIFENESIN 10; 200 MG/1; MG/1
CAPSULE, GELATIN COATED ORAL EVERY 4 HOURS PRN
Status: ON HOLD | COMMUNITY

## 2025-05-12 NOTE — TELEPHONE ENCOUNTER
Spoke with patient- he believes he developed a cold over the weekend. He reports it is now mostly resolved- only remaining symptom is some nasal congestion. Denies fever/SOB.

## 2025-05-12 NOTE — TELEPHONE ENCOUNTER
----- Message from Juanita sent at 5/12/2025 10:04 AM CDT -----  Who called: self  What is the request in detail: pt wants to take to anyone in the office  about a procedure he has coming up on wednesday and a medicine he took over this weekend  Can the clinic reply by [facility]? Would the patient rather a call back or a response via [facility]? Best call back number: .[phone] Additional Information:

## 2025-05-13 ENCOUNTER — TELEPHONE (OUTPATIENT)
Dept: UROLOGY | Facility: CLINIC | Age: 65
End: 2025-05-13
Payer: MEDICARE

## 2025-05-14 ENCOUNTER — ANESTHESIA (OUTPATIENT)
Dept: SURGERY | Facility: OTHER | Age: 65
End: 2025-05-14
Payer: MEDICARE

## 2025-05-14 ENCOUNTER — HOSPITAL ENCOUNTER (OUTPATIENT)
Facility: OTHER | Age: 65
LOS: 1 days | Discharge: HOME | End: 2025-05-15
Attending: UROLOGY | Admitting: UROLOGY
Payer: MEDICARE

## 2025-05-14 DIAGNOSIS — N28.89 RENAL MASS: Primary | ICD-10-CM

## 2025-05-14 LAB
BILIRUBIN, POC UA: NEGATIVE
BLOOD, POC UA: NEGATIVE
CLARITY, UA: CLEAR
COLOR, UA: YELLOW
GLUCOSE, POC UA: NEGATIVE
KETONES, POC UA: NEGATIVE
LEUKOCYTE EST, POC UA: NEGATIVE
NITRITE, POC UA: NEGATIVE
PH UR STRIP: 6 [PH] (ref 5–8)
PROTEIN, POC UA: NEGATIVE
SPECIFIC GRAVITY, POC UA: <=1.005 (ref 1–1.03)
UROBILINOGEN, POC UA: 0.2 E.U./DL

## 2025-05-14 PROCEDURE — 63600175 PHARM REV CODE 636 W HCPCS: Performed by: ANESTHESIOLOGY

## 2025-05-14 PROCEDURE — 63600175 PHARM REV CODE 636 W HCPCS

## 2025-05-14 PROCEDURE — 63600175 PHARM REV CODE 636 W HCPCS: Performed by: UROLOGY

## 2025-05-14 PROCEDURE — 86920 COMPATIBILITY TEST SPIN: CPT | Performed by: UROLOGY

## 2025-05-14 PROCEDURE — 88307 TISSUE EXAM BY PATHOLOGIST: CPT | Mod: TC,59 | Performed by: UROLOGY

## 2025-05-14 PROCEDURE — 94799 UNLISTED PULMONARY SVC/PX: CPT

## 2025-05-14 PROCEDURE — 94761 N-INVAS EAR/PLS OXIMETRY MLT: CPT

## 2025-05-14 PROCEDURE — 25000003 PHARM REV CODE 250

## 2025-05-14 PROCEDURE — 50543 LAPARO PARTIAL NEPHRECTOMY: CPT | Mod: 22,RT,, | Performed by: UROLOGY

## 2025-05-14 PROCEDURE — 36000712 HC OR TIME LEV V 1ST 15 MIN: Performed by: UROLOGY

## 2025-05-14 PROCEDURE — 27000221 HC OXYGEN, UP TO 24 HOURS

## 2025-05-14 PROCEDURE — 27201423 OPTIME MED/SURG SUP & DEVICES STERILE SUPPLY: Performed by: UROLOGY

## 2025-05-14 PROCEDURE — 25000003 PHARM REV CODE 250: Performed by: HOSPITALIST

## 2025-05-14 PROCEDURE — 52000 CYSTOURETHROSCOPY: CPT | Mod: XU,51,, | Performed by: UROLOGY

## 2025-05-14 PROCEDURE — 36000713 HC OR TIME LEV V EA ADD 15 MIN: Performed by: UROLOGY

## 2025-05-14 PROCEDURE — 37000008 HC ANESTHESIA 1ST 15 MINUTES: Performed by: UROLOGY

## 2025-05-14 PROCEDURE — P9045 ALBUMIN (HUMAN), 5%, 250 ML: HCPCS | Mod: JZ,TB

## 2025-05-14 PROCEDURE — 64486 TAP BLOCK UNIL BY INJECTION: CPT | Performed by: ANESTHESIOLOGY

## 2025-05-14 PROCEDURE — 99900035 HC TECH TIME PER 15 MIN (STAT)

## 2025-05-14 PROCEDURE — 37000009 HC ANESTHESIA EA ADD 15 MINS: Performed by: UROLOGY

## 2025-05-14 PROCEDURE — 36620 INSERTION CATHETER ARTERY: CPT | Performed by: ANESTHESIOLOGY

## 2025-05-14 RX ORDER — MANNITOL 250 MG/ML
INJECTION, SOLUTION INTRAVENOUS
Status: DISCONTINUED | OUTPATIENT
Start: 2025-05-14 | End: 2025-05-14

## 2025-05-14 RX ORDER — HEPARIN SODIUM 5000 [USP'U]/ML
5000 INJECTION, SOLUTION INTRAVENOUS; SUBCUTANEOUS
Status: COMPLETED | OUTPATIENT
Start: 2025-05-14 | End: 2025-05-14

## 2025-05-14 RX ORDER — HYDROMORPHONE HYDROCHLORIDE 2 MG/ML
0.4 INJECTION, SOLUTION INTRAMUSCULAR; INTRAVENOUS; SUBCUTANEOUS EVERY 5 MIN PRN
Status: DISCONTINUED | OUTPATIENT
Start: 2025-05-14 | End: 2025-05-15 | Stop reason: HOSPADM

## 2025-05-14 RX ORDER — HYDRALAZINE HYDROCHLORIDE 20 MG/ML
10 INJECTION INTRAMUSCULAR; INTRAVENOUS EVERY 6 HOURS PRN
Status: DISCONTINUED | OUTPATIENT
Start: 2025-05-14 | End: 2025-05-14

## 2025-05-14 RX ORDER — CARVEDILOL 12.5 MG/1
12.5 TABLET ORAL 2 TIMES DAILY WITH MEALS
Status: DISCONTINUED | OUTPATIENT
Start: 2025-05-14 | End: 2025-05-15 | Stop reason: HOSPADM

## 2025-05-14 RX ORDER — OXYCODONE HYDROCHLORIDE 5 MG/1
5 TABLET ORAL
Status: DISCONTINUED | OUTPATIENT
Start: 2025-05-14 | End: 2025-05-15 | Stop reason: HOSPADM

## 2025-05-14 RX ORDER — ACETAMINOPHEN 10 MG/ML
INJECTION, SOLUTION INTRAVENOUS
Status: DISCONTINUED | OUTPATIENT
Start: 2025-05-14 | End: 2025-05-14

## 2025-05-14 RX ORDER — PROCHLORPERAZINE EDISYLATE 5 MG/ML
5 INJECTION INTRAMUSCULAR; INTRAVENOUS EVERY 30 MIN PRN
Status: DISCONTINUED | OUTPATIENT
Start: 2025-05-14 | End: 2025-05-15 | Stop reason: HOSPADM

## 2025-05-14 RX ORDER — ACETAMINOPHEN 500 MG
1000 TABLET ORAL EVERY 6 HOURS
Status: DISCONTINUED | OUTPATIENT
Start: 2025-05-14 | End: 2025-05-15 | Stop reason: HOSPADM

## 2025-05-14 RX ORDER — FUROSEMIDE 10 MG/ML
INJECTION INTRAMUSCULAR; INTRAVENOUS
Status: DISCONTINUED | OUTPATIENT
Start: 2025-05-14 | End: 2025-05-14

## 2025-05-14 RX ORDER — KETOROLAC TROMETHAMINE 10 MG/1
10 TABLET, FILM COATED ORAL EVERY 8 HOURS
Status: COMPLETED | OUTPATIENT
Start: 2025-05-14 | End: 2025-05-15

## 2025-05-14 RX ORDER — SODIUM CHLORIDE 0.9 % (FLUSH) 0.9 %
3 SYRINGE (ML) INJECTION
Status: DISCONTINUED | OUTPATIENT
Start: 2025-05-14 | End: 2025-05-15 | Stop reason: HOSPADM

## 2025-05-14 RX ORDER — LABETALOL HYDROCHLORIDE 5 MG/ML
10 INJECTION, SOLUTION INTRAVENOUS EVERY 4 HOURS PRN
Status: DISCONTINUED | OUTPATIENT
Start: 2025-05-14 | End: 2025-05-15 | Stop reason: HOSPADM

## 2025-05-14 RX ORDER — TRAMADOL HYDROCHLORIDE 50 MG/1
50 TABLET, FILM COATED ORAL EVERY 4 HOURS PRN
Status: DISCONTINUED | OUTPATIENT
Start: 2025-05-14 | End: 2025-05-15 | Stop reason: HOSPADM

## 2025-05-14 RX ORDER — LIDOCAINE HYDROCHLORIDE 20 MG/ML
INJECTION INTRAVENOUS
Status: DISCONTINUED | OUTPATIENT
Start: 2025-05-14 | End: 2025-05-14

## 2025-05-14 RX ORDER — ALBUMIN HUMAN 50 G/1000ML
SOLUTION INTRAVENOUS
Status: DISCONTINUED | OUTPATIENT
Start: 2025-05-14 | End: 2025-05-14

## 2025-05-14 RX ORDER — HYOSCYAMINE SULFATE 0.12 MG/1
0.12 TABLET SUBLINGUAL EVERY 6 HOURS PRN
Status: DISCONTINUED | OUTPATIENT
Start: 2025-05-14 | End: 2025-05-15 | Stop reason: HOSPADM

## 2025-05-14 RX ORDER — POLYETHYLENE GLYCOL 3350 17 G/17G
17 POWDER, FOR SOLUTION ORAL DAILY
Status: DISCONTINUED | OUTPATIENT
Start: 2025-05-15 | End: 2025-05-15 | Stop reason: HOSPADM

## 2025-05-14 RX ORDER — GLUCAGON 1 MG
1 KIT INJECTION
Status: DISCONTINUED | OUTPATIENT
Start: 2025-05-14 | End: 2025-05-15 | Stop reason: HOSPADM

## 2025-05-14 RX ORDER — LIDOCAINE HYDROCHLORIDE 10 MG/ML
0.5 INJECTION, SOLUTION EPIDURAL; INFILTRATION; INTRACAUDAL; PERINEURAL ONCE
Status: DISCONTINUED | OUTPATIENT
Start: 2025-05-14 | End: 2025-05-14 | Stop reason: HOSPADM

## 2025-05-14 RX ORDER — SODIUM CHLORIDE, SODIUM LACTATE, POTASSIUM CHLORIDE, CALCIUM CHLORIDE 600; 310; 30; 20 MG/100ML; MG/100ML; MG/100ML; MG/100ML
INJECTION, SOLUTION INTRAVENOUS CONTINUOUS
Status: DISCONTINUED | OUTPATIENT
Start: 2025-05-14 | End: 2025-05-15 | Stop reason: HOSPADM

## 2025-05-14 RX ORDER — FENTANYL CITRATE 50 UG/ML
INJECTION, SOLUTION INTRAMUSCULAR; INTRAVENOUS
Status: DISCONTINUED | OUTPATIENT
Start: 2025-05-14 | End: 2025-05-14

## 2025-05-14 RX ORDER — HYDRALAZINE HYDROCHLORIDE 20 MG/ML
10 INJECTION INTRAMUSCULAR; INTRAVENOUS EVERY 6 HOURS PRN
Status: DISCONTINUED | OUTPATIENT
Start: 2025-05-14 | End: 2025-05-15 | Stop reason: HOSPADM

## 2025-05-14 RX ORDER — ONDANSETRON HYDROCHLORIDE 2 MG/ML
INJECTION, SOLUTION INTRAVENOUS
Status: DISCONTINUED | OUTPATIENT
Start: 2025-05-14 | End: 2025-05-14

## 2025-05-14 RX ORDER — CEFAZOLIN 2 G/1
2 INJECTION, POWDER, FOR SOLUTION INTRAMUSCULAR; INTRAVENOUS
Status: COMPLETED | OUTPATIENT
Start: 2025-05-14 | End: 2025-05-14

## 2025-05-14 RX ORDER — KETAMINE HCL IN 0.9 % NACL 50 MG/5 ML
SYRINGE (ML) INTRAVENOUS
Status: DISCONTINUED | OUTPATIENT
Start: 2025-05-14 | End: 2025-05-14

## 2025-05-14 RX ORDER — PROCHLORPERAZINE EDISYLATE 5 MG/ML
5 INJECTION INTRAMUSCULAR; INTRAVENOUS EVERY 6 HOURS PRN
Status: DISCONTINUED | OUTPATIENT
Start: 2025-05-14 | End: 2025-05-15 | Stop reason: HOSPADM

## 2025-05-14 RX ORDER — LATANOPROST 50 UG/ML
1 SOLUTION/ DROPS OPHTHALMIC NIGHTLY
Status: DISCONTINUED | OUTPATIENT
Start: 2025-05-14 | End: 2025-05-15 | Stop reason: HOSPADM

## 2025-05-14 RX ORDER — DEXAMETHASONE SODIUM PHOSPHATE 4 MG/ML
INJECTION, SOLUTION INTRA-ARTICULAR; INTRALESIONAL; INTRAMUSCULAR; INTRAVENOUS; SOFT TISSUE
Status: DISCONTINUED | OUTPATIENT
Start: 2025-05-14 | End: 2025-05-14

## 2025-05-14 RX ORDER — BUPIVACAINE HYDROCHLORIDE 2.5 MG/ML
INJECTION, SOLUTION EPIDURAL; INFILTRATION; INTRACAUDAL; PERINEURAL
Status: COMPLETED | OUTPATIENT
Start: 2025-05-14 | End: 2025-05-14

## 2025-05-14 RX ORDER — ROCURONIUM BROMIDE 10 MG/ML
INJECTION, SOLUTION INTRAVENOUS
Status: DISCONTINUED | OUTPATIENT
Start: 2025-05-14 | End: 2025-05-14

## 2025-05-14 RX ORDER — MIDAZOLAM HYDROCHLORIDE 1 MG/ML
INJECTION INTRAMUSCULAR; INTRAVENOUS
Status: DISCONTINUED | OUTPATIENT
Start: 2025-05-14 | End: 2025-05-14

## 2025-05-14 RX ORDER — ONDANSETRON HYDROCHLORIDE 2 MG/ML
4 INJECTION, SOLUTION INTRAVENOUS EVERY 6 HOURS PRN
Status: DISCONTINUED | OUTPATIENT
Start: 2025-05-14 | End: 2025-05-15 | Stop reason: HOSPADM

## 2025-05-14 RX ORDER — AMLODIPINE BESYLATE 5 MG/1
10 TABLET ORAL NIGHTLY
Status: DISCONTINUED | OUTPATIENT
Start: 2025-05-14 | End: 2025-05-15 | Stop reason: HOSPADM

## 2025-05-14 RX ORDER — PROPOFOL 10 MG/ML
VIAL (ML) INTRAVENOUS
Status: DISCONTINUED | OUTPATIENT
Start: 2025-05-14 | End: 2025-05-14

## 2025-05-14 RX ORDER — OXYCODONE HYDROCHLORIDE 5 MG/1
5 TABLET ORAL EVERY 4 HOURS PRN
Status: DISCONTINUED | OUTPATIENT
Start: 2025-05-14 | End: 2025-05-15 | Stop reason: HOSPADM

## 2025-05-14 RX ADMIN — TRAMADOL HYDROCHLORIDE 50 MG: 50 TABLET, COATED ORAL at 01:05

## 2025-05-14 RX ADMIN — ROCURONIUM BROMIDE 15 MG: 10 SOLUTION INTRAVENOUS at 09:05

## 2025-05-14 RX ADMIN — CEFAZOLIN 2 G: 2 INJECTION, POWDER, FOR SOLUTION INTRAMUSCULAR; INTRAVENOUS at 12:05

## 2025-05-14 RX ADMIN — CARVEDILOL 12.5 MG: 12.5 TABLET, FILM COATED ORAL at 05:05

## 2025-05-14 RX ADMIN — ROCURONIUM BROMIDE 20 MG: 10 SOLUTION INTRAVENOUS at 09:05

## 2025-05-14 RX ADMIN — ONDANSETRON HYDROCHLORIDE 4 MG: 2 INJECTION INTRAMUSCULAR; INTRAVENOUS at 11:05

## 2025-05-14 RX ADMIN — MANNITOL 12.5 G: 12.5 INJECTION, SOLUTION INTRAVENOUS at 10:05

## 2025-05-14 RX ADMIN — LATANOPROST 1 DROP: 50 SOLUTION OPHTHALMIC at 08:05

## 2025-05-14 RX ADMIN — AMLODIPINE BESYLATE 10 MG: 5 TABLET ORAL at 08:05

## 2025-05-14 RX ADMIN — BUPIVACAINE HYDROCHLORIDE 40 ML: 2.5 INJECTION, SOLUTION EPIDURAL; INFILTRATION; INTRACAUDAL; PERINEURAL at 07:05

## 2025-05-14 RX ADMIN — DEXAMETHASONE SODIUM PHOSPHATE 4 MG: 4 INJECTION, SOLUTION INTRAMUSCULAR; INTRAVENOUS at 08:05

## 2025-05-14 RX ADMIN — PHENYLEPHRINE HYDROCHLORIDE 0.1 MCG/KG/MIN: 10 INJECTION INTRAVENOUS at 08:05

## 2025-05-14 RX ADMIN — Medication 25 MG: at 08:05

## 2025-05-14 RX ADMIN — LIDOCAINE HYDROCHLORIDE 100 MG: 20 INJECTION, SOLUTION INTRAVENOUS at 08:05

## 2025-05-14 RX ADMIN — ROCURONIUM BROMIDE 50 MG: 10 SOLUTION INTRAVENOUS at 08:05

## 2025-05-14 RX ADMIN — ALBUMIN (HUMAN) 100 ML: 12.5 SOLUTION INTRAVENOUS at 09:05

## 2025-05-14 RX ADMIN — ROCURONIUM BROMIDE 15 MG: 10 SOLUTION INTRAVENOUS at 11:05

## 2025-05-14 RX ADMIN — HYOSCYAMINE SULFATE 0.12 MG: 0.12 TABLET SUBLINGUAL at 05:05

## 2025-05-14 RX ADMIN — CEFAZOLIN 2 G: 2 INJECTION, POWDER, FOR SOLUTION INTRAMUSCULAR; INTRAVENOUS at 08:05

## 2025-05-14 RX ADMIN — SODIUM CHLORIDE, SODIUM LACTATE, POTASSIUM CHLORIDE, AND CALCIUM CHLORIDE: .6; .31; .03; .02 INJECTION, SOLUTION INTRAVENOUS at 07:05

## 2025-05-14 RX ADMIN — SODIUM CHLORIDE, POTASSIUM CHLORIDE, SODIUM LACTATE AND CALCIUM CHLORIDE: 600; 310; 30; 20 INJECTION, SOLUTION INTRAVENOUS at 09:05

## 2025-05-14 RX ADMIN — TRAMADOL HYDROCHLORIDE 50 MG: 50 TABLET, COATED ORAL at 08:05

## 2025-05-14 RX ADMIN — ACETAMINOPHEN 1000 MG: 500 TABLET, FILM COATED ORAL at 05:05

## 2025-05-14 RX ADMIN — HEPARIN SODIUM 5000 UNITS: 5000 INJECTION INTRAVENOUS; SUBCUTANEOUS at 07:05

## 2025-05-14 RX ADMIN — SODIUM CHLORIDE, POTASSIUM CHLORIDE, SODIUM LACTATE AND CALCIUM CHLORIDE: 600; 310; 30; 20 INJECTION, SOLUTION INTRAVENOUS at 01:05

## 2025-05-14 RX ADMIN — HYDROMORPHONE HYDROCHLORIDE 0.4 MG: 2 INJECTION, SOLUTION INTRAMUSCULAR; INTRAVENOUS; SUBCUTANEOUS at 01:05

## 2025-05-14 RX ADMIN — FUROSEMIDE 5 MG: 10 INJECTION, SOLUTION INTRAMUSCULAR; INTRAVENOUS at 09:05

## 2025-05-14 RX ADMIN — ROCURONIUM BROMIDE 15 MG: 10 SOLUTION INTRAVENOUS at 10:05

## 2025-05-14 RX ADMIN — ALBUMIN (HUMAN) 100 ML: 12.5 SOLUTION INTRAVENOUS at 10:05

## 2025-05-14 RX ADMIN — PROPOFOL 200 MG: 10 INJECTION, EMULSION INTRAVENOUS at 08:05

## 2025-05-14 RX ADMIN — MIDAZOLAM HYDROCHLORIDE 2 MG: 1 INJECTION INTRAMUSCULAR; INTRAVENOUS at 07:05

## 2025-05-14 RX ADMIN — KETOROLAC TROMETHAMINE 10 MG: 10 TABLET, FILM COATED ORAL at 10:05

## 2025-05-14 RX ADMIN — SODIUM CHLORIDE, SODIUM LACTATE, POTASSIUM CHLORIDE, AND CALCIUM CHLORIDE: .6; .31; .03; .02 INJECTION, SOLUTION INTRAVENOUS at 11:05

## 2025-05-14 RX ADMIN — SUGAMMADEX 400 MG: 100 INJECTION, SOLUTION INTRAVENOUS at 12:05

## 2025-05-14 RX ADMIN — GLYCOPYRROLATE 0.2 MG: 0.2 INJECTION, SOLUTION INTRAMUSCULAR; INTRAVITREAL at 08:05

## 2025-05-14 RX ADMIN — SODIUM CHLORIDE, SODIUM LACTATE, POTASSIUM CHLORIDE, AND CALCIUM CHLORIDE: .6; .31; .03; .02 INJECTION, SOLUTION INTRAVENOUS at 08:05

## 2025-05-14 RX ADMIN — DEXMEDETOMIDINE HYDROCHLORIDE 0.5 MCG/KG/HR: 100 INJECTION, SOLUTION, CONCENTRATE INTRAVENOUS at 08:05

## 2025-05-14 RX ADMIN — Medication 25 MG: at 09:05

## 2025-05-14 RX ADMIN — ACETAMINOPHEN 1000 MG: 10 INJECTION INTRAVENOUS at 11:05

## 2025-05-14 RX ADMIN — FENTANYL CITRATE 100 MCG: 50 INJECTION, SOLUTION INTRAMUSCULAR; INTRAVENOUS at 07:05

## 2025-05-14 NOTE — PLAN OF CARE
Case Management Assessment     PCP: Elliot Ybarra   Pharmacy: [facility] ([facility] [place])    Patient Arrived From: home  Existing Help at Home: wife    Barriers to Discharge: none    Discharge Plan:    A. Home with Family    B. Home with Family     Assessment completed with wife - lives with wife - independent in ADLs - wife will provide transportation home     [facility] (Jennifer)  Initial Discharge Assessment       Primary Care Provider: Elliot Ybarra DO    Admission Diagnosis: Renal mass [N28.89]    Admission Date: 5/14/2025  Expected Discharge Date:     Transition of Care Barriers: None    Payor: BCBS [place] MEDICARE / Plan: BCBS [employer] [place] / Product Type: Medicare Advantage /     Extended Emergency Contact Information  Primary Emergency Contact: Yasmeen Mays  Address: [address] [place] [address] [place] [address] [place]  Mobile Phone: [phone]  Relation: Spouse    Discharge Plan A: Home with family  Discharge Plan B: Home with family      [id number] - [place] - [address] AT [facility] [address]  [address] [facility] [address]  Phone: [phone] Fax: [phone]      Initial Assessment (most recent)       Adult Discharge Assessment - 05/14/25 1048          Discharge Assessment    Assessment Type Discharge Planning Assessment     Confirmed/corrected address, phone number and insurance Yes     Confirmed Demographics Correct on Facesheet     Source of Information family     Communicated BERTHA with patient/caregiver Yes     People in Home spouse     Do you expect to return to your current living situation? Yes     Do you have help at home or someone to help you manage your care at home? Yes     Prior to hospitilization cognitive status: Alert/Oriented     Current cognitive status: Alert/Oriented     Walking or Climbing Stairs Difficulty no     Dressing/Bathing Difficulty no     Equipment Currently Used at Home none      Readmission within 30 days? No     Patient currently being followed by outpatient case management? No     Do you currently have service(s) that help you manage your care at home? No     Do you take prescription medications? Yes     Do you have prescription coverage? Yes     Do you have any problems affording any of your prescribed medications? No     Is the patient taking medications as prescribed? yes     Who is going to help you get home at discharge? wife     How do you get to doctors appointments? car, drives self     Are you on dialysis? No     Do you take coumadin? No     Discharge Plan A Home with family     Discharge Plan B Home with family     DME Needed Upon Discharge  none     Discharge Plan discussed with: Spouse/sig other     Transition of Care Barriers None        Physical Activity    On average, how many days per week do you engage in moderate to strenuous exercise (like a brisk walk)? 0 days     On average, how many minutes do you engage in exercise at this level? 0 min        Financial Resource Strain    How hard is it for you to pay for the very basics like food, housing, medical care, and heating? Not hard at all        Housing Stability    In the last 12 months, was there a time when you were not able to pay the mortgage or rent on time? No     At any time in the past 12 months, were you homeless or living in a shelter (including now)? No        Transportation Needs    In the past 12 months, has lack of transportation kept you from medical appointments or from getting medications? No     In the past 12 months, has lack of transportation kept you from meetings, work, or from getting things needed for daily living? No        Food Insecurity    Within the past 12 months, you worried that your food would run out before you got the money to buy more. Never true     Within the past 12 months, the food you bought just didn't last and you didn't have money to get more. Never true        Stress    Do  you feel stress - tense, restless, nervous, or anxious, or unable to sleep at night because your mind is troubled all the time - these days? Only a little        Social Isolation    How often do you feel lonely or isolated from those around you?  Never        Alcohol Use    Q1: How often do you have a drink containing alcohol? Never     Q2: How many drinks containing alcohol do you have on a typical day when you are drinking? Patient does not drink     Q3: How often do you have six or more drinks on one occasion? Never        Utilities    In the past 12 months has the electric, gas, oil, or water company threatened to shut off services in your home? No        Health Literacy    How often do you need to have someone help you when you read instructions, pamphlets, or other written material from your doctor or pharmacy? Rarely

## 2025-05-14 NOTE — INTERVAL H&P NOTE
The patient has been examined and the H&P has been reviewed:    I concur with the findings and no changes have occurred since H&P was written.    Surgery risks, benefits and alternative options discussed and understood by patient/family.    Right side verified and marked  Answered all question      There are no hospital problems to display for this patient.

## 2025-05-14 NOTE — DISCHARGE INSTRUCTIONS
What to expect with your Nephrectomy.  [facility]    After surgery  You may or may not have a drain that is shaped like a grenade and put to suction  This drain usually may or may not come out on Post op day 1. If you go home with a drain, the nurses will teach you how to record the output and you will come back 3-5 days after you leave to have the drain removed in clinic.  You will have a catheter after your surgery. It should come out the next day and you should be able to void on your own before you leave. If you are a male and on a BPH medicine, we will need to make sure you restart that the night of your surgery.  The night of surgery we expect and hope that you will:  Walk - walking helps get the bowels moving. Also after your surgery, you are at a risk for a deep venous thrombosis (which is a clot in the legs that can form by remaining inactive or still for extended periods of time) and this can travel to your lungs and make you feel short of breath. This is a very serious condition. Walking helps prevent a DVT from occurring.  Eat - you do not have to eat a whole meal, but we want to make sure you can tolerate liquid and/or solid food without nausea and vomiting  Use your incentive spirometer - this is the breathing apparatus that helps you expand your lungs. If and when you have pain you will not want to take deep breaths. But if you dont take deep breaths, you are at risk for pneumonia. The incentive spirometer will help prevent this from occurring by expanding your lungs.  Symptoms you may experience immediately post-op:  Bloating and/or shoulder pain if you had a laparoscopic procedure - when we do this operation, we fill up your abdominal cavity with gas to better help us visualize the organs and allow our instruments to fit. After the surgery, not all the air can be removed and your body will eventually absorb this small amount of air. However this can make you feel bloated. In addition, when you  sit up, the air can sit right under a muscle (the diaphragm) which has connecting nerves to the shoulders, which could explain why you have shoulder pain.  Do not expect necessarily to have gas or to have a bowel movement - this goes along with the bloating, you may feel like you want to pass gas or have a bowel movement but you cant. This is normal and you will feel like this for a couple days. There are no pills to help with this. Small walks throughout the day should help with this.  Pain - your pain should be able to be controlled with medicines by mouth that we prescribe. It is important for you to tell us if you are on any pain medications at home before the surgery as you may need stronger pain meds while in the hospital.  You can go home when:  Pain is controlled with medicines by mouth  You are able to walk without difficulty or pain  You are tolerating a regulating diet  Your are voiding. If you cannot void we may have to replace a catheter and you will follow up 3-5 days after you leave to have a voiding trial. The nurses will teach you how to care for your catheter.  When you go home:  Activity  Continue to walk - small walks throughout the day are better than one long walk.   Do not lift anything greater than 8 pounds for 6 weeks - we want your abdominal wall muscles to heal.  Bowel Movements - Do not strain to have a bowel movement - the pain medicines will make you constipated. That is why we also ask you to take colace 2-3 x per day to help keep your bowels regular. If you are still having trouble, then you can also add Miralax once a day. Do not take any stool softeners if you are having diarrhea.  Drain - If you have a drain (not your catheter, but a separate drain) then record the output and bring it with you to your next appointment  Smoking - If you smoke, we encourage you STOP. Smoking interferes with the healing process and your prolong your healing with continued smoking.  Driving - Do not  drive while you are on pain meds or with your catheter in place.  Bathing - If you do not have a drain, you can shower 48 hours after your surgery. If you do have a drain, sponge bathe only until the drain is out.  Dressing - you can remove the dressings if there is no drainage or change them as needed if there is. The little sterile band-aid strips will fall off on their own in 10-14 days. If they have not fallen off then you can remove them yourself.  Restarting medicines -especially blood thinners (Aspirin, Plavix, Coumadin), Fish Oil. Discuss this with your physician prior to discharge.  When to return to the ER  Fever - If you have a fever >101.5, this could be due to a number of reasons such as infection of the urine or incision. If your catheter has been removed, you could possibly have a leak. It would be best to come to the ER so they can better evaluate you.  Severe pain - pain is expected, but severe or new onset of pain is not normal.   Inability to tolerate food or liquid with nausea and vomiting - it would be best to go to the ER for them to better evaluate you.

## 2025-05-14 NOTE — HPI
Mr. Mays a 64-year-old man who presented today for robotic partial right nephrectomy with Dr. Jansen.  Patient is in ICU after the procedure with a warming blanket in place given his low temp postop.  Hospital medicine is consulted for assistance with medical management.    Patient's medical history includes hypertension for which he is on amlodipine, carvedilol and valsartan.  He takes the amlodipine at night and he took his carvedilol this morning, having been told to hold the valsartan.  Blood pressure has been normal perioperatively.  Patient also has glaucoma and is on latanoprost eye drops at night.  He is followed by urology for prostate cancer.  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  He has some venous insufficiency and chronic lymphedema of his lower extremities and wear support stockings as needed.  He does not smoke.  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

## 2025-05-14 NOTE — CONSULTS
[facility] Intensive [facility] Medicine  Consult Note    Patient Name: Ihsan Mays Sr.  MRN: [id number]  Admission Date: 5/14/2025  Hospital Length of Stay: 1 days  Attending Physician: Hitesh Jansen MD   Primary Care Provider: Elliot Ybarra DO           Patient information was obtained from patient, spouse/SO, and past medical records.     Consults  Subjective:     Principal Problem: <principal problem not specified>    Chief Complaint: No chief complaint on file.       HPI: Mr. Mays a 64-year-old man who presented today for robotic partial right nephrectomy with Dr. Jansen.  Patient is in ICU after the procedure with a warming blanket in place given his low temp postop.  Hospital medicine is consulted for assistance with medical management.    Patient's medical history includes hypertension for which he is on amlodipine, carvedilol and valsartan.  He takes the amlodipine at night and he took his carvedilol this morning, having been told to hold the valsartan.  Blood pressure has been normal perioperatively.  Patient also has glaucoma and is on latanoprost eye drops at night.  He is followed by urology for prostate cancer.  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  He has some venous insufficiency and chronic lymphedema of his lower extremities and wear support stockings as needed.  He does not smoke.  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    Past Medical History:   Diagnosis Date    Anxiety 12/18/2024    Glaucoma     History of stomach ulcers     Hypertension     Prostate cancer     Right kidney mass     Unspecified glaucoma        Past Surgical History:   Procedure Laterality Date    COLONOSCOPY N/A 08/21/2020    Procedure: COLONOSCOPYPrepopik;  Surgeon: Danie Conway MD;  Location: [facility];  Service: Endoscopy;   Laterality: N/A;    COLONOSCOPY N/A 09/29/2023    Procedure: COLONOSCOPY;  Surgeon: Edel Langley MD;  Location: [facility] ENDO;  Service: Endoscopy;  Laterality: N/A;    ESOPHAGOGASTRODUODENOSCOPY N/A 08/21/2020    Procedure: EGD (ESOPHAGOGASTRODUODENOSCOPY);  Surgeon: Danie Conway MD;  Location: [facility] ENDO;  Service: Endoscopy;  Laterality: N/A;    ESOPHAGOGASTRODUODENOSCOPY N/A 09/29/2023    Procedure: EGD (ESOPHAGOGASTRODUODENOSCOPY);  Surgeon: Edel Langley MD;  Location: [facility] ENDO;  Service: Endoscopy;  Laterality: N/A;    EYE SURGERY  8/2017 & 10/2017    FINGER SURGERY      right hand pinkie finger     GLAUCOMA SURGERY Bilateral     STOMACH SURGERY      ulcers        Review of patient's allergies indicates:  No Known Allergies    No current facility-administered medications on file prior to encounter.     Current Outpatient Medications on File Prior to Encounter   Medication Sig    alfuzosin (UROXATRAL) 10 mg Tb24 Take 1 tablet (10 mg total) by mouth once daily.    amLODIPine (NORVASC) 10 MG tablet TAKE 1 TABLET(10 MG) BY MOUTH EVERY DAY (Patient taking differently: Take 10 mg by mouth every evening.)    apixaban (ELIQUIS) 5 mg Tab 1/2 tab PO BID (Patient taking differently: Take 2.5 mg by mouth 2 (two) times daily. 1/2 tab PO BID)    atorvastatin (LIPITOR) 40 MG tablet Take 1 tablet (40 mg total) by mouth once daily.    carvediloL (COREG) 12.5 MG tablet Take 1 tablet (12.5 mg total) by mouth 2 (two) times daily with meals.    dextromethorphan HBr (THERAFLU COUGH ORAL) Take by mouth.    dextromethorphan-guaiFENesin (CORICIDIN HBP CHEST DELIA-COUGH)  mg Cap Take by mouth every 4 (four) hours as needed.    DM/p-ephed/acetaminoph/doxylam (NYQUIL ORAL) Take by mouth.    latanoprost 0.005 % ophthalmic solution INSTILL 1 DROP INTO BOTH EYES AT BEDTIME    magnesium citrate solution Take 74 mLs by mouth once. Drink 1/4 bottle magnesium citrate the afternoon prior to your procedure.    pantoprazole  (PROTONIX) 20 MG tablet Take 1 tablet (20 mg total) by mouth once daily.    busPIRone (BUSPAR) 5 MG Tab Take 1 tablet (5 mg total) by mouth 2 (two) times daily.    cyclobenzaprine (FLEXERIL) 10 MG tablet Take 1 tablet (10 mg total) by mouth every 8 (eight) hours as needed (pain/spasms, can be taken at night before bed).     Family History       Problem Relation (Age of Onset)    Anemia Daughter    Arthritis Mother, Father, Maternal Aunt, Maternal Grandmother    Asthma Daughter    Diabetes Mother, Maternal Uncle    Glaucoma Father    Heart attack Sister    Heart disease Sister, Maternal Grandmother, Maternal Grandfather, Sister    Hypertension Mother, Father, Sister, Brother, Maternal Aunt, Sister, Brother    No Known Problems Son    Stroke Maternal Aunt, Maternal Aunt    Vision loss Father, Paternal Grandmother          Tobacco Use    Smoking status: Never    Smokeless tobacco: Never   Substance and Sexual Activity    Alcohol use: Yes     Comment: occasionally; none 24 hr prior to surg    Drug use: Not Currently     Types: Marijuana     Comment: Quit 35 years ago    Sexual activity: Yes     Partners: Female     Review of Systems   Unable to perform ROS: Acuity of condition   Patient still fairly sedated, has some chills.  Warming blanket on.  Wiklerson in place.  Objective:     Vital Signs (Most Recent):  Temp: 97.1 °F (36.2 °C) (05/14/25 1500)  Pulse: 71 (05/14/25 1500)  Resp: 20 (05/14/25 1500)  BP: 137/81 (05/14/25 1701)  SpO2: (!) 91 % (05/14/25 1500) Vital Signs (24h Range):  Temp:  [93.3 °F (34.1 °C)-98.1 °F (36.7 °C)] 97.1 °F (36.2 °C)  Pulse:  [58-71] 71  Resp:  [16-23] 20  SpO2:  [91 %-97 %] 91 %  BP: (115-138)/(77-89) 137/81  Arterial Line BP: ()/(67-85) 127/73        There is no height or weight on file to calculate BMI.     Physical Exam  Constitutional:       Comments: Somnolent   HENT:      Head: Normocephalic.      Nose: No congestion.      Mouth/Throat:      Mouth: Mucous membranes are moist.    Eyes:      Extraocular Movements: Extraocular movements intact.      Conjunctiva/sclera: Conjunctivae normal.      Pupils: Pupils are equal, round, and reactive to light.   Cardiovascular:      Rate and Rhythm: Normal rate and regular rhythm.      Pulses: Normal pulses.      Heart sounds: Normal heart sounds. No murmur heard.     No gallop.   Pulmonary:      Effort: Pulmonary effort is normal.      Breath sounds: Normal breath sounds.   Musculoskeletal:      Cervical back: Normal range of motion and neck supple.   Skin:     General: Skin is warm and dry.          Significant Labs: All pertinent labs within the past 24 hours have been reviewed.    Significant Imaging: I have reviewed all pertinent imaging results/findings within the past 24 hours.  Assessment/Plan:     Lymphedema of both lower extremities  No edema noted today (has TEDs/SCDs on)  Resume compression stockings as needed when outpatient.    Saphenous vein clot, right  - Patient on anticoagulation with apixaban 2.5 mg bid, recommended to be lifelong  - Held for surgery - resume when OK with Urology      Cancer of prostate  Per primary team      Primary hypertension  Patient is status post partial nephrectomy putting him at risk for bleeding if he is hypertensive.  His blood pressure range in the last 24 hours was: BP  Min: 115/86  Max: 138/87.The patient's inpatient anti-hypertensive regimen is listed below:    Current Antihypertensives  labetaloL injection 10 mg, Every 4 hours PRN, Intravenous  amLODIPine tablet 10 mg, Nightly, Oral  carvediloL tablet 12.5 mg, 2 times daily with meals, Oral  hydrALAZINE injection 10 mg, Every 6 hours PRN, Intravenous (secondary choice if first ineffective)    Plan  - Continue above medications.  May resume valsartan tomorrow if creatinine remains stable  - SBP needs to stay below 170, which should not be an issue      VTE Risk Mitigation (From admission, onward)           Ordered     IP VTE HIGH RISK PATIENT  Once          05/14/25 1259     Place SHAE hose  Until discontinued         05/14/25 1259     Place sequential compression device  Until discontinued         05/14/25 1259     Place sequential compression device  Until discontinued         05/14/25 0611                        Thank you for your consult. I will follow-up with patient. Please contact us if you have any additional questions.    Makeda Philip MD  Department of Hospital Medicine   [facility] - Intensive Care ([place])

## 2025-05-14 NOTE — ASSESSMENT & PLAN NOTE
Patient is status post partial nephrectomy putting him at risk for bleeding if he is hypertensive.  His blood pressure range in the last 24 hours was: BP  Min: 115/86  Max: 138/87.The patient's inpatient anti-hypertensive regimen is listed below:    Current Antihypertensives  labetaloL injection 10 mg, Every 4 hours PRN, Intravenous  amLODIPine tablet 10 mg, Nightly, Oral  carvediloL tablet 12.5 mg, 2 times daily with meals, Oral  hydrALAZINE injection 10 mg, Every 6 hours PRN, Intravenous (secondary choice if first ineffective)    Plan  - Continue above medications.  May resume valsartan tomorrow if creatinine remains stable  - SBP needs to stay below 170, which should not be an issue

## 2025-05-14 NOTE — ANESTHESIA PROCEDURE NOTES
Peripheral Block    Patient location during procedure: pre-op   Block not for primary anesthetic.  Reason for block: at surgeon's request and post-op pain management   Post-op Pain Location: abdominal wall pain    End time: 5/14/2025 7:45 AM    Staffing  Authorizing Provider: Mau Read MD  Performing Provider: Mau Read MD    Staffing  Performed by: Mau Read MD  Authorized by: Mau Read MD    Preanesthetic Checklist  Completed: patient identified, IV checked, site marked, risks and benefits discussed, surgical consent, monitors and equipment checked, pre-op evaluation and timeout performed  Peripheral Block  Patient position: supine  Prep: ChloraPrep  Patient monitoring: cardiac monitor, heart rate, continuous pulse ox, continuous capnometry and frequent blood pressure checks  Block type: TAP  Laterality: right  Injection technique: single shot  Needle  Needle type: Stimuplex   Needle gauge: 21 G  Needle length: 4 in  Needle localization: ultrasound guidance   -ultrasound image captured on disc.  Assessment  Injection assessment: negative aspiration, negative parasthesia and local visualized surrounding nerve  Paresthesia pain: none  Heart rate change: no  Slow fractionated injection: yes  Pain Tolerance: comfortable throughout block and no complaints  Medications:    Medications: bupivacaine (pf) (MARCAINE) injection 0.25% - Perineural   40 mL - 5/14/2025 7:45:00 AM    Additional Notes  VSS.  [facility] RN monitoring vitals throughout procedure.  Patient tolerated procedure well.  Right subcostal done also

## 2025-05-14 NOTE — TRANSFER OF CARE
Anesthesia Transfer of Care Note    Patient: Ihsan Mays SrLuis Enrique    Procedure(s) Performed: Procedure(s) (LRB):  XI ROBOTIC NEPHRECTOMY, PARTIAL (Right)  CYSTOSCOPY, FLEXIBLE (N/A)    Patient location: ICU    Anesthesia Type: general    Transport from OR: Transported from OR on 6-10 L/min O2 by face mask with adequate spontaneous ventilation    Post pain: adequate analgesia    Post assessment: no apparent anesthetic complications and tolerated procedure well    Post vital signs: stable    Level of consciousness: awake and alert    Nausea/Vomiting: no nausea/vomiting    Complications: none    Transfer of care protocol was followed      Last vitals: Visit Vitals  /86   Pulse 70   Temp 36.7 °C (98.1 °F) (Oral)   Resp 16   SpO2 97%

## 2025-05-14 NOTE — OP NOTE
[facility] Surgery ([facility]  Surgery Department  Operative Note    SUMMARY     Date of Procedure: 5/14/2025     Procedure: Procedure(s) (LRB):  XI ROBOTIC NEPHRECTOMY, PARTIAL (Right)  CYSTOSCOPY, FLEXIBLE (N/A)   Intraoperative Doppler ultrasound of the right kidney    Surgeons and Role:     * Hitesh Jansen MD - Primary        Assistant: Judy Wynn RN no qualified resident available, assisted with room setup, patient positioning, sterile prep and drape, trocar placement, docking of the robot, suction retraction and exposure, and closure    Edgar Dorman MD PGY2      Pre-Operative Diagnosis: Renal mass [N28.89]  Hematuria      Post-Operative Diagnosis: Post-Op Diagnosis Codes:     * Renal mass [N28.89]  Same    Anesthesia: General    Complications: No    Estimated Blood Loss (EBL): 150 mL           Specimens:   Specimen (24h ago, onward)       Start     Ordered    05/14/25 1152  Specimen to Pathology Urology  RELEASE UPON ORDERING        References:    Click here for ordering Quick Tip   Question:  Release to patient  Answer:  Immediate    05/14/25 1152                            Condition: Good    Disposition: ICU - extubated and stable.    Attestation: I was present and scrubbed for the key portions of the procedure.    History:  64-year-old male with recent incidentally diagnosed 3 cm endophytic enhancing right renal mass.  This was discovered on a workup for UTI and epididymitis.  Also had hematuria therefore he is having cystoscopy performed today.  After discussing all the options detail he presents for robotic partial nephrectomy and flexible cystoscopy.  Informed consent was obtained.  The mass was completely endophytic it is difficult to identify with the ultrasound which increases the complexity of the surgery and addition there was extensive stranding in the perinephric fat suggestive of adhesive perinephric adipose tissue.  There was no evidence of metastatic disease.  We discussed all the  alternatives and answered all his questions.  Informed consent was obtained.  The patient was on preoperative Eliquis for atrial fibrillation.  He stopped this preoperatively with the permission of his cardiologist    Procedure In Detail:  The patient is brought to the operating room and underwent general endotracheal anesthesia.  Sterile prep and drape was performed.  Time-out was performed.  Flexible cystoscopy was performed with the 14 [place] flexible scope.  The bladder was unremarkable.  There were no tumors and no stones in the bladder.  There was moderate enlargement of the prostate and the urethra was unremarkable.  The scope was removed and a Wilkerson catheter was placed.  The patient was placed in the left lateral decubitus position with the right side up.  An axillary roll was placed and all pressure points were padded.  An orogastric tube was placed by anesthesia.  The patient was secured to the surgical table and sterile prep and drape was performed.  Time-out was performed.  Primary access was obtained with a Veress needle technique.  The needle irrigated and aspirated without abnormality and a drop test was negative.  The abdomen was insufflated with low opening pressures.  A dilating 8 mm metallic robotic trocar was placed with the visual obturator.  The abdomen was inspected and no injuries occurred with the primary access.  The remaining trocars were placed under direct vision.  Three additional 8 mm metallic robotic trocars were placed laterally.  In the upper midline we placed a 5 mm dilating disposable trocar for the liver retractor.  Per rectus we placed a 5 mm and 12 mm dilating disposable trocar.  The robot was docked.  The lateral margin of the liver was mobilized from the diaphragm and the liver was retracted.  The ascending colon was mobilized.  A Kocher maneuver was performed.  There was an extremely large amount of redundant perinephric adipose tissue which added 90 minutes of dissection to  the case.  The gonadal vein was identified preserved and dissected.  The ureter was identified and preserved and retracted laterally.  The renal artery and renal vein were dissected.  Gerota's fascia was entered and the kidney was dissected.  Again there was dense adhesions throughout the perinephric adipose tissue which considerably increased the complexity and length of the procedure.  This required 90 minutes of additional dissection.  The mass was not visible.  Fortunately the mass was visible ultrasonography was somewhat hyperechoic.  The margins of the mass were demarcated utilizing the ultrasound.  Doppler was performed and there was good arterial and venous flow.  The mass had a well-defined pseudo capsule on ultrasound.  Mannitol was administered.  Two clamps were placed on the renal artery.  Doppler ultrasound was again performed and there was good arterial control.  The mass was resected with a margin of normal parenchyma.  There was a small entry into the pseudo capsule at the cephalad margin of the mass.  This was recognized and dissection was carried wider in this area.  All of the tumor was completely excised and the margins were grossly negative.  The specimen was placed in Endo-Catch sac and there was no gross spillage of tumor.  The base of the defect was closed with 2 running Stratafix sutures passing the ends of the sutures outside of the cortex and using  ties outside the cortex.  Surgiflo was applied.  There was no entry into the collecting system.  The cortex was closed with a running horizontal mattress sutures of Stratafix.  Clamp time was 21 minutes.  The clamps were removed and the kidney reperfused well.  There was good hemostasis.  Additional Surgiflo was applied.  Pressure was lowered and hemostasis remained good Gerota's fascia was closed with a running Stratafix.  The hilum was inspected in Surgiflo was applied.  There was good hemostasis at the end of the procedure with the  pressure lowered.  The robot was undocked.  The specimen was extracted through 1 of the lateral 8 mm trocar sites.  Fascia was closed with interrupted Ethibond sutures at this location.  The abdomen is again inspected.  The extraction site was intact with no unwanted structures within the closure.  The 12 mm site was closed with a Vicryl and the Weck closure device.  The remaining trocars removed at low pressure.  There was good hemostasis.  All counts were correct.  The skin incisions were closed with absorbable suture and Dermabond.  The patient tolerated the procedure well

## 2025-05-14 NOTE — ANESTHESIA POSTPROCEDURE EVALUATION
Anesthesia Post Evaluation    Patient: Ihsan Mays     Procedure(s) Performed: Procedure(s) (LRB):  XI ROBOTIC NEPHRECTOMY, PARTIAL (Right)  CYSTOSCOPY, FLEXIBLE (N/A)    Final Anesthesia Type: general      Patient location during evaluation: ICU  Patient participation: Yes- Able to Participate  Level of consciousness: awake and alert  Post-procedure vital signs: reviewed and stable  Pain management: adequate  Airway patency: patent  NAILA mitigation strategies: Extubation while patient is awake  PONV status at discharge: No PONV  Anesthetic complications: no      Cardiovascular status: hemodynamically stable  Respiratory status: unassisted  Hydration status: euvolemic  Follow-up not needed.              Vitals Value Taken Time   /89 05/14/25 13:16   Temp 98 05/14/25 13:26   Pulse 61 05/14/25 13:26   Resp 19 05/14/25 13:26   SpO2 96 % 05/14/25 13:26   Vitals shown include unfiled device data.      No case tracking events are documented in the log.      Pain/Daniel Score: Pain Rating Prior to Med Admin: 7 (5/14/2025  1:21 PM)

## 2025-05-14 NOTE — ANESTHESIA PROCEDURE NOTES
Arterial    Diagnosis: kidney dz    Patient location during procedure: holding area  Procedure end time: 5/14/2025 7:50 AM    Staffing  Authorizing Provider: Mau Read MD  Performing Provider: Mau Read MD    Staffing  Performed by: Mau Read MD  Authorized by: Mau Read MD    Anesthesiologist was present at the time of the procedure.  Arterial  Skin Prep: chlorhexidine gluconate  Local Infiltration: lidocaine  Orientation: left  Location: radial    Catheter Size: 20 G  Catheter placement by Ultrasound guidance. Heme positive aspiration all ports.   Vessel Caliber: medium, patent, compressibility normal  Vascular Doppler:  not done  Needle advanced into vessel with real time Ultrasound guidance.  Guidewire confirmed in vessel.  Sterile sheath used.  Image recorded and saved.Insertion Attempts: 1  Assessment  Dressing: secured with tape and tegaderm  Patient: Tolerated well

## 2025-05-14 NOTE — SUBJECTIVE & OBJECTIVE
Past Medical History:   Diagnosis Date    Anxiety 12/18/2024    Glaucoma     History of stomach ulcers     Hypertension     Prostate cancer     Right kidney mass     Unspecified glaucoma        Past Surgical History:   Procedure Laterality Date    COLONOSCOPY N/A 08/21/2020    Procedure: COLONOSCOPYPrepopik;  Surgeon: Danie Conway MD;  Location: [facility];  Service: Endoscopy;  Laterality: N/A;    COLONOSCOPY N/A 09/29/2023    Procedure: COLONOSCOPY;  Surgeon: Edel Langley MD;  Location: [facility];  Service: Endoscopy;  Laterality: N/A;    ESOPHAGOGASTRODUODENOSCOPY N/A 08/21/2020    Procedure: EGD (ESOPHAGOGASTRODUODENOSCOPY);  Surgeon: Danie Conway MD;  Location: [facility];  Service: Endoscopy;  Laterality: N/A;    ESOPHAGOGASTRODUODENOSCOPY N/A 09/29/2023    Procedure: EGD (ESOPHAGOGASTRODUODENOSCOPY);  Surgeon: Edel Langley MD;  Location: [facility];  Service: Endoscopy;  Laterality: N/A;    EYE SURGERY  8/2017 & 10/2017    FINGER SURGERY      right hand pinkie finger     GLAUCOMA SURGERY Bilateral     STOMACH SURGERY      ulcers        Review of patient's allergies indicates:  No Known Allergies    No current facility-administered medications on file prior to encounter.     Current Outpatient Medications on File Prior to Encounter   Medication Sig    alfuzosin (UROXATRAL) 10 mg Tb24 Take 1 tablet (10 mg total) by mouth once daily.    amLODIPine (NORVASC) 10 MG tablet TAKE 1 TABLET(10 MG) BY MOUTH EVERY DAY (Patient taking differently: Take 10 mg by mouth every evening.)    apixaban (ELIQUIS) 5 mg Tab 1/2 tab PO BID (Patient taking differently: Take 2.5 mg by mouth 2 (two) times daily. 1/2 tab PO BID)    atorvastatin (LIPITOR) 40 MG tablet Take 1 tablet (40 mg total) by mouth once daily.    carvediloL (COREG) 12.5 MG tablet Take 1 tablet (12.5 mg total) by mouth 2 (two) times daily with meals.    dextromethorphan HBr (THERAFLU COUGH ORAL) Take by mouth.    dextromethorphan-guaiFENesin  (CORICIDIN HBP CHEST DELIA-COUGH)  mg Cap Take by mouth every 4 (four) hours as needed.    DM/p-ephed/acetaminoph/doxylam (NYQUIL ORAL) Take by mouth.    latanoprost 0.005 % ophthalmic solution INSTILL 1 DROP INTO BOTH EYES AT BEDTIME    magnesium citrate solution Take 74 mLs by mouth once. Drink 1/4 bottle magnesium citrate the afternoon prior to your procedure.    pantoprazole (PROTONIX) 20 MG tablet Take 1 tablet (20 mg total) by mouth once daily.    busPIRone (BUSPAR) 5 MG Tab Take 1 tablet (5 mg total) by mouth 2 (two) times daily.    cyclobenzaprine (FLEXERIL) 10 MG tablet Take 1 tablet (10 mg total) by mouth every 8 (eight) hours as needed (pain/spasms, can be taken at night before bed).     Family History       Problem Relation (Age of Onset)    Anemia Daughter    Arthritis Mother, Father, Maternal Aunt, Maternal Grandmother    Asthma Daughter    Diabetes Mother, Maternal Uncle    Glaucoma Father    Heart attack Sister    Heart disease Sister, Maternal Grandmother, Maternal Grandfather, Sister    Hypertension Mother, Father, Sister, Brother, Maternal Aunt, Sister, Brother    No Known Problems Son    Stroke Maternal Aunt, Maternal Aunt    Vision loss Father, Paternal Grandmother          Tobacco Use    Smoking status: Never    Smokeless tobacco: Never   Substance and Sexual Activity    Alcohol use: Yes     Comment: occasionally; none 24 hr prior to surg    Drug use: Not Currently     Types: Marijuana     Comment: Quit 35 years ago    Sexual activity: Yes     Partners: Female     Review of Systems   Unable to perform ROS: Acuity of condition   Patient still fairly sedated, has some chills.  Warming blanket on.  Wilkerson in place.  Objective:     Vital Signs (Most Recent):  Temp: 97.1 °F (36.2 °C) (05/14/25 1500)  Pulse: 71 (05/14/25 1500)  Resp: 20 (05/14/25 1500)  BP: 137/81 (05/14/25 1701)  SpO2: (!) 91 % (05/14/25 1500) Vital Signs (24h Range):  Temp:  [93.3 °F (34.1 °C)-98.1 °F (36.7 °C)] 97.1 °F (36.2  °C)  Pulse:  [58-71] 71  Resp:  [16-23] 20  SpO2:  [91 %-97 %] 91 %  BP: (115-138)/(77-89) 137/81  Arterial Line BP: ()/(67-85) 127/73        There is no height or weight on file to calculate BMI.     Physical Exam  Constitutional:       Comments: Somnolent   HENT:      Head: Normocephalic.      Nose: No congestion.      Mouth/Throat:      Mouth: Mucous membranes are moist.   Eyes:      Extraocular Movements: Extraocular movements intact.      Conjunctiva/sclera: Conjunctivae normal.      Pupils: Pupils are equal, round, and reactive to light.   Cardiovascular:      Rate and Rhythm: Normal rate and regular rhythm.      Pulses: Normal pulses.      Heart sounds: Normal heart sounds. No murmur heard.     No gallop.   Pulmonary:      Effort: Pulmonary effort is normal.      Breath sounds: Normal breath sounds.   Musculoskeletal:      Cervical back: Normal range of motion and neck supple.   Skin:     General: Skin is warm and dry.          Significant Labs: All pertinent labs within the past 24 hours have been reviewed.    Significant Imaging: I have reviewed all pertinent imaging results/findings within the past 24 hours.

## 2025-05-14 NOTE — EICU
Virtual ICU Admission    Admit Date: 2025  LOS: 1  Code Status: Full Code   : 1960  Bed: BICU 02/BICU 02A:     Diagnosis: <principal problem not specified>    Patient  has a past medical history of Anxiety, Glaucoma, History of stomach ulcers, Hypertension, Prostate cancer, Right kidney mass, and Unspecified glaucoma.    Last VS: /86   Pulse 70   Temp 98.1 °F (36.7 °C) (Oral)   Resp 17   SpO2 97%       VICU Review    VICU nurse assessment :  [place] completed and LDA documentation reconciliation completed        Nursing orders placed : IP ROBB Central Line Care and IP ROBB Peripheral IV Access

## 2025-05-14 NOTE — ANESTHESIA PROCEDURE NOTES
Intubation    Date/Time: 5/14/2025 8:19 AM    Performed by: Nicholas Singer CRNA  Authorized by: Mau Read MD    Intubation:     Induction:  Intravenous    Intubated:  Postinduction    Mask Ventilation:  Easy with oral airway    Attempts:  1    Attempted By:  CRNA    Method of Intubation:  Video laryngoscopy    Blade:  Rodriguez 3    Laryngeal View Grade: Grade I - full view of cords      Difficult Airway Encountered?: No      Complications:  None    Airway Device:  Oral endotracheal tube    Airway Device Size:  8.0    Style/Cuff Inflation:  Cuffed (inflated to minimal occlusive pressure)    Tube secured:  23    Secured at:  The lips    Placement Verified By:  Capnometry    Complicating Factors:  None    Findings Post-Intubation:  BS equal bilateral and atraumatic/condition of teeth unchanged

## 2025-05-14 NOTE — ASSESSMENT & PLAN NOTE
- Patient on anticoagulation with apixaban 2.5 mg bid, recommended to be lifelong  - Held for surgery - resume when OK with Urology

## 2025-05-15 ENCOUNTER — PATIENT MESSAGE (OUTPATIENT)
Dept: UROLOGY | Facility: CLINIC | Age: 65
End: 2025-05-15
Payer: MEDICARE

## 2025-05-15 VITALS
SYSTOLIC BLOOD PRESSURE: 125 MMHG | DIASTOLIC BLOOD PRESSURE: 71 MMHG | OXYGEN SATURATION: 94 % | RESPIRATION RATE: 16 BRPM | HEART RATE: 82 BPM | TEMPERATURE: 98 F

## 2025-05-15 PROBLEM — N28.89 RENAL MASS: Status: ACTIVE | Noted: 2025-05-15

## 2025-05-15 LAB
ABO + RH BLD: NORMAL
ABO + RH BLD: NORMAL
ABSOLUTE EOSINOPHIL (OHS): 0.01 K/UL
ABSOLUTE MONOCYTE (OHS): 0.82 K/UL (ref 0.3–1)
ABSOLUTE NEUTROPHIL COUNT (OHS): 9.32 K/UL (ref 1.8–7.7)
ANION GAP (OHS): 6 MMOL/L (ref 8–16)
BASOPHILS # BLD AUTO: 0.01 K/UL
BASOPHILS NFR BLD AUTO: 0.1 %
BLD PROD TYP BPU: NORMAL
BLD PROD TYP BPU: NORMAL
BLOOD UNIT EXPIRATION DATE: NORMAL
BLOOD UNIT EXPIRATION DATE: NORMAL
BLOOD UNIT TYPE CODE: 5100
BLOOD UNIT TYPE CODE: 5100
BUN SERPL-MCNC: 12 MG/DL (ref 8–23)
CA-I BLD-SCNC: 1.17 MMOL/L (ref 1.06–1.42)
CALCIUM SERPL-MCNC: 6.8 MG/DL (ref 8.7–10.5)
CHLORIDE SERPL-SCNC: 115 MMOL/L (ref 95–110)
CO2 SERPL-SCNC: 19 MMOL/L (ref 23–29)
CREAT SERPL-MCNC: 0.8 MG/DL (ref 0.5–1.4)
CROSSMATCH INTERPRETATION: NORMAL
CROSSMATCH INTERPRETATION: NORMAL
DISPENSE STATUS: NORMAL
DISPENSE STATUS: NORMAL
ERYTHROCYTE [DISTWIDTH] IN BLOOD BY AUTOMATED COUNT: 13.4 % (ref 11.5–14.5)
GFR SERPLBLD CREATININE-BSD FMLA CKD-EPI: >60 ML/MIN/1.73/M2
GLUCOSE SERPL-MCNC: 142 MG/DL (ref 70–110)
HCT VFR BLD AUTO: 30.3 % (ref 40–54)
HGB BLD-MCNC: 10.1 GM/DL (ref 14–18)
IMM GRANULOCYTES # BLD AUTO: 0.04 K/UL (ref 0–0.04)
IMM GRANULOCYTES NFR BLD AUTO: 0.4 % (ref 0–0.5)
LYMPHOCYTES # BLD AUTO: 0.72 K/UL (ref 1–4.8)
MCH RBC QN AUTO: 31.3 PG (ref 27–31)
MCHC RBC AUTO-ENTMCNC: 33.3 G/DL (ref 32–36)
MCV RBC AUTO: 94 FL (ref 82–98)
NUCLEATED RBC (/100WBC) (OHS): 0 /100 WBC
PLATELET # BLD AUTO: 153 K/UL (ref 150–450)
PMV BLD AUTO: 10 FL (ref 9.2–12.9)
POTASSIUM SERPL-SCNC: 3.1 MMOL/L (ref 3.5–5.1)
RBC # BLD AUTO: 3.23 M/UL (ref 4.6–6.2)
RELATIVE EOSINOPHIL (OHS): 0.1 %
RELATIVE LYMPHOCYTE (OHS): 6.6 % (ref 18–48)
RELATIVE MONOCYTE (OHS): 7.5 % (ref 4–15)
RELATIVE NEUTROPHIL (OHS): 85.3 % (ref 38–73)
SODIUM SERPL-SCNC: 140 MMOL/L (ref 136–145)
UNIT NUMBER: NORMAL
UNIT NUMBER: NORMAL
WBC # BLD AUTO: 10.92 K/UL (ref 3.9–12.7)

## 2025-05-15 PROCEDURE — 94799 UNLISTED PULMONARY SVC/PX: CPT

## 2025-05-15 PROCEDURE — 80048 BASIC METABOLIC PNL TOTAL CA: CPT | Performed by: UROLOGY

## 2025-05-15 PROCEDURE — 36415 COLL VENOUS BLD VENIPUNCTURE: CPT | Performed by: UROLOGY

## 2025-05-15 PROCEDURE — 63600175 PHARM REV CODE 636 W HCPCS

## 2025-05-15 PROCEDURE — 82330 ASSAY OF CALCIUM: CPT | Performed by: UROLOGY

## 2025-05-15 PROCEDURE — 25000003 PHARM REV CODE 250

## 2025-05-15 PROCEDURE — 85025 COMPLETE CBC W/AUTO DIFF WBC: CPT | Performed by: UROLOGY

## 2025-05-15 PROCEDURE — 94761 N-INVAS EAR/PLS OXIMETRY MLT: CPT

## 2025-05-15 PROCEDURE — 25000003 PHARM REV CODE 250: Performed by: HOSPITALIST

## 2025-05-15 RX ORDER — DEXTROMETHORPHAN HYDROBROMIDE, GUAIFENESIN 5; 100 MG/5ML; MG/5ML
650 LIQUID ORAL EVERY 8 HOURS
Status: ON HOLD | COMMUNITY
Start: 2025-05-15 | End: 2025-05-25

## 2025-05-15 RX ORDER — OXYCODONE HYDROCHLORIDE 5 MG/1
5 TABLET ORAL EVERY 4 HOURS PRN
Qty: 10 TABLET | Refills: 0 | Status: ON HOLD | OUTPATIENT
Start: 2025-05-15

## 2025-05-15 RX ORDER — POLYETHYLENE GLYCOL 3350 17 G/17G
17 POWDER, FOR SOLUTION ORAL DAILY
Qty: 510 G | Refills: 0 | Status: ON HOLD | OUTPATIENT
Start: 2025-05-15

## 2025-05-15 RX ORDER — SODIUM,POTASSIUM PHOSPHATES 280-250MG
2 POWDER IN PACKET (EA) ORAL ONCE
Status: DISCONTINUED | OUTPATIENT
Start: 2025-05-15 | End: 2025-05-15

## 2025-05-15 RX ORDER — IBUPROFEN 800 MG/1
800 TABLET, FILM COATED ORAL 3 TIMES DAILY
Status: ON HOLD | COMMUNITY
Start: 2025-05-15 | End: 2025-05-25

## 2025-05-15 RX ORDER — CALCIUM GLUCONATE 20 MG/ML
1 INJECTION, SOLUTION INTRAVENOUS
Status: COMPLETED | OUTPATIENT
Start: 2025-05-15 | End: 2025-05-15

## 2025-05-15 RX ADMIN — SODIUM CHLORIDE, POTASSIUM CHLORIDE, SODIUM LACTATE AND CALCIUM CHLORIDE: 600; 310; 30; 20 INJECTION, SOLUTION INTRAVENOUS at 04:05

## 2025-05-15 RX ADMIN — CALCIUM GLUCONATE 1 G: 20 INJECTION, SOLUTION INTRAVENOUS at 11:05

## 2025-05-15 RX ADMIN — CARVEDILOL 12.5 MG: 12.5 TABLET, FILM COATED ORAL at 08:05

## 2025-05-15 RX ADMIN — POLYETHYLENE GLYCOL 3350 17 G: 17 POWDER, FOR SOLUTION ORAL at 08:05

## 2025-05-15 RX ADMIN — KETOROLAC TROMETHAMINE 10 MG: 10 TABLET, FILM COATED ORAL at 06:05

## 2025-05-15 RX ADMIN — KETOROLAC TROMETHAMINE 10 MG: 10 TABLET, FILM COATED ORAL at 01:05

## 2025-05-15 RX ADMIN — OXYCODONE HYDROCHLORIDE 5 MG: 5 TABLET ORAL at 07:05

## 2025-05-15 RX ADMIN — CALCIUM GLUCONATE 1 G: 20 INJECTION, SOLUTION INTRAVENOUS at 12:05

## 2025-05-15 RX ADMIN — OXYCODONE HYDROCHLORIDE 5 MG: 5 TABLET ORAL at 01:05

## 2025-05-15 RX ADMIN — ACETAMINOPHEN 1000 MG: 500 TABLET, FILM COATED ORAL at 11:05

## 2025-05-15 NOTE — ASSESSMENT & PLAN NOTE
64-year-old male with recent incidentally diagnosed 3 cm endophytic enhancing right renal mass. S/p partial nephrectomy 5/14.    -- Ambulate as tolerated  -- Wilkerson removed voiding trial this AM  -- MM PO Pain control  -- Diet as tolerated  -- Bowel regimen with Miralax      Dispo: Likely home today pending voiding trial and PM check.

## 2025-05-15 NOTE — PROGRESS NOTES
[facility]ensive [facility] [place] [facility] Medicine  Progress Note    Patient Name: Ihsan Mays Sr.  MRN: [id number]  Patient Class: OP- Outpatient Recovery   Admission Date: 5/14/2025  Length of Stay: 1 days  Attending Physician: Hitesh Jansen MD  Primary Care Provider: Elliot Ybarra DO        Subjective     Principal Problem:Renal mass        HPI:  Mr. Mays a 64-year-old man who presented today for robotic partial right nephrectomy with Dr. Jansen.  Patient is in ICU after the procedure with a warming blanket in place given his low temp postop.  Hospital medicine is consulted for assistance with medical management.    Patient's medical history includes hypertension for which he is on amlodipine, carvedilol and valsartan.  He takes the amlodipine at night and he took his carvedilol this morning, having been told to hold the valsartan.  Blood pressure has been normal perioperatively.  Patient also has glaucoma and is on latanoprost eye drops at night.  He is followed by urology for prostate cancer.  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  He has some venous insufficiency and chronic lymphedema of his lower extremities and wear support stockings as needed.  He does not smoke.  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    Overview/Hospital Course:  No notes on file    Interval History: Wilkerson out this AM, patient passed voiding trial already.  Eating breakfast.  BP has been very stable and he is on amlodipine and carvedilol.  Holding valsartan for now.  No complaints, looking forward to going home.    Review of Systems   Constitutional:  Negative for chills and fever.   Respiratory:  Negative for cough and shortness of breath.    Cardiovascular:  Negative for chest pain and palpitations.     Objective:     Vital Signs (Most Recent):  Temp: 99  °F (37.2 °C) (heating blanket in place for comfort) (05/15/25 0345)  Pulse: 74 (05/15/25 0630)  Resp: 20 (05/15/25 0728)  BP: 123/75 (05/15/25 0630)  SpO2: 95 % (05/15/25 0630) Vital Signs (24h Range):  Temp:  [93.3 °F (34.1 °C)-99 °F (37.2 °C)] 99 °F (37.2 °C)  Pulse:  [] 74  Resp:  [14-42] 20  SpO2:  [90 %-97 %] 95 %  BP: (117-161)/(62-90) 123/75  Arterial Line BP: ()/() 122/63        There is no height or weight on file to calculate BMI.    Intake/Output Summary (Last 24 hours) at 5/15/2025 0836  Last data filed at 5/15/2025 0600  Gross per 24 hour   Intake 5752.79 ml   Output 2375 ml   Net 3377.79 ml         Physical Exam  Cardiovascular:      Rate and Rhythm: Normal rate and regular rhythm.      Pulses: Normal pulses.      Heart sounds: Normal heart sounds. No murmur heard.     No gallop.   Pulmonary:      Effort: Pulmonary effort is normal.      Breath sounds: Normal breath sounds.   Abdominal:      General: Bowel sounds are normal.      Palpations: Abdomen is soft.   Skin:     General: Skin is warm and dry.   Neurological:      General: No focal deficit present.      Mental Status: He is alert and oriented to person, place, and time.               Significant Labs: All pertinent labs within the past 24 hours have been reviewed.    Significant Imaging: I have reviewed all pertinent imaging results/findings within the past 24 hours.      Assessment & Plan  Primary hypertension  Patient is status post partial nephrectomy putting him at risk for bleeding if he is hypertensive.  His blood pressure range in the last 24 hours was: BP  Min: 117/67  Max: 161/90.The patient's inpatient anti-hypertensive regimen is listed below:    Current Antihypertensives  labetaloL injection 10 mg, Every 4 hours PRN, Intravenous  amLODIPine tablet 10 mg, Nightly, Oral  carvediloL tablet 12.5 mg, 2 times daily with meals, Oral  hydrALAZINE injection 10 mg, Every 6 hours PRN, Intravenous (secondary choice if first  ineffective)    Plan  - BP normal on current regimen, OK to resume valsartan in AM tomorrow (5/16).  Discussed with patient.  - OK for discharge from perspective of hospital medicine.  Cancer of prostate  Per primary team    Saphenous vein clot, right  - Patient on anticoagulation with apixaban 2.5 mg bid, recommended to be lifelong  - Held for surgery - resume when OK with Urology    Lymphedema of both lower extremities  No edema noted today (has TEDs/SCDs on)  Resume compression stockings as needed when outpatient.  Renal mass      VTE Risk Mitigation (From admission, onward)           Ordered     IP VTE HIGH RISK PATIENT  Once         05/14/25 1259     Place SHAE hose  Until discontinued         05/14/25 1259     Place sequential compression device  Until discontinued         05/14/25 1259     Place sequential compression device  Until discontinued         05/14/25 0611                    Discharge Planning   BERTHA: 5/16/2025     Code Status: Full Code   Medical Readiness for Discharge Date:   Discharge Plan A: Home with family                        Makeda Philip MD  Department of Hospital Medicine   [facility] - Intensive Care ([place])

## 2025-05-15 NOTE — PROGRESS NOTES
[facility] Intensive [facility]  Urology  Progress Note    Patient Name: Ihsan Mays Sr.  MRN: [id number]  Admission Date: 5/14/2025  Hospital Length of Stay: 1 days  Code Status: Full Code   Attending Provider: Hitesh Jansen MD   Primary Care Physician: Elliot Ybarra DO    Subjective:     HPI:  64-year-old male with recent incidentally diagnosed 3 cm endophytic enhancing right renal mass. S/p partial nephrectomy 5/14.      Interval History: AFVSS. NAEO. Tolerating PO diet without nausea, vomiting. Ambulated the room. Wilkerson draining light pink urine. Belching, no flatus. Abdomen slightly distended.       Objective:     Temp:  [93.3 °F (34.1 °C)-99 °F (37.2 °C)] 99 °F (37.2 °C)  Pulse:  [] 78  Resp:  [14-42] 16  SpO2:  [90 %-97 %] 94 %  BP: (115-161)/(62-90) 120/72  Arterial Line BP: ()/() 117/60     There is no height or weight on file to calculate BMI.           Drains       Drain  Duration                  Urethral Catheter 05/14/25 0842 20 Fr. <1 day                     Physical Exam  Constitutional:       General: He is not in acute distress.     Appearance: Normal appearance.   HENT:      Head: Normocephalic and atraumatic.   Eyes:      Extraocular Movements: Extraocular movements intact.      Pupils: Pupils are equal, round, and reactive to light.   Cardiovascular:      Rate and Rhythm: Normal rate.   Pulmonary:      Effort: Pulmonary effort is normal. No respiratory distress.   Abdominal:      General: Abdomen is flat. There is distension.      Tenderness: There is no abdominal tenderness. There is no right CVA tenderness or left CVA tenderness.      Comments: Laparoscopic incisions c/d/I.        Genitourinary:     Comments: Wilkerson draining light pink urine.   Skin:     Coloration: Skin is not jaundiced.   Neurological:      General: No focal deficit present.      Mental Status: He is alert and oriented to person, place, and time.   Psychiatric:         Mood and Affect:  Mood normal.         Behavior: Behavior normal.           Significant Labs:    BMP:  Recent Labs   Lab 05/08/25  0931      K 3.8   *   CO2 20*   BUN 11   CREATININE 0.9   CALCIUM 8.8       CBC:   Recent Labs   Lab 05/08/25  0931   WBC 6.34   HGB 13.5*   HCT 40.3          All pertinent labs results from the past 24 hours have been reviewed.    Significant Imaging:  All pertinent imaging results/findings from the past 24 hours have been reviewed.                  Assessment/Plan:     Renal mass  64-year-old male with recent incidentally diagnosed 3 cm endophytic enhancing right renal mass. S/p partial nephrectomy 5/14.    -- Ambulate as tolerated  -- Wilkerson removed voiding trial this AM  -- MM PO Pain control  -- Diet as tolerated  -- Bowel regimen with Miralax      Dispo: Likely home today pending voiding trial and PM check.         VTE Risk Mitigation (From admission, onward)           Ordered     IP VTE HIGH RISK PATIENT  Once         05/14/25 1259     Place SHAE hose  Until discontinued         05/14/25 1259     Place sequential compression device  Until discontinued         05/14/25 1259     Place sequential compression device  Until discontinued         05/14/25 0611                    Edgar Dorman DO  Urology  [facility] - Intensive Care ([place])

## 2025-05-15 NOTE — ASSESSMENT & PLAN NOTE
Patient is status post partial nephrectomy putting him at risk for bleeding if he is hypertensive.  His blood pressure range in the last 24 hours was: BP  Min: 117/67  Max: 161/90.The patient's inpatient anti-hypertensive regimen is listed below:    Current Antihypertensives  labetaloL injection 10 mg, Every 4 hours PRN, Intravenous  amLODIPine tablet 10 mg, Nightly, Oral  carvediloL tablet 12.5 mg, 2 times daily with meals, Oral  hydrALAZINE injection 10 mg, Every 6 hours PRN, Intravenous (secondary choice if first ineffective)    Plan  - BP normal on current regimen, OK to resume valsartan in AM tomorrow (5/16).  Discussed with patient.  - OK for discharge from perspective of hospital medicine.

## 2025-05-15 NOTE — HPI
64-year-old male with recent incidentally diagnosed 3 cm endophytic enhancing right renal mass. S/p partial nephrectomy 5/14.

## 2025-05-15 NOTE — PLAN OF CARE
Case Management Final Discharge Note    Discharge Disposition: Home    New DME ordered / company name: NA    Relevant SDOH / Transition of Care Barriers:  NA    Person available to provide assistance at home when needed and their contact information: Spouse    Scheduled followup appointment: 6/2/2025 11:00 AM     Referrals placed: NA    Transportation: Spouse        Patient and family educated on discharge services and updated on DC plan. Bedside RN notified, patient clear to discharge from Case Management Perspective.   [facility] - Intensive [facility] ([place])  Discharge Final Note    Primary Care Provider: Elliot Ybarra DO    Expected Discharge Date: 5/15/2025    Final Discharge Note (most recent)       Final Note - 05/15/25 1344          Final Note    Assessment Type Final Discharge Note     Anticipated Discharge Disposition Home or Self Care     Hospital Resources/Appts/Education Provided Provided patient/caregiver with written discharge plan information;Appointments scheduled and added to AVS        Post-Acute Status    Post-Acute Authorization Other (P)      Other Status No Post-Acute Service Needs (P)      Discharge Delays None known at this time (P)                    Contact Info       Tiana Man NP   Specialty: Urology    [address] [place] [id number]   Phone: [phone]       Next Steps: Follow up in 2 week(s)

## 2025-05-15 NOTE — SUBJECTIVE & OBJECTIVE
Interval History: Wilkerson out this AM, patient passed voiding trial already.  Eating breakfast.  BP has been very stable and he is on amlodipine and carvedilol.  Holding valsartan for now.  No complaints, looking forward to going home.    Review of Systems   Constitutional:  Negative for chills and fever.   Respiratory:  Negative for cough and shortness of breath.    Cardiovascular:  Negative for chest pain and palpitations.     Objective:     Vital Signs (Most Recent):  Temp: 99 °F (37.2 °C) (heating blanket in place for comfort) (05/15/25 0345)  Pulse: 74 (05/15/25 0630)  Resp: 20 (05/15/25 0728)  BP: 123/75 (05/15/25 0630)  SpO2: 95 % (05/15/25 0630) Vital Signs (24h Range):  Temp:  [93.3 °F (34.1 °C)-99 °F (37.2 °C)] 99 °F (37.2 °C)  Pulse:  [] 74  Resp:  [14-42] 20  SpO2:  [90 %-97 %] 95 %  BP: (117-161)/(62-90) 123/75  Arterial Line BP: ()/() 122/63        There is no height or weight on file to calculate BMI.    Intake/Output Summary (Last 24 hours) at 5/15/2025 0836  Last data filed at 5/15/2025 0600  Gross per 24 hour   Intake 5752.79 ml   Output 2375 ml   Net 3377.79 ml         Physical Exam  Cardiovascular:      Rate and Rhythm: Normal rate and regular rhythm.      Pulses: Normal pulses.      Heart sounds: Normal heart sounds. No murmur heard.     No gallop.   Pulmonary:      Effort: Pulmonary effort is normal.      Breath sounds: Normal breath sounds.   Abdominal:      General: Bowel sounds are normal.      Palpations: Abdomen is soft.   Skin:     General: Skin is warm and dry.   Neurological:      General: No focal deficit present.      Mental Status: He is alert and oriented to person, place, and time.               Significant Labs: All pertinent labs within the past 24 hours have been reviewed.    Significant Imaging: I have reviewed all pertinent imaging results/findings within the past 24 hours.

## 2025-05-15 NOTE — DISCHARGE SUMMARY
[facility], [place], operated by [facility]  Urology  Discharge Summary      Patient Name: Ihsan Mays Sr.  MRN: [id number]  Admission Date: 5/14/2025  Hospital Length of Stay: 1 days  Discharge Date and Time: 05/15/2025 12:12 PM  Attending Physician: Hitesh Jansen MD   Discharging Provider: Edgar Keating DO  Primary Care Physician: Elliot Ybarra DO    HPI:   64-year-old male with recent incidentally diagnosed 3 cm endophytic enhancing right renal mass. S/p partial nephrectomy 5/14.      Procedure(s) (LRB):  XI ROBOTIC NEPHRECTOMY, PARTIAL (Right)  CYSTOSCOPY, FLEXIBLE (N/A)     Indwelling Lines/Drains at time of discharge:   Lines/Drains/Airways       Arterial Line  Duration             Arterial Line 05/14/25 0822 Left Radial 1 day                    Hospital Course (synopsis of major diagnoses, care, treatment, and services provided during the course of the hospital stay): The patient was admitted to [facility] [place], [place] for the above procedure. Patient tolerated the procedure well in its entirety without issue. For more details, please refer to the complete operative note. he was transferred to recovery post-op and then to the floor. Once on the floor His diet was advanced and he ambulated the day after surgery. On POD 1 the patient was tolerating a regular diet, ambulating without difficulty.His pain was well controlled.    Physical exam was appropriate for post operative state. The incisions were clean, dry and intact. The silva was draining clear pink urine. The silva was removed and he was able to void without difficulty. The patient was deemed stable for discharge on 05/15/2025.  .      Goals of Care Treatment Preferences:  Code Status: Full Code      Consults:   Consults (From admission, onward)          Status Ordering Provider     [facility]        Provider:  (Not yet assigned)    Acknowledged EDGAR KEATING            Significant Diagnostic Studies: None    Pending  Diagnostic Studies:       Procedure Component Value Units Date/Time    Calcium, ionized [[id number]] Collected: 05/15/25 1207    Order Status: Sent Lab Status: In process Updated: 05/15/25 1209    Specimen: Blood             Final Active Diagnoses:    Diagnosis Date Noted POA    PRINCIPAL PROBLEM:  Renal mass [N28.89] 05/15/2025 Yes    Lymphedema of both lower extremities [I89.0] 04/11/2022 Yes    Saphenous vein clot, right [I82.811] 03/15/2022 Yes    Cancer of prostate [C61] 08/24/2021 Yes    Primary hypertension [I10] 12/29/2015 Yes      Problems Resolved During this Admission:         Discharged Condition: good    Disposition: Home or Self Care    Follow Up:   Follow-up Information       Tiana Man NP Follow up in 2 week(s).    Specialty: Urology  Contact information:  [address] [place] [id number]  [phone]                           Patient Instructions:      No dressing needed     Notify your health care provider if you experience any of the following:  temperature >100.4     Notify your health care provider if you experience any of the following:  persistent nausea and vomiting or diarrhea     Notify your health care provider if you experience any of the following:  severe uncontrolled pain     Notify your health care provider if you experience any of the following:  difficulty breathing or increased cough     Notify your health care provider if you experience any of the following:  severe persistent headache     Notify your health care provider if you experience any of the following:  worsening rash     Notify your health care provider if you experience any of the following:  persistent dizziness, light-headedness, or visual disturbances     Activity as tolerated     Medications:  Reconciled Home Medications:      Medication List        START taking these medications      acetaminophen 650 MG Tbsr  Commonly known as: TYLENOL  Take 1 tablet (650 mg total) by mouth every 8 (eight) hours.  for 10 days     ibuprofen 800 MG tablet  Commonly known as: ADVIL,MOTRIN  Take 1 tablet (800 mg total) by mouth 3 (three) times daily. for 10 days     oxyCODONE 5 MG immediate release tablet  Commonly known as: ROXICODONE  Take 1 tablet (5 mg total) by mouth every 4 (four) hours as needed for Pain.     polyethylene glycol 17 gram/dose powder  Commonly known as: GLYCOLAX  Take 17 g by mouth once daily.            CHANGE how you take these medications      apixaban 5 mg Tab  Commonly known as: ELIQUIS  1/2 tab PO BID  What changed:   how much to take  how to take this  when to take this            CONTINUE taking these medications      alfuzosin 10 mg Tb24  Commonly known as: UROXATRAL  Take 1 tablet (10 mg total) by mouth once daily.     amLODIPine 10 MG tablet  Commonly known as: NORVASC  TAKE 1 TABLET(10 MG) BY MOUTH EVERY DAY     atorvastatin 40 MG tablet  Commonly known as: LIPITOR  Take 1 tablet (40 mg total) by mouth once daily.     busPIRone 5 MG Tab  Commonly known as: BUSPAR  Take 1 tablet (5 mg total) by mouth 2 (two) times daily.     carvediloL 12.5 MG tablet  Commonly known as: COREG  Take 1 tablet (12.5 mg total) by mouth 2 (two) times daily with meals.     CORICIDIN HBP CHEST DELIA-COUGH  mg Cap  Generic drug: dextromethorphan-guaiFENesin  Take by mouth every 4 (four) hours as needed.     cyclobenzaprine 10 MG tablet  Commonly known as: FLEXERIL  Take 1 tablet (10 mg total) by mouth every 8 (eight) hours as needed (pain/spasms, can be taken at night before bed).     latanoprost 0.005 % ophthalmic solution  INSTILL 1 DROP INTO BOTH EYES AT BEDTIME     magnesium citrate solution  Take 74 mLs by mouth once. Drink 1/4 bottle magnesium citrate the afternoon prior to your procedure.     NYQUIL ORAL  Take by mouth.     pantoprazole 20 MG tablet  Commonly known as: PROTONIX  Take 1 tablet (20 mg total) by mouth once daily.     THERAFLU COUGH ORAL  Take by mouth.     valsartan 320 MG tablet  Commonly known  as: DIOVAN  Take 1 tablet (320 mg total) by mouth once daily.              Time spent on the discharge of patient: 15 minutes    Edgar Dorman DO  Urology  [facility] ([place])

## 2025-05-15 NOTE — EICU
VICU Night Rounds Checklist  24H Vital Sign Range:  Temp:  [93.3 °F (34.1 °C)-98.5 °F (36.9 °C)]   Pulse:  []   Resp:  [14-42]   BP: (115-161)/(62-90)   SpO2:  [90 %-97 %]   Arterial Line BP: ()/()     Video rounds and LDA reconciliation

## 2025-05-15 NOTE — EICU
Virtual ICU Quality Rounds    Admit Date: 5/14/2025  Hospital Day: 1    ICU Day: 18h    24H Vital Sign Range:  Temp:  [93.3 °F (34.1 °C)-99 °F (37.2 °C)]   Pulse:  []   Resp:  [14-42]   BP: (117-161)/(62-90)   SpO2:  [90 %-97 %]   Arterial Line BP: ()/()     VICU Surveillance Screening  LDA reconciliation : Yes

## 2025-05-15 NOTE — SUBJECTIVE & OBJECTIVE
Interval History: AFVSS. NAEO. Tolerating PO diet without nausea, vomiting. Ambulated the room. Wilkerson draining light pink urine. Belching, no flatus. Abdomen slightly distended.       Objective:     Temp:  [93.3 °F (34.1 °C)-99 °F (37.2 °C)] 99 °F (37.2 °C)  Pulse:  [] 78  Resp:  [14-42] 16  SpO2:  [90 %-97 %] 94 %  BP: (115-161)/(62-90) 120/72  Arterial Line BP: ()/() 117/60     There is no height or weight on file to calculate BMI.           Drains       Drain  Duration                  Urethral Catheter 05/14/25 0842 20 Fr. <1 day                     Physical Exam  Constitutional:       General: He is not in acute distress.     Appearance: Normal appearance.   HENT:      Head: Normocephalic and atraumatic.   Eyes:      Extraocular Movements: Extraocular movements intact.      Pupils: Pupils are equal, round, and reactive to light.   Cardiovascular:      Rate and Rhythm: Normal rate.   Pulmonary:      Effort: Pulmonary effort is normal. No respiratory distress.   Abdominal:      General: Abdomen is flat. There is distension.      Tenderness: There is no abdominal tenderness. There is no right CVA tenderness or left CVA tenderness.      Comments: Laparoscopic incisions c/d/I.        Genitourinary:     Comments: Wilkerson draining light pink urine.   Skin:     Coloration: Skin is not jaundiced.   Neurological:      General: No focal deficit present.      Mental Status: He is alert and oriented to person, place, and time.   Psychiatric:         Mood and Affect: Mood normal.         Behavior: Behavior normal.           Significant Labs:    BMP:  Recent Labs   Lab 05/08/25  0931      K 3.8   *   CO2 20*   BUN 11   CREATININE 0.9   CALCIUM 8.8       CBC:   Recent Labs   Lab 05/08/25  0931   WBC 6.34   HGB 13.5*   HCT 40.3          All pertinent labs results from the past 24 hours have been reviewed.    Significant Imaging:  All pertinent imaging results/findings from the past 24 hours  have been reviewed.

## 2025-05-16 LAB
ESTROGEN SERPL-MCNC: NORMAL PG/ML
INSULIN SERPL-ACNC: NORMAL U[IU]/ML
LAB AP CLINICAL INFORMATION: NORMAL
LAB AP GROSS DESCRIPTION: NORMAL
LAB AP PERFORMING LOCATION(S): NORMAL
LAB AP REPORT FOOTNOTES: NORMAL
LAB AP SYNOPTIC CHECKLIST: NORMAL

## 2025-05-19 ENCOUNTER — RESULTS FOLLOW-UP (OUTPATIENT)
Dept: UROLOGY | Facility: HOSPITAL | Age: 65
End: 2025-05-19
Payer: MEDICARE

## 2025-05-19 ENCOUNTER — HOSPITAL ENCOUNTER (OUTPATIENT)
Dept: RADIOLOGY | Facility: HOSPITAL | Age: 65
Discharge: HOME | End: 2025-05-19
Attending: UROLOGY
Payer: MEDICARE

## 2025-05-19 ENCOUNTER — PATIENT MESSAGE (OUTPATIENT)
Dept: UROLOGY | Facility: CLINIC | Age: 65
End: 2025-05-19
Payer: MEDICARE

## 2025-05-19 ENCOUNTER — TELEPHONE (OUTPATIENT)
Dept: UROLOGY | Facility: CLINIC | Age: 65
End: 2025-05-19
Payer: MEDICARE

## 2025-05-19 DIAGNOSIS — M79.669 CALF PAIN, UNSPECIFIED LATERALITY: ICD-10-CM

## 2025-05-19 DIAGNOSIS — T81.72XA COMPLICATION OF VEIN FOLLOWING A PROCEDURE, NOT ELSEWHERE CLASSIFIED, INITIAL ENCOUNTER: ICD-10-CM

## 2025-05-19 DIAGNOSIS — C61 PROSTATE CANCER: Primary | ICD-10-CM

## 2025-05-19 DIAGNOSIS — C61 PROSTATE CANCER: ICD-10-CM

## 2025-05-19 PROCEDURE — 93970 EXTREMITY STUDY: CPT | Mod: TC,PN

## 2025-05-19 NOTE — PROGRESS NOTES
Subjective:      Ihsan Mays Sr. is a 64 y.o. male who returns today regarding his     Overall doing well    Right thigh numbness and tingling started 2 days ago AFTER he was discharged    Also with right testicular pain; he had epididymitis PREOP    No fever  No chills    Normal BMs  +flatus    Thelma reg diet      The following portions of the patient's history were reviewed and updated as appropriate: allergies, current medications, past family history, past medical history, past social history, past surgical history and problem list.    Review of Systems  Pertinent items are noted in HPI.  A comprehensive multipoint review of systems was negative except as otherwise stated in the HPI.    Past Medical History:   Diagnosis Date    Anxiety 12/18/2024    Glaucoma     History of stomach ulcers     Hypertension     Prostate cancer     Right kidney mass     Unspecified glaucoma      Past Surgical History:   Procedure Laterality Date    COLONOSCOPY N/A 08/21/2020    Procedure: COLONOSCOPYPrepopik;  Surgeon: Danie Conway MD;  Location: [facility];  Service: Endoscopy;  Laterality: N/A;    COLONOSCOPY N/A 09/29/2023    Procedure: COLONOSCOPY;  Surgeon: Edel Langley MD;  Location: [facility];  Service: Endoscopy;  Laterality: N/A;    ESOPHAGOGASTRODUODENOSCOPY N/A 08/21/2020    Procedure: EGD (ESOPHAGOGASTRODUODENOSCOPY);  Surgeon: Danie Conway MD;  Location: [facility];  Service: Endoscopy;  Laterality: N/A;    ESOPHAGOGASTRODUODENOSCOPY N/A 09/29/2023    Procedure: EGD (ESOPHAGOGASTRODUODENOSCOPY);  Surgeon: Edel Langley MD;  Location: [facility];  Service: Endoscopy;  Laterality: N/A;    EYE SURGERY  8/2017 & 10/2017    FINGER SURGERY      right hand pinkie finger     FLEXIBLE CYSTOSCOPY N/A 5/14/2025    Procedure: CYSTOSCOPY, FLEXIBLE;  Surgeon: Hitesh Jansen MD;  Location: [facility];  Service: Urology;  Laterality: N/A;    GLAUCOMA SURGERY Bilateral     ROBOT-ASSISTED LAPAROSCOPIC PARTIAL  NEPHRECTOMY USING DA BARAK XI Right 5/14/2025    Procedure: XI ROBOTIC NEPHRECTOMY, PARTIAL;  Surgeon: Hitesh Jansen MD;  Location: [facility];  Service: Urology;  Laterality: Right;    STOMACH SURGERY      ulcers        Review of patient's allergies indicates:  No Known Allergies       Objective:   Vitals: There were no vitals taken for this visit.    Physical Exam   General: alert and oriented, no acute distress  Respiratory: Symmetric expansion, non-labored breathing  Cardiovascular: no peripheral edema  Abdomen: non distended  Skin: normal coloration and turgor, no rashes, no suspicious skin lesions noted  Neuro: no gross deficits  Psych: normal judgment and insight, normal mood/affect, and non-anxious  Wounds CDI  Ab mildly distended but +bowel sounds and benign  Right epididymis mild swelling and tenderness  Testes normal    Physical Exam    Lab Review   Urinalysis demonstrates no specimen    Lab Results   Component Value Date    WBC 10.92 05/15/2025    HGB 10.1 (L) 05/15/2025    HGB 13.6 (L) 02/10/2025    HCT 30.3 (L) 05/15/2025    HCT 41.0 02/10/2025    MCV 94 05/15/2025    MCV 94 02/10/2025     05/15/2025     (L) 02/10/2025     Lab Results   Component Value Date    CREATININE 0.8 05/15/2025    BUN 12 05/15/2025     Final Diagnosis   Right kidney, partial nephrectomy:  Papillary renal cell carcinoma, 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  Margins are free of malignancy.    See synoptic report in comment section for further details.     Note:  This case was also reviewed by Dr. CARLITOS Desai, who concurs with the above diagnosis.     Tumor Blocks for possible Future Studies:  1A, 1B, 1C.      Electronically signed by Shell Gamble MD on 5/16/2025         Imaging  US LOWER EXTREMITY VEINS BILATERAL     CLINICAL HISTORY:  Malignant neoplasm of prostate     TECHNIQUE:  Duplex and color flow Doppler and dynamic compression was performed of the bilateral lower extremity veins was  performed.     COMPARISON:  None     FINDINGS:  Right thigh veins: The common femoral, femoral, popliteal, upper greater saphenous, and deep femoral veins are patent and free of thrombus. The veins are normally compressible and have normal phasic flow and augmentation response.     Right calf veins: The visualized calf veins are patent.     Left thigh veins: The common femoral, femoral, popliteal, upper greater saphenous, and deep femoral veins are patent and free of thrombus. The veins are normally compressible and have normal phasic flow and augmentation response.     Left calf veins: The visualized calf veins are patent.     Miscellaneous: None     Impression:     No evidence of deep venous thrombosis in either lower extremity.        Electronically signed by:Devin Encarnacion  Date:                                            05/19/2025  Time:                                           15:42  Assessment and Plan:   Renal cell carcinoma, unspecified laterality  Papillary pT1a cN0 cM0 JOANNA sp robotic partial nephrectomy      Leg pain  NV intact  Suspect lateral femoral cutaneous nerve irritation  Provided reassurance  Will monitor    Epididiymitis; present preop  UA and reflex cs  Bactrim * 14 days    See NP 1 week  OK to cancel cyst; this was done in the OR.

## 2025-05-19 NOTE — TELEPHONE ENCOUNTER
Staff called pt to schedule appt for ultrasound. Pt was informed of appt time and location. Pt was also informed that  would like to see him tomorrow. Pt asked if he would get a message about the time. Staff confirmed and pt voiced understanding.

## 2025-05-20 ENCOUNTER — OFFICE VISIT (OUTPATIENT)
Dept: UROLOGY | Facility: CLINIC | Age: 65
End: 2025-05-20
Payer: MEDICARE

## 2025-05-20 VITALS
OXYGEN SATURATION: 96 % | DIASTOLIC BLOOD PRESSURE: 83 MMHG | BODY MASS INDEX: 36.5 KG/M2 | HEIGHT: 68 IN | HEART RATE: 82 BPM | SYSTOLIC BLOOD PRESSURE: 132 MMHG

## 2025-05-20 DIAGNOSIS — C64.9 RENAL CELL CARCINOMA, UNSPECIFIED LATERALITY: Primary | ICD-10-CM

## 2025-05-20 LAB
BACTERIA #/AREA URNS AUTO: ABNORMAL /HPF
BILIRUB UR QL STRIP.AUTO: NEGATIVE
CLARITY UR: CLEAR
COLOR UR AUTO: YELLOW
GLUCOSE UR QL STRIP: NEGATIVE
HGB UR QL STRIP: ABNORMAL
KETONES UR QL STRIP: NEGATIVE
LEUKOCYTE ESTERASE UR QL STRIP: NEGATIVE
MICROSCOPIC COMMENT: ABNORMAL
NITRITE UR QL STRIP: NEGATIVE
PH UR STRIP: 6 [PH]
PROT UR QL STRIP: ABNORMAL
RBC #/AREA URNS AUTO: >100 /HPF (ref 0–4)
SP GR UR STRIP: 1.02
UROBILINOGEN UR STRIP-ACNC: ABNORMAL EU/DL
WBC #/AREA URNS AUTO: 13 /HPF (ref 0–5)

## 2025-05-20 PROCEDURE — 81001 URINALYSIS AUTO W/SCOPE: CPT | Performed by: UROLOGY

## 2025-05-20 PROCEDURE — 87086 URINE CULTURE/COLONY COUNT: CPT | Performed by: UROLOGY

## 2025-05-20 RX ORDER — SULFAMETHOXAZOLE AND TRIMETHOPRIM 400; 80 MG/1; MG/1
1 TABLET ORAL 2 TIMES DAILY
Qty: 28 TABLET | Refills: 0 | Status: ON HOLD | OUTPATIENT
Start: 2025-05-20 | End: 2025-06-03

## 2025-05-22 LAB — BACTERIA UR CULT: NORMAL

## 2025-05-24 ENCOUNTER — HOSPITAL ENCOUNTER (INPATIENT)
Facility: HOSPITAL | Age: 65
LOS: 8 days | Discharge: HOME | DRG: 674 | End: 2025-06-01
Payer: MEDICARE

## 2025-05-24 ENCOUNTER — HOSPITAL ENCOUNTER (EMERGENCY)
Facility: HOSPITAL | Age: 65
Discharge: ANOTHER HOSPITAL | End: 2025-05-24
Attending: STUDENT IN AN ORGANIZED HEALTH CARE EDUCATION/TRAINING PROGRAM
Payer: MEDICARE

## 2025-05-24 VITALS
HEIGHT: 68 IN | RESPIRATION RATE: 18 BRPM | HEART RATE: 76 BPM | OXYGEN SATURATION: 98 % | WEIGHT: 240 LBS | TEMPERATURE: 98 F | BODY MASS INDEX: 36.37 KG/M2 | SYSTOLIC BLOOD PRESSURE: 124 MMHG | DIASTOLIC BLOOD PRESSURE: 79 MMHG

## 2025-05-24 DIAGNOSIS — R31.0 GROSS HEMATURIA: Primary | ICD-10-CM

## 2025-05-24 DIAGNOSIS — R07.9 CHEST PAIN: ICD-10-CM

## 2025-05-24 PROBLEM — Z85.528 HISTORY OF RENAL CELL CARCINOMA: Status: ACTIVE | Noted: 2025-05-15

## 2025-05-24 PROBLEM — E66.812 CLASS 2 SEVERE OBESITY DUE TO EXCESS CALORIES WITH SERIOUS COMORBIDITY IN ADULT: Status: ACTIVE | Noted: 2023-07-05

## 2025-05-24 PROBLEM — R31.9 HEMATURIA: Status: ACTIVE | Noted: 2025-05-24

## 2025-05-24 LAB
ABSOLUTE EOSINOPHIL (OHS): 0.14 K/UL
ABSOLUTE EOSINOPHIL (OHS): 0.21 K/UL
ABSOLUTE MONOCYTE (OHS): 0.76 K/UL (ref 0.3–1)
ABSOLUTE MONOCYTE (OHS): 0.84 K/UL (ref 0.3–1)
ABSOLUTE NEUTROPHIL COUNT (OHS): 10.06 K/UL (ref 1.8–7.7)
ABSOLUTE NEUTROPHIL COUNT (OHS): 7.64 K/UL (ref 1.8–7.7)
ALBUMIN SERPL BCP-MCNC: 4.2 G/DL (ref 3.5–5.2)
ALP SERPL-CCNC: 113 UNIT/L (ref 38–126)
ALT SERPL W/O P-5'-P-CCNC: 66 UNIT/L (ref 10–44)
ANION GAP (OHS): 13 MMOL/L (ref 8–16)
APTT PPP: 28.7 SECONDS (ref 21–32)
AST SERPL-CCNC: 41 UNIT/L (ref 15–46)
BACTERIA #/AREA URNS HPF: ABNORMAL /HPF
BASOPHILS # BLD AUTO: 0.04 K/UL
BASOPHILS # BLD AUTO: 0.05 K/UL
BASOPHILS NFR BLD AUTO: 0.3 %
BASOPHILS NFR BLD AUTO: 0.5 %
BILIRUB SERPL-MCNC: 0.7 MG/DL (ref 0.1–1)
BILIRUB UR QL STRIP.AUTO: NEGATIVE
BUN SERPL-MCNC: 14 MG/DL (ref 2–20)
CALCIUM SERPL-MCNC: 9 MG/DL (ref 8.7–10.5)
CHLORIDE SERPL-SCNC: 107 MMOL/L (ref 95–110)
CLARITY UR: ABNORMAL
CO2 SERPL-SCNC: 20 MMOL/L (ref 23–29)
COLOR UR AUTO: ABNORMAL
CREAT SERPL-MCNC: 1 MG/DL (ref 0.5–1.4)
ERYTHROCYTE [DISTWIDTH] IN BLOOD BY AUTOMATED COUNT: 13.4 % (ref 11.5–14.5)
ERYTHROCYTE [DISTWIDTH] IN BLOOD BY AUTOMATED COUNT: 13.4 % (ref 11.5–14.5)
GFR SERPLBLD CREATININE-BSD FMLA CKD-EPI: >60 ML/MIN/1.73/M2
GLUCOSE SERPL-MCNC: 128 MG/DL (ref 70–110)
GLUCOSE UR QL STRIP: NEGATIVE
HCT VFR BLD AUTO: 35.9 % (ref 40–54)
HCT VFR BLD AUTO: 36.3 % (ref 40–54)
HGB BLD-MCNC: 12.1 GM/DL (ref 14–18)
HGB BLD-MCNC: 12.4 GM/DL (ref 14–18)
HGB UR QL STRIP: ABNORMAL
HOLD SPECIMEN: NORMAL
HYALINE CASTS #/AREA URNS LPF: 0 /LPF (ref 0–1)
IMM GRANULOCYTES # BLD AUTO: 0.06 K/UL (ref 0–0.04)
IMM GRANULOCYTES # BLD AUTO: 0.07 K/UL (ref 0–0.04)
IMM GRANULOCYTES NFR BLD AUTO: 0.5 % (ref 0–0.5)
IMM GRANULOCYTES NFR BLD AUTO: 0.7 % (ref 0–0.5)
INR PPP: 1 (ref 0.8–1.2)
KETONES UR QL STRIP: NEGATIVE
LEUKOCYTE ESTERASE UR QL STRIP: NEGATIVE
LYMPHOCYTES # BLD AUTO: 1.01 K/UL (ref 1–4.8)
LYMPHOCYTES # BLD AUTO: 1.15 K/UL (ref 1–4.8)
MCH RBC QN AUTO: 31.2 PG (ref 27–31)
MCH RBC QN AUTO: 31.4 PG (ref 27–31)
MCHC RBC AUTO-ENTMCNC: 33.7 G/DL (ref 32–36)
MCHC RBC AUTO-ENTMCNC: 34.2 G/DL (ref 32–36)
MCV RBC AUTO: 92 FL (ref 82–98)
MCV RBC AUTO: 93 FL (ref 82–98)
MICROSCOPIC COMMENT: ABNORMAL
NITRITE UR QL STRIP: POSITIVE
NUCLEATED RBC (/100WBC) (OHS): 0 /100 WBC
NUCLEATED RBC (/100WBC) (OHS): 0 /100 WBC
PH UR STRIP: 7 [PH]
PLATELET # BLD AUTO: 285 K/UL (ref 150–450)
PLATELET # BLD AUTO: 295 K/UL (ref 150–450)
PMV BLD AUTO: 9.3 FL (ref 9.2–12.9)
PMV BLD AUTO: 9.4 FL (ref 9.2–12.9)
POTASSIUM SERPL-SCNC: 4.2 MMOL/L (ref 3.5–5.1)
PROT SERPL-MCNC: 7.7 GM/DL (ref 6–8.4)
PROT UR QL STRIP: ABNORMAL
PROTHROMBIN TIME: 11.4 SECONDS (ref 9–12.5)
RBC # BLD AUTO: 3.88 M/UL (ref 4.6–6.2)
RBC # BLD AUTO: 3.95 M/UL (ref 4.6–6.2)
RBC #/AREA URNS HPF: 100 /HPF (ref 0–4)
RELATIVE EOSINOPHIL (OHS): 1.1 %
RELATIVE EOSINOPHIL (OHS): 2.2 %
RELATIVE LYMPHOCYTE (OHS): 10.4 % (ref 18–48)
RELATIVE LYMPHOCYTE (OHS): 9.4 % (ref 18–48)
RELATIVE MONOCYTE (OHS): 6.8 % (ref 4–15)
RELATIVE MONOCYTE (OHS): 7.8 % (ref 4–15)
RELATIVE NEUTROPHIL (OHS): 78.4 % (ref 38–73)
RELATIVE NEUTROPHIL (OHS): 81.9 % (ref 38–73)
SODIUM SERPL-SCNC: 140 MMOL/L (ref 136–145)
SP GR UR STRIP: 1.01
UROBILINOGEN UR STRIP-ACNC: 1 EU/DL
WBC # BLD AUTO: 12.29 K/UL (ref 3.9–12.7)
WBC # BLD AUTO: 9.74 K/UL (ref 3.9–12.7)
WBC #/AREA URNS HPF: 3 /HPF (ref 0–5)
WBC CLUMPS URNS QL MICRO: ABNORMAL

## 2025-05-24 PROCEDURE — 99285 EMERGENCY DEPT VISIT HI MDM: CPT | Mod: 25,ER

## 2025-05-24 PROCEDURE — 81003 URINALYSIS AUTO W/O SCOPE: CPT | Mod: ER | Performed by: STUDENT IN AN ORGANIZED HEALTH CARE EDUCATION/TRAINING PROGRAM

## 2025-05-24 PROCEDURE — 96374 THER/PROPH/DIAG INJ IV PUSH: CPT | Mod: ER

## 2025-05-24 PROCEDURE — 96361 HYDRATE IV INFUSION ADD-ON: CPT | Mod: ER

## 2025-05-24 PROCEDURE — 25500020 PHARM REV CODE 255: Mod: ER | Performed by: STUDENT IN AN ORGANIZED HEALTH CARE EDUCATION/TRAINING PROGRAM

## 2025-05-24 PROCEDURE — 25000003 PHARM REV CODE 250: Performed by: INTERNAL MEDICINE

## 2025-05-24 PROCEDURE — 85610 PROTHROMBIN TIME: CPT | Mod: ER | Performed by: STUDENT IN AN ORGANIZED HEALTH CARE EDUCATION/TRAINING PROGRAM

## 2025-05-24 PROCEDURE — 99223 1ST HOSP IP/OBS HIGH 75: CPT | Mod: 24,,, | Performed by: STUDENT IN AN ORGANIZED HEALTH CARE EDUCATION/TRAINING PROGRAM

## 2025-05-24 PROCEDURE — 63600175 PHARM REV CODE 636 W HCPCS: Mod: ER | Performed by: STUDENT IN AN ORGANIZED HEALTH CARE EDUCATION/TRAINING PROGRAM

## 2025-05-24 PROCEDURE — 25000003 PHARM REV CODE 250: Mod: ER | Performed by: STUDENT IN AN ORGANIZED HEALTH CARE EDUCATION/TRAINING PROGRAM

## 2025-05-24 PROCEDURE — 25000003 PHARM REV CODE 250

## 2025-05-24 PROCEDURE — 85730 THROMBOPLASTIN TIME PARTIAL: CPT | Mod: ER | Performed by: STUDENT IN AN ORGANIZED HEALTH CARE EDUCATION/TRAINING PROGRAM

## 2025-05-24 PROCEDURE — 87086 URINE CULTURE/COLONY COUNT: CPT | Mod: ER | Performed by: STUDENT IN AN ORGANIZED HEALTH CARE EDUCATION/TRAINING PROGRAM

## 2025-05-24 PROCEDURE — 96375 TX/PRO/DX INJ NEW DRUG ADDON: CPT | Mod: ER

## 2025-05-24 PROCEDURE — 96376 TX/PRO/DX INJ SAME DRUG ADON: CPT | Mod: ER

## 2025-05-24 PROCEDURE — 11000001 HC ACUTE MED/SURG PRIVATE ROOM

## 2025-05-24 PROCEDURE — 85025 COMPLETE CBC W/AUTO DIFF WBC: CPT | Mod: ER | Performed by: STUDENT IN AN ORGANIZED HEALTH CARE EDUCATION/TRAINING PROGRAM

## 2025-05-24 PROCEDURE — 80053 COMPREHEN METABOLIC PANEL: CPT | Mod: ER | Performed by: STUDENT IN AN ORGANIZED HEALTH CARE EDUCATION/TRAINING PROGRAM

## 2025-05-24 RX ORDER — CARVEDILOL 12.5 MG/1
12.5 TABLET ORAL 2 TIMES DAILY
Status: DISCONTINUED | OUTPATIENT
Start: 2025-05-24 | End: 2025-06-01 | Stop reason: HOSPADM

## 2025-05-24 RX ORDER — TAMSULOSIN HYDROCHLORIDE 0.4 MG/1
0.4 CAPSULE ORAL DAILY
Status: DISCONTINUED | OUTPATIENT
Start: 2025-05-25 | End: 2025-06-01 | Stop reason: HOSPADM

## 2025-05-24 RX ORDER — VALSARTAN 80 MG/1
320 TABLET ORAL DAILY
Status: DISCONTINUED | OUTPATIENT
Start: 2025-05-25 | End: 2025-05-27

## 2025-05-24 RX ORDER — GUAIFENESIN 100 MG/5ML
200 LIQUID ORAL EVERY 4 HOURS PRN
Status: DISCONTINUED | OUTPATIENT
Start: 2025-05-24 | End: 2025-06-01 | Stop reason: HOSPADM

## 2025-05-24 RX ORDER — IBUPROFEN 200 MG
24 TABLET ORAL
Status: DISCONTINUED | OUTPATIENT
Start: 2025-05-24 | End: 2025-06-01 | Stop reason: HOSPADM

## 2025-05-24 RX ORDER — ATORVASTATIN CALCIUM 40 MG/1
40 TABLET, FILM COATED ORAL DAILY
Status: DISCONTINUED | OUTPATIENT
Start: 2025-05-25 | End: 2025-06-01 | Stop reason: HOSPADM

## 2025-05-24 RX ORDER — MORPHINE SULFATE 4 MG/ML
4 INJECTION, SOLUTION INTRAMUSCULAR; INTRAVENOUS
Refills: 0 | Status: COMPLETED | OUTPATIENT
Start: 2025-05-24 | End: 2025-05-24

## 2025-05-24 RX ORDER — BUSPIRONE HYDROCHLORIDE 5 MG/1
5 TABLET ORAL 2 TIMES DAILY
Status: DISCONTINUED | OUTPATIENT
Start: 2025-05-24 | End: 2025-06-01 | Stop reason: HOSPADM

## 2025-05-24 RX ORDER — NALOXONE HCL 0.4 MG/ML
0.02 VIAL (ML) INJECTION
Status: DISCONTINUED | OUTPATIENT
Start: 2025-05-24 | End: 2025-06-01 | Stop reason: HOSPADM

## 2025-05-24 RX ORDER — GLUCAGON 1 MG
1 KIT INJECTION
Status: DISCONTINUED | OUTPATIENT
Start: 2025-05-24 | End: 2025-06-01 | Stop reason: HOSPADM

## 2025-05-24 RX ORDER — IBUPROFEN 200 MG
16 TABLET ORAL
Status: DISCONTINUED | OUTPATIENT
Start: 2025-05-24 | End: 2025-06-01 | Stop reason: HOSPADM

## 2025-05-24 RX ORDER — LATANOPROST 50 UG/ML
1 SOLUTION/ DROPS OPHTHALMIC NIGHTLY
Status: DISCONTINUED | OUTPATIENT
Start: 2025-05-24 | End: 2025-06-01 | Stop reason: HOSPADM

## 2025-05-24 RX ORDER — DEXTROMETHORPHAN POLISTIREX 30 MG/5 ML
1 SUSPENSION, EXTENDED RELEASE 12 HR ORAL DAILY PRN
Status: DISCONTINUED | OUTPATIENT
Start: 2025-05-24 | End: 2025-06-01 | Stop reason: HOSPADM

## 2025-05-24 RX ORDER — MUPIROCIN 20 MG/G
OINTMENT TOPICAL 2 TIMES DAILY
Status: COMPLETED | OUTPATIENT
Start: 2025-05-24 | End: 2025-05-29

## 2025-05-24 RX ORDER — ONDANSETRON HYDROCHLORIDE 2 MG/ML
4 INJECTION, SOLUTION INTRAVENOUS
Status: COMPLETED | OUTPATIENT
Start: 2025-05-24 | End: 2025-05-24

## 2025-05-24 RX ORDER — SULFAMETHOXAZOLE AND TRIMETHOPRIM 400; 80 MG/1; MG/1
1 TABLET ORAL 2 TIMES DAILY
Status: DISCONTINUED | OUTPATIENT
Start: 2025-05-24 | End: 2025-05-25

## 2025-05-24 RX ORDER — POLYETHYLENE GLYCOL 3350 17 G/17G
17 POWDER, FOR SOLUTION ORAL DAILY
Status: DISCONTINUED | OUTPATIENT
Start: 2025-05-25 | End: 2025-05-28

## 2025-05-24 RX ORDER — ACETAMINOPHEN 325 MG/1
650 TABLET ORAL EVERY 4 HOURS PRN
Status: DISCONTINUED | OUTPATIENT
Start: 2025-05-24 | End: 2025-06-01 | Stop reason: HOSPADM

## 2025-05-24 RX ORDER — CYCLOBENZAPRINE HCL 5 MG
10 TABLET ORAL EVERY 8 HOURS PRN
Status: DISCONTINUED | OUTPATIENT
Start: 2025-05-24 | End: 2025-06-01 | Stop reason: HOSPADM

## 2025-05-24 RX ORDER — PANTOPRAZOLE SODIUM 40 MG/1
40 TABLET, DELAYED RELEASE ORAL DAILY
Status: DISCONTINUED | OUTPATIENT
Start: 2025-05-25 | End: 2025-06-01 | Stop reason: HOSPADM

## 2025-05-24 RX ORDER — TALC
6 POWDER (GRAM) TOPICAL NIGHTLY PRN
Status: DISCONTINUED | OUTPATIENT
Start: 2025-05-24 | End: 2025-06-01 | Stop reason: HOSPADM

## 2025-05-24 RX ORDER — PROCHLORPERAZINE EDISYLATE 5 MG/ML
5 INJECTION INTRAMUSCULAR; INTRAVENOUS EVERY 6 HOURS PRN
Status: DISCONTINUED | OUTPATIENT
Start: 2025-05-24 | End: 2025-06-01 | Stop reason: HOSPADM

## 2025-05-24 RX ORDER — AMLODIPINE BESYLATE 5 MG/1
10 TABLET ORAL NIGHTLY
Status: DISCONTINUED | OUTPATIENT
Start: 2025-05-24 | End: 2025-06-01 | Stop reason: HOSPADM

## 2025-05-24 RX ORDER — LOPERAMIDE HYDROCHLORIDE 2 MG/1
4 CAPSULE ORAL ONCE AS NEEDED
Status: DISCONTINUED | OUTPATIENT
Start: 2025-05-24 | End: 2025-06-01 | Stop reason: HOSPADM

## 2025-05-24 RX ORDER — BENZONATATE 100 MG/1
100 CAPSULE ORAL 3 TIMES DAILY PRN
Status: DISCONTINUED | OUTPATIENT
Start: 2025-05-24 | End: 2025-06-01 | Stop reason: HOSPADM

## 2025-05-24 RX ORDER — HYDRALAZINE HYDROCHLORIDE 20 MG/ML
10 INJECTION INTRAMUSCULAR; INTRAVENOUS EVERY 6 HOURS PRN
Status: DISCONTINUED | OUTPATIENT
Start: 2025-05-24 | End: 2025-06-01 | Stop reason: HOSPADM

## 2025-05-24 RX ORDER — ONDANSETRON HYDROCHLORIDE 2 MG/ML
4 INJECTION, SOLUTION INTRAVENOUS EVERY 6 HOURS PRN
Status: DISCONTINUED | OUTPATIENT
Start: 2025-05-24 | End: 2025-06-01 | Stop reason: HOSPADM

## 2025-05-24 RX ORDER — SODIUM CHLORIDE 0.9 % (FLUSH) 0.9 %
10 SYRINGE (ML) INJECTION EVERY 12 HOURS PRN
Status: DISCONTINUED | OUTPATIENT
Start: 2025-05-24 | End: 2025-06-01 | Stop reason: HOSPADM

## 2025-05-24 RX ORDER — ALUMINUM HYDROXIDE, MAGNESIUM HYDROXIDE, AND SIMETHICONE 1200; 120; 1200 MG/30ML; MG/30ML; MG/30ML
30 SUSPENSION ORAL 4 TIMES DAILY PRN
Status: DISCONTINUED | OUTPATIENT
Start: 2025-05-24 | End: 2025-06-01 | Stop reason: HOSPADM

## 2025-05-24 RX ORDER — OXYCODONE HYDROCHLORIDE 5 MG/1
5 TABLET ORAL EVERY 4 HOURS PRN
Refills: 0 | Status: DISCONTINUED | OUTPATIENT
Start: 2025-05-24 | End: 2025-06-01 | Stop reason: HOSPADM

## 2025-05-24 RX ADMIN — SODIUM CHLORIDE 1000 ML: 0.9 INJECTION, SOLUTION INTRAVENOUS at 11:05

## 2025-05-24 RX ADMIN — IOHEXOL 100 ML: 350 INJECTION, SOLUTION INTRAVENOUS at 09:05

## 2025-05-24 RX ADMIN — MORPHINE SULFATE 4 MG: 4 INJECTION INTRAVENOUS at 10:05

## 2025-05-24 RX ADMIN — SULFAMETHOXAZOLE AND TRIMETHOPRIM 1 TABLET: 400; 80 TABLET ORAL at 09:05

## 2025-05-24 RX ADMIN — MUPIROCIN: 20 OINTMENT TOPICAL at 09:05

## 2025-05-24 RX ADMIN — IOHEXOL 100 ML: 350 INJECTION, SOLUTION INTRAVENOUS at 12:05

## 2025-05-24 RX ADMIN — LATANOPROST 1 DROP: 50 SOLUTION OPHTHALMIC at 09:05

## 2025-05-24 RX ADMIN — ONDANSETRON 4 MG: 2 INJECTION INTRAMUSCULAR; INTRAVENOUS at 10:05

## 2025-05-24 RX ADMIN — CARVEDILOL 12.5 MG: 12.5 TABLET, FILM COATED ORAL at 10:05

## 2025-05-24 RX ADMIN — MORPHINE SULFATE 4 MG: 4 INJECTION INTRAVENOUS at 01:05

## 2025-05-24 RX ADMIN — AMLODIPINE BESYLATE 10 MG: 5 TABLET ORAL at 10:05

## 2025-05-25 LAB
ABSOLUTE EOSINOPHIL (OHS): 0.13 K/UL
ABSOLUTE MONOCYTE (OHS): 1.23 K/UL (ref 0.3–1)
ABSOLUTE NEUTROPHIL COUNT (OHS): 14.18 K/UL (ref 1.8–7.7)
ALBUMIN SERPL BCP-MCNC: 3.3 G/DL (ref 3.5–5.2)
ALP SERPL-CCNC: 103 UNIT/L (ref 40–150)
ALT SERPL W/O P-5'-P-CCNC: 50 UNIT/L (ref 10–44)
ANION GAP (OHS): 12 MMOL/L (ref 8–16)
AST SERPL-CCNC: 33 UNIT/L (ref 11–45)
BASOPHILS # BLD AUTO: 0.04 K/UL
BASOPHILS NFR BLD AUTO: 0.2 %
BILIRUB SERPL-MCNC: 0.7 MG/DL (ref 0.1–1)
BUN SERPL-MCNC: 16 MG/DL (ref 8–23)
CALCIUM SERPL-MCNC: 9 MG/DL (ref 8.7–10.5)
CHLORIDE SERPL-SCNC: 107 MMOL/L (ref 95–110)
CO2 SERPL-SCNC: 19 MMOL/L (ref 23–29)
CREAT SERPL-MCNC: 1.4 MG/DL (ref 0.5–1.4)
ERYTHROCYTE [DISTWIDTH] IN BLOOD BY AUTOMATED COUNT: 13.7 % (ref 11.5–14.5)
GFR SERPLBLD CREATININE-BSD FMLA CKD-EPI: 55 ML/MIN/1.73/M2
GLUCOSE SERPL-MCNC: 116 MG/DL (ref 70–110)
HCT VFR BLD AUTO: 36.8 % (ref 40–54)
HGB BLD-MCNC: 11.7 GM/DL (ref 14–18)
IMM GRANULOCYTES # BLD AUTO: 0.07 K/UL (ref 0–0.04)
IMM GRANULOCYTES NFR BLD AUTO: 0.4 % (ref 0–0.5)
LYMPHOCYTES # BLD AUTO: 0.97 K/UL (ref 1–4.8)
MAGNESIUM SERPL-MCNC: 2 MG/DL (ref 1.6–2.6)
MCH RBC QN AUTO: 29.9 PG (ref 27–31)
MCHC RBC AUTO-ENTMCNC: 31.8 G/DL (ref 32–36)
MCV RBC AUTO: 94 FL (ref 82–98)
NUCLEATED RBC (/100WBC) (OHS): 0 /100 WBC
PHOSPHATE SERPL-MCNC: 3.5 MG/DL (ref 2.7–4.5)
PLATELET # BLD AUTO: 297 K/UL (ref 150–450)
PMV BLD AUTO: 9.7 FL (ref 9.2–12.9)
POTASSIUM SERPL-SCNC: 4.4 MMOL/L (ref 3.5–5.1)
PROT SERPL-MCNC: 7.2 GM/DL (ref 6–8.4)
RBC # BLD AUTO: 3.91 M/UL (ref 4.6–6.2)
RELATIVE EOSINOPHIL (OHS): 0.8 %
RELATIVE LYMPHOCYTE (OHS): 5.8 % (ref 18–48)
RELATIVE MONOCYTE (OHS): 7.4 % (ref 4–15)
RELATIVE NEUTROPHIL (OHS): 85.4 % (ref 38–73)
SODIUM SERPL-SCNC: 138 MMOL/L (ref 136–145)
WBC # BLD AUTO: 16.62 K/UL (ref 3.9–12.7)

## 2025-05-25 PROCEDURE — 25000003 PHARM REV CODE 250: Performed by: HOSPITALIST

## 2025-05-25 PROCEDURE — 25000003 PHARM REV CODE 250

## 2025-05-25 PROCEDURE — 25000003 PHARM REV CODE 250: Performed by: INTERNAL MEDICINE

## 2025-05-25 PROCEDURE — 80053 COMPREHEN METABOLIC PANEL: CPT

## 2025-05-25 PROCEDURE — 36415 COLL VENOUS BLD VENIPUNCTURE: CPT

## 2025-05-25 PROCEDURE — 85025 COMPLETE CBC W/AUTO DIFF WBC: CPT

## 2025-05-25 PROCEDURE — 63600175 PHARM REV CODE 636 W HCPCS: Performed by: HOSPITALIST

## 2025-05-25 PROCEDURE — 11000001 HC ACUTE MED/SURG PRIVATE ROOM

## 2025-05-25 PROCEDURE — 99233 SBSQ HOSP IP/OBS HIGH 50: CPT | Mod: 24,,, | Performed by: STUDENT IN AN ORGANIZED HEALTH CARE EDUCATION/TRAINING PROGRAM

## 2025-05-25 PROCEDURE — 84100 ASSAY OF PHOSPHORUS: CPT

## 2025-05-25 PROCEDURE — 83735 ASSAY OF MAGNESIUM: CPT

## 2025-05-25 RX ORDER — CEFTRIAXONE 2 G/1
2 INJECTION, POWDER, FOR SOLUTION INTRAMUSCULAR; INTRAVENOUS
Status: DISCONTINUED | OUTPATIENT
Start: 2025-05-25 | End: 2025-05-27

## 2025-05-25 RX ORDER — SODIUM CHLORIDE 9 MG/ML
INJECTION, SOLUTION INTRAVENOUS CONTINUOUS
Status: ACTIVE | OUTPATIENT
Start: 2025-05-25 | End: 2025-05-25

## 2025-05-25 RX ADMIN — TAMSULOSIN HYDROCHLORIDE 0.4 MG: 0.4 CAPSULE ORAL at 09:05

## 2025-05-25 RX ADMIN — MUPIROCIN: 20 OINTMENT TOPICAL at 08:05

## 2025-05-25 RX ADMIN — SODIUM CHLORIDE: 0.9 INJECTION, SOLUTION INTRAVENOUS at 12:05

## 2025-05-25 RX ADMIN — MUPIROCIN: 20 OINTMENT TOPICAL at 09:05

## 2025-05-25 RX ADMIN — LATANOPROST 1 DROP: 50 SOLUTION OPHTHALMIC at 08:05

## 2025-05-25 RX ADMIN — CARVEDILOL 12.5 MG: 12.5 TABLET, FILM COATED ORAL at 09:05

## 2025-05-25 RX ADMIN — SULFAMETHOXAZOLE AND TRIMETHOPRIM 1 TABLET: 400; 80 TABLET ORAL at 09:05

## 2025-05-25 RX ADMIN — CEFTRIAXONE 2 G: 2 INJECTION, POWDER, FOR SOLUTION INTRAMUSCULAR; INTRAVENOUS at 12:05

## 2025-05-25 RX ADMIN — VALSARTAN 320 MG: 80 TABLET, FILM COATED ORAL at 09:05

## 2025-05-25 RX ADMIN — ATORVASTATIN CALCIUM 40 MG: 40 TABLET, FILM COATED ORAL at 09:05

## 2025-05-25 RX ADMIN — OXYCODONE HYDROCHLORIDE 5 MG: 5 TABLET ORAL at 08:05

## 2025-05-25 RX ADMIN — PANTOPRAZOLE SODIUM 40 MG: 40 TABLET, DELAYED RELEASE ORAL at 09:05

## 2025-05-25 RX ADMIN — AMLODIPINE BESYLATE 10 MG: 5 TABLET ORAL at 08:05

## 2025-05-25 RX ADMIN — CARVEDILOL 12.5 MG: 12.5 TABLET, FILM COATED ORAL at 08:05

## 2025-05-26 PROBLEM — N30.01 ACUTE CYSTITIS WITH HEMATURIA: Status: ACTIVE | Noted: 2025-05-26

## 2025-05-26 PROBLEM — N17.9 AKI (ACUTE KIDNEY INJURY): Status: ACTIVE | Noted: 2025-05-26

## 2025-05-26 PROBLEM — R31.0 GROSS HEMATURIA: Status: ACTIVE | Noted: 2025-05-24

## 2025-05-26 LAB
ABSOLUTE EOSINOPHIL (OHS): 0.18 K/UL
ABSOLUTE MONOCYTE (OHS): 1.47 K/UL (ref 0.3–1)
ABSOLUTE NEUTROPHIL COUNT (OHS): 13.37 K/UL (ref 1.8–7.7)
ALBUMIN SERPL BCP-MCNC: 3 G/DL (ref 3.5–5.2)
ALP SERPL-CCNC: 88 UNIT/L (ref 40–150)
ALT SERPL W/O P-5'-P-CCNC: 38 UNIT/L (ref 10–44)
ANION GAP (OHS): 10 MMOL/L (ref 8–16)
AST SERPL-CCNC: 23 UNIT/L (ref 11–45)
BACTERIA UR CULT: NORMAL
BASOPHILS # BLD AUTO: 0.02 K/UL
BASOPHILS NFR BLD AUTO: 0.1 %
BILIRUB SERPL-MCNC: 0.7 MG/DL (ref 0.1–1)
BUN SERPL-MCNC: 20 MG/DL (ref 8–23)
CALCIUM SERPL-MCNC: 8.3 MG/DL (ref 8.7–10.5)
CHLORIDE SERPL-SCNC: 107 MMOL/L (ref 95–110)
CO2 SERPL-SCNC: 20 MMOL/L (ref 23–29)
CREAT SERPL-MCNC: 1.7 MG/DL (ref 0.5–1.4)
ERYTHROCYTE [DISTWIDTH] IN BLOOD BY AUTOMATED COUNT: 13.5 % (ref 11.5–14.5)
GFR SERPLBLD CREATININE-BSD FMLA CKD-EPI: 44 ML/MIN/1.73/M2
GLUCOSE SERPL-MCNC: 111 MG/DL (ref 70–110)
HCT VFR BLD AUTO: 32.4 % (ref 40–54)
HGB BLD-MCNC: 10.4 GM/DL (ref 14–18)
IMM GRANULOCYTES # BLD AUTO: 0.06 K/UL (ref 0–0.04)
IMM GRANULOCYTES NFR BLD AUTO: 0.4 % (ref 0–0.5)
LYMPHOCYTES # BLD AUTO: 1.29 K/UL (ref 1–4.8)
MAGNESIUM SERPL-MCNC: 2 MG/DL (ref 1.6–2.6)
MCH RBC QN AUTO: 30.1 PG (ref 27–31)
MCHC RBC AUTO-ENTMCNC: 32.1 G/DL (ref 32–36)
MCV RBC AUTO: 94 FL (ref 82–98)
NUCLEATED RBC (/100WBC) (OHS): 0 /100 WBC
PHOSPHATE SERPL-MCNC: 2.7 MG/DL (ref 2.7–4.5)
PLATELET # BLD AUTO: 279 K/UL (ref 150–450)
PMV BLD AUTO: 9.3 FL (ref 9.2–12.9)
POTASSIUM SERPL-SCNC: 3.8 MMOL/L (ref 3.5–5.1)
PROT SERPL-MCNC: 6.5 GM/DL (ref 6–8.4)
RBC # BLD AUTO: 3.45 M/UL (ref 4.6–6.2)
RELATIVE EOSINOPHIL (OHS): 1.1 %
RELATIVE LYMPHOCYTE (OHS): 7.9 % (ref 18–48)
RELATIVE MONOCYTE (OHS): 9 % (ref 4–15)
RELATIVE NEUTROPHIL (OHS): 81.5 % (ref 38–73)
SODIUM SERPL-SCNC: 137 MMOL/L (ref 136–145)
WBC # BLD AUTO: 16.39 K/UL (ref 3.9–12.7)

## 2025-05-26 PROCEDURE — 25000003 PHARM REV CODE 250: Performed by: INTERNAL MEDICINE

## 2025-05-26 PROCEDURE — 99024 POSTOP FOLLOW-UP VISIT: CPT | Mod: ,,, | Performed by: UROLOGY

## 2025-05-26 PROCEDURE — 25000003 PHARM REV CODE 250

## 2025-05-26 PROCEDURE — 25000003 PHARM REV CODE 250: Performed by: HOSPITALIST

## 2025-05-26 PROCEDURE — 63600175 PHARM REV CODE 636 W HCPCS: Performed by: HOSPITALIST

## 2025-05-26 PROCEDURE — 36415 COLL VENOUS BLD VENIPUNCTURE: CPT

## 2025-05-26 PROCEDURE — 85025 COMPLETE CBC W/AUTO DIFF WBC: CPT

## 2025-05-26 PROCEDURE — 99223 1ST HOSP IP/OBS HIGH 75: CPT | Mod: ,,, | Performed by: STUDENT IN AN ORGANIZED HEALTH CARE EDUCATION/TRAINING PROGRAM

## 2025-05-26 PROCEDURE — 84100 ASSAY OF PHOSPHORUS: CPT

## 2025-05-26 PROCEDURE — 80053 COMPREHEN METABOLIC PANEL: CPT

## 2025-05-26 PROCEDURE — 11000001 HC ACUTE MED/SURG PRIVATE ROOM

## 2025-05-26 PROCEDURE — 83735 ASSAY OF MAGNESIUM: CPT

## 2025-05-26 RX ORDER — SODIUM CHLORIDE 9 MG/ML
INJECTION, SOLUTION INTRAVENOUS CONTINUOUS
Status: DISCONTINUED | OUTPATIENT
Start: 2025-05-26 | End: 2025-05-29

## 2025-05-26 RX ORDER — LEVOFLOXACIN 500 MG/1
500 TABLET, FILM COATED ORAL DAILY
Status: DISCONTINUED | OUTPATIENT
Start: 2025-05-26 | End: 2025-05-31

## 2025-05-26 RX ADMIN — MUPIROCIN: 20 OINTMENT TOPICAL at 09:05

## 2025-05-26 RX ADMIN — POLYETHYLENE GLYCOL 3350 17 G: 17 POWDER, FOR SOLUTION ORAL at 09:05

## 2025-05-26 RX ADMIN — LATANOPROST 1 DROP: 50 SOLUTION OPHTHALMIC at 09:05

## 2025-05-26 RX ADMIN — TAMSULOSIN HYDROCHLORIDE 0.4 MG: 0.4 CAPSULE ORAL at 09:05

## 2025-05-26 RX ADMIN — LEVOFLOXACIN 500 MG: 500 TABLET, FILM COATED ORAL at 11:05

## 2025-05-26 RX ADMIN — VALSARTAN 320 MG: 80 TABLET, FILM COATED ORAL at 09:05

## 2025-05-26 RX ADMIN — CARVEDILOL 12.5 MG: 12.5 TABLET, FILM COATED ORAL at 09:05

## 2025-05-26 RX ADMIN — OXYCODONE HYDROCHLORIDE 5 MG: 5 TABLET ORAL at 10:05

## 2025-05-26 RX ADMIN — OXYCODONE HYDROCHLORIDE 5 MG: 5 TABLET ORAL at 09:05

## 2025-05-26 RX ADMIN — CEFTRIAXONE 2 G: 2 INJECTION, POWDER, FOR SOLUTION INTRAMUSCULAR; INTRAVENOUS at 11:05

## 2025-05-26 RX ADMIN — ATORVASTATIN CALCIUM 40 MG: 40 TABLET, FILM COATED ORAL at 09:05

## 2025-05-26 RX ADMIN — SODIUM CHLORIDE: 9 INJECTION, SOLUTION INTRAVENOUS at 09:05

## 2025-05-26 RX ADMIN — OXYCODONE HYDROCHLORIDE 5 MG: 5 TABLET ORAL at 01:05

## 2025-05-26 RX ADMIN — AMLODIPINE BESYLATE 10 MG: 5 TABLET ORAL at 09:05

## 2025-05-26 RX ADMIN — PANTOPRAZOLE SODIUM 40 MG: 40 TABLET, DELAYED RELEASE ORAL at 09:05

## 2025-05-27 LAB
ABSOLUTE EOSINOPHIL (OHS): 0.08 K/UL
ABSOLUTE EOSINOPHIL (OHS): 0.19 K/UL
ABSOLUTE MONOCYTE (OHS): 1.42 K/UL (ref 0.3–1)
ABSOLUTE MONOCYTE (OHS): 1.43 K/UL (ref 0.3–1)
ABSOLUTE NEUTROPHIL COUNT (OHS): 13.28 K/UL (ref 1.8–7.7)
ABSOLUTE NEUTROPHIL COUNT (OHS): 16.04 K/UL (ref 1.8–7.7)
ALBUMIN SERPL BCP-MCNC: 3 G/DL (ref 3.5–5.2)
ALP SERPL-CCNC: 90 UNIT/L (ref 40–150)
ALT SERPL W/O P-5'-P-CCNC: 31 UNIT/L (ref 10–44)
ANION GAP (OHS): 6 MMOL/L (ref 8–16)
ANION GAP (OHS): 8 MMOL/L (ref 8–16)
AST SERPL-CCNC: 16 UNIT/L (ref 11–45)
BASOPHILS # BLD AUTO: 0.03 K/UL
BASOPHILS # BLD AUTO: 0.03 K/UL
BASOPHILS NFR BLD AUTO: 0.2 %
BASOPHILS NFR BLD AUTO: 0.2 %
BILIRUB SERPL-MCNC: 0.5 MG/DL (ref 0.1–1)
BUN SERPL-MCNC: 19 MG/DL (ref 8–23)
BUN SERPL-MCNC: 21 MG/DL (ref 8–23)
CALCIUM SERPL-MCNC: 8.6 MG/DL (ref 8.7–10.5)
CALCIUM SERPL-MCNC: 8.7 MG/DL (ref 8.7–10.5)
CHLORIDE SERPL-SCNC: 109 MMOL/L (ref 95–110)
CHLORIDE SERPL-SCNC: 110 MMOL/L (ref 95–110)
CO2 SERPL-SCNC: 19 MMOL/L (ref 23–29)
CO2 SERPL-SCNC: 22 MMOL/L (ref 23–29)
CREAT SERPL-MCNC: 1.6 MG/DL (ref 0.5–1.4)
CREAT SERPL-MCNC: 1.6 MG/DL (ref 0.5–1.4)
ERYTHROCYTE [DISTWIDTH] IN BLOOD BY AUTOMATED COUNT: 13.5 % (ref 11.5–14.5)
ERYTHROCYTE [DISTWIDTH] IN BLOOD BY AUTOMATED COUNT: 13.5 % (ref 11.5–14.5)
GFR SERPLBLD CREATININE-BSD FMLA CKD-EPI: 47 ML/MIN/1.73/M2
GFR SERPLBLD CREATININE-BSD FMLA CKD-EPI: 47 ML/MIN/1.73/M2
GLUCOSE SERPL-MCNC: 122 MG/DL (ref 70–110)
GLUCOSE SERPL-MCNC: 146 MG/DL (ref 70–110)
HCT VFR BLD AUTO: 30.4 % (ref 40–54)
HCT VFR BLD AUTO: 30.8 % (ref 40–54)
HGB BLD-MCNC: 10.1 GM/DL (ref 14–18)
HGB BLD-MCNC: 9.9 GM/DL (ref 14–18)
IMM GRANULOCYTES # BLD AUTO: 0.08 K/UL (ref 0–0.04)
IMM GRANULOCYTES # BLD AUTO: 0.08 K/UL (ref 0–0.04)
IMM GRANULOCYTES NFR BLD AUTO: 0.4 % (ref 0–0.5)
IMM GRANULOCYTES NFR BLD AUTO: 0.5 % (ref 0–0.5)
LYMPHOCYTES # BLD AUTO: 0.83 K/UL (ref 1–4.8)
LYMPHOCYTES # BLD AUTO: 0.89 K/UL (ref 1–4.8)
MAGNESIUM SERPL-MCNC: 2.1 MG/DL (ref 1.6–2.6)
MCH RBC QN AUTO: 30.1 PG (ref 27–31)
MCH RBC QN AUTO: 31.2 PG (ref 27–31)
MCHC RBC AUTO-ENTMCNC: 32.1 G/DL (ref 32–36)
MCHC RBC AUTO-ENTMCNC: 33.2 G/DL (ref 32–36)
MCV RBC AUTO: 94 FL (ref 82–98)
MCV RBC AUTO: 94 FL (ref 82–98)
NUCLEATED RBC (/100WBC) (OHS): 0 /100 WBC
NUCLEATED RBC (/100WBC) (OHS): 0 /100 WBC
PHOSPHATE SERPL-MCNC: 2.7 MG/DL (ref 2.7–4.5)
PLATELET # BLD AUTO: 261 K/UL (ref 150–450)
PLATELET # BLD AUTO: 291 K/UL (ref 150–450)
PMV BLD AUTO: 9.4 FL (ref 9.2–12.9)
PMV BLD AUTO: 9.5 FL (ref 9.2–12.9)
POTASSIUM SERPL-SCNC: 3.6 MMOL/L (ref 3.5–5.1)
POTASSIUM SERPL-SCNC: 4.2 MMOL/L (ref 3.5–5.1)
PROT SERPL-MCNC: 6.7 GM/DL (ref 6–8.4)
RBC # BLD AUTO: 3.24 M/UL (ref 4.6–6.2)
RBC # BLD AUTO: 3.29 M/UL (ref 4.6–6.2)
RELATIVE EOSINOPHIL (OHS): 0.4 %
RELATIVE EOSINOPHIL (OHS): 1.2 %
RELATIVE LYMPHOCYTE (OHS): 4.8 % (ref 18–48)
RELATIVE LYMPHOCYTE (OHS): 5.2 % (ref 18–48)
RELATIVE MONOCYTE (OHS): 7.7 % (ref 4–15)
RELATIVE MONOCYTE (OHS): 9 % (ref 4–15)
RELATIVE NEUTROPHIL (OHS): 83.9 % (ref 38–73)
RELATIVE NEUTROPHIL (OHS): 86.5 % (ref 38–73)
SODIUM SERPL-SCNC: 136 MMOL/L (ref 136–145)
SODIUM SERPL-SCNC: 138 MMOL/L (ref 136–145)
WBC # BLD AUTO: 15.83 K/UL (ref 3.9–12.7)
WBC # BLD AUTO: 18.55 K/UL (ref 3.9–12.7)

## 2025-05-27 PROCEDURE — 84100 ASSAY OF PHOSPHORUS: CPT

## 2025-05-27 PROCEDURE — 25000003 PHARM REV CODE 250: Performed by: HOSPITALIST

## 2025-05-27 PROCEDURE — 85025 COMPLETE CBC W/AUTO DIFF WBC: CPT | Performed by: PHYSICIAN ASSISTANT

## 2025-05-27 PROCEDURE — 83735 ASSAY OF MAGNESIUM: CPT

## 2025-05-27 PROCEDURE — 85025 COMPLETE CBC W/AUTO DIFF WBC: CPT

## 2025-05-27 PROCEDURE — 82374 ASSAY BLOOD CARBON DIOXIDE: CPT | Performed by: PHYSICIAN ASSISTANT

## 2025-05-27 PROCEDURE — 80053 COMPREHEN METABOLIC PANEL: CPT

## 2025-05-27 PROCEDURE — 36415 COLL VENOUS BLD VENIPUNCTURE: CPT

## 2025-05-27 PROCEDURE — 99024 POSTOP FOLLOW-UP VISIT: CPT | Mod: ,,, | Performed by: STUDENT IN AN ORGANIZED HEALTH CARE EDUCATION/TRAINING PROGRAM

## 2025-05-27 PROCEDURE — 11000001 HC ACUTE MED/SURG PRIVATE ROOM

## 2025-05-27 PROCEDURE — 63600175 PHARM REV CODE 636 W HCPCS: Performed by: INTERNAL MEDICINE

## 2025-05-27 PROCEDURE — 36415 COLL VENOUS BLD VENIPUNCTURE: CPT | Performed by: PHYSICIAN ASSISTANT

## 2025-05-27 PROCEDURE — 25000003 PHARM REV CODE 250: Performed by: PHYSICIAN ASSISTANT

## 2025-05-27 PROCEDURE — 25000003 PHARM REV CODE 250

## 2025-05-27 PROCEDURE — 25000003 PHARM REV CODE 250: Performed by: INTERNAL MEDICINE

## 2025-05-27 RX ORDER — MORPHINE SULFATE 4 MG/ML
2 INJECTION, SOLUTION INTRAMUSCULAR; INTRAVENOUS ONCE
Status: COMPLETED | OUTPATIENT
Start: 2025-05-27 | End: 2025-05-27

## 2025-05-27 RX ORDER — NITROFURANTOIN 25; 75 MG/1; MG/1
100 CAPSULE ORAL EVERY 12 HOURS
Status: DISCONTINUED | OUTPATIENT
Start: 2025-05-27 | End: 2025-05-31

## 2025-05-27 RX ADMIN — CYCLOBENZAPRINE HYDROCHLORIDE 10 MG: 5 TABLET, FILM COATED ORAL at 04:05

## 2025-05-27 RX ADMIN — LATANOPROST 1 DROP: 50 SOLUTION OPHTHALMIC at 09:05

## 2025-05-27 RX ADMIN — OXYCODONE HYDROCHLORIDE 5 MG: 5 TABLET ORAL at 06:05

## 2025-05-27 RX ADMIN — MUPIROCIN: 20 OINTMENT TOPICAL at 09:05

## 2025-05-27 RX ADMIN — OXYCODONE HYDROCHLORIDE 5 MG: 5 TABLET ORAL at 04:05

## 2025-05-27 RX ADMIN — ATORVASTATIN CALCIUM 40 MG: 40 TABLET, FILM COATED ORAL at 09:05

## 2025-05-27 RX ADMIN — NITROFURANTOIN MONOHYDRATE/MACROCRYSTALS 100 MG: 25; 75 CAPSULE ORAL at 09:05

## 2025-05-27 RX ADMIN — CARVEDILOL 12.5 MG: 12.5 TABLET, FILM COATED ORAL at 09:05

## 2025-05-27 RX ADMIN — SODIUM CHLORIDE: 9 INJECTION, SOLUTION INTRAVENOUS at 08:05

## 2025-05-27 RX ADMIN — BUSPIRONE HYDROCHLORIDE 5 MG: 5 TABLET ORAL at 09:05

## 2025-05-27 RX ADMIN — SODIUM CHLORIDE: 9 INJECTION, SOLUTION INTRAVENOUS at 06:05

## 2025-05-27 RX ADMIN — TAMSULOSIN HYDROCHLORIDE 0.4 MG: 0.4 CAPSULE ORAL at 09:05

## 2025-05-27 RX ADMIN — AMLODIPINE BESYLATE 10 MG: 5 TABLET ORAL at 09:05

## 2025-05-27 RX ADMIN — CYCLOBENZAPRINE HYDROCHLORIDE 10 MG: 5 TABLET, FILM COATED ORAL at 09:05

## 2025-05-27 RX ADMIN — PANTOPRAZOLE SODIUM 40 MG: 40 TABLET, DELAYED RELEASE ORAL at 09:05

## 2025-05-27 RX ADMIN — NITROFURANTOIN MONOHYDRATE/MACROCRYSTALS 100 MG: 25; 75 CAPSULE ORAL at 11:05

## 2025-05-27 RX ADMIN — LEVOFLOXACIN 500 MG: 500 TABLET, FILM COATED ORAL at 09:05

## 2025-05-27 RX ADMIN — MORPHINE SULFATE 2 MG: 4 INJECTION INTRAVENOUS at 12:05

## 2025-05-27 RX ADMIN — POLYETHYLENE GLYCOL 3350 17 G: 17 POWDER, FOR SOLUTION ORAL at 09:05

## 2025-05-27 RX ADMIN — VALSARTAN 320 MG: 80 TABLET, FILM COATED ORAL at 09:05

## 2025-05-28 LAB
ABSOLUTE EOSINOPHIL (OHS): 0.16 K/UL
ABSOLUTE MONOCYTE (OHS): 1.66 K/UL (ref 0.3–1)
ABSOLUTE NEUTROPHIL COUNT (OHS): 14.64 K/UL (ref 1.8–7.7)
ALBUMIN SERPL BCP-MCNC: 3.1 G/DL (ref 3.5–5.2)
ALP SERPL-CCNC: 92 UNIT/L (ref 40–150)
ALT SERPL W/O P-5'-P-CCNC: 25 UNIT/L (ref 10–44)
ANION GAP (OHS): 8 MMOL/L (ref 8–16)
ANION GAP (OHS): 9 MMOL/L (ref 8–16)
AST SERPL-CCNC: 16 UNIT/L (ref 11–45)
BASOPHILS # BLD AUTO: 0.04 K/UL
BASOPHILS NFR BLD AUTO: 0.2 %
BILIRUB SERPL-MCNC: 0.7 MG/DL (ref 0.1–1)
BUN SERPL-MCNC: 17 MG/DL (ref 8–23)
BUN SERPL-MCNC: 19 MG/DL (ref 8–23)
CALCIUM SERPL-MCNC: 8.7 MG/DL (ref 8.7–10.5)
CALCIUM SERPL-MCNC: 8.8 MG/DL (ref 8.7–10.5)
CHLORIDE SERPL-SCNC: 109 MMOL/L (ref 95–110)
CHLORIDE SERPL-SCNC: 111 MMOL/L (ref 95–110)
CO2 SERPL-SCNC: 19 MMOL/L (ref 23–29)
CO2 SERPL-SCNC: 21 MMOL/L (ref 23–29)
CREAT SERPL-MCNC: 1.5 MG/DL (ref 0.5–1.4)
CREAT SERPL-MCNC: 1.8 MG/DL (ref 0.5–1.4)
ERYTHROCYTE [DISTWIDTH] IN BLOOD BY AUTOMATED COUNT: 13.5 % (ref 11.5–14.5)
GFR SERPLBLD CREATININE-BSD FMLA CKD-EPI: 41 ML/MIN/1.73/M2
GFR SERPLBLD CREATININE-BSD FMLA CKD-EPI: 51 ML/MIN/1.73/M2
GLUCOSE SERPL-MCNC: 106 MG/DL (ref 70–110)
GLUCOSE SERPL-MCNC: 121 MG/DL (ref 70–110)
HCT VFR BLD AUTO: 30.3 % (ref 40–54)
HGB BLD-MCNC: 9.5 GM/DL (ref 14–18)
HGB BLD-MCNC: 9.7 GM/DL (ref 14–18)
IMM GRANULOCYTES # BLD AUTO: 0.09 K/UL (ref 0–0.04)
IMM GRANULOCYTES NFR BLD AUTO: 0.5 % (ref 0–0.5)
LYMPHOCYTES # BLD AUTO: 0.87 K/UL (ref 1–4.8)
MCH RBC QN AUTO: 30.4 PG (ref 27–31)
MCHC RBC AUTO-ENTMCNC: 32 G/DL (ref 32–36)
MCV RBC AUTO: 95 FL (ref 82–98)
NUCLEATED RBC (/100WBC) (OHS): 0 /100 WBC
PLATELET # BLD AUTO: 299 K/UL (ref 150–450)
PMV BLD AUTO: 9.4 FL (ref 9.2–12.9)
POTASSIUM SERPL-SCNC: 3.7 MMOL/L (ref 3.5–5.1)
POTASSIUM SERPL-SCNC: 4.1 MMOL/L (ref 3.5–5.1)
PROT SERPL-MCNC: 6.9 GM/DL (ref 6–8.4)
RBC # BLD AUTO: 3.19 M/UL (ref 4.6–6.2)
RELATIVE EOSINOPHIL (OHS): 0.9 %
RELATIVE LYMPHOCYTE (OHS): 5 % (ref 18–48)
RELATIVE MONOCYTE (OHS): 9.5 % (ref 4–15)
RELATIVE NEUTROPHIL (OHS): 83.9 % (ref 38–73)
SODIUM SERPL-SCNC: 138 MMOL/L (ref 136–145)
SODIUM SERPL-SCNC: 139 MMOL/L (ref 136–145)
WBC # BLD AUTO: 17.46 K/UL (ref 3.9–12.7)

## 2025-05-28 PROCEDURE — 25000003 PHARM REV CODE 250: Performed by: HOSPITALIST

## 2025-05-28 PROCEDURE — 11000001 HC ACUTE MED/SURG PRIVATE ROOM

## 2025-05-28 PROCEDURE — 85018 HEMOGLOBIN: CPT | Performed by: PHYSICIAN ASSISTANT

## 2025-05-28 PROCEDURE — 85025 COMPLETE CBC W/AUTO DIFF WBC: CPT | Performed by: PHYSICIAN ASSISTANT

## 2025-05-28 PROCEDURE — 25000003 PHARM REV CODE 250: Performed by: PHYSICIAN ASSISTANT

## 2025-05-28 PROCEDURE — 82040 ASSAY OF SERUM ALBUMIN: CPT | Performed by: PHYSICIAN ASSISTANT

## 2025-05-28 PROCEDURE — 82947 ASSAY GLUCOSE BLOOD QUANT: CPT | Performed by: PHYSICIAN ASSISTANT

## 2025-05-28 PROCEDURE — 25000003 PHARM REV CODE 250

## 2025-05-28 PROCEDURE — 99024 POSTOP FOLLOW-UP VISIT: CPT | Mod: ,,, | Performed by: STUDENT IN AN ORGANIZED HEALTH CARE EDUCATION/TRAINING PROGRAM

## 2025-05-28 PROCEDURE — 36415 COLL VENOUS BLD VENIPUNCTURE: CPT | Performed by: PHYSICIAN ASSISTANT

## 2025-05-28 PROCEDURE — 51701 INSERT BLADDER CATHETER: CPT

## 2025-05-28 PROCEDURE — 25000003 PHARM REV CODE 250: Performed by: INTERNAL MEDICINE

## 2025-05-28 PROCEDURE — 51798 US URINE CAPACITY MEASURE: CPT

## 2025-05-28 RX ORDER — POLYETHYLENE GLYCOL 3350 17 G/17G
17 POWDER, FOR SOLUTION ORAL 2 TIMES DAILY
Status: DISCONTINUED | OUTPATIENT
Start: 2025-05-28 | End: 2025-05-31

## 2025-05-28 RX ORDER — BISACODYL 10 MG/1
10 SUPPOSITORY RECTAL DAILY PRN
Status: DISCONTINUED | OUTPATIENT
Start: 2025-05-28 | End: 2025-06-01 | Stop reason: HOSPADM

## 2025-05-28 RX ORDER — HYOSCYAMINE SULFATE 0.12 MG/1
0.12 TABLET SUBLINGUAL EVERY 4 HOURS PRN
Status: DISCONTINUED | OUTPATIENT
Start: 2025-05-28 | End: 2025-06-01 | Stop reason: HOSPADM

## 2025-05-28 RX ADMIN — LATANOPROST 1 DROP: 50 SOLUTION OPHTHALMIC at 09:05

## 2025-05-28 RX ADMIN — ATORVASTATIN CALCIUM 40 MG: 40 TABLET, FILM COATED ORAL at 09:05

## 2025-05-28 RX ADMIN — LEVOFLOXACIN 500 MG: 500 TABLET, FILM COATED ORAL at 09:05

## 2025-05-28 RX ADMIN — MUPIROCIN 1 G: 20 OINTMENT TOPICAL at 09:05

## 2025-05-28 RX ADMIN — CARVEDILOL 12.5 MG: 12.5 TABLET, FILM COATED ORAL at 09:05

## 2025-05-28 RX ADMIN — NITROFURANTOIN MONOHYDRATE/MACROCRYSTALS 100 MG: 25; 75 CAPSULE ORAL at 09:05

## 2025-05-28 RX ADMIN — PANTOPRAZOLE SODIUM 40 MG: 40 TABLET, DELAYED RELEASE ORAL at 09:05

## 2025-05-28 RX ADMIN — SODIUM CHLORIDE: 9 INJECTION, SOLUTION INTRAVENOUS at 08:05

## 2025-05-28 RX ADMIN — AMLODIPINE BESYLATE 10 MG: 5 TABLET ORAL at 09:05

## 2025-05-28 RX ADMIN — OXYCODONE HYDROCHLORIDE 5 MG: 5 TABLET ORAL at 01:05

## 2025-05-28 RX ADMIN — SODIUM CHLORIDE: 9 INJECTION, SOLUTION INTRAVENOUS at 06:05

## 2025-05-28 RX ADMIN — POLYETHYLENE GLYCOL 3350 17 G: 17 POWDER, FOR SOLUTION ORAL at 09:05

## 2025-05-28 RX ADMIN — OXYCODONE HYDROCHLORIDE 5 MG: 5 TABLET ORAL at 09:05

## 2025-05-28 RX ADMIN — OXYCODONE HYDROCHLORIDE 5 MG: 5 TABLET ORAL at 11:05

## 2025-05-28 RX ADMIN — MUPIROCIN: 20 OINTMENT TOPICAL at 09:05

## 2025-05-28 RX ADMIN — TAMSULOSIN HYDROCHLORIDE 0.4 MG: 0.4 CAPSULE ORAL at 09:05

## 2025-05-28 RX ADMIN — BUSPIRONE HYDROCHLORIDE 5 MG: 5 TABLET ORAL at 09:05

## 2025-05-28 RX ADMIN — OXYCODONE HYDROCHLORIDE 5 MG: 5 TABLET ORAL at 03:05

## 2025-05-29 LAB
ABSOLUTE EOSINOPHIL (OHS): 0.28 K/UL
ABSOLUTE MONOCYTE (OHS): 1.34 K/UL (ref 0.3–1)
ABSOLUTE NEUTROPHIL COUNT (OHS): 9.32 K/UL (ref 1.8–7.7)
ALBUMIN SERPL BCP-MCNC: 2.7 G/DL (ref 3.5–5.2)
ALP SERPL-CCNC: 83 UNIT/L (ref 40–150)
ALT SERPL W/O P-5'-P-CCNC: 21 UNIT/L (ref 10–44)
ANION GAP (OHS): 9 MMOL/L (ref 8–16)
AST SERPL-CCNC: 15 UNIT/L (ref 11–45)
BASOPHILS # BLD AUTO: 0.03 K/UL
BASOPHILS NFR BLD AUTO: 0.3 %
BILIRUB SERPL-MCNC: 0.6 MG/DL (ref 0.1–1)
BUN SERPL-MCNC: 14 MG/DL (ref 8–23)
CALCIUM SERPL-MCNC: 8.3 MG/DL (ref 8.7–10.5)
CHLORIDE SERPL-SCNC: 110 MMOL/L (ref 95–110)
CO2 SERPL-SCNC: 18 MMOL/L (ref 23–29)
CREAT SERPL-MCNC: 1.3 MG/DL (ref 0.5–1.4)
ERYTHROCYTE [DISTWIDTH] IN BLOOD BY AUTOMATED COUNT: 13.8 % (ref 11.5–14.5)
GFR SERPLBLD CREATININE-BSD FMLA CKD-EPI: >60 ML/MIN/1.73/M2
GLUCOSE SERPL-MCNC: 108 MG/DL (ref 70–110)
HCT VFR BLD AUTO: 25.9 % (ref 40–54)
HGB BLD-MCNC: 8.4 GM/DL (ref 14–18)
IMM GRANULOCYTES # BLD AUTO: 0.07 K/UL (ref 0–0.04)
IMM GRANULOCYTES NFR BLD AUTO: 0.6 % (ref 0–0.5)
LYMPHOCYTES # BLD AUTO: 0.88 K/UL (ref 1–4.8)
MCH RBC QN AUTO: 30.7 PG (ref 27–31)
MCHC RBC AUTO-ENTMCNC: 32.4 G/DL (ref 32–36)
MCV RBC AUTO: 95 FL (ref 82–98)
NUCLEATED RBC (/100WBC) (OHS): 0 /100 WBC
PLATELET # BLD AUTO: 249 K/UL (ref 150–450)
PMV BLD AUTO: 9.3 FL (ref 9.2–12.9)
POTASSIUM SERPL-SCNC: 3.8 MMOL/L (ref 3.5–5.1)
PROT SERPL-MCNC: 6.2 GM/DL (ref 6–8.4)
RBC # BLD AUTO: 2.74 M/UL (ref 4.6–6.2)
RELATIVE EOSINOPHIL (OHS): 2.3 %
RELATIVE LYMPHOCYTE (OHS): 7.4 % (ref 18–48)
RELATIVE MONOCYTE (OHS): 11.2 % (ref 4–15)
RELATIVE NEUTROPHIL (OHS): 78.2 % (ref 38–73)
SODIUM SERPL-SCNC: 137 MMOL/L (ref 136–145)
WBC # BLD AUTO: 11.92 K/UL (ref 3.9–12.7)

## 2025-05-29 PROCEDURE — 25000003 PHARM REV CODE 250: Performed by: PHYSICIAN ASSISTANT

## 2025-05-29 PROCEDURE — 25000003 PHARM REV CODE 250: Performed by: HOSPITALIST

## 2025-05-29 PROCEDURE — 85025 COMPLETE CBC W/AUTO DIFF WBC: CPT | Performed by: PHYSICIAN ASSISTANT

## 2025-05-29 PROCEDURE — 99024 POSTOP FOLLOW-UP VISIT: CPT | Mod: ,,, | Performed by: UROLOGY

## 2025-05-29 PROCEDURE — 80053 COMPREHEN METABOLIC PANEL: CPT | Performed by: PHYSICIAN ASSISTANT

## 2025-05-29 PROCEDURE — 25000003 PHARM REV CODE 250

## 2025-05-29 PROCEDURE — 36415 COLL VENOUS BLD VENIPUNCTURE: CPT | Performed by: PHYSICIAN ASSISTANT

## 2025-05-29 PROCEDURE — 25000003 PHARM REV CODE 250: Performed by: INTERNAL MEDICINE

## 2025-05-29 PROCEDURE — 11000001 HC ACUTE MED/SURG PRIVATE ROOM

## 2025-05-29 PROCEDURE — 25500020 PHARM REV CODE 255

## 2025-05-29 PROCEDURE — 99223 1ST HOSP IP/OBS HIGH 75: CPT | Mod: ,,, | Performed by: NURSE PRACTITIONER

## 2025-05-29 RX ADMIN — LEVOFLOXACIN 500 MG: 500 TABLET, FILM COATED ORAL at 09:05

## 2025-05-29 RX ADMIN — BUSPIRONE HYDROCHLORIDE 5 MG: 5 TABLET ORAL at 09:05

## 2025-05-29 RX ADMIN — Medication 6 MG: at 09:05

## 2025-05-29 RX ADMIN — PANTOPRAZOLE SODIUM 40 MG: 40 TABLET, DELAYED RELEASE ORAL at 09:05

## 2025-05-29 RX ADMIN — CARVEDILOL 12.5 MG: 12.5 TABLET, FILM COATED ORAL at 09:05

## 2025-05-29 RX ADMIN — NITROFURANTOIN MONOHYDRATE/MACROCRYSTALS 100 MG: 25; 75 CAPSULE ORAL at 09:05

## 2025-05-29 RX ADMIN — MUPIROCIN: 20 OINTMENT TOPICAL at 09:05

## 2025-05-29 RX ADMIN — LATANOPROST 1 DROP: 50 SOLUTION OPHTHALMIC at 09:05

## 2025-05-29 RX ADMIN — IOHEXOL 100 ML: 350 INJECTION, SOLUTION INTRAVENOUS at 11:05

## 2025-05-29 RX ADMIN — TAMSULOSIN HYDROCHLORIDE 0.4 MG: 0.4 CAPSULE ORAL at 09:05

## 2025-05-29 RX ADMIN — ATORVASTATIN CALCIUM 40 MG: 40 TABLET, FILM COATED ORAL at 09:05

## 2025-05-29 RX ADMIN — AMLODIPINE BESYLATE 10 MG: 5 TABLET ORAL at 09:05

## 2025-05-30 LAB
ABSOLUTE EOSINOPHIL (OHS): 0.29 K/UL
ABSOLUTE MONOCYTE (OHS): 1.2 K/UL (ref 0.3–1)
ABSOLUTE NEUTROPHIL COUNT (OHS): 9.99 K/UL (ref 1.8–7.7)
ANION GAP (OHS): 12 MMOL/L (ref 8–16)
BASOPHILS # BLD AUTO: 0.04 K/UL
BASOPHILS NFR BLD AUTO: 0.3 %
BUN SERPL-MCNC: 13 MG/DL (ref 8–23)
CALCIUM SERPL-MCNC: 8.7 MG/DL (ref 8.7–10.5)
CHLORIDE SERPL-SCNC: 108 MMOL/L (ref 95–110)
CO2 SERPL-SCNC: 19 MMOL/L (ref 23–29)
CREAT SERPL-MCNC: 1.4 MG/DL (ref 0.5–1.4)
ERYTHROCYTE [DISTWIDTH] IN BLOOD BY AUTOMATED COUNT: 13.5 % (ref 11.5–14.5)
GFR SERPLBLD CREATININE-BSD FMLA CKD-EPI: 55 ML/MIN/1.73/M2
GLUCOSE SERPL-MCNC: 110 MG/DL (ref 70–110)
HCT VFR BLD AUTO: 28.8 % (ref 40–54)
HGB BLD-MCNC: 9.4 GM/DL (ref 14–18)
IMM GRANULOCYTES # BLD AUTO: 0.08 K/UL (ref 0–0.04)
IMM GRANULOCYTES NFR BLD AUTO: 0.6 % (ref 0–0.5)
LYMPHOCYTES # BLD AUTO: 0.87 K/UL (ref 1–4.8)
MCH RBC QN AUTO: 30.6 PG (ref 27–31)
MCHC RBC AUTO-ENTMCNC: 32.6 G/DL (ref 32–36)
MCV RBC AUTO: 94 FL (ref 82–98)
NUCLEATED RBC (/100WBC) (OHS): 0 /100 WBC
PLATELET # BLD AUTO: 258 K/UL (ref 150–450)
PMV BLD AUTO: 9 FL (ref 9.2–12.9)
POTASSIUM SERPL-SCNC: 3.6 MMOL/L (ref 3.5–5.1)
RBC # BLD AUTO: 3.07 M/UL (ref 4.6–6.2)
RELATIVE EOSINOPHIL (OHS): 2.3 %
RELATIVE LYMPHOCYTE (OHS): 7 % (ref 18–48)
RELATIVE MONOCYTE (OHS): 9.6 % (ref 4–15)
RELATIVE NEUTROPHIL (OHS): 80.2 % (ref 38–73)
SODIUM SERPL-SCNC: 139 MMOL/L (ref 136–145)
WBC # BLD AUTO: 12.47 K/UL (ref 3.9–12.7)

## 2025-05-30 PROCEDURE — B4161ZZ FLUOROSCOPY OF RIGHT RENAL ARTERY USING LOW OSMOLAR CONTRAST: ICD-10-PCS | Performed by: STUDENT IN AN ORGANIZED HEALTH CARE EDUCATION/TRAINING PROGRAM

## 2025-05-30 PROCEDURE — B4151ZZ FLUOROSCOPY OF INFERIOR MESENTERIC ARTERY USING LOW OSMOLAR CONTRAST: ICD-10-PCS | Performed by: STUDENT IN AN ORGANIZED HEALTH CARE EDUCATION/TRAINING PROGRAM

## 2025-05-30 PROCEDURE — 63600175 PHARM REV CODE 636 W HCPCS: Performed by: STUDENT IN AN ORGANIZED HEALTH CARE EDUCATION/TRAINING PROGRAM

## 2025-05-30 PROCEDURE — 25000003 PHARM REV CODE 250: Performed by: INTERNAL MEDICINE

## 2025-05-30 PROCEDURE — 25000003 PHARM REV CODE 250

## 2025-05-30 PROCEDURE — 04L93DZ OCCLUSION OF RIGHT RENAL ARTERY WITH INTRALUMINAL DEVICE, PERCUTANEOUS APPROACH: ICD-10-PCS | Performed by: STUDENT IN AN ORGANIZED HEALTH CARE EDUCATION/TRAINING PROGRAM

## 2025-05-30 PROCEDURE — 99024 POSTOP FOLLOW-UP VISIT: CPT | Mod: ,,, | Performed by: UROLOGY

## 2025-05-30 PROCEDURE — 25000003 PHARM REV CODE 250: Performed by: PHYSICIAN ASSISTANT

## 2025-05-30 PROCEDURE — 82310 ASSAY OF CALCIUM: CPT | Performed by: PHYSICIAN ASSISTANT

## 2025-05-30 PROCEDURE — 21400001 HC TELEMETRY ROOM

## 2025-05-30 PROCEDURE — 25000003 PHARM REV CODE 250: Performed by: HOSPITALIST

## 2025-05-30 PROCEDURE — 36415 COLL VENOUS BLD VENIPUNCTURE: CPT | Performed by: PHYSICIAN ASSISTANT

## 2025-05-30 PROCEDURE — 85025 COMPLETE CBC W/AUTO DIFF WBC: CPT | Performed by: PHYSICIAN ASSISTANT

## 2025-05-30 PROCEDURE — 25500020 PHARM REV CODE 255: Performed by: STUDENT IN AN ORGANIZED HEALTH CARE EDUCATION/TRAINING PROGRAM

## 2025-05-30 RX ORDER — MIDAZOLAM HYDROCHLORIDE 1 MG/ML
INJECTION, SOLUTION INTRAMUSCULAR; INTRAVENOUS
Status: DISCONTINUED | OUTPATIENT
Start: 2025-05-30 | End: 2025-06-01 | Stop reason: HOSPADM

## 2025-05-30 RX ORDER — LIDOCAINE HYDROCHLORIDE 10 MG/ML
INJECTION, SOLUTION INFILTRATION; PERINEURAL
Status: DISCONTINUED | OUTPATIENT
Start: 2025-05-30 | End: 2025-06-01 | Stop reason: HOSPADM

## 2025-05-30 RX ORDER — FENTANYL CITRATE 50 UG/ML
INJECTION, SOLUTION INTRAMUSCULAR; INTRAVENOUS
Status: DISCONTINUED | OUTPATIENT
Start: 2025-05-30 | End: 2025-06-01 | Stop reason: HOSPADM

## 2025-05-30 RX ADMIN — LIDOCAINE HYDROCHLORIDE 5 ML: 10 INJECTION, SOLUTION INFILTRATION; PERINEURAL at 11:05

## 2025-05-30 RX ADMIN — OXYCODONE HYDROCHLORIDE 5 MG: 5 TABLET ORAL at 12:05

## 2025-05-30 RX ADMIN — BUSPIRONE HYDROCHLORIDE 5 MG: 5 TABLET ORAL at 08:05

## 2025-05-30 RX ADMIN — AMLODIPINE BESYLATE 10 MG: 5 TABLET ORAL at 09:05

## 2025-05-30 RX ADMIN — PANTOPRAZOLE SODIUM 40 MG: 40 TABLET, DELAYED RELEASE ORAL at 08:05

## 2025-05-30 RX ADMIN — NITROFURANTOIN MONOHYDRATE/MACROCRYSTALS 100 MG: 25; 75 CAPSULE ORAL at 08:05

## 2025-05-30 RX ADMIN — POLYETHYLENE GLYCOL 3350 17 G: 17 POWDER, FOR SOLUTION ORAL at 09:05

## 2025-05-30 RX ADMIN — FENTANYL CITRATE 25 MCG: 50 INJECTION INTRAMUSCULAR; INTRAVENOUS at 12:05

## 2025-05-30 RX ADMIN — CARVEDILOL 12.5 MG: 12.5 TABLET, FILM COATED ORAL at 08:05

## 2025-05-30 RX ADMIN — LATANOPROST 1 DROP: 50 SOLUTION OPHTHALMIC at 09:05

## 2025-05-30 RX ADMIN — IOHEXOL 60 ML: 350 INJECTION, SOLUTION INTRAVENOUS at 12:05

## 2025-05-30 RX ADMIN — TAMSULOSIN HYDROCHLORIDE 0.4 MG: 0.4 CAPSULE ORAL at 08:05

## 2025-05-30 RX ADMIN — ATORVASTATIN CALCIUM 40 MG: 40 TABLET, FILM COATED ORAL at 08:05

## 2025-05-30 RX ADMIN — NITROFURANTOIN MONOHYDRATE/MACROCRYSTALS 100 MG: 25; 75 CAPSULE ORAL at 09:05

## 2025-05-30 RX ADMIN — FENTANYL CITRATE 50 MCG: 50 INJECTION INTRAMUSCULAR; INTRAVENOUS at 11:05

## 2025-05-30 RX ADMIN — BUSPIRONE HYDROCHLORIDE 5 MG: 5 TABLET ORAL at 09:05

## 2025-05-30 RX ADMIN — CARVEDILOL 12.5 MG: 12.5 TABLET, FILM COATED ORAL at 09:05

## 2025-05-30 RX ADMIN — MIDAZOLAM HYDROCHLORIDE 1 MG: 1 INJECTION, SOLUTION INTRAMUSCULAR; INTRAVENOUS at 11:05

## 2025-05-30 RX ADMIN — LEVOFLOXACIN 500 MG: 500 TABLET, FILM COATED ORAL at 08:05

## 2025-05-31 LAB
ABSOLUTE EOSINOPHIL (OHS): 0.14 K/UL
ABSOLUTE MONOCYTE (OHS): 0.99 K/UL (ref 0.3–1)
ABSOLUTE NEUTROPHIL COUNT (OHS): 9.37 K/UL (ref 1.8–7.7)
ALBUMIN SERPL BCP-MCNC: 2.8 G/DL (ref 3.5–5.2)
ALP SERPL-CCNC: 97 UNIT/L (ref 40–150)
ALT SERPL W/O P-5'-P-CCNC: 18 UNIT/L (ref 10–44)
ANION GAP (OHS): 12 MMOL/L (ref 8–16)
AST SERPL-CCNC: 14 UNIT/L (ref 11–45)
BASOPHILS # BLD AUTO: 0.03 K/UL
BASOPHILS NFR BLD AUTO: 0.3 %
BILIRUB SERPL-MCNC: 0.5 MG/DL (ref 0.1–1)
BUN SERPL-MCNC: 15 MG/DL (ref 8–23)
CALCIUM SERPL-MCNC: 8.3 MG/DL (ref 8.7–10.5)
CHLORIDE SERPL-SCNC: 108 MMOL/L (ref 95–110)
CO2 SERPL-SCNC: 21 MMOL/L (ref 23–29)
CREAT SERPL-MCNC: 1.4 MG/DL (ref 0.5–1.4)
ERYTHROCYTE [DISTWIDTH] IN BLOOD BY AUTOMATED COUNT: 13.4 % (ref 11.5–14.5)
GFR SERPLBLD CREATININE-BSD FMLA CKD-EPI: 55 ML/MIN/1.73/M2
GLUCOSE SERPL-MCNC: 162 MG/DL (ref 70–110)
HCT VFR BLD AUTO: 26.9 % (ref 40–54)
HGB BLD-MCNC: 8.9 GM/DL (ref 14–18)
IMM GRANULOCYTES # BLD AUTO: 0.05 K/UL (ref 0–0.04)
IMM GRANULOCYTES NFR BLD AUTO: 0.4 % (ref 0–0.5)
LYMPHOCYTES # BLD AUTO: 0.7 K/UL (ref 1–4.8)
MCH RBC QN AUTO: 31.2 PG (ref 27–31)
MCHC RBC AUTO-ENTMCNC: 33.1 G/DL (ref 32–36)
MCV RBC AUTO: 94 FL (ref 82–98)
NUCLEATED RBC (/100WBC) (OHS): 0 /100 WBC
PLATELET # BLD AUTO: 263 K/UL (ref 150–450)
PMV BLD AUTO: 9.3 FL (ref 9.2–12.9)
POTASSIUM SERPL-SCNC: 3.6 MMOL/L (ref 3.5–5.1)
PROT SERPL-MCNC: 6.6 GM/DL (ref 6–8.4)
RBC # BLD AUTO: 2.85 M/UL (ref 4.6–6.2)
RELATIVE EOSINOPHIL (OHS): 1.2 %
RELATIVE LYMPHOCYTE (OHS): 6.2 % (ref 18–48)
RELATIVE MONOCYTE (OHS): 8.8 % (ref 4–15)
RELATIVE NEUTROPHIL (OHS): 83.1 % (ref 38–73)
SODIUM SERPL-SCNC: 141 MMOL/L (ref 136–145)
WBC # BLD AUTO: 11.28 K/UL (ref 3.9–12.7)

## 2025-05-31 PROCEDURE — 99024 POSTOP FOLLOW-UP VISIT: CPT | Mod: ,,, | Performed by: STUDENT IN AN ORGANIZED HEALTH CARE EDUCATION/TRAINING PROGRAM

## 2025-05-31 PROCEDURE — 25000003 PHARM REV CODE 250: Performed by: PHYSICIAN ASSISTANT

## 2025-05-31 PROCEDURE — 25000003 PHARM REV CODE 250: Performed by: INTERNAL MEDICINE

## 2025-05-31 PROCEDURE — 82040 ASSAY OF SERUM ALBUMIN: CPT | Performed by: PHYSICIAN ASSISTANT

## 2025-05-31 PROCEDURE — 51798 US URINE CAPACITY MEASURE: CPT

## 2025-05-31 PROCEDURE — 21400001 HC TELEMETRY ROOM

## 2025-05-31 PROCEDURE — 36415 COLL VENOUS BLD VENIPUNCTURE: CPT | Performed by: PHYSICIAN ASSISTANT

## 2025-05-31 PROCEDURE — 85025 COMPLETE CBC W/AUTO DIFF WBC: CPT | Performed by: PHYSICIAN ASSISTANT

## 2025-05-31 PROCEDURE — 25000003 PHARM REV CODE 250

## 2025-05-31 PROCEDURE — 63600175 PHARM REV CODE 636 W HCPCS

## 2025-05-31 RX ORDER — POLYETHYLENE GLYCOL 3350 17 G/17G
17 POWDER, FOR SOLUTION ORAL EVERY OTHER DAY
Status: DISCONTINUED | OUTPATIENT
Start: 2025-06-02 | End: 2025-06-01 | Stop reason: HOSPADM

## 2025-05-31 RX ORDER — POLYETHYLENE GLYCOL 3350 17 G/17G
17 POWDER, FOR SOLUTION ORAL DAILY
Status: DISCONTINUED | OUTPATIENT
Start: 2025-06-01 | End: 2025-05-31

## 2025-05-31 RX ORDER — POLYETHYLENE GLYCOL 3350 17 G/17G
17 POWDER, FOR SOLUTION ORAL ONCE
Status: COMPLETED | OUTPATIENT
Start: 2025-05-31 | End: 2025-05-31

## 2025-05-31 RX ADMIN — TAMSULOSIN HYDROCHLORIDE 0.4 MG: 0.4 CAPSULE ORAL at 07:05

## 2025-05-31 RX ADMIN — ALUMINUM HYDROXIDE, MAGNESIUM HYDROXIDE, AND SIMETHICONE 30 ML: 200; 200; 20 SUSPENSION ORAL at 02:05

## 2025-05-31 RX ADMIN — CARVEDILOL 12.5 MG: 12.5 TABLET, FILM COATED ORAL at 07:05

## 2025-05-31 RX ADMIN — LATANOPROST 1 DROP: 50 SOLUTION OPHTHALMIC at 08:05

## 2025-05-31 RX ADMIN — OXYCODONE HYDROCHLORIDE 5 MG: 5 TABLET ORAL at 09:05

## 2025-05-31 RX ADMIN — CARVEDILOL 12.5 MG: 12.5 TABLET, FILM COATED ORAL at 08:05

## 2025-05-31 RX ADMIN — BUSPIRONE HYDROCHLORIDE 5 MG: 5 TABLET ORAL at 07:05

## 2025-05-31 RX ADMIN — PANTOPRAZOLE SODIUM 40 MG: 40 TABLET, DELAYED RELEASE ORAL at 07:05

## 2025-05-31 RX ADMIN — POLYETHYLENE GLYCOL 3350 17 G: 17 POWDER, FOR SOLUTION ORAL at 04:05

## 2025-05-31 RX ADMIN — ATORVASTATIN CALCIUM 40 MG: 40 TABLET, FILM COATED ORAL at 07:05

## 2025-05-31 RX ADMIN — ONDANSETRON 4 MG: 2 INJECTION INTRAMUSCULAR; INTRAVENOUS at 01:05

## 2025-05-31 RX ADMIN — AMLODIPINE BESYLATE 10 MG: 5 TABLET ORAL at 08:05

## 2025-06-01 VITALS
DIASTOLIC BLOOD PRESSURE: 83 MMHG | OXYGEN SATURATION: 95 % | BODY MASS INDEX: 36.22 KG/M2 | WEIGHT: 239 LBS | HEART RATE: 77 BPM | HEIGHT: 68 IN | RESPIRATION RATE: 16 BRPM | TEMPERATURE: 98 F | SYSTOLIC BLOOD PRESSURE: 130 MMHG

## 2025-06-01 LAB
ABSOLUTE EOSINOPHIL (OHS): 0.37 K/UL
ABSOLUTE MONOCYTE (OHS): 1.15 K/UL (ref 0.3–1)
ABSOLUTE NEUTROPHIL COUNT (OHS): 7.68 K/UL (ref 1.8–7.7)
BASOPHILS # BLD AUTO: 0.03 K/UL
BASOPHILS NFR BLD AUTO: 0.3 %
ERYTHROCYTE [DISTWIDTH] IN BLOOD BY AUTOMATED COUNT: 13.4 % (ref 11.5–14.5)
HCT VFR BLD AUTO: 26.2 % (ref 40–54)
HGB BLD-MCNC: 8.5 GM/DL (ref 14–18)
IMM GRANULOCYTES # BLD AUTO: 0.12 K/UL (ref 0–0.04)
IMM GRANULOCYTES NFR BLD AUTO: 1.1 % (ref 0–0.5)
LYMPHOCYTES # BLD AUTO: 1.24 K/UL (ref 1–4.8)
MCH RBC QN AUTO: 30.6 PG (ref 27–31)
MCHC RBC AUTO-ENTMCNC: 32.4 G/DL (ref 32–36)
MCV RBC AUTO: 94 FL (ref 82–98)
NUCLEATED RBC (/100WBC) (OHS): 0 /100 WBC
PLATELET # BLD AUTO: 253 K/UL (ref 150–450)
PMV BLD AUTO: 9.3 FL (ref 9.2–12.9)
RBC # BLD AUTO: 2.78 M/UL (ref 4.6–6.2)
RELATIVE EOSINOPHIL (OHS): 3.5 %
RELATIVE LYMPHOCYTE (OHS): 11.7 % (ref 18–48)
RELATIVE MONOCYTE (OHS): 10.9 % (ref 4–15)
RELATIVE NEUTROPHIL (OHS): 72.5 % (ref 38–73)
WBC # BLD AUTO: 10.59 K/UL (ref 3.9–12.7)

## 2025-06-01 PROCEDURE — 36415 COLL VENOUS BLD VENIPUNCTURE: CPT | Performed by: PHYSICIAN ASSISTANT

## 2025-06-01 PROCEDURE — 25000003 PHARM REV CODE 250

## 2025-06-01 PROCEDURE — 25000003 PHARM REV CODE 250: Performed by: INTERNAL MEDICINE

## 2025-06-01 PROCEDURE — 85025 COMPLETE CBC W/AUTO DIFF WBC: CPT | Performed by: PHYSICIAN ASSISTANT

## 2025-06-01 RX ADMIN — ATORVASTATIN CALCIUM 40 MG: 40 TABLET, FILM COATED ORAL at 07:06

## 2025-06-01 RX ADMIN — BUSPIRONE HYDROCHLORIDE 5 MG: 5 TABLET ORAL at 07:06

## 2025-06-01 RX ADMIN — TAMSULOSIN HYDROCHLORIDE 0.4 MG: 0.4 CAPSULE ORAL at 07:06

## 2025-06-01 RX ADMIN — CARVEDILOL 12.5 MG: 12.5 TABLET, FILM COATED ORAL at 07:06

## 2025-06-01 RX ADMIN — PANTOPRAZOLE SODIUM 40 MG: 40 TABLET, DELAYED RELEASE ORAL at 07:06

## 2025-06-02 ENCOUNTER — TELEPHONE (OUTPATIENT)
Dept: HEMATOLOGY/ONCOLOGY | Facility: CLINIC | Age: 65
End: 2025-06-02
Payer: MEDICARE

## 2025-06-02 ENCOUNTER — OFFICE VISIT (OUTPATIENT)
Dept: UROLOGY | Facility: CLINIC | Age: 65
End: 2025-06-02
Payer: MEDICARE

## 2025-06-02 ENCOUNTER — TELEPHONE (OUTPATIENT)
Dept: UROLOGY | Facility: CLINIC | Age: 65
End: 2025-06-02

## 2025-06-02 VITALS
SYSTOLIC BLOOD PRESSURE: 118 MMHG | HEART RATE: 91 BPM | BODY MASS INDEX: 36.22 KG/M2 | WEIGHT: 239 LBS | RESPIRATION RATE: 18 BRPM | DIASTOLIC BLOOD PRESSURE: 75 MMHG | HEIGHT: 68 IN

## 2025-06-02 DIAGNOSIS — C64.1 RENAL CELL CARCINOMA OF RIGHT KIDNEY: Primary | ICD-10-CM

## 2025-06-02 DIAGNOSIS — N45.1 EPIDIDYMITIS: ICD-10-CM

## 2025-06-02 DIAGNOSIS — C61 PROSTATE CANCER: ICD-10-CM

## 2025-06-02 PROCEDURE — 4010F ACE/ARB THERAPY RXD/TAKEN: CPT | Mod: CPTII,S$GLB,, | Performed by: NURSE PRACTITIONER

## 2025-06-02 PROCEDURE — 1126F AMNT PAIN NOTED NONE PRSNT: CPT | Mod: CPTII,S$GLB,, | Performed by: NURSE PRACTITIONER

## 2025-06-02 PROCEDURE — 87086 URINE CULTURE/COLONY COUNT: CPT | Performed by: NURSE PRACTITIONER

## 2025-06-02 PROCEDURE — 1160F RVW MEDS BY RX/DR IN RCRD: CPT | Mod: CPTII,S$GLB,, | Performed by: NURSE PRACTITIONER

## 2025-06-02 PROCEDURE — 3074F SYST BP LT 130 MM HG: CPT | Mod: CPTII,S$GLB,, | Performed by: NURSE PRACTITIONER

## 2025-06-02 PROCEDURE — 3044F HG A1C LEVEL LT 7.0%: CPT | Mod: CPTII,S$GLB,, | Performed by: NURSE PRACTITIONER

## 2025-06-02 PROCEDURE — 99024 POSTOP FOLLOW-UP VISIT: CPT | Mod: S$GLB,,, | Performed by: NURSE PRACTITIONER

## 2025-06-02 PROCEDURE — 1159F MED LIST DOCD IN RCRD: CPT | Mod: CPTII,S$GLB,, | Performed by: NURSE PRACTITIONER

## 2025-06-02 PROCEDURE — 3078F DIAST BP <80 MM HG: CPT | Mod: CPTII,S$GLB,, | Performed by: NURSE PRACTITIONER

## 2025-06-02 RX ORDER — LEVOFLOXACIN 500 MG/1
500 TABLET, FILM COATED ORAL DAILY
Qty: 10 TABLET | Refills: 0 | Status: SHIPPED | OUTPATIENT
Start: 2025-06-02 | End: 2025-06-12

## 2025-06-03 ENCOUNTER — PATIENT OUTREACH (OUTPATIENT)
Dept: ADMINISTRATIVE | Facility: CLINIC | Age: 65
End: 2025-06-03
Payer: MEDICARE

## 2025-06-03 ENCOUNTER — OFFICE VISIT (OUTPATIENT)
Dept: FAMILY MEDICINE | Facility: CLINIC | Age: 65
End: 2025-06-03
Payer: MEDICARE

## 2025-06-03 VITALS — BODY MASS INDEX: 36.07 KG/M2 | HEIGHT: 68 IN | WEIGHT: 238 LBS

## 2025-06-03 DIAGNOSIS — M54.16 LUMBAR RADICULOPATHY, RIGHT: ICD-10-CM

## 2025-06-03 DIAGNOSIS — I87.2 VENOUS INSUFFICIENCY OF BOTH LOWER EXTREMITIES: ICD-10-CM

## 2025-06-03 DIAGNOSIS — I82.811 VENOUS EMBOLISM AND THROMBOSIS OF SUPERFICIAL VESSELS OF RIGHT LOWER EXTREMITY: ICD-10-CM

## 2025-06-03 DIAGNOSIS — G57.11 MERALGIA PARAESTHETICA, RIGHT: ICD-10-CM

## 2025-06-03 DIAGNOSIS — Z85.528 HISTORY OF RENAL CELL CARCINOMA: ICD-10-CM

## 2025-06-03 DIAGNOSIS — R73.03 PREDIABETES: ICD-10-CM

## 2025-06-03 DIAGNOSIS — F41.9 ANXIETY: ICD-10-CM

## 2025-06-03 DIAGNOSIS — F51.04 PSYCHOPHYSIOLOGICAL INSOMNIA: ICD-10-CM

## 2025-06-03 DIAGNOSIS — E66.01 CLASS 2 SEVERE OBESITY DUE TO EXCESS CALORIES WITH SERIOUS COMORBIDITY AND BODY MASS INDEX (BMI) OF 36.0 TO 36.9 IN ADULT: ICD-10-CM

## 2025-06-03 DIAGNOSIS — N52.9 ERECTILE DYSFUNCTION, UNSPECIFIED ERECTILE DYSFUNCTION TYPE: ICD-10-CM

## 2025-06-03 DIAGNOSIS — C61 CANCER OF PROSTATE: ICD-10-CM

## 2025-06-03 DIAGNOSIS — D50.0 IRON DEFICIENCY ANEMIA DUE TO CHRONIC BLOOD LOSS: ICD-10-CM

## 2025-06-03 DIAGNOSIS — I10 PRIMARY HYPERTENSION: ICD-10-CM

## 2025-06-03 DIAGNOSIS — E78.2 MIXED HYPERLIPIDEMIA: ICD-10-CM

## 2025-06-03 DIAGNOSIS — R26.9 ABNORMALITY OF GAIT AND MOBILITY: ICD-10-CM

## 2025-06-03 DIAGNOSIS — E66.812 CLASS 2 SEVERE OBESITY DUE TO EXCESS CALORIES WITH SERIOUS COMORBIDITY AND BODY MASS INDEX (BMI) OF 36.0 TO 36.9 IN ADULT: ICD-10-CM

## 2025-06-03 DIAGNOSIS — Z00.00 ENCOUNTER FOR MEDICARE ANNUAL WELLNESS EXAM: Primary | ICD-10-CM

## 2025-06-04 ENCOUNTER — RESULTS FOLLOW-UP (OUTPATIENT)
Dept: UROLOGY | Facility: CLINIC | Age: 65
End: 2025-06-04

## 2025-06-04 LAB — BACTERIA UR CULT: NO GROWTH

## 2025-06-10 ENCOUNTER — OFFICE VISIT (OUTPATIENT)
Dept: HEMATOLOGY/ONCOLOGY | Facility: CLINIC | Age: 65
End: 2025-06-10
Payer: MEDICARE

## 2025-06-10 VITALS
DIASTOLIC BLOOD PRESSURE: 75 MMHG | OXYGEN SATURATION: 97 % | HEART RATE: 74 BPM | WEIGHT: 234.38 LBS | TEMPERATURE: 98 F | SYSTOLIC BLOOD PRESSURE: 120 MMHG | HEIGHT: 68 IN | BODY MASS INDEX: 35.52 KG/M2

## 2025-06-10 DIAGNOSIS — R97.20 ELEVATED PSA: ICD-10-CM

## 2025-06-10 DIAGNOSIS — R31.0 GROSS HEMATURIA: ICD-10-CM

## 2025-06-10 DIAGNOSIS — D64.9 ANEMIA, UNSPECIFIED TYPE: Primary | ICD-10-CM

## 2025-06-10 DIAGNOSIS — I82.811 VENOUS EMBOLISM AND THROMBOSIS OF SUPERFICIAL VESSELS OF RIGHT LOWER EXTREMITY: ICD-10-CM

## 2025-06-10 PROCEDURE — 3288F FALL RISK ASSESSMENT DOCD: CPT | Mod: CPTII,S$GLB,, | Performed by: STUDENT IN AN ORGANIZED HEALTH CARE EDUCATION/TRAINING PROGRAM

## 2025-06-10 PROCEDURE — 3008F BODY MASS INDEX DOCD: CPT | Mod: CPTII,S$GLB,, | Performed by: STUDENT IN AN ORGANIZED HEALTH CARE EDUCATION/TRAINING PROGRAM

## 2025-06-10 PROCEDURE — 4010F ACE/ARB THERAPY RXD/TAKEN: CPT | Mod: CPTII,S$GLB,, | Performed by: STUDENT IN AN ORGANIZED HEALTH CARE EDUCATION/TRAINING PROGRAM

## 2025-06-10 PROCEDURE — 3078F DIAST BP <80 MM HG: CPT | Mod: CPTII,S$GLB,, | Performed by: STUDENT IN AN ORGANIZED HEALTH CARE EDUCATION/TRAINING PROGRAM

## 2025-06-10 PROCEDURE — 99999 PR PBB SHADOW E&M-EST. PATIENT-LVL V: CPT | Mod: PBBFAC,,, | Performed by: STUDENT IN AN ORGANIZED HEALTH CARE EDUCATION/TRAINING PROGRAM

## 2025-06-10 PROCEDURE — 99215 OFFICE O/P EST HI 40 MIN: CPT | Mod: S$GLB,,, | Performed by: STUDENT IN AN ORGANIZED HEALTH CARE EDUCATION/TRAINING PROGRAM

## 2025-06-10 PROCEDURE — 1159F MED LIST DOCD IN RCRD: CPT | Mod: CPTII,S$GLB,, | Performed by: STUDENT IN AN ORGANIZED HEALTH CARE EDUCATION/TRAINING PROGRAM

## 2025-06-10 PROCEDURE — 3044F HG A1C LEVEL LT 7.0%: CPT | Mod: CPTII,S$GLB,, | Performed by: STUDENT IN AN ORGANIZED HEALTH CARE EDUCATION/TRAINING PROGRAM

## 2025-06-10 PROCEDURE — G2211 COMPLEX E/M VISIT ADD ON: HCPCS | Mod: S$GLB,,, | Performed by: STUDENT IN AN ORGANIZED HEALTH CARE EDUCATION/TRAINING PROGRAM

## 2025-06-10 PROCEDURE — 1101F PT FALLS ASSESS-DOCD LE1/YR: CPT | Mod: CPTII,S$GLB,, | Performed by: STUDENT IN AN ORGANIZED HEALTH CARE EDUCATION/TRAINING PROGRAM

## 2025-06-10 PROCEDURE — 1126F AMNT PAIN NOTED NONE PRSNT: CPT | Mod: CPTII,S$GLB,, | Performed by: STUDENT IN AN ORGANIZED HEALTH CARE EDUCATION/TRAINING PROGRAM

## 2025-06-10 PROCEDURE — 3074F SYST BP LT 130 MM HG: CPT | Mod: CPTII,S$GLB,, | Performed by: STUDENT IN AN ORGANIZED HEALTH CARE EDUCATION/TRAINING PROGRAM

## 2025-06-10 PROCEDURE — 1111F DSCHRG MED/CURRENT MED MERGE: CPT | Mod: CPTII,S$GLB,, | Performed by: STUDENT IN AN ORGANIZED HEALTH CARE EDUCATION/TRAINING PROGRAM

## 2025-06-10 NOTE — PROGRESS NOTES
Hematology- Oncology Clinic Note :     6/10/2025    Chief Complaint   Patient presents with    New patient      Anemia     Establish Care         HPI    Pt is a 64 y.o. male with recently surgery for RCC, esophagitis, hiatal hernia, prostate cancer, right lower extremity saphenous vein thrombus, hypertension, glaucoma, and chronic Eliquis who was transferred to [facility] ED for hematuria. Pt presented after noticing he had some bright red urine and clots in his Wilkerson bag. CT urogram showed postoperative changes in the lower pole of the right kidney but no significant retroperitoneal hematoma and pt underwent bladder irrigation with a large amount of small clots present.  CTA was concerning for possible arterial venous malformation. Hematuria resolved s/p IR embolization of PSA in R kidney. Anticoagulation held.  Pt presents for follow up with family for AC and anemia post discharge.  Due to bleed and renal cancer excision pt will remain off of AC for now since clot happened years ago but can consider prophylactic AC in future if no more bleeding and if immobilized or at very high risk for clot.  Will recheck iron labs in a few weeks and potential IV or PO iron discussed with pt along with potential side effects if iron levels low.  Pt agreeable to plan and all questions answered to his satisfaction.  No new issues at time of exam.    Active Problem List with Overview Notes    Diagnosis Date Noted    Acute cystitis with hematuria 05/26/2025    BERENICE (acute kidney injury) 05/26/2025    Gross hematuria 05/24/2025    History of renal cell carcinoma s/p R partial nephrectomy May 2025 05/15/2025    Anxiety 12/18/2024    Psychophysiological insomnia 12/18/2024    Lumbar radiculopathy, right 11/12/2024    Meralgia paraesthetica, right 10/17/2024    Prediabetes 08/02/2024    Venous insufficiency of both lower extremities 08/02/2023    Class 2 severe obesity due to excess calories with serious comorbidity in adult  07/05/2023    Numbness in right leg 07/05/2023    Mixed hyperlipidemia 04/11/2022    Lymphedema of both lower extremities 04/11/2022    Edema of both lower extremities 04/11/2022    Venous embolism and thrombosis of superficial vessels of right lower extremity, 2022 03/15/2022    Cancer of prostate 08/24/2021    Decreased range of motion of lumbar spine 05/09/2021    Weakness of both hips 05/09/2021    Hamstring tightness of both lower extremities 05/09/2021    Hip tightness 05/09/2021    Erectile dysfunction 10/15/2019    Lower back pain 07/14/2016    Primary hypertension 12/29/2015    Anemia 06/22/2015       Patient Active Problem List    Diagnosis Date Noted    Acute cystitis with hematuria 05/26/2025    BERENICE (acute kidney injury) 05/26/2025    Gross hematuria 05/24/2025    History of renal cell carcinoma s/p R partial nephrectomy May 2025 05/15/2025    Anxiety 12/18/2024    Psychophysiological insomnia 12/18/2024    Lumbar radiculopathy, right 11/12/2024    Meralgia paraesthetica, right 10/17/2024    Prediabetes 08/02/2024    Venous insufficiency of both lower extremities 08/02/2023    Class 2 severe obesity due to excess calories with serious comorbidity in adult 07/05/2023    Numbness in right leg 07/05/2023    Mixed hyperlipidemia 04/11/2022    Lymphedema of both lower extremities 04/11/2022    Edema of both lower extremities 04/11/2022    Venous embolism and thrombosis of superficial vessels of right lower extremity, 2022 03/15/2022    Cancer of prostate 08/24/2021    Decreased range of motion of lumbar spine 05/09/2021    Weakness of both hips 05/09/2021    Hamstring tightness of both lower extremities 05/09/2021    Hip tightness 05/09/2021    Erectile dysfunction 10/15/2019    Lower back pain 07/14/2016    Primary hypertension 12/29/2015    Anemia 06/22/2015     Past Medical History:   Diagnosis Date    Anxiety 12/18/2024    Glaucoma     History of stomach ulcers     Hypertension     Prostate cancer      Right kidney mass     Unspecified glaucoma       Past Surgical History:   Procedure Laterality Date    COLONOSCOPY N/A 08/21/2020    Procedure: COLONOSCOPYPrepopik;  Surgeon: Danie Conway MD;  Location: [facility];  Service: Endoscopy;  Laterality: N/A;    COLONOSCOPY N/A 09/29/2023    Procedure: COLONOSCOPY;  Surgeon: Edel Langley MD;  Location: [facility];  Service: Endoscopy;  Laterality: N/A;    ESOPHAGOGASTRODUODENOSCOPY N/A 08/21/2020    Procedure: EGD (ESOPHAGOGASTRODUODENOSCOPY);  Surgeon: Danie Conway MD;  Location: [facility];  Service: Endoscopy;  Laterality: N/A;    ESOPHAGOGASTRODUODENOSCOPY N/A 09/29/2023    Procedure: EGD (ESOPHAGOGASTRODUODENOSCOPY);  Surgeon: Edel Langley MD;  Location: [facility];  Service: Endoscopy;  Laterality: N/A;    EYE SURGERY  8/2017 & 10/2017    FINGER SURGERY      right hand pinkie finger     FLEXIBLE CYSTOSCOPY N/A 5/14/2025    Procedure: CYSTOSCOPY, FLEXIBLE;  Surgeon: Hitesh Jansen MD;  Location: [facility];  Service: Urology;  Laterality: N/A;    GLAUCOMA SURGERY Bilateral     ROBOT-ASSISTED LAPAROSCOPIC PARTIAL NEPHRECTOMY USING DA BARAK XI Right 5/14/2025    Procedure: XI ROBOTIC NEPHRECTOMY, PARTIAL;  Surgeon: Hitesh Jansen MD;  Location: [facility];  Service: Urology;  Laterality: Right;    STOMACH SURGERY      ulcers       Prescriptions Prior to Admission[1]  Review of patient's allergies indicates:  No Known Allergies   Social History     Tobacco Use    Smoking status: Never     Passive exposure: Never    Smokeless tobacco: Never   Substance Use Topics    Alcohol use: Not Currently     Alcohol/week: 3.0 standard drinks of alcohol     Types: 1 Glasses of wine, 2 Shots of liquor per week     Comment: occasionally      Family History   Problem Relation Name Age of Onset    Diabetes Mother Jeanine     Hypertension Mother Jeanine     Arthritis Mother Jeanine     Hypertension Father Doug     Glaucoma Father Doug     Arthritis Father Doug      Vision loss Father Doug     Hypertension Sister Kenna     Heart disease Sister Kenna     Heart attack Sister Kenna     Hypertension Brother      Anemia Daughter Nisa     No Known Problems Son x2     Stroke Maternal Aunt      Arthritis Maternal Aunt      Hypertension Maternal Aunt      Heart disease Maternal Grandmother Jeanine     Arthritis Maternal Grandmother Jeanine     Heart disease Maternal Grandfather Michael     Vision loss Paternal Grandmother Justina Mays     Asthma Daughter Serina     Diabetes Maternal Uncle Ririe Elliott     Heart disease Sister Kenna Green     Hypertension Sister Kenna Green     Hypertension Brother Villere     Stroke Maternal Aunt Bhavani Bruno     Colon cancer Neg Hx      Esophageal cancer Neg Hx      Rectal cancer Neg Hx      Stomach cancer Neg Hx      Ulcerative colitis Neg Hx      Irritable bowel syndrome Neg Hx      Crohn's disease Neg Hx      Celiac disease Neg Hx      Cancer Neg Hx          Review of Systems :  Review of Systems   Constitutional:  Negative for fever, malaise/fatigue and weight loss.   HENT:  Negative for congestion, hearing loss and nosebleeds.    Eyes:  Negative for blurred vision and discharge.   Respiratory:  Negative for cough and shortness of breath.    Cardiovascular:  Negative for chest pain and leg swelling.   Gastrointestinal:  Negative for abdominal pain, blood in stool, constipation, diarrhea, heartburn, melena, nausea and vomiting.   Genitourinary:  Negative for dysuria and hematuria.   Musculoskeletal:  Negative for joint pain and myalgias.   Skin:  Negative for itching and rash.   Neurological:  Negative for dizziness.   Endo/Heme/Allergies:  Does not bruise/bleed easily.   Psychiatric/Behavioral:  Negative for depression. The patient is not nervous/anxious.        Physical Exam :  Wt Readings from Last 3 Encounters:   06/10/25 106.3 kg (234 lb 5.6 oz)   06/03/25 108 kg (238 lb)   06/02/25 108.4 kg (238 lb 15.7 oz)     Temp Readings from Last 3  Encounters:   06/10/25 98 °F (36.7 °C)   06/01/25 98 °F (36.7 °C) (Oral)   05/24/25 98.4 °F (36.9 °C)     BP Readings from Last 3 Encounters:   06/10/25 120/75   06/02/25 118/75   06/01/25 130/83     Pulse Readings from Last 3 Encounters:   06/10/25 74   06/02/25 91   06/01/25 77     Body mass index is 35.63 kg/m².    Physical Exam  Vitals reviewed.   Constitutional:       Appearance: Normal appearance. He is obese.   HENT:      Head: Normocephalic.      Nose: Nose normal.      Mouth/Throat:      Mouth: Mucous membranes are moist.   Eyes:      Pupils: Pupils are equal, round, and reactive to light.   Cardiovascular:      Rate and Rhythm: Normal rate and regular rhythm.   Pulmonary:      Effort: Pulmonary effort is normal.   Abdominal:      General: Abdomen is flat.      Comments: Surgical scars on abdomen   Musculoskeletal:         General: Normal range of motion.      Cervical back: Normal range of motion and neck supple.   Skin:     General: Skin is warm and dry.   Neurological:      Mental Status: Mental status is at baseline.   Psychiatric:         Mood and Affect: Mood normal.         Behavior: Behavior normal.           Pertinent Diagnostic studies:    No results found for this or any previous visit (from the past 24 hours).    Assessment/Plan :       Anemia  -Most likely from hematuria and frequent blood draws recently during hospitalization  -Has had on/off mild anemia for years per chart review and appears to have a chronic disease component   Plan:  -cbc, iron, ferritin, sol trans in 2 weeks  -RTC in 2 months for MD visit and labs day prior cbc, iron, ferritin, sol boudreaux  -labs at [facility]  -can get iron and labs in [facility] area PRN if needed in future      Stage 1 renal  papillary carcinoma  -s/p excision on 5/14/25  -On obs with urology    Stage I Follow-up During Active Surveillance1-6  H&P annually  Laboratory tests annually, as clinically indicated  Abdominal imaging: Abdominal CT or MRI both with and  without IV contrast (unless otherwise contraindicated) within 6 months of surveillance initiation, then CT, MRI, or ultrasound (US) at least annually  Chest imaging: Chest x-ray or CT at baseline and annually as clinically indicated to assess for pulmonary metastases Consider repeat chest imaging if intervention is being contemplated  Consider renal mass biopsy at initiation of active surveillance or at follow-up, as clinically indicated  Follow-up may be individualized based on surgical status, treatment schedules, side effects, comorbidities, and symptoms    Hx of Venous embolism and thrombosis of superficial vessels of right lower extremity, 2022  -hx of superficial venous thrombosis of R saphenous vein in 2022, and was placed on Eliquis 5 BID  -Has since had RCC removed with clean margins noted on pathology and is on active surveillance for prostate cancer with urologist  -RCC and prostate cancer can increase risk of provoked clots  -No signs of clot recurrence and due to bleed and anemia can hold AC for now especially since he has been on AC for years and last clot was superficial  -Clot precautions given         Leukocytosis-resolved  -Most likely reactive  -Can continue to monitor        Prostate cancer  -Jeff 6 3+3 GG1 cT1c cN0 cMx  -Active surveillance  -On active surveillance with urology          Summary of orders placed this encounter:  Orders Placed This Encounter   Procedures    CBC Auto Differential    Ferritin    Iron and TIBC    Soluble Transferrin Receptor       Future Appointments   Date Time Provider Department Center   6/13/2025 10:00 AM Elliot Ybarra DO [facility] IM [place]   6/24/2025  8:30 AM LAB, [facility] RVPH LAB [facility]   6/30/2025  9:00 AM LAB, [facility] RVPH LAB [facility]   8/12/2025  9:20 AM Elliot Ybarra DO MET IM [place]   8/12/2025 10:30 AM LAB, [facility] RVPH LAB [facility]   8/14/2025 11:00 AM Mary Neal MD [facility] HEM ONC [facility]    9/2/2025  9:00 AM [facility] PATH, SPECIMEN DROP-OFF [facility] LABDRAW [facility]   9/2/2025  9:45 AM [facility] USOP5 [facility] USOUNDO [facility]   9/9/2025  2:00 PM Hitesh Jansen MD [facility] UROLOGY [facility]           Mary Neal MD   Hematology/oncology, [facility] [place]           [1] (Not in a hospital admission)

## 2025-06-10 NOTE — Clinical Note
-cbc, iron, ferritin, sol trans labs in 2 weeks -RTC in 2 months for MD visit and labs day prior cbc, iron, ferritin, sol boudreaux -labs at [facility]

## 2025-06-13 ENCOUNTER — OFFICE VISIT (OUTPATIENT)
Dept: INTERNAL MEDICINE | Facility: CLINIC | Age: 65
End: 2025-06-13
Payer: MEDICARE

## 2025-06-13 VITALS
TEMPERATURE: 98 F | HEIGHT: 68 IN | RESPIRATION RATE: 18 BRPM | WEIGHT: 238.31 LBS | SYSTOLIC BLOOD PRESSURE: 112 MMHG | HEART RATE: 80 BPM | DIASTOLIC BLOOD PRESSURE: 76 MMHG | BODY MASS INDEX: 36.12 KG/M2 | OXYGEN SATURATION: 96 %

## 2025-06-13 DIAGNOSIS — D50.0 IRON DEFICIENCY ANEMIA DUE TO CHRONIC BLOOD LOSS: ICD-10-CM

## 2025-06-13 DIAGNOSIS — I87.2 VENOUS INSUFFICIENCY OF BOTH LOWER EXTREMITIES: ICD-10-CM

## 2025-06-13 DIAGNOSIS — E66.812 CLASS 2 SEVERE OBESITY DUE TO EXCESS CALORIES WITH SERIOUS COMORBIDITY AND BODY MASS INDEX (BMI) OF 36.0 TO 36.9 IN ADULT: ICD-10-CM

## 2025-06-13 DIAGNOSIS — I10 PRIMARY HYPERTENSION: ICD-10-CM

## 2025-06-13 DIAGNOSIS — E66.01 CLASS 2 SEVERE OBESITY DUE TO EXCESS CALORIES WITH SERIOUS COMORBIDITY AND BODY MASS INDEX (BMI) OF 36.0 TO 36.9 IN ADULT: ICD-10-CM

## 2025-06-13 DIAGNOSIS — C61 CANCER OF PROSTATE: ICD-10-CM

## 2025-06-13 DIAGNOSIS — Z85.528 HISTORY OF RENAL CELL CARCINOMA: Primary | ICD-10-CM

## 2025-06-13 DIAGNOSIS — R73.03 PREDIABETES: ICD-10-CM

## 2025-06-13 DIAGNOSIS — I82.811 VENOUS EMBOLISM AND THROMBOSIS OF SUPERFICIAL VESSELS OF RIGHT LOWER EXTREMITY: ICD-10-CM

## 2025-06-13 DIAGNOSIS — E78.2 MIXED HYPERLIPIDEMIA: ICD-10-CM

## 2025-06-13 PROCEDURE — 1159F MED LIST DOCD IN RCRD: CPT | Mod: CPTII,S$GLB,, | Performed by: INTERNAL MEDICINE

## 2025-06-13 PROCEDURE — 3078F DIAST BP <80 MM HG: CPT | Mod: CPTII,S$GLB,, | Performed by: INTERNAL MEDICINE

## 2025-06-13 PROCEDURE — G2211 COMPLEX E/M VISIT ADD ON: HCPCS | Mod: S$GLB,,, | Performed by: INTERNAL MEDICINE

## 2025-06-13 PROCEDURE — 3044F HG A1C LEVEL LT 7.0%: CPT | Mod: CPTII,S$GLB,, | Performed by: INTERNAL MEDICINE

## 2025-06-13 PROCEDURE — 1126F AMNT PAIN NOTED NONE PRSNT: CPT | Mod: CPTII,S$GLB,, | Performed by: INTERNAL MEDICINE

## 2025-06-13 PROCEDURE — 1101F PT FALLS ASSESS-DOCD LE1/YR: CPT | Mod: CPTII,S$GLB,, | Performed by: INTERNAL MEDICINE

## 2025-06-13 PROCEDURE — 99215 OFFICE O/P EST HI 40 MIN: CPT | Mod: S$GLB,,, | Performed by: INTERNAL MEDICINE

## 2025-06-13 PROCEDURE — 99999 PR PBB SHADOW E&M-EST. PATIENT-LVL IV: CPT | Mod: PBBFAC,,, | Performed by: INTERNAL MEDICINE

## 2025-06-13 PROCEDURE — 3074F SYST BP LT 130 MM HG: CPT | Mod: CPTII,S$GLB,, | Performed by: INTERNAL MEDICINE

## 2025-06-13 PROCEDURE — 4010F ACE/ARB THERAPY RXD/TAKEN: CPT | Mod: CPTII,S$GLB,, | Performed by: INTERNAL MEDICINE

## 2025-06-13 PROCEDURE — 3008F BODY MASS INDEX DOCD: CPT | Mod: CPTII,S$GLB,, | Performed by: INTERNAL MEDICINE

## 2025-06-13 PROCEDURE — 1111F DSCHRG MED/CURRENT MED MERGE: CPT | Mod: CPTII,S$GLB,, | Performed by: INTERNAL MEDICINE

## 2025-06-13 PROCEDURE — 3288F FALL RISK ASSESSMENT DOCD: CPT | Mod: CPTII,S$GLB,, | Performed by: INTERNAL MEDICINE

## 2025-06-13 NOTE — PROGRESS NOTES
"Subjective     Patient ID: Ihsan Mays Sr. is a 64 y.o. male.    Chief Complaint: Hospital Follow Up    HPI  Pt with HTN, Prostate cancer, RCC s/p partial nephrectomy(5/25), HLD, Prediabetes, Severe obesity, Hx of Thrombosis of right lesser saphenous vein, B/L LE venous insufficiency/reflux is here for hospital f/u from 5/24/25-6/1/25 for gross hematuria after partial nephrectomy(5/14/25). Per records, "He presented earlier that day to [facility] after waking up that morning with sxs of urinary retention, then noticing he had some bright red urine and clots in his Wilkerson bag.  He reports that when he urinates, there is urine coming out outside of his Wilkerson.   He reported urgency and having to urinate every 10 minutes with minimal output of bright red bloody urine.  He had suprapubic abdominal tenderness on exam.  He had positive nitrite and hematuria on urinalysis, but other labs were fairly unremarkable.  CT urogram showed postoperative changes in the lower pole of the right kidney but no significant retroperitoneal hematoma.  There was a small amount of high-density material in the bladder.  He underwent bladder irrigation with a large amount of small clots present, though the catheter remained difficult to flush.  CTA noted that it was difficult to fully assess if there was an underlying arterial venous malformation, though this could be suspected given the relatively dense contrast enhancement along the mid and lower pole of the hypoenhancing area with tubular configuration.  [facility] ED d/w [facility] Urology and IR, and transferred him to [facility] for HLOC. Upon arrival his hemoglobin was 12.1 in his CTA abdomen and pelvis had dense contrast opacification of the right kidney and an irregular tubular structure or possibly vascular.  He was seen by Urology had a Wilkerson placed.  He had a persistent leukocytosis which was attributed to stress response from surgery and catheterization.  He had no " fever and his urine culture remained negative.  He developed an BERENICE which was attributed to urinary retention from blood clots and contrast. Pt had an IR embolization of PSA in R kidney. He did not require a transfusion while hospitalized. Hg has been stable in 8-9 range. Pt denies any further hematuria.     Review of Systems   Constitutional:  Negative for activity change, appetite change, chills, diaphoresis, fatigue, fever and unexpected weight change.   HENT:  Negative for nasal congestion, mouth sores, postnasal drip, rhinorrhea, sinus pressure/congestion, sneezing, sore throat, trouble swallowing and voice change.    Eyes:  Negative for discharge, itching and visual disturbance.   Respiratory:  Negative for cough, chest tightness, shortness of breath and wheezing.    Cardiovascular:  Negative for chest pain, palpitations and leg swelling.   Gastrointestinal:  Negative for abdominal pain, blood in stool, constipation, diarrhea, nausea and vomiting.   Endocrine: Negative for cold intolerance and heat intolerance.   Genitourinary:  Positive for hematuria (resolved). Negative for difficulty urinating, dysuria, flank pain and urgency.   Musculoskeletal:  Negative for arthralgias, back pain, myalgias and neck pain.   Integumentary:  Negative for rash and wound.   Allergic/Immunologic: Negative for environmental allergies and food allergies.   Neurological:  Negative for dizziness, tremors, seizures, syncope, weakness and headaches.   Hematological:  Negative for adenopathy. Does not bruise/bleed easily.   Psychiatric/Behavioral:  Negative for confusion, sleep disturbance and suicidal ideas. The patient is not nervous/anxious.           Objective     Physical Exam  Constitutional:       General: He is not in acute distress.     Appearance: Normal appearance. He is well-developed. He is not ill-appearing, toxic-appearing or diaphoretic.   HENT:      Head: Normocephalic and atraumatic.      Right Ear: External ear  normal.      Left Ear: External ear normal.      Nose: Nose normal.      Mouth/Throat:      Pharynx: No oropharyngeal exudate.   Eyes:      General: No scleral icterus.        Right eye: No discharge.         Left eye: No discharge.      Extraocular Movements: Extraocular movements intact.      Conjunctiva/sclera: Conjunctivae normal.      Pupils: Pupils are equal, round, and reactive to light.   Neck:      Thyroid: No thyromegaly.      Vascular: No JVD.   Cardiovascular:      Rate and Rhythm: Normal rate and regular rhythm.      Pulses: Normal pulses.      Heart sounds: Normal heart sounds. No murmur heard.  Pulmonary:      Effort: Pulmonary effort is normal. No respiratory distress.      Breath sounds: Normal breath sounds. No wheezing or rales.   Abdominal:      General: Bowel sounds are normal. There is no distension.      Palpations: Abdomen is soft.      Tenderness: There is no abdominal tenderness. There is no right CVA tenderness, left CVA tenderness, guarding or rebound.   Musculoskeletal:      Cervical back: Normal range of motion and neck supple. No rigidity.      Right lower leg: No edema.      Left lower leg: No edema.   Lymphadenopathy:      Cervical: No cervical adenopathy.   Skin:     General: Skin is warm and dry.      Capillary Refill: Capillary refill takes less than 2 seconds.      Coloration: Skin is not pale.      Findings: No rash.   Neurological:      General: No focal deficit present.      Mental Status: He is alert and oriented to person, place, and time. Mental status is at baseline.      Cranial Nerves: No cranial nerve deficit.      Sensory: No sensory deficit.      Motor: No weakness.      Coordination: Coordination normal.      Gait: Gait normal.      Deep Tendon Reflexes: Reflexes normal.   Psychiatric:         Mood and Affect: Mood normal.         Behavior: Behavior normal.         Thought Content: Thought content normal.         Judgment: Judgment normal.            Assessment and  Plan     1. History of renal cell carcinoma s/p R partial nephrectomy May 2025    2. Cancer of prostate    3. Iron deficiency anemia due to chronic blood loss    4. Prediabetes    5. Class 2 severe obesity due to excess calories with serious comorbidity and body mass index (BMI) of 36.0 to 36.9 in adult    6. Venous embolism and thrombosis of superficial vessels of right lower extremity, 2022    7. Venous insufficiency of both lower extremities    8. Primary hypertension    9. Mixed hyperlipidemia         RCC- s/p R robotic partial nephrectomy(5/14/25)     Gross hematuria- s/p IR embolization of PSA in R kidney     Anemia- followed by Hematology     Prostate cancer- stable, followed by Urology     HTN- stable on Coreg/Norvasc    -Diovan HCT stopped 2/2 mild BERENICE      ED- controlled on Viagra      HLD- stable on Lipitor      Thrombosis of right lesser saphenous vein- suspect 2/2 underlying prostate cancer    -holding Eliquis 2.5 mg BID for now 2/2 bleeding/anemia      Prediabetes- trending       B/L LE venous insufficiency/reflux- stable with compression stockings       Severe obesity- stable     F/u in 4 months       Over 1/2 of 40 minute visit spent reviewing pt's medical records, education/discussion of pt's medical conditions and medical management

## 2025-06-22 ENCOUNTER — PATIENT MESSAGE (OUTPATIENT)
Dept: UROLOGY | Facility: CLINIC | Age: 65
End: 2025-06-22
Payer: MEDICARE

## 2025-06-23 RX ORDER — LEVOFLOXACIN 750 MG/1
750 TABLET, FILM COATED ORAL DAILY
Qty: 7 TABLET | Refills: 0 | Status: SHIPPED | OUTPATIENT
Start: 2025-06-23 | End: 2025-06-30

## 2025-06-24 ENCOUNTER — LAB VISIT (OUTPATIENT)
Dept: LAB | Facility: HOSPITAL | Age: 65
End: 2025-06-24
Attending: STUDENT IN AN ORGANIZED HEALTH CARE EDUCATION/TRAINING PROGRAM
Payer: MEDICARE

## 2025-06-24 DIAGNOSIS — R31.0 GROSS HEMATURIA: ICD-10-CM

## 2025-06-24 DIAGNOSIS — D64.9 ANEMIA, UNSPECIFIED TYPE: ICD-10-CM

## 2025-06-24 LAB
ABSOLUTE EOSINOPHIL (OHS): 0.31 K/UL
ABSOLUTE MONOCYTE (OHS): 0.64 K/UL (ref 0.3–1)
ABSOLUTE NEUTROPHIL COUNT (OHS): 4.36 K/UL (ref 1.8–7.7)
BASOPHILS # BLD AUTO: 0.02 K/UL
BASOPHILS NFR BLD AUTO: 0.3 %
ERYTHROCYTE [DISTWIDTH] IN BLOOD BY AUTOMATED COUNT: 13.8 % (ref 11.5–14.5)
FERRITIN SERPL-MCNC: 348.9 NG/ML (ref 20–300)
HCT VFR BLD AUTO: 32.9 % (ref 40–54)
HGB BLD-MCNC: 10.8 GM/DL (ref 14–18)
IMM GRANULOCYTES # BLD AUTO: 0.02 K/UL (ref 0–0.04)
IMM GRANULOCYTES NFR BLD AUTO: 0.3 % (ref 0–0.5)
IRON SATN MFR SERPL: 25 % (ref 20–50)
IRON SERPL-MCNC: 71 UG/DL (ref 45–160)
LYMPHOCYTES # BLD AUTO: 1 K/UL (ref 1–4.8)
MCH RBC QN AUTO: 30.9 PG (ref 27–31)
MCHC RBC AUTO-ENTMCNC: 32.8 G/DL (ref 32–36)
MCV RBC AUTO: 94 FL (ref 82–98)
NUCLEATED RBC (/100WBC) (OHS): 0 /100 WBC
PLATELET # BLD AUTO: 174 K/UL (ref 150–450)
PMV BLD AUTO: 10.2 FL (ref 9.2–12.9)
RBC # BLD AUTO: 3.5 M/UL (ref 4.6–6.2)
RELATIVE EOSINOPHIL (OHS): 4.9 %
RELATIVE LYMPHOCYTE (OHS): 15.7 % (ref 18–48)
RELATIVE MONOCYTE (OHS): 10.1 % (ref 4–15)
RELATIVE NEUTROPHIL (OHS): 68.7 % (ref 38–73)
TIBC SERPL-MCNC: 280 UG/DL (ref 250–450)
TRANSFERRIN SERPL-MCNC: 189 MG/DL (ref 200–375)
WBC # BLD AUTO: 6.35 K/UL (ref 3.9–12.7)

## 2025-06-24 PROCEDURE — 36415 COLL VENOUS BLD VENIPUNCTURE: CPT | Mod: PN

## 2025-06-24 PROCEDURE — 85025 COMPLETE CBC W/AUTO DIFF WBC: CPT | Mod: PN

## 2025-06-24 PROCEDURE — 82728 ASSAY OF FERRITIN: CPT | Mod: PN

## 2025-06-24 PROCEDURE — 83540 ASSAY OF IRON: CPT | Mod: PN

## 2025-06-24 PROCEDURE — 84238 ASSAY NONENDOCRINE RECEPTOR: CPT | Mod: PN

## 2025-06-26 LAB — STFR SERPL-MCNC: 4.5 MG/L (ref 1.8–4.6)

## 2025-06-27 ENCOUNTER — RESULTS FOLLOW-UP (OUTPATIENT)
Dept: HEMATOLOGY/ONCOLOGY | Facility: CLINIC | Age: 65
End: 2025-06-27

## 2025-06-27 ENCOUNTER — TELEPHONE (OUTPATIENT)
Dept: HEMATOLOGY/ONCOLOGY | Facility: CLINIC | Age: 65
End: 2025-06-27
Payer: MEDICARE

## 2025-06-27 NOTE — TELEPHONE ENCOUNTER
----- Message from Mary Neal MD sent at 6/24/2025  8:34 PM CDT -----  Please let him know that labs are improving and we will continue to monitor  ----- Message -----  From: Lab, Background User  Sent: 6/24/2025   9:38 AM CDT  To: Mary Neal MD

## 2025-06-30 ENCOUNTER — LAB VISIT (OUTPATIENT)
Dept: LAB | Facility: HOSPITAL | Age: 65
End: 2025-06-30
Attending: NURSE PRACTITIONER
Payer: MEDICARE

## 2025-06-30 ENCOUNTER — RESULTS FOLLOW-UP (OUTPATIENT)
Dept: UROLOGY | Facility: CLINIC | Age: 65
End: 2025-06-30

## 2025-06-30 DIAGNOSIS — C61 PROSTATE CANCER: ICD-10-CM

## 2025-06-30 LAB — PSA SERPL-MCNC: 5.98 NG/ML

## 2025-06-30 PROCEDURE — 36415 COLL VENOUS BLD VENIPUNCTURE: CPT | Mod: PN

## 2025-06-30 PROCEDURE — 84153 ASSAY OF PSA TOTAL: CPT | Mod: PN

## 2025-07-03 ENCOUNTER — TELEPHONE (OUTPATIENT)
Dept: UROLOGY | Facility: CLINIC | Age: 65
End: 2025-07-03
Payer: MEDICARE

## 2025-07-07 ENCOUNTER — OFFICE VISIT (OUTPATIENT)
Dept: UROLOGY | Facility: CLINIC | Age: 65
End: 2025-07-07
Payer: MEDICARE

## 2025-07-07 VITALS
SYSTOLIC BLOOD PRESSURE: 124 MMHG | WEIGHT: 241.06 LBS | OXYGEN SATURATION: 95 % | HEIGHT: 68 IN | BODY MASS INDEX: 36.53 KG/M2 | DIASTOLIC BLOOD PRESSURE: 80 MMHG | HEART RATE: 74 BPM

## 2025-07-07 DIAGNOSIS — C64.1 RENAL CELL CARCINOMA OF RIGHT KIDNEY: Primary | ICD-10-CM

## 2025-07-07 LAB
BILIRUB SERPL-MCNC: NORMAL MG/DL
BLOOD URINE, POC: NORMAL
CLARITY, POC UA: CLEAR
COLOR, POC UA: YELLOW
GLUCOSE UR QL STRIP: NORMAL
KETONES UR QL STRIP: NORMAL
LEUKOCYTE ESTERASE URINE, POC: NORMAL
NITRITE, POC UA: NORMAL
PH, POC UA: 5
PROTEIN, POC: NORMAL
SPECIFIC GRAVITY, POC UA: 1.01
UROBILINOGEN, POC UA: NORMAL

## 2025-07-07 PROCEDURE — 4010F ACE/ARB THERAPY RXD/TAKEN: CPT | Mod: CPTII,S$GLB,, | Performed by: UROLOGY

## 2025-07-07 PROCEDURE — 1159F MED LIST DOCD IN RCRD: CPT | Mod: CPTII,S$GLB,, | Performed by: UROLOGY

## 2025-07-07 PROCEDURE — 3074F SYST BP LT 130 MM HG: CPT | Mod: CPTII,S$GLB,, | Performed by: UROLOGY

## 2025-07-07 PROCEDURE — 81002 URINALYSIS NONAUTO W/O SCOPE: CPT | Mod: S$GLB,,, | Performed by: UROLOGY

## 2025-07-07 PROCEDURE — 3079F DIAST BP 80-89 MM HG: CPT | Mod: CPTII,S$GLB,, | Performed by: UROLOGY

## 2025-07-07 PROCEDURE — 3288F FALL RISK ASSESSMENT DOCD: CPT | Mod: CPTII,S$GLB,, | Performed by: UROLOGY

## 2025-07-07 PROCEDURE — 1101F PT FALLS ASSESS-DOCD LE1/YR: CPT | Mod: CPTII,S$GLB,, | Performed by: UROLOGY

## 2025-07-07 PROCEDURE — 3044F HG A1C LEVEL LT 7.0%: CPT | Mod: CPTII,S$GLB,, | Performed by: UROLOGY

## 2025-07-07 PROCEDURE — 99024 POSTOP FOLLOW-UP VISIT: CPT | Mod: S$GLB,,, | Performed by: UROLOGY

## 2025-07-07 PROCEDURE — 3008F BODY MASS INDEX DOCD: CPT | Mod: CPTII,S$GLB,, | Performed by: UROLOGY

## 2025-07-07 PROCEDURE — 1126F AMNT PAIN NOTED NONE PRSNT: CPT | Mod: CPTII,S$GLB,, | Performed by: UROLOGY

## 2025-07-07 NOTE — PROGRESS NOTES
Subjective:      Ihsan Mays Sr. is a 64 y.o. male who returns today regarding his     Overall doing well     Right thigh numbness and tingling started 2 days ago AFTER he was discharged     Also with right testicular pain; he had epididymitis PREOP     No fever  No chills     Normal BMs  +flatus     Thelma reg diet.    Was admitted at [facility] and had embolization of bleeding from right renal resection site  Doing much better      Pain in leg and testes better  Mild discomfort at trocar sites but otherwise no ad pain    No hematuria    Doing well    The following portions of the patient's history were reviewed and updated as appropriate: allergies, current medications, past family history, past medical history, past social history, past surgical history and problem list.    Review of Systems  Pertinent items are noted in HPI.  A comprehensive multipoint review of systems was negative except as otherwise stated in the HPI.    Past Medical History:   Diagnosis Date    Anxiety 12/18/2024    Glaucoma     History of stomach ulcers     Hypertension     Lesser saphenous vein embolism     Prediabetes     Prostate cancer     Renal cell carcinoma     Right kidney mass     Unspecified glaucoma     Venous insufficiency of both lower extremities      Past Surgical History:   Procedure Laterality Date    COLONOSCOPY N/A 08/21/2020    Procedure: COLONOSCOPYPrepopik;  Surgeon: Danie Conway MD;  Location: [facility];  Service: Endoscopy;  Laterality: N/A;    COLONOSCOPY N/A 09/29/2023    Procedure: COLONOSCOPY;  Surgeon: Edel Langley MD;  Location: [facility];  Service: Endoscopy;  Laterality: N/A;    ESOPHAGOGASTRODUODENOSCOPY N/A 08/21/2020    Procedure: EGD (ESOPHAGOGASTRODUODENOSCOPY);  Surgeon: Danie Conway MD;  Location: [facility];  Service: Endoscopy;  Laterality: N/A;    ESOPHAGOGASTRODUODENOSCOPY N/A 09/29/2023    Procedure: EGD (ESOPHAGOGASTRODUODENOSCOPY);  Surgeon: Edel Langley MD;  Location: [facility]  ENDO;  Service: Endoscopy;  Laterality: N/A;    EYE SURGERY  8/2017 & 10/2017    FINGER SURGERY      right hand pinkie finger     FLEXIBLE CYSTOSCOPY N/A 5/14/2025    Procedure: CYSTOSCOPY, FLEXIBLE;  Surgeon: Hitesh Jansen MD;  Location: [facility];  Service: Urology;  Laterality: N/A;    GLAUCOMA SURGERY Bilateral     ROBOT-ASSISTED LAPAROSCOPIC PARTIAL NEPHRECTOMY USING DA BRAAK XI Right 5/14/2025    Procedure: XI ROBOTIC NEPHRECTOMY, PARTIAL;  Surgeon: Hitesh Jansen MD;  Location: [facility];  Service: Urology;  Laterality: Right;    STOMACH SURGERY      ulcers        Review of patient's allergies indicates:  No Known Allergies       Objective:   Vitals: There were no vitals taken for this visit.    Physical Exam   General: alert and oriented, no acute distress  Respiratory: Symmetric expansion, non-labored breathing  Cardiovascular: no peripheral edema  Abdomen: non distended  Skin: normal coloration and turgor, no rashes, no suspicious skin lesions noted  Neuro: no gross deficits  Psych: normal judgment and insight, normal mood/affect, and non-anxious    Physical Exam    Lab Review   Urinalysis demonstrates negative for all components  Lab Results   Component Value Date    WBC 6.35 06/24/2025    HGB 10.8 (L) 06/24/2025    HGB 13.6 (L) 02/10/2025    HCT 32.9 (L) 06/24/2025    HCT 41.0 02/10/2025    MCV 94 06/24/2025    MCV 94 02/10/2025     06/24/2025     (L) 02/10/2025     Lab Results   Component Value Date    CREATININE 1.4 05/31/2025    BUN 15 05/31/2025       Imaging  -    Assessment and Plan:   Renal cell carcinoma of right kidney  Papillary pT1a cN0 cM0 JOANNA sp robotic partial nephrectomy  Sp post op embolization; bleeding resolved without sequelae    RTC 9 2025 with renal US and BMP, CBC     Leg pain; improved  NV intact  Suspect lateral femoral cutaneous nerve irritation  Provided reassurance  Will monitor     Epididiymitis; present preop  UA and reflex cs  Bactrim * 14 days       OK to  cancel cysto; this was done in the OR.

## 2025-08-12 ENCOUNTER — LAB VISIT (OUTPATIENT)
Dept: LAB | Facility: HOSPITAL | Age: 65
End: 2025-08-12
Attending: STUDENT IN AN ORGANIZED HEALTH CARE EDUCATION/TRAINING PROGRAM
Payer: MEDICARE

## 2025-08-12 DIAGNOSIS — D64.9 ANEMIA, UNSPECIFIED TYPE: ICD-10-CM

## 2025-08-12 DIAGNOSIS — R31.0 GROSS HEMATURIA: ICD-10-CM

## 2025-08-12 LAB
ABSOLUTE EOSINOPHIL (OHS): 0.2 K/UL
ABSOLUTE MONOCYTE (OHS): 0.63 K/UL (ref 0.3–1)
ABSOLUTE NEUTROPHIL COUNT (OHS): 4.11 K/UL (ref 1.8–7.7)
BASOPHILS # BLD AUTO: 0.03 K/UL
BASOPHILS NFR BLD AUTO: 0.5 %
ERYTHROCYTE [DISTWIDTH] IN BLOOD BY AUTOMATED COUNT: 13.2 % (ref 11.5–14.5)
FERRITIN SERPL-MCNC: 192.6 NG/ML (ref 20–300)
HCT VFR BLD AUTO: 39.4 % (ref 40–54)
HGB BLD-MCNC: 13 GM/DL (ref 14–18)
IMM GRANULOCYTES # BLD AUTO: 0.01 K/UL (ref 0–0.04)
IMM GRANULOCYTES NFR BLD AUTO: 0.2 % (ref 0–0.5)
IRON SATN MFR SERPL: 25 % (ref 20–50)
IRON SERPL-MCNC: 77 UG/DL (ref 45–160)
LYMPHOCYTES # BLD AUTO: 1.12 K/UL (ref 1–4.8)
MCH RBC QN AUTO: 30.3 PG (ref 27–31)
MCHC RBC AUTO-ENTMCNC: 33 G/DL (ref 32–36)
MCV RBC AUTO: 92 FL (ref 82–98)
NUCLEATED RBC (/100WBC) (OHS): 0 /100 WBC
PLATELET # BLD AUTO: 203 K/UL (ref 150–450)
PMV BLD AUTO: 10.5 FL (ref 9.2–12.9)
RBC # BLD AUTO: 4.29 M/UL (ref 4.6–6.2)
RELATIVE EOSINOPHIL (OHS): 3.3 %
RELATIVE LYMPHOCYTE (OHS): 18.4 % (ref 18–48)
RELATIVE MONOCYTE (OHS): 10.3 % (ref 4–15)
RELATIVE NEUTROPHIL (OHS): 67.3 % (ref 38–73)
TIBC SERPL-MCNC: 311 UG/DL (ref 250–450)
TRANSFERRIN SERPL-MCNC: 210 MG/DL (ref 200–375)
WBC # BLD AUTO: 6.1 K/UL (ref 3.9–12.7)

## 2025-08-12 PROCEDURE — 84238 ASSAY NONENDOCRINE RECEPTOR: CPT | Mod: PN

## 2025-08-12 PROCEDURE — 85025 COMPLETE CBC W/AUTO DIFF WBC: CPT | Mod: PN

## 2025-08-12 PROCEDURE — 84466 ASSAY OF TRANSFERRIN: CPT | Mod: PN

## 2025-08-12 PROCEDURE — 36415 COLL VENOUS BLD VENIPUNCTURE: CPT | Mod: PN

## 2025-08-12 PROCEDURE — 82728 ASSAY OF FERRITIN: CPT | Mod: PN

## 2025-08-13 LAB — STFR SERPL-MCNC: 3.6 MG/L (ref 1.8–4.6)

## 2025-08-14 ENCOUNTER — OFFICE VISIT (OUTPATIENT)
Dept: HEMATOLOGY/ONCOLOGY | Facility: CLINIC | Age: 65
End: 2025-08-14
Payer: MEDICARE

## 2025-08-14 VITALS
HEIGHT: 68 IN | BODY MASS INDEX: 36.29 KG/M2 | SYSTOLIC BLOOD PRESSURE: 117 MMHG | OXYGEN SATURATION: 98 % | HEART RATE: 76 BPM | DIASTOLIC BLOOD PRESSURE: 80 MMHG | WEIGHT: 239.44 LBS

## 2025-08-14 DIAGNOSIS — Z71.2 ENCOUNTER TO DISCUSS TEST RESULTS: ICD-10-CM

## 2025-08-14 DIAGNOSIS — D64.9 ANEMIA, UNSPECIFIED TYPE: Primary | ICD-10-CM

## 2025-08-14 PROCEDURE — 3074F SYST BP LT 130 MM HG: CPT | Mod: CPTII,S$GLB,, | Performed by: STUDENT IN AN ORGANIZED HEALTH CARE EDUCATION/TRAINING PROGRAM

## 2025-08-14 PROCEDURE — 3288F FALL RISK ASSESSMENT DOCD: CPT | Mod: CPTII,S$GLB,, | Performed by: STUDENT IN AN ORGANIZED HEALTH CARE EDUCATION/TRAINING PROGRAM

## 2025-08-14 PROCEDURE — 3044F HG A1C LEVEL LT 7.0%: CPT | Mod: CPTII,S$GLB,, | Performed by: STUDENT IN AN ORGANIZED HEALTH CARE EDUCATION/TRAINING PROGRAM

## 2025-08-14 PROCEDURE — 99214 OFFICE O/P EST MOD 30 MIN: CPT | Mod: S$GLB,,, | Performed by: STUDENT IN AN ORGANIZED HEALTH CARE EDUCATION/TRAINING PROGRAM

## 2025-08-14 PROCEDURE — 3079F DIAST BP 80-89 MM HG: CPT | Mod: CPTII,S$GLB,, | Performed by: STUDENT IN AN ORGANIZED HEALTH CARE EDUCATION/TRAINING PROGRAM

## 2025-08-14 PROCEDURE — 1159F MED LIST DOCD IN RCRD: CPT | Mod: CPTII,S$GLB,, | Performed by: STUDENT IN AN ORGANIZED HEALTH CARE EDUCATION/TRAINING PROGRAM

## 2025-08-14 PROCEDURE — 3008F BODY MASS INDEX DOCD: CPT | Mod: CPTII,S$GLB,, | Performed by: STUDENT IN AN ORGANIZED HEALTH CARE EDUCATION/TRAINING PROGRAM

## 2025-08-14 PROCEDURE — 99999 PR PBB SHADOW E&M-EST. PATIENT-LVL III: CPT | Mod: PBBFAC,,, | Performed by: STUDENT IN AN ORGANIZED HEALTH CARE EDUCATION/TRAINING PROGRAM

## 2025-08-14 PROCEDURE — 4010F ACE/ARB THERAPY RXD/TAKEN: CPT | Mod: CPTII,S$GLB,, | Performed by: STUDENT IN AN ORGANIZED HEALTH CARE EDUCATION/TRAINING PROGRAM

## 2025-08-14 PROCEDURE — 1126F AMNT PAIN NOTED NONE PRSNT: CPT | Mod: CPTII,S$GLB,, | Performed by: STUDENT IN AN ORGANIZED HEALTH CARE EDUCATION/TRAINING PROGRAM

## 2025-08-14 PROCEDURE — 1101F PT FALLS ASSESS-DOCD LE1/YR: CPT | Mod: CPTII,S$GLB,, | Performed by: STUDENT IN AN ORGANIZED HEALTH CARE EDUCATION/TRAINING PROGRAM

## 2025-08-18 ENCOUNTER — PATIENT MESSAGE (OUTPATIENT)
Dept: INTERNAL MEDICINE | Facility: CLINIC | Age: 65
End: 2025-08-18
Payer: MEDICARE

## 2025-08-19 ENCOUNTER — OFFICE VISIT (OUTPATIENT)
Dept: INTERNAL MEDICINE | Facility: CLINIC | Age: 65
End: 2025-08-19
Payer: MEDICARE

## 2025-08-19 VITALS
DIASTOLIC BLOOD PRESSURE: 78 MMHG | HEART RATE: 66 BPM | OXYGEN SATURATION: 97 % | TEMPERATURE: 97 F | SYSTOLIC BLOOD PRESSURE: 118 MMHG | BODY MASS INDEX: 36.47 KG/M2 | WEIGHT: 239.88 LBS

## 2025-08-19 DIAGNOSIS — N18.31 STAGE 3A CHRONIC KIDNEY DISEASE: ICD-10-CM

## 2025-08-19 DIAGNOSIS — M94.0 COSTOCHONDRITIS, ACUTE: Primary | ICD-10-CM

## 2025-08-19 DIAGNOSIS — Z85.528 HISTORY OF RENAL CELL CARCINOMA: ICD-10-CM

## 2025-08-19 PROCEDURE — 4010F ACE/ARB THERAPY RXD/TAKEN: CPT | Mod: CPTII,S$GLB,, | Performed by: NURSE PRACTITIONER

## 2025-08-19 PROCEDURE — 3078F DIAST BP <80 MM HG: CPT | Mod: CPTII,S$GLB,, | Performed by: NURSE PRACTITIONER

## 2025-08-19 PROCEDURE — 3288F FALL RISK ASSESSMENT DOCD: CPT | Mod: CPTII,S$GLB,, | Performed by: NURSE PRACTITIONER

## 2025-08-19 PROCEDURE — 99999 PR PBB SHADOW E&M-EST. PATIENT-LVL III: CPT | Mod: PBBFAC,,, | Performed by: NURSE PRACTITIONER

## 2025-08-19 PROCEDURE — 1101F PT FALLS ASSESS-DOCD LE1/YR: CPT | Mod: CPTII,S$GLB,, | Performed by: NURSE PRACTITIONER

## 2025-08-19 PROCEDURE — 3008F BODY MASS INDEX DOCD: CPT | Mod: CPTII,S$GLB,, | Performed by: NURSE PRACTITIONER

## 2025-08-19 PROCEDURE — 1125F AMNT PAIN NOTED PAIN PRSNT: CPT | Mod: CPTII,S$GLB,, | Performed by: NURSE PRACTITIONER

## 2025-08-19 PROCEDURE — 3044F HG A1C LEVEL LT 7.0%: CPT | Mod: CPTII,S$GLB,, | Performed by: NURSE PRACTITIONER

## 2025-08-19 PROCEDURE — 99214 OFFICE O/P EST MOD 30 MIN: CPT | Mod: S$GLB,,, | Performed by: NURSE PRACTITIONER

## 2025-08-19 PROCEDURE — 1159F MED LIST DOCD IN RCRD: CPT | Mod: CPTII,S$GLB,, | Performed by: NURSE PRACTITIONER

## 2025-08-19 PROCEDURE — 1160F RVW MEDS BY RX/DR IN RCRD: CPT | Mod: CPTII,S$GLB,, | Performed by: NURSE PRACTITIONER

## 2025-08-19 PROCEDURE — 3074F SYST BP LT 130 MM HG: CPT | Mod: CPTII,S$GLB,, | Performed by: NURSE PRACTITIONER

## 2025-08-19 RX ORDER — TAMSULOSIN HYDROCHLORIDE 0.4 MG/1
CAPSULE ORAL
COMMUNITY
Start: 2025-06-28

## 2025-08-19 RX ORDER — METHYLPREDNISOLONE 4 MG/1
TABLET ORAL
Qty: 21 EACH | Refills: 0 | Status: SHIPPED | OUTPATIENT
Start: 2025-08-19

## 2025-09-02 ENCOUNTER — HOSPITAL ENCOUNTER (OUTPATIENT)
Dept: RADIOLOGY | Facility: OTHER | Age: 65
Discharge: HOME | End: 2025-09-02
Attending: NURSE PRACTITIONER
Payer: MEDICARE

## 2025-09-02 DIAGNOSIS — C64.1 RENAL CELL CARCINOMA OF RIGHT KIDNEY: ICD-10-CM

## 2025-09-02 PROCEDURE — 76700 US EXAM ABDOM COMPLETE: CPT | Mod: 26,,, | Performed by: RADIOLOGY

## 2025-09-02 PROCEDURE — 76700 US EXAM ABDOM COMPLETE: CPT | Mod: TC

## 2025-09-03 RX ORDER — PANTOPRAZOLE SODIUM 20 MG/1
20 TABLET, DELAYED RELEASE ORAL DAILY
Qty: 90 TABLET | Refills: 3 | Status: SHIPPED | OUTPATIENT
Start: 2025-09-03

## (undated) DEVICE — SUT VICRYL 3-0 27 SH

## (undated) DEVICE — SOL POVIDONE SCRUB IODINE 4 OZ

## (undated) DEVICE — SUT MCRYL PLUS 4-0 PS2 27IN

## (undated) DEVICE — HEMOSTAT SURGICEL 4X8IN

## (undated) DEVICE — SUT 0 VICRYL PLUS CT-1 27IN

## (undated) DEVICE — SOL ELECTROLUBE ANTI-STIC

## (undated) DEVICE — ELECTRODE PATIENT RETURN DISP

## (undated) DEVICE — GLOVE SENSICARE PI ORTHO 7.0

## (undated) DEVICE — COVER TIP CURVED SCISSORS XI

## (undated) DEVICE — DRAPE COLUMN DAVINCI XI

## (undated) DEVICE — BIT DRILL TWST CBLT/CHRM 1/8X5

## (undated) DEVICE — ADHESIVE DERMABOND ADVANCED

## (undated) DEVICE — CATH ELASTOMER 20FR 10ML

## (undated) DEVICE — KIT SURGIFLO HEMOSTATIC MATRIX

## (undated) DEVICE — ELECTRODE BLD EXT INSUL 1

## (undated) DEVICE — IRRIGATOR ENDOSCOPY DISP.

## (undated) DEVICE — GLOVE BIOGEL 7.0

## (undated) DEVICE — OBTURATOR BLADELESS 8MM XI CLR

## (undated) DEVICE — ELECTRODE REM PLYHSV RETURN 9

## (undated) DEVICE — BAG TISSUE RETRIEVAL 225ML

## (undated) DEVICE — APPLICATOR ENDOSCOPIC

## (undated) DEVICE — SUT VICRYL CTD 2-0 GI 27 SH

## (undated) DEVICE — TOWEL OR DISP STRL BLUE 4/PK

## (undated) DEVICE — TROCAR ENDOPATH XCEL 12X100MM

## (undated) DEVICE — SCALPEL #15 BLADE STRL DISP.

## (undated) DEVICE — CLIP HEMO-LOK MLX LARGE LF

## (undated) DEVICE — DRAPE ARM DAVINCI XI

## (undated) DEVICE — STAPLER SKIN PROXIMATE WIDE

## (undated) DEVICE — SYS WECK EFX SHIELD PORT CLOS

## (undated) DEVICE — SET TRI-LUMEN FILTERED TUBE

## (undated) DEVICE — BAG DRAIN ANTI REFLUX 2000ML

## (undated) DEVICE — TROCAR ENDOPATH XCEL 5X100MM

## (undated) DEVICE — SUT STRATAFIX SPIRAL 20CM

## (undated) DEVICE — SEALER VESSEL EXTEND

## (undated) DEVICE — PORT ACCESS 5MM W/100MM

## (undated) DEVICE — DRESSING MEPORE ISLAND 31/2X4

## (undated) DEVICE — SUT CTD VICRYL 0 UND BR SUT

## (undated) DEVICE — SYS SEE SHARP SCP ANTIFG LNG

## (undated) DEVICE — NDL INSUFFLATION VERRES 120MM

## (undated) DEVICE — SUT STRATAFIX 3-0 SH 8IN

## (undated) DEVICE — SEAL CANN UNIVERSAL 5-12MM

## (undated) DEVICE — SUT ETHIBOND 0 CR/CT-2 8-18

## (undated) DEVICE — TRAY DO THE ROBOT

## (undated) DEVICE — BLADE SURG STAINLESS STEEL #15

## (undated) DEVICE — SOL IRRI STRL WATER 1000ML

## (undated) DEVICE — CONTAINER SPEC OR STRL 4.5OZ